# Patient Record
Sex: MALE | Race: WHITE | Employment: OTHER | URBAN - METROPOLITAN AREA
[De-identification: names, ages, dates, MRNs, and addresses within clinical notes are randomized per-mention and may not be internally consistent; named-entity substitution may affect disease eponyms.]

---

## 2017-01-24 DIAGNOSIS — R91.8 LUNG NODULES: ICD-10-CM

## 2017-01-24 DIAGNOSIS — J90 UNSPECIFIED PLEURAL EFFUSION: ICD-10-CM

## 2017-01-24 DIAGNOSIS — J44.9 OBSTRUCTIVE CHRONIC BRONCHITIS WITHOUT EXACERBATION (HCC): ICD-10-CM

## 2017-01-24 DIAGNOSIS — R05.9 COUGH: Primary | ICD-10-CM

## 2017-01-24 DIAGNOSIS — J90 PLEURAL EFFUSION, LEFT: Primary | ICD-10-CM

## 2017-01-27 ENCOUNTER — HOSPITAL ENCOUNTER (OUTPATIENT)
Dept: CT IMAGING | Age: 82
Discharge: HOME | End: 2017-01-27
Attending: INTERNAL MEDICINE
Payer: MEDICARE

## 2017-01-27 DIAGNOSIS — J90 PLEURAL EFFUSION, LEFT: ICD-10-CM

## 2017-01-27 DIAGNOSIS — R91.8 LUNG NODULES: ICD-10-CM

## 2017-01-27 PROCEDURE — 74011250636 HC RX REV CODE- 250/636: Performed by: INTERNAL MEDICINE

## 2017-01-27 PROCEDURE — 74011636320 HC RX REV CODE- 636/320: Performed by: INTERNAL MEDICINE

## 2017-01-27 PROCEDURE — 71260 CT THORAX DX C+: CPT

## 2017-01-27 RX ORDER — SODIUM CHLORIDE 0.9 % (FLUSH) 0.9 %
10 SYRINGE (ML) INJECTION
Status: COMPLETED | OUTPATIENT
Start: 2017-01-27 | End: 2017-01-27

## 2017-01-27 RX ORDER — SODIUM CHLORIDE 9 MG/ML
50 INJECTION, SOLUTION INTRAVENOUS
Status: COMPLETED | OUTPATIENT
Start: 2017-01-27 | End: 2017-01-27

## 2017-01-27 RX ADMIN — Medication 10 ML: at 09:09

## 2017-01-27 RX ADMIN — SODIUM CHLORIDE 50 ML/HR: 900 INJECTION, SOLUTION INTRAVENOUS at 09:09

## 2017-01-27 RX ADMIN — IOPAMIDOL 70 ML: 612 INJECTION, SOLUTION INTRAVENOUS at 09:09

## 2017-01-27 NOTE — PROGRESS NOTES
Discussed results with patient--only trace effusion. Feeling better. No cough or sob. , can f/u with PCP Dr Jeffery Kapoor in 4 months

## 2017-01-30 ENCOUNTER — TELEPHONE (OUTPATIENT)
Dept: INTERNAL MEDICINE CLINIC | Age: 82
End: 2017-01-30

## 2017-01-30 NOTE — TELEPHONE ENCOUNTER
I called and spoke with Sunni Ruiz. She was made aware that CT did not have any pneumonia or masses. She was made aware that it did show some lung nodules that have not changed. She was advised to come back into the office in a few months to get potential xray. She verbalized understanding.

## 2017-01-30 NOTE — TELEPHONE ENCOUNTER
Patient's daughter, Vilma Velasquez, states she needs a call back in reference to getting CT Scan results & discuss pan of care. Please call.  Thank you

## 2017-02-22 RX ORDER — ROSUVASTATIN CALCIUM 20 MG/1
TABLET, COATED ORAL
Qty: 90 TAB | Refills: 1 | Status: SHIPPED | OUTPATIENT
Start: 2017-02-22 | End: 2017-10-17 | Stop reason: SDUPTHER

## 2017-03-02 ENCOUNTER — OFFICE VISIT (OUTPATIENT)
Dept: NEUROLOGY | Age: 82
End: 2017-03-02

## 2017-03-02 VITALS
OXYGEN SATURATION: 95 % | HEART RATE: 61 BPM | DIASTOLIC BLOOD PRESSURE: 70 MMHG | WEIGHT: 122 LBS | HEIGHT: 64 IN | SYSTOLIC BLOOD PRESSURE: 122 MMHG | BODY MASS INDEX: 20.83 KG/M2

## 2017-03-02 DIAGNOSIS — F98.8 ADD (ATTENTION DEFICIT DISORDER): Primary | ICD-10-CM

## 2017-03-02 RX ORDER — ATOMOXETINE 80 MG/1
80 CAPSULE ORAL DAILY
Qty: 30 CAP | Refills: 5 | Status: SHIPPED | OUTPATIENT
Start: 2017-03-02 | End: 2017-11-01 | Stop reason: SDUPTHER

## 2017-03-02 NOTE — PATIENT INSTRUCTIONS

## 2017-03-02 NOTE — MR AVS SNAPSHOT
Visit Information Date & Time Provider Department Dept. Phone Encounter #  
 3/2/2017 12:00 PM Finn Mcgee MD [facility] [phone] [id number] Your Appointments 5/23/2017 11:30 AM  
ROUTINE CARE with Velia Richards III, DO [facility] Appt Note: 6 month follow up  
 [address] [id number]  
[address]  [address] [facility] Upcoming Health Maintenance Date Due  
 GLAUCOMA SCREENING Q2Y 12/30/2016 MEDICARE YEARLY EXAM 5/24/2017 DTaP/Tdap/Td series (2 - Td) 3/14/2022 Allergies as of 3/2/2017  Review Complete On: 3/2/2017 By: Elizabeth Flanagan Severity Noted Reaction Type Reactions Zetia [Ezetimibe]  04/12/2010    Other (comments) C/o back pain Current Immunizations  Reviewed on 11/23/2015 Name Date Influenza High Dose Vaccine PF 10/15/2015 Influenza Vaccine 10/15/2014, 10/7/2013 Influenza Vaccine Whole 11/1/2010 Pneumococcal Conjugate (PCV-13) 11/23/2015 Pneumococcal Vaccine (Unspecified Type) 11/14/2011, 10/1/1998 TDAP Vaccine 3/14/2012 Zoster 9/24/2011 Not reviewed this visit Vitals BP  
  
  
  
  
  
 122/70 BMI and BSA Data Body Mass Index Body Surface Area  
 20.94 kg/m 2 1.58 m 2 Preferred Pharmacy Pharmacy Name Phone [address] [phone] Your Updated Medication List  
  
   
This list is accurate as of: 3/2/17 12:55 PM.  Always use your most recent med list.  
  
  
  
  
 aspirin 81 mg tablet Take 81 mg by mouth daily. atomoxetine 80 mg capsule Commonly known as:  STRATTERA Take 1 Cap by mouth daily. finasteride 5 mg tablet Commonly known as:  PROSCAR Take 5 mg by mouth daily. rosuvastatin 20 mg tablet Commonly known as:  CRESTOR  
 TAKE ONE TABLET BY MOUTH ONE TIME DAILY SPIRIVA WITH HANDIHALER 18 mcg inhalation capsule Generic drug:  tiotropium INHALE ONE CAPSULE BY MOUTH ONE TIME DAILY  
  
 tamsulosin 0.4 mg capsule Commonly known as:  FLOMAX Take 0.4 mg by mouth daily. Prescriptions Sent to Pharmacy Refills  
 atomoxetine (STRATTERA) 80 mg capsule 5 Sig: Take 1 Cap by mouth daily. Class: Normal  
 Pharmacy: [address], [address] [place] [address] #: [phone] Route: Oral  
  
Patient Instructions A Healthy Lifestyle: Care Instructions Your Care Instructions A healthy lifestyle can help you feel good, stay at a healthy weight, and have plenty of energy for both work and play. A healthy lifestyle is something you can share with your whole family. A healthy lifestyle also can lower your risk for serious health problems, such as high blood pressure, heart disease, and diabetes. You can follow a few steps listed below to improve your health and the health of your family. Follow-up care is a key part of your treatment and safety. Be sure to make and go to all appointments, and call your doctor if you are having problems. Its also a good idea to know your test results and keep a list of the medicines you take. How can you care for yourself at home? · Do not eat too much sugar, fat, or fast foods. You can still have dessert and treats now and then. The goal is moderation. · Start small to improve your eating habits. Pay attention to portion sizes, drink less juice and soda pop, and eat more fruits and vegetables. ¨ Eat a healthy amount of food. A 3-ounce serving of meat, for example, is about the size of a deck of cards. Fill the rest of your plate with vegetables and whole grains. ¨ Limit the amount of soda and sports drinks you have every day. Drink more water when you are thirsty. ¨ Eat at least 5 servings of fruits and vegetables every day.  It may seem like a lot, but it is not hard to reach this goal. A serving or helping is 1 piece of fruit, 1 cup of vegetables, or 2 cups of leafy, raw vegetables. Have an apple or some carrot sticks as an afternoon snack instead of a candy bar. Try to have fruits and/or vegetables at every meal. 
· Make exercise part of your daily routine. You may want to start with simple activities, such as walking, bicycling, or slow swimming. Try to be active 30 to 60 minutes every day. You do not need to do all 30 to 60 minutes all at once. For example, you can exercise 3 times a day for 10 or 20 minutes. Moderate exercise is safe for most people, but it is always a good idea to talk to your doctor before starting an exercise program. 
· Keep moving. Naheed Boys the lawn, work in the garden, or [employer]. Take the stairs instead of the elevator at work. · If you smoke, quit. People who smoke have an increased risk for heart attack, stroke, cancer, and other lung illnesses. Quitting is hard, but there are ways to boost your chance of quitting tobacco for good. ¨ Use nicotine gum, patches, or lozenges. ¨ Ask your doctor about stop-smoking programs and medicines. ¨ Keep trying. In addition to reducing your risk of diseases in the future, you will notice some benefits soon after you stop using tobacco. If you have shortness of breath or asthma symptoms, they will likely get better within a few weeks after you quit. · Limit how much alcohol you drink. Moderate amounts of alcohol (up to 2 drinks a day for men, 1 drink a day for women) are okay. But drinking too much can lead to liver problems, high blood pressure, and other health problems. Family health If you have a family, there are many things you can do together to improve your health. · Eat meals together as a family as often as possible. · Eat healthy foods. This includes fruits, vegetables, lean meats and dairy, and whole grains. · Include your family in your fitness plan. Most people think of activities such as jogging or tennis as the way to fitness, but there are many ways you and your family can be more active. Anything that makes you breathe hard and gets your heart pumping is exercise. Here are some tips: 
¨ Walk to do errands or to take your child to school or the bus. ¨ Go for a family bike ride after dinner instead of watching TV. Where can you learn more? Go to http://samina-cyn.info/. Enter U288 in the search box to learn more about \"A Healthy Lifestyle: Care Instructions. \" Current as of: July 26, 2016 Content Version: 11.1 © 1571-2710 [employer]. Care instructions adapted under license by [employer] (which disclaims liability or warranty for this information). If you have questions about a medical condition or this instruction, always ask your healthcare professional. [address] any warranty or liability for your use of this information. Introducing [facility]! Harvinder Norris introduces [employer] patient portal. Now you can access parts of your medical record, email your doctor's office, and request medication refills online. 1. In your internet browser, go to [email or web address] 2. Click on the First Time User? Click Here link in the Sign In box. You will see the New Member Sign Up page. 3. Enter your [employer] Access Code exactly as it appears below. You will not need to use this code after youve completed the sign-up process. If you do not sign up before the expiration date, you must request a new code. · [employer] Access Code: [email or web address] Expires: 5/31/2017 11:53 AM 
 
4. Enter the last four digits of your Social Security Number (xxxx) and Date of Birth (mm/dd/yyyy) as indicated and click Submit. You will be taken to the next sign-up page. 5. Create a [employer] ID. This will be your [employer] login ID and cannot be changed, so think of one that is secure and easy to remember. 6. Create a [employer] password. You can change your password at any time. 7. Enter your Password Reset Question and Answer. This can be used at a later time if you forget your password. 8. Enter your e-mail address. You will receive e-mail notification when new information is available in [address] 9. Click Sign Up. You can now view and download portions of your medical record. 10. Click the Download Summary menu link to download a portable copy of your medical information. If you have questions, please visit the Frequently Asked Questions section of the [employer] website. Remember, [employer] is NOT to be used for urgent needs. For medical emergencies, dial 911. Now available from your iPhone and Android! Please provide this summary of care documentation to your next provider. Your primary care clinician is listed as Vaughn De La Cruz If you have any questions after today's visit, please call [phone].
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
## 2017-05-16 ENCOUNTER — OFFICE VISIT (OUTPATIENT)
Dept: INTERNAL MEDICINE CLINIC | Age: 82
End: 2017-05-16

## 2017-05-16 VITALS
OXYGEN SATURATION: 96 % | SYSTOLIC BLOOD PRESSURE: 117 MMHG | WEIGHT: 120 LBS | TEMPERATURE: 97.7 F | BODY MASS INDEX: 20.49 KG/M2 | DIASTOLIC BLOOD PRESSURE: 64 MMHG | HEART RATE: 79 BPM | RESPIRATION RATE: 18 BRPM | HEIGHT: 64 IN

## 2017-05-16 DIAGNOSIS — E78.2 MIXED HYPERLIPIDEMIA: Chronic | ICD-10-CM

## 2017-05-16 DIAGNOSIS — I10 ESSENTIAL HYPERTENSION: Chronic | ICD-10-CM

## 2017-05-16 DIAGNOSIS — J43.9 PULMONARY EMPHYSEMA, UNSPECIFIED EMPHYSEMA TYPE (HCC): Primary | Chronic | ICD-10-CM

## 2017-05-16 DIAGNOSIS — Z00.00 ROUTINE GENERAL MEDICAL EXAMINATION AT A HEALTH CARE FACILITY: ICD-10-CM

## 2017-05-16 DIAGNOSIS — Z13.39 SCREENING FOR ALCOHOLISM: ICD-10-CM

## 2017-05-16 DIAGNOSIS — R73.9 HYPERGLYCEMIA: ICD-10-CM

## 2017-05-16 DIAGNOSIS — Z13.31 SCREENING FOR DEPRESSION: ICD-10-CM

## 2017-05-16 DIAGNOSIS — R73.03 PREDIABETES: ICD-10-CM

## 2017-05-16 NOTE — PATIENT INSTRUCTIONS
Medicare Part B Preventive Services Limitations Recommendation/Date completed if known Scheduled/ Next Due   Bone Mass Measurement  (age 72 & older, biennial) Requires diagnosis related to osteoporosis or estrogen deficiency. Biennial benefit unless patient has history of long-term glucocorticoid tx or baseline is needed because initial test was by other method Completed:  n/a     Recommended every 2 years n/a   Cardiovascular Screening Blood Tests (every 5 years)  Total cholesterol, HDL, Triglycerides Order as a panel if possible Completed:  5/2016     Recommended annually DUE: Due   Colorectal Cancer Screening  -Fecal occult blood test (annual)  -Flexible sigmoidoscopy (5y)  -Screening colonoscopy (10y)  -Barium Enema   Completed:  8/5/15 Dr. Renee Dumas        Recommended every (10 ) years DUE: complete-no routine colonoscopy follow up due to age    Counseling to Prevent Tobacco Use (up to 8 sessions per year)  - Counseling greater than 3 and up to 10 minutes  - Counseling greater than 10 minutes Patients must be asymptomatic of tobacco-related conditions to receive as preventive service You currently smoke 1/2 PPD . You have tried quitting. call [phone]  When you are ready to quit   Diabetes Screening Tests (at least every 3 years, Medicare covers annually or at 6-month intervals for prediabetic patients)     Fasting blood sugar (FBS) or glucose tolerance test (GTT) Patient must be diagnosed with one of the following:  -Hypertension, Dyslipidemia, obesity, previous impaired FBS or GTT  Or any two of the following: overweight, FH of diabetes, age ? 72, history of gestational diabetes, birth of baby weighing more than 9 pounds Completed:  5/2016        Recommended annually DUE: Today   Diabetes Self-Management Training (DSMT) (no USPSTF recommendation) Requires referral by treating physician for patient with diabetes or renal disease.  10 hours of initial DSMT session of no less than 30 minutes each in a continuous 12-month period. 2 hours of follow-up DSMT in subsequent years. n/a n/a   Glaucoma Screening (no USPSTF recommendation) Diabetes mellitus, family history, , age 48 or over,  American, age 72 or over Completed:  [facility]  2015        Recommended annually DUE:    Human Immunodeficiency Virus (HIV) Screening (annually for increased risk patients)  HIV-1 and HIV-2 by EIA, NOHELIA, rapid antibody test, or oral mucosa transudate Patient must be at increased risk for HIV infection per USPSTF guidelines or pregnant. Tests covered annually for patients at increased risk. Pregnant patients may receive up to 3 test during pregnancy. n/a n/a   Medical Nutrition Therapy (MNT) (for diabetes or renal disease not recommended schedule) Requires referral by treating physician for patient with diabetes or renal disease. Can be provided in same year as diabetes self-management training (DSMT), and CMS recommends medical nutrition therapy take place after DSMT. Up to 3 hours for initial year and 2 hours in subsequent years. n/a n/a   Prostate Cancer Screening (annually up to age 76)  - Digital rectal exam (DAVID)  - Prostate specific antigen (PSA) Annually (age 48 or over), DAVID not paid separately when covered E/M service is provided on same date Completed:  Managed by Dr. Lexy Whaley  None recorded in chart     Recommended annually DUE: per Dr. Jenny Loyd Vaccination (annually)   Completed:  8/2016    Recommended annually     DUE every Fall    Pneumococcal Vaccination (once after 65)   Completed:  11/14/11 PSV 23  11/23/15 PCV 13 Complete   Hepatitis B Vaccinations (if medium/high risk) Medium/high risk factors: End-stage renal disease,  Hemophiliacs who received Factor VIII or IX concentrates, Clients of institutions for the mentally retarded, Persons who live in the same house as a HepB virus carrier, Homosexual men, Illicit injectable drug abusers.  n/a n/a   Screening Mammography (biennial age 54-69)? Annually (age 36 or over) Completed:   n/a     Recommended annually n/a   Screening Pap Tests and Pelvic Examination (up to age 79 and after 79 if unknown history or abnormal study last 10 years) Every 25 months except high risk Completed:     n/a     Recommended every 2 years n/a   Ultrasound Screening for Abdominal Aortic Aneurysm (AAA) (once) Patient must be referred through IPPE and not have had a screening for abdominal aortic aneurysm before under Medicare. Limited to patients who meet one of the following criteria:  - Men who are 73-68 years old and have smoked more than 100 cigarettes in their lifetime.  -Anyone with a FH of AAA  -Anyone recommended for screening by USPSTF Not covered by Medicare as preventive.     n/a n/a   Thanks for coming in today. It was nice to spend some time with you. If you have any questions about your visit today, please call [phone] and ask for Tone Meyers. Advance Directives: After Your Visit  Your Care Instructions  An advance directive is a legal way to state your wishes at the end of your life. It tells your family and your doctor what to do if you can no longer say what you want. There are two main types of advance directives. You can change them any time that your wishes change. · A living will tells your family and your doctor your wishes about life support and other treatment. · A medical power of  lets you name a person to make treatment decisions for you when you can't speak for yourself. This person is called a health care agent. If you do not have an advance directive, decisions about your medical care may be made by a doctor or a  who doesn't know you. It may help to think of an advance directive as a gift to the people who care for you. If you have one, they won't have to make tough decisions by themselves. Follow-up care is a key part of your treatment and safety.  Be sure to make and go to all appointments, and call your doctor if you are having problems. It's also a good idea to know your test results and keep a list of the medicines you take. How can you care for yourself at home? · Discuss your wishes with your loved ones and your doctor. This way, there are no surprises. · Many states have a unique form. Or you might use a universal form that has been approved by many states. This kind of form can sometimes be completed and stored online. Your electronic copy will then be available wherever you have a connection to the Internet. In most cases, doctors will respect your wishes even if you have a form from a different state. · You don't need a  to do an advance directive. But you may want to get legal advice. · Think about these questions when you prepare an advance directive:  ¨ Who do you want to make decisions about your medical care if you are not able to? Many people choose a family member, close friend, or doctor. ¨ Do you know enough about life support methods that might be used? If not, talk to your doctor so you understand. ¨ What are you most afraid of that might happen? You might be afraid of having pain, losing your independence, or being kept alive by machines. ¨ Where would you prefer to die? Choices include your home, a hospital, or a nursing home. ¨ Would you like to have information about hospice care to support you and your family? ¨ Do you want to donate organs when you die? ¨ Do you want certain Shinto practices performed before you die? If so, put your wishes in the advance directive. · Read your advance directive every year, and make changes as needed. When should you call for help? Be sure to contact your doctor if you have any questions. Where can you learn more? Go to [employer]  Enter R264 in the search box to learn more about \"Advance Directives: After Your Visit. \"   © 8484-7203 [employer].  Care instructions adapted under license by [employer] (which disclaims liability or warranty for this information). This care instruction is for use with your licensed healthcare professional. If you have questions about a medical condition or this instruction, always ask your healthcare professional. [address] any warranty or liability for your use of this information. Content Version: 86.3.318109; Current as of: February 20, 2015. Come back for fasting labs, lab opens 8 am, mon-fri. No appt needed.

## 2017-05-16 NOTE — PROGRESS NOTES
Ambrose Nagy is a 80 y.o. male who presents for evaluation of routine follow up. Last seen by me 2016. Saw dr Enrique Vu dec 29 for uri issues. Ended up having CT chest, had small pleural effusion, otherwise ok. He is doing fine now, no issues. ROS:  Constitutional: negative for fevers, chills, anorexia and weight loss  Eyes:   negative for visual disturbance and irritation  ENT:   negative for tinnitus,sore throat,nasal congestion,ear pain,hoarseness  Respiratory:  negative for cough, hemoptysis, dyspnea,wheezing  CV:   negative for chest pain, palpitations, lower extremity edema  GI:   negative for nausea, vomiting, diarrhea, abdominal pain,melena  Genitourinary: negative for frequency, dysuria and hematuria  Musculoskel: negative for myalgias, arthralgias, back pain, muscle weakness, joint pain  Neurological:  negative for headaches, dizziness, focal weakness, numbness  Psychiatric:     Negative for depression or anxiety      Past Medical History:   Diagnosis Date    Arthritis     BPH (benign prostatic hypertrophy) 2010    COPD (chronic obstructive pulmonary disease) with emphysema ([facility] [address] 2012    HLD (hyperlipidemia) 2010    HTN (hypertension) 2010    Memory disorder     Psychiatric disorder     anxiety and depression       Past Surgical History:   Procedure Laterality Date    COLONOSCOPY,NURA FERNÁNDEZ,SNARE  2015    2 sessile sigmoid polyps, 3 & 5 mm; Dr. Gilbert Patterson; no further screening recommended    ENDOSCOPY, COLON, DIAGNOSTIC      negative; 10 year repeat; Dr. Daniella Perez  years ago    left    HX HERNIA REPAIR      right inguinal    CO COLSC FLX W/RMVL OF TUMOR POLYP LESION SNARE TQ  2012            Family History   Problem Relation Age of Onset    Heart Disease Mother       at 80    Heart Disease Father       at 80    Liver Disease Brother     Stroke Brother     Heart Attack Brother     Other Sister 8     ?  polio    Cancer Sister      lung       Social History     Social History    Marital status:      Spouse name: N/A    Number of children: N/A    Years of education: N/A     Occupational History    Not on file. Social History Main Topics    Smoking status: Current Some Day Smoker     Packs/day: 0.50     Years: 60.00     Types: Cigarettes    Smokeless tobacco: Former User     Quit date: 10/5/2014      Comment: smokes about 10 cigs per day    Alcohol use 4.2 oz/week     7 Glasses of wine per week      Comment: 1 drinks per evening    Drug use: No    Sexual activity: Not on file     Other Topics Concern    Not on file     Social History Narrative            Visit Vitals    /64 (BP 1 Location: Left arm, BP Patient Position: Sitting)    Pulse 79    Temp 97.7 °F (36.5 °C) (Oral)    Resp 18    Ht 5' 4\" (1.626 m)    Wt 120 lb (54.4 kg)    SpO2 96%    BMI 20.6 kg/m2       Physical Examination:   General - Well appearing male  HEENT - PERRL, TM no erythema/opacification, normal nasal turbinates, no oropharyngeal erythema or exudate, MMM  Neck - supple, no bruits, no thyroidomegaly, no lymphadenopathy  Pulm - clear to auscultation bilaterally--good air flow, no adventitious sounds. Cardio - RRR, normal S1 S2, no murmur  Abd - soft, nontender, no masses, no HSM  Extrem - no edema, +2 distal pulses  Neuro-  No focal deficits, CN intact     Assessment/Plan:    1  Copd--controlled with spiriva  2.  hyperlipids--on crestor, check flp  3. Prediabetes--last a1c 6.0, check again  4.  adhd--controlled with strattera  5. Tobacco abuse--continues to smoke some  6. bph--on proscar, flomax  7.   Routine adult health maintenance--will make appt to be screened for glaucoma    rtc 6 months        Maggie Archibald III, DO

## 2017-05-16 NOTE — PROGRESS NOTES
Reviewed record in preparation for visit and have obtained necessary documentation. Identified pt with two pt identifiers(name and ). Chief Complaint   Patient presents with    Cholesterol Problem     follow up       Health Maintenance Due   Topic Date Due    GLAUCOMA SCREENING Q2Y  2016       Mr. aHrrington has a reminder for a \"due or due soon\" health maintenance. I have asked that he discuss health maintenance topic(s) due with His  primary care provider. Coordination of Care Questionnaire:  :     1) Have you been to an emergency room, urgent care clinic since your last visit? no   Hospitalized since your last visit? no             2) Have you seen or consulted any other health care providers outside of [address] [place] [address] since your last visit? no  (Include any pap smears or colon screenings in this section.)      Patient is accompanied by self I have received verbal consent from Dennis Severance to discuss any/all medical information while they are present in the room.

## 2017-05-16 NOTE — MR AVS SNAPSHOT
Visit Information Date & Time Provider Department Dept. Phone Encounter #  
 5/16/2017  2:00 PM Shyla Jeronimo, [address] [phone] [id number] Follow-up Instructions Return in about 6 months (around 11/16/2017). Your Appointments 9/5/2017 11:30 AM  
Follow Up with Radha Desir NP [facility] Appt Note: Follow up $0CP tdb 3/2/17  
 [address] [place] [address], 
[address] [place] [address], [phone]  
[address] , [address] [place] [address], [address] [phone] Upcoming Health Maintenance Date Due  
 GLAUCOMA SCREENING Q2Y 12/30/2016 INFLUENZA AGE 9 TO ADULT 8/1/2017 MEDICARE YEARLY EXAM 5/17/2018 DTaP/Tdap/Td series (2 - Td) 3/14/2022 Allergies as of 5/16/2017  Review Complete On: 5/16/2017 By: Marli Joiner III, DO Severity Noted Reaction Type Reactions Zetia [Ezetimibe]  04/12/2010    Other (comments) C/o back pain Current Immunizations  Reviewed on 11/23/2015 Name Date Influenza High Dose Vaccine PF 10/15/2015 Influenza Vaccine 10/15/2014, 10/7/2013 Influenza Vaccine Whole 11/1/2010 Pneumococcal Conjugate (PCV-13) 11/23/2015 Pneumococcal Vaccine (Unspecified Type) 11/14/2011, 10/1/1998 TDAP Vaccine 3/14/2012 Zoster 9/24/2011 Not reviewed this visit You Were Diagnosed With   
  
 Codes Comments Pulmonary emphysema, unspecified emph[facility] type ([facility]    -  Primary ICD-10-CM: J43.9 ICD-9-CM: 492.8 Routine general medical examination at a health care facility     ICD-10-CM: Z00.00 ICD-9-CM: V70.0 Screening for alcoholism     ICD-10-CM: Z13.89 ICD-9-CM: V79.1 Screening for depression     ICD-10-CM: Z13.89 ICD-9-CM: V79.0 Essential hypertension     ICD-10-CM: I10 
ICD-9-CM: 401.9 Mixed hyperlipidemia     ICD-10-CM: E78.2 ICD-9-CM: 272.2 Prediabetes     ICD-10-CM: R73.03 
ICD-9-CM: 790.29 Hyperglycemia     ICD-10-CM: R73.9 ICD-9-CM: 790.29 Vitals BP Pulse Temp Resp Height(growth percentile) Weight(growth percentile)  
 117/64 (BP 1 Location: Left arm, BP Patient Position: Sitting) 79 97.7 °F (36.5 °C) (Oral) 18 5' 4\" (1.626 m) 120 lb (54.4 kg) SpO2 BMI Smoking Status 96% 20.6 kg/m2 Current Some Day Smoker Vitals History BMI and BSA Data Body Mass Index Body Surface Area  
 20.6 kg/m 2 1.57 m 2 Preferred Pharmacy Pharmacy Name Phone [facility], [address] [phone] Your Updated Medication List  
  
   
This list is accurate as of: 5/16/17  2:36 PM.  Always use your most recent med list.  
  
  
  
  
 aspirin 81 mg tablet Take 81 mg by mouth daily. atomoxetine 80 mg capsule Commonly known as:  STRATTERA Take 1 Cap by mouth daily. finasteride 5 mg tablet Commonly known as:  PROSCAR Take 5 mg by mouth daily. rosuvastatin 20 mg tablet Commonly known as:  CRESTOR  
TAKE ONE TABLET BY MOUTH ONE TIME DAILY SPIRIVA WITH HANDIHALER 18 mcg inhalation capsule Generic drug:  tiotropium INHALE ONE CAPSULE BY MOUTH ONE TIME DAILY  
  
 tamsulosin 0.4 mg capsule Commonly known as:  FLOMAX Take 0.4 mg by mouth daily. We Performed the Following [facility] 68 [[id number] [facility]] HEMOGLOBIN A1C WITH EAG [47796 CPT(R)] LIPID PANEL [07982 CPT(R)] METABOLIC PANEL, COMPREHENSIVE [99742 CPT(R)] Follow-up Instructions Return in about 6 months (around 11/16/2017). Patient Instructions Medicare Part B Preventive Services Limitations Recommendation/Date completed if known Scheduled/ Next Due Bone Mass Measurement 
(age 72 & older, biennial) Requires diagnosis related to osteoporosis or estrogen deficiency.  Biennial benefit unless patient has history of long-term glucocorticoid tx or baseline is needed because initial test was by other method Completed: 
n/a 
  
Recommended every 2 years n/a Cardiovascular Screening Blood Tests (every 5 years) Total cholesterol, HDL, Triglycerides Order as a panel if possible Completed: 
5/2016 
  
Recommended annually DUE: Due  
Colorectal Cancer Screening 
-Fecal occult blood test (annual) -Flexible sigmoidoscopy (5y) 
-Screening colonoscopy (10y) -Barium Enema   Completed: 
8/5/15 Dr. Kathia Zavala 
  
  
Recommended every (10 ) years DUE: complete-no routine colonoscopy follow up due to age Counseling to Prevent Tobacco Use (up to 8 sessions per year) - Counseling greater than 3 and up to 10 minutes - Counseling greater than 10 minutes Patients must be asymptomatic of tobacco-related conditions to receive as preventive service You currently smoke 1/2 PPD . You have tried quitting. call [phone] When you are ready to quit Diabetes Screening Tests (at least every 3 years, Medicare covers annually or at 6-month intervals for prediabetic patients) 
  Fasting blood sugar (FBS) or glucose tolerance test (GTT) Patient must be diagnosed with one of the following: 
-Hypertension, Dyslipidemia, obesity, previous impaired FBS or GTT 
Or any two of the following: overweight, FH of diabetes, age ? 72, history of gestational diabetes, birth of baby weighing more than 9 pounds Completed: 
5/2016 
  
  
Recommended annually DUE: Today Diabetes Self-Management Training (DSMT) (no USPSTF recommendation) Requires referral by treating physician for patient with diabetes or renal disease. 10 hours of initial DSMT session of no less than 30 minutes each in a continuous 12-month period. 2 hours of follow-up DSMT in subsequent years. n/a n/a Glaucoma Screening (no USPSTF recommendation) Diabetes mellitus, family history, , age 48 or over,  American, age 72 or over Completed: 
[facility] 2015 
  
  
 Recommended annually DUE:   
Human Immunodeficiency Virus (HIV) Screening (annually for increased risk patients) HIV-1 and HIV-2 by EIA, NOHELIA, rapid antibody test, or oral mucosa transudate Patient must be at increased risk for HIV infection per USPSTF guidelines or pregnant. Tests covered annually for patients at increased risk. Pregnant patients may receive up to 3 test during pregnancy. n/a n/a Medical Nutrition Therapy (MNT) (for diabetes or renal disease not recommended schedule) Requires referral by treating physician for patient with diabetes or renal disease. Can be provided in same year as diabetes self-management training (DSMT), and CMS recommends medical nutrition therapy take place after DSMT. Up to 3 hours for initial year and 2 hours in subsequent years. n/a n/a Prostate Cancer Screening (annually up to age 76) - Digital rectal exam (DAVID) - Prostate specific antigen (PSA) Annually (age 48 or over), DAVID not paid separately when covered E/M service is provided on same date Completed: 
Managed by Dr. Lindsay Collier None recorded in chart 
  
Recommended annually DUE: per Dr. Lindsay Collier Seasonal Influenza Vaccination (annually)   Completed: 
8/2016 
  Recommended annually 
  
DUE every Fall Pneumococcal Vaccination (once after 65)   Completed: 
11/14/11 PSV 23 
11/23/15 PCV 13 Complete Hepatitis B Vaccinations (if medium/high risk) Medium/high risk factors: End-stage renal disease, Hemophiliacs who received Factor VIII or IX concentrates, Clients of institutions for the mentally retarded, Persons who live in the same house as a HepB virus carrier, Homosexual men, Illicit injectable drug abusers. n/a n/a Screening Mammography (biennial age 54-69)? Annually (age 36 or over) Completed:  
n/a 
  
Recommended annually n/a Screening Pap Tests and Pelvic Examination (up to age 79 and after 79 if unknown history or abnormal study last 10 years) Every 24 months except high risk Completed: 
  
n/a 
  
 Recommended every 2 years n/a Ultrasound Screening for Abdominal Aortic Aneurysm (AAA) (once) Patient must be referred through IPPE and not have had a screening for abdominal aortic aneurysm before under Medicare. Limited to patients who meet one of the following criteria: 
- Men who are 73-68 years old and have smoked more than 100 cigarettes in their lifetime. 
-Anyone with a FH of AAA 
-Anyone recommended for screening by USPSTF Not covered by Medicare as preventive. 
  
n/a n/a Thanks for coming in today. It was nice to spend some time with you. If you have any questions about your visit today, please call [phone] and ask for [facility]. Advance Directives: After Your Visit Your Care Instructions An advance directive is a legal way to state your wishes at the end of your life. It tells your family and your doctor what to do if you can no longer say what you want. There are two main types of advance directives. You can change them any time that your wishes change. · A living will tells your family and your doctor your wishes about life support and other treatment. · A medical power of  lets you name a person to make treatment decisions for you when you can't speak for yourself. This person is called a health care agent. If you do not have an advance directive, decisions about your medical care may be made by a doctor or a  who doesn't know you. It may help to think of an advance directive as a gift to the people who care for you. If you have one, they won't have to make tough decisions by themselves. Follow-up care is a key part of your treatment and safety. Be sure to make and go to all appointments, and call your doctor if you are having problems. It's also a good idea to know your test results and keep a list of the medicines you take. How can you care for yourself at home? · Discuss your wishes with your loved ones and your doctor. This way, there are no surprises. · Many states have a unique form. Or you might use a universal form that has been approved by many states. This kind of form can sometimes be completed and stored online. Your electronic copy will then be available wherever you have a connection to the Internet. In most cases, doctors will respect your wishes even if you have a form from a different state. · You don't need a  to do an advance directive. But you may want to get legal advice. · Think about these questions when you prepare an advance directive: ¨ Who do you want to make decisions about your medical care if you are not able to? Many people choose a family member, close friend, or doctor. ¨ Do you know enough about life support methods that might be used? If not, talk to your doctor so you understand. ¨ What are you most afraid of that might happen? You might be afraid of having pain, losing your independence, or being kept alive by machines. ¨ Where would you prefer to die? Choices include your home, a hospital, or a nursing home. ¨ Would you like to have information about hospice care to support you and your family? ¨ Do you want to donate organs when you die? ¨ Do you want certain Amish practices performed before you die? If so, put your wishes in the advance directive. · Read your advance directive every year, and make changes as needed. When should you call for help? Be sure to contact your doctor if you have any questions. Where can you learn more? Go to Mindjet.be Enter R264 in the search box to learn more about \"Advance Directives: After Your Visit. \"  
© 3841-8931 [employer]. Care instructions adapted under license by Judith Harrington (which disclaims liability or warranty for this information).  This care instruction is for use with your licensed healthcare professional. If you have questions about a medical condition or this instruction, always ask your healthcare professional. [place] [address] any warranty or liability for your use of this information. Content Version: 25.6.929053; Current as of: February 20, 2015. Come back for fasting labs, lab opens 8 am, mon-fri. No appt needed. Introducing [facility] SERVICES! Theresa Cleverly introduces Greenpie patient portal. Now you can access parts of your medical record, email your doctor's office, and request medication refills online. 1. In your internet browser, go to [email or web address] 2. Click on the First Time User? Click Here link in the Sign In box. You will see the New Member Sign Up page. 3. Enter your Greenpie Access Code exactly as it appears below. You will not need to use this code after youve completed the sign-up process. If you do not sign up before the expiration date, you must request a new code. · Greenpie Access Code: [email or web address] Expires: 5/31/2017 12:53 PM 
 
4. Enter the last four digits of your Social Security Number (xxxx) and Date of Birth (mm/dd/yyyy) as indicated and click Submit. You will be taken to the next sign-up page. 5. Create a Greenpie ID. This will be your Greenpie login ID and cannot be changed, so think of one that is secure and easy to remember. 6. Create a Greenpie password. You can change your password at any time. 7. Enter your Password Reset Question and Answer. This can be used at a later time if you forget your password. 8. Enter your e-mail address. You will receive e-mail notification when new information is available in [address] 9. Click Sign Up. You can now view and download portions of your medical record. 10. Click the Download Summary menu link to download a portable copy of your medical information. If you have questions, please visit the Frequently Asked Questions section of the Greenpie website.  Remember, Greenpie is NOT to be used for urgent needs. For medical emergencies, dial 911. Now available from your iPhone and Android! Please provide this summary of care documentation to your next provider. Your primary care clinician is listed as Rubén Krause If you have any questions after today's visit, please call [phone].

## 2017-05-16 NOTE — PROGRESS NOTES
This is a Subsequent Medicare Annual Wellness Visit providing Personalized Prevention Plan Services (PPPS) (Performed 12 months after initial AWV and PPPS )    I have reviewed the patient's medical history in detail and updated the computerized patient record. History     Past Medical History:   Diagnosis Date    Arthritis     BPH (benign prostatic hypertrophy) 2010    COPD (chronic obstructive pulmonary disease) with emphysema ([facility] 2012    HLD (hyperlipidemia) 2010    HTN (hypertension) 2010    Memory disorder     Psychiatric disorder     anxiety and depression      Past Surgical History:   Procedure Laterality Date    Juan Naranjo  2015    2 sessile sigmoid polyps, 3 & 5 mm; Dr. Christine Vale; no further screening recommended    ENDOSCOPY, COLON, DIAGNOSTIC      negative; 10 year repeat; Dr. Joycelyn Zhu  years ago    left    HX HERNIA REPAIR      right inguinal    [place] COLSC FLX W/RMVL OF TUMOR POLYP LESION SNARE TQ  2012          Current Outpatient Prescriptions   Medication Sig Dispense Refill    atomoxetine (STRATTERA) 80 mg capsule Take 1 Cap by mouth daily. 30 Cap 5    rosuvastatin (CRESTOR) 20 mg tablet TAKE ONE TABLET BY MOUTH ONE TIME DAILY 90 Tab 1    SPIRIVA WITH HANDIHALER 18 mcg inhalation capsule INHALE ONE CAPSULE BY MOUTH ONE TIME DAILY 30 Cap 8    tamsulosin (FLOMAX) 0.4 mg capsule Take 0.4 mg by mouth daily.  finasteride (PROSCAR) 5 mg tablet Take 5 mg by mouth daily.  aspirin 81 mg Tab Take 81 mg by mouth daily. Allergies   Allergen Reactions    Zetia [Ezetimibe] Other (comments)     C/o back pain     Family History   Problem Relation Age of Onset    Heart Disease Mother       at 80    Heart Disease Father       at 80    Liver Disease Brother     Stroke Brother     Heart Attack Brother     Other Sister 8     ?  polio    Cancer Sister      lung     Social History   Substance Use Topics    Smoking status: Current Some Day Smoker     Packs/day: 0.50     Years: 60.00     Types: Cigarettes    Smokeless tobacco: Former User     Quit date: 10/5/2014      Comment: smokes about 10 cigs per day    Alcohol use 4.2 oz/week     7 Glasses of wine per week      Comment: 1 drinks per evening     Patient Active Problem List   Diagnosis Code    HLD (hyperlipidemia) E78.5    BPH (benign prostatic hypertrophy) N40.0    DJD (degenerative joint disease) M19.90    Smoker F17.200    Post herpetic neuralgia B02.29    COPD (chronic obstructive pulmonary disease) with emphysema (HCC) J43.9    Essential tremor G25.0    Short-term memory loss R41.3    Anxiety state F41.1    Depression F32.9    ADHD (attention deficit hyperactivity disorder) F90.9    Abnormal CT lung screening R91.8    Prediabetes R73.03    Essential hypertension I10       Depression Risk Factor Screening:     PHQ over the last two weeks 5/23/2016   Little interest or pleasure in doing things Not at all   Feeling down, depressed or hopeless Not at all   Total Score PHQ 2 0     Alcohol Risk Factor Screening: On any occasion during the past 3 months, have you had more than 4 drinks containing alcohol? Yes    Do you average more than 14 drinks per week? No      Functional Ability and Level of Safety:     Hearing Loss   None noted. Activities of Daily Living   Self-care.    Requires assistance with: no ADLs  ADL Assessment 5/16/2017   Feeding yourself No Help Needed   Getting from bed to chair No Help Needed   Getting dressed No Help Needed   Bathing or showering No Help Needed   Walk across the room (includes cane/walker) No Help Needed   Using the telphone No Help Needed   Taking your medications No Help Needed   Preparing meals No Help Needed   Managing money (expenses/bills) No Help Needed   Moderately strenuous housework (laundry) No Help Needed   Shopping for personal items (toiletries/medicines) No Help Needed   Shopping for groceries No Help Needed   Driving No Help Needed   Climbing a flight of stairs No Help Needed   Getting to places beyond walking distances No Help Needed     Fall Risk     Fall Risk Assessment, last 12 mths 5/16/2017   Able to walk? Yes   Fall in past 12 months? No     Abuse Screen   Patient is not abused  Abuse Screening Questionnaire 5/16/2017   Do you ever feel afraid of your partner? N   Are you in a relationship with someone who physically or mentally threatens you? N   Is it safe for you to go home? Y     Review of Systems   Not required    Physical Examination     Evaluation of Cognitive Function:  Mood/affect:  neutral  Appearance: age appropriate and casually dressed  Family member/caregiver input: None noted during this visit. No exam performed today, Medicare Wellness Visit Completed. Patient Care Team:  Sally Rizo DO as PCP - General (Internal Medicine)  Tillie Skiff, RN as Ambulatory Care Navigator  Ila Sheppard MD (Gastroenterology)  Eric Sawant MD (Neurology)  Rafael Morin MD (Urology)  Pedro Ramirez DO (Dermatology)    Advice/Referrals/Counseling   Education and counseling provided:  Are appropriate based on today's review and evaluation  End-of-Life planning (with patient's consent)  Screening for glaucoma      Assessment/Plan   Sofia Lutz presents today for a 6 month follow-up and a Medicare Wellness Visit. 1. Glaucoma Screening: The patient stated that it has been about 2 years. Daughter to schedule appointment. 2. ACP: A copy of patient's completed Advanced Medical Directive is on file in the patient's medical record. NN reviewed Advanced Medical Directive document with patient & patient denies the need for changes/ updates. Medication reconciliation completed by MA/LPN and reviewed by PCP. Medical/surgical/social/family history reviewed and updated by PCP. Patient provided AVS and preventative screening table.   Patient verbalized understanding of all information discussed.     Attending note:  Agree with above  Thanks  Josi Glance, do

## 2017-05-17 ENCOUNTER — APPOINTMENT (OUTPATIENT)
Dept: INTERNAL MEDICINE CLINIC | Age: 82
End: 2017-05-17

## 2017-05-17 ENCOUNTER — HOSPITAL ENCOUNTER (OUTPATIENT)
Dept: LAB | Age: 82
Discharge: HOME | End: 2017-05-17
Payer: MEDICARE

## 2017-05-17 PROCEDURE — 80061 LIPID PANEL: CPT

## 2017-05-17 PROCEDURE — 80053 COMPREHEN METABOLIC PANEL: CPT

## 2017-05-17 PROCEDURE — 36415 COLL VENOUS BLD VENIPUNCTURE: CPT

## 2017-05-17 PROCEDURE — 83036 HEMOGLOBIN GLYCOSYLATED A1C: CPT

## 2017-05-18 LAB
ALBUMIN SERPL-MCNC: 3.7 G/DL (ref 3.5–4.7)
ALBUMIN/GLOB SERPL: 1.3 {RATIO} (ref 1.2–2.2)
ALP SERPL-CCNC: 76 IU/L (ref 39–117)
ALT SERPL-CCNC: 17 IU/L (ref 0–44)
AST SERPL-CCNC: 21 IU/L (ref 0–40)
BILIRUB SERPL-MCNC: 0.4 MG/DL (ref 0–1.2)
BUN SERPL-MCNC: 15 MG/DL (ref 8–27)
BUN/CREAT SERPL: 20 (ref 10–24)
CALCIUM SERPL-MCNC: 9 MG/DL (ref 8.6–10.2)
CHLORIDE SERPL-SCNC: 97 MMOL/L (ref 96–106)
CHOLEST SERPL-MCNC: 126 MG/DL (ref 100–199)
CO2 SERPL-SCNC: 25 MMOL/L (ref 18–29)
CREAT SERPL-MCNC: 0.75 MG/DL (ref 0.76–1.27)
EST. AVERAGE GLUCOSE BLD GHB EST-MCNC: 123 MG/DL
GLOBULIN SER CALC-MCNC: 2.9 G/DL (ref 1.5–4.5)
GLUCOSE SERPL-MCNC: 92 MG/DL (ref 65–99)
HBA1C MFR BLD: 5.9 % (ref 4.8–5.6)
HDLC SERPL-MCNC: 48 MG/DL
LDLC SERPL CALC-MCNC: 66 MG/DL (ref 0–99)
POTASSIUM SERPL-SCNC: 4.5 MMOL/L (ref 3.5–5.2)
PROT SERPL-MCNC: 6.6 G/DL (ref 6–8.5)
SODIUM SERPL-SCNC: 138 MMOL/L (ref 134–144)
TRIGL SERPL-MCNC: 61 MG/DL (ref 0–149)
VLDLC SERPL CALC-MCNC: 12 MG/DL (ref 5–40)

## 2017-05-18 NOTE — PROGRESS NOTES
Labs all stable. Remains prediabetic. Try to limit carbs/starches. Cholesterol, kidneys and liver all look good. No new recs.

## 2017-05-30 ENCOUNTER — OFFICE VISIT (OUTPATIENT)
Dept: INTERNAL MEDICINE CLINIC | Age: 82
End: 2017-05-30

## 2017-05-30 VITALS
BODY MASS INDEX: 20.14 KG/M2 | DIASTOLIC BLOOD PRESSURE: 68 MMHG | HEART RATE: 74 BPM | TEMPERATURE: 98 F | HEIGHT: 64 IN | WEIGHT: 118 LBS | RESPIRATION RATE: 14 BRPM | OXYGEN SATURATION: 96 % | SYSTOLIC BLOOD PRESSURE: 117 MMHG

## 2017-05-30 DIAGNOSIS — K59.00 CONSTIPATION, UNSPECIFIED CONSTIPATION TYPE: Primary | ICD-10-CM

## 2017-05-30 RX ORDER — BISMUTH SUBSALICYLATE 262 MG
1 TABLET,CHEWABLE ORAL DAILY
COMMUNITY
End: 2019-01-01

## 2017-05-30 NOTE — PROGRESS NOTES
Reviewed record in preparation for visit and have obtained necessary documentation. Identified pt with two pt identifiers(name and ). Chief Complaint   Patient presents with    Constipation     follow up       Health Maintenance Due   Topic Date Due    GLAUCOMA SCREENING Q2Y  2016       Mr. Harrington has a reminder for a \"due or due soon\" health maintenance. I have asked that he discuss health maintenance topic(s) due with His  primary care provider. Coordination of Care Questionnaire:  :     1) Have you been to an emergency room, urgent care clinic since your last visit? no   Hospitalized since your last visit? no             2) Have you seen or consulted any other health care providers outside of [address] [place] [address] since your last visit? no  (Include any pap smears or colon screenings in this section.)      Patient is accompanied by self I have received verbal consent from Brittnee Allison to discuss any/all medical information while they are present in the room.

## 2017-05-30 NOTE — PROGRESS NOTES
Christiano Villarreal is a 80 y.o. male who presents for evaluation of constipation. Last seen by me just 2 weeks ago, may 16. Apparently has been having troubles with constipation for years. Not on any scheduled meds for bowels. Had to manually disimpact self last week. Uses laxatives on occasion.       ROS:  Constitutional: negative for fevers, chills, anorexia and weight loss  Eyes:   negative for visual disturbance and irritation  ENT:   negative for tinnitus,sore throat,nasal congestion,ear pain,hoarseness  Respiratory:  negative for cough, hemoptysis, dyspnea,wheezing  CV:   negative for chest pain, palpitations, lower extremity edema  GI:   negative for nausea, vomiting, diarrhea, abdominal pain,melena  Genitourinary: negative for frequency, dysuria and hematuria  Musculoskel: negative for myalgias, arthralgias, back pain, muscle weakness, joint pain  Neurological:  negative for headaches, dizziness, focal weakness, numbness  Psychiatric:     Negative for depression or anxiety      Past Medical History:   Diagnosis Date    Arthritis     BPH (benign prostatic hypertrophy) 2010    COPD (chronic obstructive pulmonary disease) with emphysema ([facility] [address] 2012    HLD (hyperlipidemia) 2010    HTN (hypertension) 2010    Memory disorder     Psychiatric disorder     anxiety and depression       Past Surgical History:   Procedure Laterality Date    COLONOSCOPY,NURA FERNÁNDEZ,SNARE  2015    2 sessile sigmoid polyps, 3 & 5 mm; Dr. Karma West; no further screening recommended    ENDOSCOPY, COLON, DIAGNOSTIC      negative; 10 year repeat; Dr. Sera Guidry  years ago    left    HX HERNIA REPAIR      right inguinal    CA COLSC FLX W/RMVL OF TUMOR POLYP LESION SNARE TQ  2012            Family History   Problem Relation Age of Onset    Heart Disease Mother       at 80    Heart Disease Father       at 80    Liver Disease Brother     Stroke Brother     Heart Attack Brother     Other Sister 8     ? polio    Cancer Sister      lung       Social History     Social History    Marital status:      Spouse name: N/A    Number of children: N/A    Years of education: N/A     Occupational History    Not on file. Social History Main Topics    Smoking status: Current Some Day Smoker     Packs/day: 0.50     Years: 60.00     Types: Cigarettes    Smokeless tobacco: Former User     Quit date: 10/5/2014      Comment: smokes about 10 cigs per day    Alcohol use 4.2 oz/week     7 Glasses of wine per week      Comment: 1 drinks per evening    Drug use: No    Sexual activity: Not on file     Other Topics Concern    Not on file     Social History Narrative            Visit Vitals    /68 (BP 1 Location: Left arm, BP Patient Position: Sitting)    Pulse 74    Temp 98 °F (36.7 °C) (Oral)    Resp 14    Ht 5' 4\" (1.626 m)    Wt 118 lb (53.5 kg)    SpO2 96%    BMI 20.25 kg/m2       Physical Examination:   General - Well appearing male  HEENT - PERRL, TM no erythema/opacification, normal nasal turbinates, no oropharyngeal erythema or exudate, MMM  Neck - supple, no bruits, no thyroidomegaly, no lymphadenopathy  Pulm - clear to auscultation bilaterally  Cardio - RRR, normal S1 S2, no murmur  Abd - soft, nontender, no masses, no HSM  Extrem - no edema, +2 distal pulses  Neuro-  No focal deficits, CN intact     Assessment/Plan:    1. Constipation--add colace or senna, as well as miralax. Info given on constipation. If not improved with this, would try linzess. rtc prn. Has eye exam for 2 weeks.         Anson Beckford III, DO

## 2017-05-30 NOTE — PATIENT INSTRUCTIONS
Constipation: Care Instructions  Your Care Instructions  Constipation means that you have a hard time passing stools (bowel movements). People pass stools from 3 times a day to once every 3 days. What is normal for you may be different. Constipation may occur with pain in the rectum and cramping. The pain may get worse when you try to pass stools. Sometimes there are small amounts of bright red blood on toilet paper or the surface of stools. This is because of enlarged veins near the rectum (hemorrhoids). A few changes in your diet and lifestyle may help you avoid ongoing constipation. Your doctor may also prescribe medicine to help loosen your stool. Some medicines can cause constipation. These include pain medicines and antidepressants. Tell your doctor about all the medicines you take. Your doctor may want to make a medicine change to ease your symptoms. Follow-up care is a key part of your treatment and safety. Be sure to make and go to all appointments, and call your doctor if you are having problems. It's also a good idea to know your test results and keep a list of the medicines you take. How can you care for yourself at home? · Drink plenty of fluids, enough so that your urine is light yellow or clear like water. If you have kidney, heart, or liver disease and have to limit fluids, talk with your doctor before you increase the amount of fluids you drink. · Include high-fiber foods in your diet each day. These include fruits, vegetables, beans, and whole grains. · Get at least 30 minutes of exercise on most days of the week. Walking is a good choice. You also may want to do other activities, such as running, swimming, cycling, or playing tennis or team sports. · Take a fiber supplement, such as Citrucel or Metamucil, every day. Read and follow all instructions on the label. · Schedule time each day for a bowel movement. A daily routine may help.  Take your time having your bowel movement. · Support your feet with a small step stool when you sit on the toilet. This helps flex your hips and places your pelvis in a squatting position. · Your doctor may recommend an over-the-counter laxative to relieve your constipation. Examples are Milk of Magnesia and MiraLax. Read and follow all instructions on the label. Do not use laxatives on a long-term basis. When should you call for help? Call your doctor now or seek immediate medical care if:  · You have new or worse belly pain. · You have new or worse nausea or vomiting. · You have blood in your stools. Watch closely for changes in your health, and be sure to contact your doctor if:  · Your constipation is getting worse. · You do not get better as expected. Where can you learn more? Go to http://samina-cyn.info/. Enter [phone] in the search box to learn more about \"Constipation: Care Instructions. \"  Current as of: May 27, 2016  Content Version: 11.2  © 7116-6833 [employer]. Care instructions adapted under license by [employer] (which disclaims liability or warranty for this information). If you have questions about a medical condition or this instruction, always ask your healthcare professional. [address] any warranty or liability for your use of this information. Try colace or senna for stool softener. Would also add miralax daily.

## 2017-06-07 RX ORDER — TIOTROPIUM BROMIDE 18 UG/1
CAPSULE ORAL; RESPIRATORY (INHALATION)
Qty: 30 CAP | Refills: 8 | Status: SHIPPED | OUTPATIENT
Start: 2017-06-07 | End: 2019-01-01

## 2017-07-24 ENCOUNTER — TELEPHONE (OUTPATIENT)
Dept: INTERNAL MEDICINE CLINIC | Age: 82
End: 2017-07-24

## 2017-07-24 NOTE — TELEPHONE ENCOUNTER
Spoke with pts daughter after verifying a realese of info in chart and advised of last TDAP shot date.  She expressed understanding and had no questions at this time

## 2017-07-24 NOTE — TELEPHONE ENCOUNTER
Patient's daughter, Art Tidwell needs to know when last TDAP was done & how long this is effective for as patient is expecting a [employer] Please call to advise.  Thank you

## 2017-11-02 RX ORDER — ATOMOXETINE 80 MG/1
80 CAPSULE ORAL DAILY
Qty: 30 CAP | Refills: 5 | Status: SHIPPED | OUTPATIENT
Start: 2017-11-02 | End: 2018-05-10 | Stop reason: SDUPTHER

## 2017-11-28 ENCOUNTER — APPOINTMENT (OUTPATIENT)
Dept: GENERAL RADIOLOGY | Age: 82
DRG: 481 | End: 2017-11-28
Attending: EMERGENCY MEDICINE
Payer: MEDICARE

## 2017-11-28 ENCOUNTER — HOSPITAL ENCOUNTER (INPATIENT)
Age: 82
LOS: 4 days | Discharge: NURSING, REHAB OR LONG-TERM CARE | DRG: 481 | End: 2017-12-02
Attending: EMERGENCY MEDICINE | Admitting: HOSPITALIST
Payer: MEDICARE

## 2017-11-28 DIAGNOSIS — S72.001A CLOSED FRACTURE OF RIGHT HIP, INITIAL ENCOUNTER (HCC): Primary | ICD-10-CM

## 2017-11-28 PROBLEM — S72.009A HIP FRACTURE (HCC): Status: ACTIVE | Noted: 2017-11-28

## 2017-11-28 LAB
ALBUMIN SERPL-MCNC: 3 G/DL (ref 3.5–5)
ALBUMIN/GLOB SERPL: 0.8 {RATIO} (ref 1.1–2.2)
ALP SERPL-CCNC: 80 U/L (ref 45–117)
ALT SERPL-CCNC: 25 U/L (ref 12–78)
ANION GAP SERPL CALC-SCNC: 4 MMOL/L (ref 5–15)
APTT PPP: 25.3 SEC (ref 22.1–32.5)
AST SERPL-CCNC: 18 U/L (ref 15–37)
BASOPHILS # BLD: 0 K/UL (ref 0–0.1)
BASOPHILS NFR BLD: 0 % (ref 0–1)
BILIRUB SERPL-MCNC: 0.3 MG/DL (ref 0.2–1)
BUN SERPL-MCNC: 19 MG/DL (ref 6–20)
BUN/CREAT SERPL: 25 (ref 12–20)
CALCIUM SERPL-MCNC: 8.2 MG/DL (ref 8.5–10.1)
CHLORIDE SERPL-SCNC: 101 MMOL/L (ref 97–108)
CO2 SERPL-SCNC: 33 MMOL/L (ref 21–32)
CREAT SERPL-MCNC: 0.75 MG/DL (ref 0.7–1.3)
EOSINOPHIL # BLD: 0.2 K/UL (ref 0–0.4)
EOSINOPHIL NFR BLD: 1 % (ref 0–7)
ERYTHROCYTE [DISTWIDTH] IN BLOOD BY AUTOMATED COUNT: 13.3 % (ref 11.5–14.5)
GLOBULIN SER CALC-MCNC: 4 G/DL (ref 2–4)
GLUCOSE SERPL-MCNC: 105 MG/DL (ref 65–100)
HCT VFR BLD AUTO: 33.6 % (ref 36.6–50.3)
HGB BLD-MCNC: 11.4 G/DL (ref 12.1–17)
INR PPP: 1.1 (ref 0.9–1.1)
LYMPHOCYTES # BLD: 1.1 K/UL (ref 0.8–3.5)
LYMPHOCYTES NFR BLD: 10 % (ref 12–49)
MCH RBC QN AUTO: 32.1 PG (ref 26–34)
MCHC RBC AUTO-ENTMCNC: 33.9 G/DL (ref 30–36.5)
MCV RBC AUTO: 94.6 FL (ref 80–99)
MONOCYTES # BLD: 0.7 K/UL (ref 0–1)
MONOCYTES NFR BLD: 7 % (ref 5–13)
NEUTS SEG # BLD: 8.9 K/UL (ref 1.8–8)
NEUTS SEG NFR BLD: 82 % (ref 32–75)
PLATELET # BLD AUTO: 188 K/UL (ref 150–400)
POTASSIUM SERPL-SCNC: 4.4 MMOL/L (ref 3.5–5.1)
PROT SERPL-MCNC: 7 G/DL (ref 6.4–8.2)
PROTHROMBIN TIME: 10.7 SEC (ref 9–11.1)
RBC # BLD AUTO: 3.55 M/UL (ref 4.1–5.7)
SODIUM SERPL-SCNC: 138 MMOL/L (ref 136–145)
THERAPEUTIC RANGE,PTTT: NORMAL SECS (ref 58–77)
WBC # BLD AUTO: 10.8 K/UL (ref 4.1–11.1)

## 2017-11-28 PROCEDURE — 85025 COMPLETE CBC W/AUTO DIFF WBC: CPT | Performed by: EMERGENCY MEDICINE

## 2017-11-28 PROCEDURE — 80053 COMPREHEN METABOLIC PANEL: CPT | Performed by: EMERGENCY MEDICINE

## 2017-11-28 PROCEDURE — 36415 COLL VENOUS BLD VENIPUNCTURE: CPT | Performed by: EMERGENCY MEDICINE

## 2017-11-28 PROCEDURE — 73502 X-RAY EXAM HIP UNI 2-3 VIEWS: CPT

## 2017-11-28 PROCEDURE — 99285 EMERGENCY DEPT VISIT HI MDM: CPT

## 2017-11-28 PROCEDURE — 85730 THROMBOPLASTIN TIME PARTIAL: CPT | Performed by: EMERGENCY MEDICINE

## 2017-11-28 PROCEDURE — 30233N1 TRANSFUSION OF NONAUTOLOGOUS RED BLOOD CELLS INTO PERIPHERAL VEIN, PERCUTANEOUS APPROACH: ICD-10-PCS | Performed by: INTERNAL MEDICINE

## 2017-11-28 PROCEDURE — 85610 PROTHROMBIN TIME: CPT | Performed by: EMERGENCY MEDICINE

## 2017-11-28 PROCEDURE — 71010 XR CHEST PORT: CPT

## 2017-11-28 PROCEDURE — 93005 ELECTROCARDIOGRAM TRACING: CPT

## 2017-11-28 PROCEDURE — 65270000029 HC RM PRIVATE

## 2017-11-28 PROCEDURE — 86923 COMPATIBILITY TEST ELECTRIC: CPT | Performed by: PHYSICIAN ASSISTANT

## 2017-11-28 PROCEDURE — 94762 N-INVAS EAR/PLS OXIMTRY CONT: CPT

## 2017-11-28 PROCEDURE — 86900 BLOOD TYPING SEROLOGIC ABO: CPT | Performed by: PHYSICIAN ASSISTANT

## 2017-11-28 PROCEDURE — 74011250637 HC RX REV CODE- 250/637: Performed by: EMERGENCY MEDICINE

## 2017-11-28 RX ORDER — ACETAMINOPHEN 325 MG/1
650 TABLET ORAL EVERY 6 HOURS
Status: DISCONTINUED | OUTPATIENT
Start: 2017-11-28 | End: 2017-11-30

## 2017-11-28 RX ORDER — OXYCODONE HYDROCHLORIDE 5 MG/1
5 TABLET ORAL
Status: DISCONTINUED | OUTPATIENT
Start: 2017-11-28 | End: 2017-12-02 | Stop reason: HOSPADM

## 2017-11-28 RX ORDER — OXYCODONE HYDROCHLORIDE 5 MG/1
2.5 TABLET ORAL
Status: DISCONTINUED | OUTPATIENT
Start: 2017-11-28 | End: 2017-12-02 | Stop reason: HOSPADM

## 2017-11-28 RX ORDER — AMOXICILLIN 250 MG
2 CAPSULE ORAL 2 TIMES DAILY
Status: DISCONTINUED | OUTPATIENT
Start: 2017-11-28 | End: 2017-12-02 | Stop reason: HOSPADM

## 2017-11-28 RX ORDER — NALOXONE HYDROCHLORIDE 0.4 MG/ML
0.4 INJECTION, SOLUTION INTRAMUSCULAR; INTRAVENOUS; SUBCUTANEOUS AS NEEDED
Status: DISCONTINUED | OUTPATIENT
Start: 2017-11-28 | End: 2017-11-30

## 2017-11-28 RX ORDER — OXYCODONE AND ACETAMINOPHEN 5; 325 MG/1; MG/1
2 TABLET ORAL
Status: COMPLETED | OUTPATIENT
Start: 2017-11-28 | End: 2017-11-28

## 2017-11-28 RX ADMIN — OXYCODONE HYDROCHLORIDE AND ACETAMINOPHEN 2 TABLET: 5; 325 TABLET ORAL at 20:26

## 2017-11-28 NOTE — IP AVS SNAPSHOT
[address] [facility] 
[phone] Patient: Bonita De Jesus MRN: [id number] About your hospitalization You were admitted on:  2017 You last received care in the:  [facility] ORTHOPEDICS You were discharged on:  2017 Why you were hospitalized Your primary diagnosis was:  Closed Right Hip Fracture (Hcc) Your diagnoses also included:  Hip Fracture (Hcc) Things You Need To Do (next 8 weeks) Follow up with Nury Cardona DO in 2 week(s)  
routine hospital follow up Phone:  [phone] Where:  [address], [facility], [address] [address] Follow up with Clau Painting DO in 10 day(s)  
surgical follow up Phone:  [phone] Where:  [address] [facility], [address] [place] [address], [address] [place] [address] Discharge Orders None A check irma indicates which time of day the medication should be taken. My Medications TAKE these medications as instructed Instructions Each Dose to Equal  
 Morning Noon Evening Bedtime  
 aspirin 81 mg tablet Your last dose was: Your next dose is: Take 81 mg by mouth daily. 81 mg  
    
   
   
   
  
 atomoxetine 80 mg capsule Commonly known as:  STRATTERA Your last dose was: Your next dose is: Take 1 Cap by mouth daily. 80 mg  
    
   
   
   
  
 calcium-vitamin D 500 mg(1,250mg) -200 unit per tablet Commonly known as:  OYSTER SHELL Your last dose was: Your next dose is: Take 1 Tab by mouth two (2) times daily (with meals) for 30 days. 1 Tab  
    
   
   
   
  
 enoxaparin 60 mg/0.6 mL injection Commonly known as:  LOVENOX Start taking on:  12/3/2017 Your last dose was: Your next dose is:    
   
   
 25 mg by SubCUTAneous route daily for 14 days.   
 25 mg  
 famotidine 20 mg tablet Commonly known as:  PEPCID Your last dose was: Your next dose is: Take 1 Tab by mouth two (2) times a day for 30 days. 20 mg  
    
   
   
   
  
 finasteride 5 mg tablet Commonly known as:  PROSCAR Your last dose was: Your next dose is: Take 5 mg by mouth daily. 5 mg  
    
   
   
   
  
 multivitamin tablet Commonly known as:  ONE A DAY Your last dose was: Your next dose is: Take 1 Tab by mouth daily. 1 Tab * oxyCODONE IR 5 mg immediate release tablet Commonly known as:  Ermias Lovell Your last dose was: Your next dose is: Take 0.5 Tabs by mouth every four (4) hours as needed (For pain 3-6). Max Daily Amount: 15 mg.  
 2.5 mg  
    
   
   
   
  
 * oxyCODONE IR 5 mg immediate release tablet Commonly known as:  Ermias Lovell Your last dose was: Your next dose is: Take 1 Tab by mouth every four (4) hours as needed (For pain 7-10). Max Daily Amount: 30 mg.  
 5 mg  
    
   
   
   
  
 polyethylene glycol 17 gram packet Commonly known as:  Ford Byrne Start taking on:  12/3/2017 Your last dose was: Your next dose is: Take 1 Packet by mouth daily for 30 days. 17 g  
    
   
   
   
  
 rosuvastatin 20 mg tablet Commonly known as:  CRESTOR Your last dose was: Your next dose is: TAKE ONE TABLET BY MOUTH ONE TIME DAILY  
     
   
   
   
  
 senna-docusate 8.6-50 mg per tablet Commonly known as:  Bettina Goff Your last dose was: Your next dose is: Take 2 Tabs by mouth two (2) times a day for 30 days. 2 Tab SPIRIVA WITH HANDIHALER 18 mcg inhalation capsule Generic drug:  tiotropium Your last dose was: Your next dose is: INHALE ONE CAPSULE VIA DEVICE BY MOUTH ONE TIME DAILY  
     
   
   
   
  
 tamsulosin 0.4 mg capsule Commonly known as:  FLOMAX Your last dose was: Your next dose is: Take 0.4 mg by mouth daily. 0.4 mg  
    
   
   
   
  
 * Notice: This list has 2 medication(s) that are the same as other medications prescribed for you. Read the directions carefully, and ask your doctor or other care provider to review them with you. Where to Get Your Medications Information on where to get these meds will be given to you by the nurse or doctor. ! Ask your nurse or doctor about these medications  
  calcium-vitamin D 500 mg(1,250mg) -200 unit per tablet  
 enoxaparin 60 mg/0.6 mL injection  
 famotidine 20 mg tablet  
 oxyCODONE IR 5 mg immediate release tablet  
 oxyCODONE IR 5 mg immediate release tablet  
 polyethylene glycol 17 gram packet  
 senna-docusate 8.6-50 mg per tablet Discharge Instructions HOSPITALIST DISCHARGE INSTRUCTIONS 
 
NAME: Annie Whatley :  1934 MRN:  [id number] Date/Time:  2017 10:21 AM 
 
ADMIT DATE: 2017 DISCHARGE DATE: 2017 Attending Physician: Ling Kan MD 
 
DISCHARGE DIAGNOSIS: 
Acute post operative blood loss anemia in setting of right hip fracture s/p right hip cephalomedullary nailing  with Dr. Breana Villela Left pleural effusion, unclear etiology COPD without acute exacerbation BPH Hyperlipidemia Mild cognitive impairment Underweight Body mass index is 17.43 kg/(m^2) Medications: Per above medication reconciliation. Pain Management: per above medications Recommended diet: Regular Diet and ensure supplements TID with meals Recommended activity: See surgical instructions Wound care: See surgical/procedure care instructions Indwelling devices:  None Supplemental Oxygen: None Required Lab work: None Glucose management:  None Code status: Full Outside physician follow up: Follow-up Information Follow up With Details Comments Contact Info Mitchell Mcallister III, DO In 2 weeks routine hospital follow up Chauncey Collins [address] [facility] [address] [facility] 
[phone] Kishor Castilloroldancong, DO In 10 days surgical follow up [address] [facility] [address] [facility] 
[phone] Skilled nursing facility/ SNF MD responsible for above on discharge. [address] [place] [address] Orthopedics [facility] Discharge Instruction Sheet: Hip Fracture Repair DR. AMAYA Blood Clot Prevention Lovenox You will be discharged on Lovenox. It helps prevent blood clots. It is an injection that you receive in the side of your stomach. Nursing will teach you or a caregiver how to do this before you go home. Patients take Lovenox for two weeks after 
surgery. Pain control: 
Typically, we will prescribe a narcotic. Usually 1-2 tabs every four hours is sufficient for the pain. Most patients need this only for the first few weeks. You should discontinue this as the pain decreases. Some of these medications contain a large dose of Tylenol (acetaminophen). Therefore, you should consult your pharmacist before taking any additional Tylenol at the same time. Constipation Pain medicines and anesthesia can be constipating-this can be prevented by gentle physical activity and drinking plenty of fluid. It should be treated with over-the-counter medications such as Miralax or suppositories, and/or Fleets enema. You should have a bowel movement at least every other day following surgery. Incision care The outer dressing can be removed when the incision is no longer draining. The incision can be left uncovered and open to air. Keep this area clean and dry. A bandage may be placed for comfort, but is not necessary. You may shower when you are discharged.  After showering pat incision dry-DO NOT rub the incision. DO NOT take a tub bath or go swimming until cleared by your doctor. DO NOT apply lotions, oils, or creams to incision. Remove staples in 10 -14 days Physical Activity Restrictions Hip Fracture You can weight bear as tolerated on the leg with the fracture  You will need to use a walker to ambulate with for the first few weeks after your surgery and then you will switch to a cane. NO DRIVING until told to do so. Surgery to Repair a Hip Fracture: What to Expect at Home Your Recovery Surgery for a hip fracture repairs a broken hip bone. When you leave the hospital after surgery, you will probably be walking with crutches or a walker. You may be able to climb a few stairs and get in and out of bed and chairs. But you will need someone to help you at home for the next few weeks or until you have more energy and can move around better. If there is no one to help you at home, you may go to a rehabilitation center or long-term care center. You will go home with a bandage and stitches or staples. You can remove the bandage when your doctor tells you to. Your doctor will remove your stitches or staples 10-15days after your surgery. You may still have some mild pain, and the area may be swollen for 3 to 4 months after surgery. Your doctor will give you medicine for the pain. You will continue the rehabilitation program (rehab) you started in the hospital. The better you do with your rehab exercises, the quicker you will get your strength and movement back. Most people are able to return to work 4 weeks to 4 months after surgery. But it may take 6 months to 1 year for you to fully recover. Some people, especially older people, are never able to move quite as well as they used to. This care sheet gives you a general idea about how long it will take for you to recover. But each person recovers at a different pace. Follow the steps below to get better as quickly as possible. How can you care for yourself at home? Activity · Rest when you feel tired. You may take a nap, but do not stay in bed all day. · Work with your physical therapist to learn the best way to exercise. You may be able to take frequent, short walks using crutches or a walker. You will probably have to use crutches or a walker for at least 4 to 6 weeks. After that, you may need to use a cane to help you walk. · Do not sit for longer than 30 to 45 minutes at a time. When you sit, use chairs with arms, and do not sit in low chairs. · Sleep on your back with your legs slightly apart or on your side with a pillow between your knees for about 6 weeks or as your doctor tells you. Do not sleep on your stomach or affected hip. · You may need to take sponge baths until your stitches or staples have been removed. You will probably be able to shower 24 hours after they are removed. Ask your doctor when it is okay to bathe or shower. · Ask your doctor when you can drive again. · Most people are able to return to work 4 weeks to 4 months after surgery. · Ask your doctor when it is okay for you to have sex. · Do not lift anything that would make you strain. This may include heavy grocery bags and milk containers, a heavy briefcase or backpack, cat litter or dog food bags, a vacuum , or a child. Diet · By the time you leave the hospital, you will probably be eating your normal diet. If your stomach is upset, try bland, low-fat foods like plain rice, broiled chicken, toast, and yogurt. Your doctor may recommend that you take iron and vitamin supplements. · Continue to drink plenty of fluids. · If you take warfarin, make sure you get about the same amount of vitamin K each day. This will help blood thinners work evenly from day to day. An example of food that is high in vitamin K is leafy green vegetables. · Eat healthy foods, and watch your portion sizes.  Try to stay at your ideal weight. Too much weight puts more stress on your hip joint. · You may notice that your bowel movements are not regular right after your surgery. This is common. Try to avoid constipation and straining with bowel movements. You may want to take a fiber supplement every day. If you have not had a bowel movement after a couple of days, ask your doctor about taking a mild laxative. · Your doctor may want you to take calcium supplements and eat foods high in calcium, such as milk, cheese, ice cream, and salmon with bones. These help stop bone loss. Orange juice and soy milk with added calcium are also good choices. Medicines · Your doctor may give you medicine to prevent blood clots. This could be in pill form or as a shot (injection). If a shot is necessary, your doctor will tell you how to do this. · Take pain medicines exactly as directed. ¨ If the doctor gave you a prescription medicine for pain, take it as prescribed. ¨ If you are not taking a prescription pain medicine, ask your doctor if you can take an over-the-counter medicine. · If you think your pain medicine is making you sick to your stomach: 
¨ Take your medicine after meals (unless your doctor has told you not to). ¨ Ask your doctor for a different pain medicine. · If your doctor prescribed antibiotics, take them as directed. Do not stop taking them just because you feel better. You need to take the full course of antibiotics. · Your doctor may also prescribe medicines or calcium supplements to make your bones stronger. Incision care · You will have a bandage over the cut (incision) and staples or stitches. If there is no drainage, most doctors will let you take the bandage off in a few days. · Your doctor will remove the staples or stitches 10-14 days after the surgery and replace them with strips of tape. Leave the tape on for a week or until it falls off. Exercise · Your rehab program will include a number of exercises to do. Always do them as your therapist tells you. · Do not do any vigorous exercise for 12 weeks or until your doctor tells you it is okay. Ice and elevation · For pain, put ice or a cold pack on the area for 10 to 20 minutes at a time. Put a thin cloth between the ice and your skin. · Your ankle may swell for about 3 months. Prop up your ankle when you ice it or anytime you sit or lie down. Try to keep it above the level of your heart. This will help reduce swelling. Other instructions · Continue to wear your support stockings as your doctor says. These help to prevent blood clots. The length of time that you will have to wear them depends on your activity level and the amount of swelling you have. Most people wear these stockings for 4 to 6 weeks after surgery. Preventing falls is also very important. To prevent falls: · Arrange furniture so that you will not trip on it. · Get rid of throw rugs, and move electrical cords out of the way. · Walk only in areas with plenty of light. · Put grab bars in showers and bathtubs. · Avoid icy or snowy sidewalks. · Wear shoes with sturdy, flat soles. Follow-up care is a key part of your treatment and safety. Be sure to make and go to all appointments, and call your doctor if you are having problems. Its also a good idea to know your test results and keep a list of the medicines you take. When should you call for help? Call 911 anytime you think you may need emergency care. For example, call if: 
· You passed out (lost consciousness). · You have severe trouble breathing. · You have sudden chest pain and shortness of breath, or you cough up blood. Call your doctor now or seek immediate medical care if: 
· Your leg or foot is cool or pale or changes color. · You cannot feel or move your leg. · You have signs of a blood clot, such as: 
¨ Pain in your calf, back of the knee, thigh, or groin. ¨ Redness and swelling in your leg or groin. · Your incision comes open and begins to bleed, or the bleeding increases. · You feel like your heart is racing or beating irregularly. · You have signs of infection, such as: 
¨ Increased pain, swelling, warmth, or redness. ¨ Red streaks leading from the incision. ¨ Pus draining from the incision. ¨ A fever. Watch closely for any changes in your health, and be sure to contact your doctor if: 
· You do not have a bowel movement after taking a laxative. · You do not get better as expected. Information obtained by : 
I understand that if any problems occur once I am at home I am to contact my physician. I understand and acknowledge receipt of the instructions indicated above. Physician's or R.N.'s Signature                                                                  Date/Time Patient or Representative 
 
[employer]O Transitions of Care Introducing [employer] [address] offers a voluntary care coordination program to provide high quality service and care to [facility] fee-for-service beneficiaries. Rasta Brewer was designed to help you enhance your health and well-being through the following services: ? Transitions of Care  support for individuals who are transitioning from one care setting to another (example: Hospital to home). ? Chronic and Complex Care Coordination  support for individuals and caregivers of those with serious or chronic illnesses or with more than one chronic (ongoing) condition and those who take a number of different medications.   
 
 
If you meet specific medical criteria, a [employer] Coordinator may call you directly to coordinate your care with your primary care physician and your other care providers. For questions about the [facility] programs, please, contact your physicians office. For general questions or additional information about Accountable Care Organizations: 
Please visit www.medicare.gov/acos. html or call 1-800-MEDICARE (9-169.774.9947) TTY users should call 2-409.193.6980. [employer]ct Announcement We are excited to announce that we are making your provider's discharge notes available to you in [employer]/Protect. You will see these notes when they are completed and signed by the physician that discharged you from your recent hospital stay. If you have any questions or concerns about any information you see in [employer], please call the Health Information Department where you were seen or reach out to your Primary Care Provider for more information about your plan of care. Introducing [facility] SERVICES! Ruslan Pablo introduces Innovate/Protect patient portal. Now you can access parts of your medical record, email your doctor's office, and request medication refills online. 1. In your internet browser, go to [email or web address] 2. Click on the First Time User? Click Here link in the Sign In box. You will see the New Member Sign Up page. 3. Enter your [employer]ct Access Code exactly as it appears below. You will not need to use this code after youve completed the sign-up process. If you do not sign up before the expiration date, you must request a new code. · [employer] Access Code: [email or web address] Expires: 3/2/2018  1:33 PM 
 
4. Enter the last four digits of your Social Security Number (xxxx) and Date of Birth (mm/dd/yyyy) as indicated and click Submit. You will be taken to the next sign-up page. 5. Create a Innovate/Protect ID. This will be your [employer] login ID and cannot be changed, so think of one that is secure and easy to remember. 6. Create a deltamethod password. You can change your password at any time. 7. Enter your Password Reset Question and Answer. This can be used at a later time if you forget your password. 8. Enter your e-mail address. You will receive e-mail notification when new information is available in [address] 9. Click Sign Up. You can now view and download portions of your medical record. 10. Click the Download Summary menu link to download a portable copy of your medical information. If you have questions, please visit the Frequently Asked Questions section of the deltamethod website. Remember, deltamethod is NOT to be used for urgent needs. For medical emergencies, dial 911. Now available from your iPhone and Android! Unresulted Labs-Please follow up with your PCP about these lab tests Order Current Status XR FLUOROSCOPY OVER 60 MINUTES In process Providers Seen During Your Hospitalization Provider Specialty Primary office phone Lianne August DO Emergency Medicine [phone] Yessenia Wesley MD Internal Medicine [phone] Your Primary Care Physician (PCP) Primary Care Physician Office Phone Office Fax Maryann Hudson BAUER [phone] You are allergic to the following Allergen Reactions Zetia (Ezetimibe) Other (comments) C/o back pain Recent Documentation Height Weight BMI Smoking Status 1.676 m 49 kg 17.43 kg/m2 Current Some Day Smoker Emergency Contacts Name Discharge Info Relation Home Work Mobile Albin Cabrera CAREGIVER [3] Child [2] [phone] Patient Belongings The following personal items are in your possession at time of discharge: 
  Dental Appliances: Partials, At home, Uppers  Visual Aid: Glasses, With patient Please provide this summary of care documentation to your next provider. Signatures-by signing, you are acknowledging that this After Visit Summary has been reviewed with you and you have received a copy. Patient Signature:  ____________________________________________________________ Date:  ____________________________________________________________  
  
Josh Artist Provider Signature:  ____________________________________________________________ Date:  ____________________________________________________________

## 2017-11-29 ENCOUNTER — ANESTHESIA (OUTPATIENT)
Dept: SURGERY | Age: 82
DRG: 481 | End: 2017-11-29
Payer: MEDICARE

## 2017-11-29 ENCOUNTER — ANESTHESIA EVENT (OUTPATIENT)
Dept: SURGERY | Age: 82
DRG: 481 | End: 2017-11-29
Payer: MEDICARE

## 2017-11-29 ENCOUNTER — APPOINTMENT (OUTPATIENT)
Dept: GENERAL RADIOLOGY | Age: 82
DRG: 481 | End: 2017-11-29
Attending: ORTHOPAEDIC SURGERY
Payer: MEDICARE

## 2017-11-29 LAB
APPEARANCE UR: CLEAR
ATRIAL RATE: 75 BPM
BACTERIA URNS QL MICRO: NEGATIVE /HPF
BILIRUB UR QL: NEGATIVE
CALCULATED P AXIS, ECG09: 72 DEGREES
CALCULATED R AXIS, ECG10: -43 DEGREES
CALCULATED T AXIS, ECG11: 63 DEGREES
COLOR UR: NORMAL
DIAGNOSIS, 93000: NORMAL
EPITH CASTS URNS QL MICRO: NORMAL /LPF
GLUCOSE UR STRIP.AUTO-MCNC: NEGATIVE MG/DL
HGB UR QL STRIP: NEGATIVE
HYALINE CASTS URNS QL MICRO: NORMAL /LPF (ref 0–5)
KETONES UR QL STRIP.AUTO: NEGATIVE MG/DL
LEUKOCYTE ESTERASE UR QL STRIP.AUTO: NEGATIVE
NITRITE UR QL STRIP.AUTO: NEGATIVE
P-R INTERVAL, ECG05: 148 MS
PH UR STRIP: 6.5 [PH] (ref 5–8)
PROT UR STRIP-MCNC: NEGATIVE MG/DL
Q-T INTERVAL, ECG07: 392 MS
QRS DURATION, ECG06: 122 MS
QTC CALCULATION (BEZET), ECG08: 437 MS
RBC #/AREA URNS HPF: NORMAL /HPF (ref 0–5)
SP GR UR REFRACTOMETRY: 1.02 (ref 1–1.03)
UA: UC IF INDICATED,UAUC: NORMAL
UROBILINOGEN UR QL STRIP.AUTO: 1 EU/DL (ref 0.2–1)
VENTRICULAR RATE, ECG03: 75 BPM
WBC URNS QL MICRO: NORMAL /HPF (ref 0–4)

## 2017-11-29 PROCEDURE — 74011250636 HC RX REV CODE- 250/636

## 2017-11-29 PROCEDURE — 74011000258 HC RX REV CODE- 258: Performed by: ORTHOPAEDIC SURGERY

## 2017-11-29 PROCEDURE — 76010000149 HC OR TIME 1 TO 1.5 HR: Performed by: ORTHOPAEDIC SURGERY

## 2017-11-29 PROCEDURE — C1769 GUIDE WIRE: HCPCS | Performed by: ORTHOPAEDIC SURGERY

## 2017-11-29 PROCEDURE — 77030021678 HC GLIDESCP STAT DISP VERT -B: Performed by: NURSE ANESTHETIST, CERTIFIED REGISTERED

## 2017-11-29 PROCEDURE — P9045 ALBUMIN (HUMAN), 5%, 250 ML: HCPCS

## 2017-11-29 PROCEDURE — 74011000250 HC RX REV CODE- 250

## 2017-11-29 PROCEDURE — 77030018836 HC SOL IRR NACL ICUM -A: Performed by: ORTHOPAEDIC SURGERY

## 2017-11-29 PROCEDURE — 77030013079 HC BLNKT BAIR HGGR 3M -A: Performed by: NURSE ANESTHETIST, CERTIFIED REGISTERED

## 2017-11-29 PROCEDURE — 93306 TTE W/DOPPLER COMPLETE: CPT

## 2017-11-29 PROCEDURE — 74011250637 HC RX REV CODE- 250/637: Performed by: ORTHOPAEDIC SURGERY

## 2017-11-29 PROCEDURE — 76210000016 HC OR PH I REC 1 TO 1.5 HR: Performed by: ORTHOPAEDIC SURGERY

## 2017-11-29 PROCEDURE — 76001 XR FLUOROSCOPY OVER 60 MINUTES: CPT

## 2017-11-29 PROCEDURE — 77030020788: Performed by: ORTHOPAEDIC SURGERY

## 2017-11-29 PROCEDURE — 77030031139 HC SUT VCRL2 J&J -A: Performed by: ORTHOPAEDIC SURGERY

## 2017-11-29 PROCEDURE — 65270000029 HC RM PRIVATE

## 2017-11-29 PROCEDURE — 81001 URINALYSIS AUTO W/SCOPE: CPT | Performed by: PHYSICIAN ASSISTANT

## 2017-11-29 PROCEDURE — 77030008684 HC TU ET CUF COVD -B: Performed by: NURSE ANESTHETIST, CERTIFIED REGISTERED

## 2017-11-29 PROCEDURE — 74011250636 HC RX REV CODE- 250/636: Performed by: HOSPITALIST

## 2017-11-29 PROCEDURE — C1713 ANCHOR/SCREW BN/BN,TIS/BN: HCPCS | Performed by: ORTHOPAEDIC SURGERY

## 2017-11-29 PROCEDURE — 36415 COLL VENOUS BLD VENIPUNCTURE: CPT | Performed by: EMERGENCY MEDICINE

## 2017-11-29 PROCEDURE — 77030032490 HC SLV COMPR SCD KNE COVD -B: Performed by: ORTHOPAEDIC SURGERY

## 2017-11-29 PROCEDURE — 77030016474 HC BIT DRL QC3 SYNT -C: Performed by: ORTHOPAEDIC SURGERY

## 2017-11-29 PROCEDURE — 77030011640 HC PAD GRND REM COVD -A: Performed by: ORTHOPAEDIC SURGERY

## 2017-11-29 PROCEDURE — 73501 X-RAY EXAM HIP UNI 1 VIEW: CPT

## 2017-11-29 PROCEDURE — 74011250637 HC RX REV CODE- 250/637: Performed by: PHYSICIAN ASSISTANT

## 2017-11-29 PROCEDURE — 77030014405 HC GD ROD RMR SYNT -C: Performed by: ORTHOPAEDIC SURGERY

## 2017-11-29 PROCEDURE — 74011250637 HC RX REV CODE- 250/637: Performed by: HOSPITALIST

## 2017-11-29 PROCEDURE — 0QS636Z REPOSITION RIGHT UPPER FEMUR WITH INTRAMEDULLARY INTERNAL FIXATION DEVICE, PERCUTANEOUS APPROACH: ICD-10-PCS | Performed by: ORTHOPAEDIC SURGERY

## 2017-11-29 PROCEDURE — 77030008467 HC STPLR SKN COVD -B: Performed by: ORTHOPAEDIC SURGERY

## 2017-11-29 PROCEDURE — 76060000033 HC ANESTHESIA 1 TO 1.5 HR: Performed by: ORTHOPAEDIC SURGERY

## 2017-11-29 PROCEDURE — 77030019908 HC STETH ESOPH SIMS -A: Performed by: NURSE ANESTHETIST, CERTIFIED REGISTERED

## 2017-11-29 PROCEDURE — 74011250636 HC RX REV CODE- 250/636: Performed by: ORTHOPAEDIC SURGERY

## 2017-11-29 PROCEDURE — 77030035168: Performed by: ORTHOPAEDIC SURGERY

## 2017-11-29 DEVICE — IMPLANTABLE DEVICE: Type: IMPLANTABLE DEVICE | Site: HIP | Status: FUNCTIONAL

## 2017-11-29 DEVICE — SCREW BNE L40MM DIA5MM TIB LT GRN TI ST CANN LOK FULL THRD: Type: IMPLANTABLE DEVICE | Site: HIP | Status: FUNCTIONAL

## 2017-11-29 DEVICE — NAIL IM L235MM DIA12MM 130DEG SHT GRN R PROX FEM TI: Type: IMPLANTABLE DEVICE | Site: HIP | Status: FUNCTIONAL

## 2017-11-29 RX ORDER — SODIUM CHLORIDE 0.9 % (FLUSH) 0.9 %
5-10 SYRINGE (ML) INJECTION AS NEEDED
Status: DISCONTINUED | OUTPATIENT
Start: 2017-11-29 | End: 2017-12-02 | Stop reason: HOSPADM

## 2017-11-29 RX ORDER — FERROUS SULFATE, DRIED 160(50) MG
1 TABLET, EXTENDED RELEASE ORAL
Status: DISCONTINUED | OUTPATIENT
Start: 2017-11-30 | End: 2017-12-02 | Stop reason: HOSPADM

## 2017-11-29 RX ORDER — PROPOFOL 10 MG/ML
INJECTION, EMULSION INTRAVENOUS AS NEEDED
Status: DISCONTINUED | OUTPATIENT
Start: 2017-11-29 | End: 2017-11-29 | Stop reason: HOSPADM

## 2017-11-29 RX ORDER — SODIUM CHLORIDE 0.9 % (FLUSH) 0.9 %
5-10 SYRINGE (ML) INJECTION EVERY 8 HOURS
Status: DISCONTINUED | OUTPATIENT
Start: 2017-11-29 | End: 2017-11-30

## 2017-11-29 RX ORDER — TAMSULOSIN HYDROCHLORIDE 0.4 MG/1
0.4 CAPSULE ORAL DAILY
Status: DISCONTINUED | OUTPATIENT
Start: 2017-11-29 | End: 2017-12-02 | Stop reason: HOSPADM

## 2017-11-29 RX ORDER — ALBUMIN HUMAN 50 G/1000ML
SOLUTION INTRAVENOUS AS NEEDED
Status: DISCONTINUED | OUTPATIENT
Start: 2017-11-29 | End: 2017-11-29 | Stop reason: HOSPADM

## 2017-11-29 RX ORDER — SODIUM CHLORIDE 0.9 % (FLUSH) 0.9 %
5-10 SYRINGE (ML) INJECTION EVERY 8 HOURS
Status: DISCONTINUED | OUTPATIENT
Start: 2017-11-30 | End: 2017-12-02 | Stop reason: HOSPADM

## 2017-11-29 RX ORDER — DIPHENHYDRAMINE HYDROCHLORIDE 50 MG/ML
12.5 INJECTION, SOLUTION INTRAMUSCULAR; INTRAVENOUS AS NEEDED
Status: DISCONTINUED | OUTPATIENT
Start: 2017-11-29 | End: 2017-11-29 | Stop reason: HOSPADM

## 2017-11-29 RX ORDER — ONDANSETRON 2 MG/ML
INJECTION INTRAMUSCULAR; INTRAVENOUS AS NEEDED
Status: DISCONTINUED | OUTPATIENT
Start: 2017-11-29 | End: 2017-11-29 | Stop reason: HOSPADM

## 2017-11-29 RX ORDER — LIDOCAINE HYDROCHLORIDE 20 MG/ML
INJECTION, SOLUTION EPIDURAL; INFILTRATION; INTRACAUDAL; PERINEURAL AS NEEDED
Status: DISCONTINUED | OUTPATIENT
Start: 2017-11-29 | End: 2017-11-29 | Stop reason: HOSPADM

## 2017-11-29 RX ORDER — SODIUM CHLORIDE, SODIUM LACTATE, POTASSIUM CHLORIDE, CALCIUM CHLORIDE 600; 310; 30; 20 MG/100ML; MG/100ML; MG/100ML; MG/100ML
INJECTION, SOLUTION INTRAVENOUS
Status: DISCONTINUED | OUTPATIENT
Start: 2017-11-29 | End: 2017-11-29 | Stop reason: HOSPADM

## 2017-11-29 RX ORDER — HYDROMORPHONE HYDROCHLORIDE 2 MG/ML
INJECTION, SOLUTION INTRAMUSCULAR; INTRAVENOUS; SUBCUTANEOUS
Status: DISPENSED
Start: 2017-11-29 | End: 2017-11-30

## 2017-11-29 RX ORDER — FINASTERIDE 5 MG/1
5 TABLET, FILM COATED ORAL DAILY
Status: DISCONTINUED | OUTPATIENT
Start: 2017-11-29 | End: 2017-12-02 | Stop reason: HOSPADM

## 2017-11-29 RX ORDER — FENTANYL CITRATE 50 UG/ML
INJECTION, SOLUTION INTRAMUSCULAR; INTRAVENOUS AS NEEDED
Status: DISCONTINUED | OUTPATIENT
Start: 2017-11-29 | End: 2017-11-29 | Stop reason: HOSPADM

## 2017-11-29 RX ORDER — GUAIFENESIN 100 MG/5ML
81 LIQUID (ML) ORAL DAILY
Status: DISCONTINUED | OUTPATIENT
Start: 2017-11-29 | End: 2017-12-02 | Stop reason: HOSPADM

## 2017-11-29 RX ORDER — ONDANSETRON 2 MG/ML
4 INJECTION INTRAMUSCULAR; INTRAVENOUS
Status: ACTIVE | OUTPATIENT
Start: 2017-11-29 | End: 2017-11-30

## 2017-11-29 RX ORDER — SODIUM CHLORIDE 0.9 % (FLUSH) 0.9 %
5-10 SYRINGE (ML) INJECTION AS NEEDED
Status: DISCONTINUED | OUTPATIENT
Start: 2017-11-29 | End: 2017-11-29 | Stop reason: HOSPADM

## 2017-11-29 RX ORDER — OXYCODONE HYDROCHLORIDE 5 MG/1
5 TABLET ORAL
Status: DISCONTINUED | OUTPATIENT
Start: 2017-11-29 | End: 2017-11-30

## 2017-11-29 RX ORDER — ONDANSETRON 2 MG/ML
4 INJECTION INTRAMUSCULAR; INTRAVENOUS
Status: DISCONTINUED | OUTPATIENT
Start: 2017-11-29 | End: 2017-11-30

## 2017-11-29 RX ORDER — MORPHINE SULFATE 10 MG/ML
2 INJECTION, SOLUTION INTRAMUSCULAR; INTRAVENOUS
Status: DISCONTINUED | OUTPATIENT
Start: 2017-11-29 | End: 2017-11-29 | Stop reason: HOSPADM

## 2017-11-29 RX ORDER — AMOXICILLIN 250 MG
1 CAPSULE ORAL 2 TIMES DAILY
Status: DISCONTINUED | OUTPATIENT
Start: 2017-11-29 | End: 2017-11-30

## 2017-11-29 RX ORDER — THERA TABS 400 MCG
1 TAB ORAL DAILY
Status: DISCONTINUED | OUTPATIENT
Start: 2017-11-29 | End: 2017-12-02 | Stop reason: HOSPADM

## 2017-11-29 RX ORDER — SUCCINYLCHOLINE CHLORIDE 20 MG/ML
INJECTION INTRAMUSCULAR; INTRAVENOUS AS NEEDED
Status: DISCONTINUED | OUTPATIENT
Start: 2017-11-29 | End: 2017-11-29 | Stop reason: HOSPADM

## 2017-11-29 RX ORDER — SODIUM CHLORIDE 0.9 % (FLUSH) 0.9 %
5-10 SYRINGE (ML) INJECTION EVERY 8 HOURS
Status: DISCONTINUED | OUTPATIENT
Start: 2017-11-29 | End: 2017-11-29 | Stop reason: HOSPADM

## 2017-11-29 RX ORDER — ONDANSETRON 2 MG/ML
4 INJECTION INTRAMUSCULAR; INTRAVENOUS AS NEEDED
Status: DISCONTINUED | OUTPATIENT
Start: 2017-11-29 | End: 2017-11-29 | Stop reason: HOSPADM

## 2017-11-29 RX ORDER — SODIUM CHLORIDE, SODIUM LACTATE, POTASSIUM CHLORIDE, CALCIUM CHLORIDE 600; 310; 30; 20 MG/100ML; MG/100ML; MG/100ML; MG/100ML
25 INJECTION, SOLUTION INTRAVENOUS CONTINUOUS
Status: DISCONTINUED | OUTPATIENT
Start: 2017-11-29 | End: 2017-11-29 | Stop reason: HOSPADM

## 2017-11-29 RX ORDER — FACIAL-BODY WIPES
10 EACH TOPICAL DAILY PRN
Status: DISCONTINUED | OUTPATIENT
Start: 2017-12-01 | End: 2017-12-02 | Stop reason: HOSPADM

## 2017-11-29 RX ORDER — NALOXONE HYDROCHLORIDE 0.4 MG/ML
0.4 INJECTION, SOLUTION INTRAMUSCULAR; INTRAVENOUS; SUBCUTANEOUS AS NEEDED
Status: DISCONTINUED | OUTPATIENT
Start: 2017-11-29 | End: 2017-12-02 | Stop reason: HOSPADM

## 2017-11-29 RX ORDER — KETAMINE HYDROCHLORIDE 100 MG/ML
INJECTION, SOLUTION INTRAMUSCULAR; INTRAVENOUS AS NEEDED
Status: DISCONTINUED | OUTPATIENT
Start: 2017-11-29 | End: 2017-11-29 | Stop reason: HOSPADM

## 2017-11-29 RX ORDER — CEFAZOLIN SODIUM 1 G/3ML
INJECTION, POWDER, FOR SOLUTION INTRAMUSCULAR; INTRAVENOUS AS NEEDED
Status: DISCONTINUED | OUTPATIENT
Start: 2017-11-29 | End: 2017-11-29 | Stop reason: HOSPADM

## 2017-11-29 RX ORDER — ONDANSETRON 4 MG/1
4 TABLET, ORALLY DISINTEGRATING ORAL
Status: DISCONTINUED | OUTPATIENT
Start: 2017-11-29 | End: 2017-12-02 | Stop reason: HOSPADM

## 2017-11-29 RX ORDER — ATORVASTATIN CALCIUM 20 MG/1
20 TABLET, FILM COATED ORAL DAILY
Status: DISCONTINUED | OUTPATIENT
Start: 2017-11-29 | End: 2017-12-02 | Stop reason: HOSPADM

## 2017-11-29 RX ORDER — SODIUM CHLORIDE 9 MG/ML
125 INJECTION, SOLUTION INTRAVENOUS CONTINUOUS
Status: DISCONTINUED | OUTPATIENT
Start: 2017-11-29 | End: 2017-11-30

## 2017-11-29 RX ORDER — SODIUM CHLORIDE 9 MG/ML
50 INJECTION, SOLUTION INTRAVENOUS CONTINUOUS
Status: DISCONTINUED | OUTPATIENT
Start: 2017-11-29 | End: 2017-11-30

## 2017-11-29 RX ORDER — PHENYLEPHRINE HCL IN 0.9% NACL 0.4MG/10ML
SYRINGE (ML) INTRAVENOUS AS NEEDED
Status: DISCONTINUED | OUTPATIENT
Start: 2017-11-29 | End: 2017-11-29 | Stop reason: HOSPADM

## 2017-11-29 RX ORDER — SODIUM CHLORIDE 0.9 % (FLUSH) 0.9 %
5-10 SYRINGE (ML) INJECTION AS NEEDED
Status: DISCONTINUED | OUTPATIENT
Start: 2017-11-29 | End: 2017-11-30

## 2017-11-29 RX ORDER — POLYETHYLENE GLYCOL 3350 17 G/17G
17 POWDER, FOR SOLUTION ORAL DAILY
Status: DISCONTINUED | OUTPATIENT
Start: 2017-11-30 | End: 2017-12-02 | Stop reason: HOSPADM

## 2017-11-29 RX ORDER — ROCURONIUM BROMIDE 10 MG/ML
INJECTION, SOLUTION INTRAVENOUS AS NEEDED
Status: DISCONTINUED | OUTPATIENT
Start: 2017-11-29 | End: 2017-11-29 | Stop reason: HOSPADM

## 2017-11-29 RX ORDER — ACETAMINOPHEN 325 MG/1
650 TABLET ORAL EVERY 6 HOURS
Status: DISCONTINUED | OUTPATIENT
Start: 2017-11-29 | End: 2017-12-02 | Stop reason: HOSPADM

## 2017-11-29 RX ORDER — FENTANYL CITRATE 50 UG/ML
25 INJECTION, SOLUTION INTRAMUSCULAR; INTRAVENOUS
Status: DISCONTINUED | OUTPATIENT
Start: 2017-11-29 | End: 2017-11-29 | Stop reason: HOSPADM

## 2017-11-29 RX ORDER — DEXAMETHASONE SODIUM PHOSPHATE 4 MG/ML
INJECTION, SOLUTION INTRA-ARTICULAR; INTRALESIONAL; INTRAMUSCULAR; INTRAVENOUS; SOFT TISSUE AS NEEDED
Status: DISCONTINUED | OUTPATIENT
Start: 2017-11-29 | End: 2017-11-29 | Stop reason: HOSPADM

## 2017-11-29 RX ORDER — OXYCODONE HYDROCHLORIDE 5 MG/1
2.5 TABLET ORAL
Status: DISCONTINUED | OUTPATIENT
Start: 2017-11-29 | End: 2017-11-30

## 2017-11-29 RX ORDER — HYDROMORPHONE HYDROCHLORIDE 1 MG/ML
.2-.5 INJECTION, SOLUTION INTRAMUSCULAR; INTRAVENOUS; SUBCUTANEOUS
Status: DISCONTINUED | OUTPATIENT
Start: 2017-11-29 | End: 2017-11-29 | Stop reason: HOSPADM

## 2017-11-29 RX ORDER — ENOXAPARIN SODIUM 100 MG/ML
25 INJECTION SUBCUTANEOUS DAILY
Status: DISCONTINUED | OUTPATIENT
Start: 2017-11-30 | End: 2017-12-02 | Stop reason: HOSPADM

## 2017-11-29 RX ADMIN — ONDANSETRON 4 MG: 2 INJECTION INTRAMUSCULAR; INTRAVENOUS at 16:39

## 2017-11-29 RX ADMIN — SUCCINYLCHOLINE CHLORIDE 120 MG: 20 INJECTION INTRAMUSCULAR; INTRAVENOUS at 16:09

## 2017-11-29 RX ADMIN — SODIUM CHLORIDE 50 ML/HR: 900 INJECTION, SOLUTION INTRAVENOUS at 03:27

## 2017-11-29 RX ADMIN — Medication 10 ML: at 14:36

## 2017-11-29 RX ADMIN — ROCURONIUM BROMIDE 5 MG: 10 INJECTION, SOLUTION INTRAVENOUS at 16:09

## 2017-11-29 RX ADMIN — CEFAZOLIN SODIUM 1 G: 1 INJECTION, POWDER, FOR SOLUTION INTRAMUSCULAR; INTRAVENOUS at 23:13

## 2017-11-29 RX ADMIN — DOCUSATE SODIUM AND SENNOSIDES 2 TABLET: 8.6; 5 TABLET, FILM COATED ORAL at 10:28

## 2017-11-29 RX ADMIN — OXYCODONE HYDROCHLORIDE 5 MG: 5 TABLET ORAL at 02:24

## 2017-11-29 RX ADMIN — FENTANYL CITRATE 50 MCG: 50 INJECTION, SOLUTION INTRAMUSCULAR; INTRAVENOUS at 16:09

## 2017-11-29 RX ADMIN — DEXAMETHASONE SODIUM PHOSPHATE 8 MG: 4 INJECTION, SOLUTION INTRA-ARTICULAR; INTRALESIONAL; INTRAMUSCULAR; INTRAVENOUS; SOFT TISSUE at 16:39

## 2017-11-29 RX ADMIN — PROPOFOL 50 MG: 10 INJECTION, EMULSION INTRAVENOUS at 16:09

## 2017-11-29 RX ADMIN — TAMSULOSIN HYDROCHLORIDE 0.4 MG: 0.4 CAPSULE ORAL at 10:28

## 2017-11-29 RX ADMIN — ACETAMINOPHEN 650 MG: 325 TABLET ORAL at 10:28

## 2017-11-29 RX ADMIN — ATORVASTATIN CALCIUM 20 MG: 20 TABLET, FILM COATED ORAL at 10:28

## 2017-11-29 RX ADMIN — Medication 80 MCG: at 16:35

## 2017-11-29 RX ADMIN — HYDROMORPHONE HYDROCHLORIDE 0.5 MG: 1 INJECTION, SOLUTION INTRAMUSCULAR; INTRAVENOUS; SUBCUTANEOUS at 18:12

## 2017-11-29 RX ADMIN — ROCURONIUM BROMIDE 10 MG: 10 INJECTION, SOLUTION INTRAVENOUS at 17:01

## 2017-11-29 RX ADMIN — ACETAMINOPHEN 650 MG: 325 TABLET ORAL at 02:24

## 2017-11-29 RX ADMIN — FENTANYL CITRATE 50 MCG: 50 INJECTION, SOLUTION INTRAMUSCULAR; INTRAVENOUS at 16:00

## 2017-11-29 RX ADMIN — Medication 10 ML: at 23:07

## 2017-11-29 RX ADMIN — Medication 160 MCG: at 16:39

## 2017-11-29 RX ADMIN — ACETAMINOPHEN 650 MG: 325 TABLET ORAL at 13:20

## 2017-11-29 RX ADMIN — Medication 160 MCG: at 16:44

## 2017-11-29 RX ADMIN — ROCURONIUM BROMIDE 25 MG: 10 INJECTION, SOLUTION INTRAVENOUS at 16:19

## 2017-11-29 RX ADMIN — DOCUSATE SODIUM AND SENNOSIDES 1 TABLET: 8.6; 5 TABLET, FILM COATED ORAL at 23:12

## 2017-11-29 RX ADMIN — CEFAZOLIN SODIUM 1 G: 1 INJECTION, POWDER, FOR SOLUTION INTRAMUSCULAR; INTRAVENOUS at 16:18

## 2017-11-29 RX ADMIN — SODIUM CHLORIDE, SODIUM LACTATE, POTASSIUM CHLORIDE, CALCIUM CHLORIDE: 600; 310; 30; 20 INJECTION, SOLUTION INTRAVENOUS at 16:45

## 2017-11-29 RX ADMIN — ALBUMIN HUMAN 250 ML: 50 SOLUTION INTRAVENOUS at 16:41

## 2017-11-29 RX ADMIN — OXYCODONE HYDROCHLORIDE 5 MG: 5 TABLET ORAL at 23:07

## 2017-11-29 RX ADMIN — PROPOFOL 50 MG: 10 INJECTION, EMULSION INTRAVENOUS at 16:17

## 2017-11-29 RX ADMIN — LIDOCAINE HYDROCHLORIDE 80 MG: 20 INJECTION, SOLUTION EPIDURAL; INFILTRATION; INTRACAUDAL; PERINEURAL at 16:09

## 2017-11-29 RX ADMIN — THERA TABS 1 TABLET: TAB at 10:28

## 2017-11-29 RX ADMIN — KETAMINE HYDROCHLORIDE 50 MG: 100 INJECTION, SOLUTION INTRAMUSCULAR; INTRAVENOUS at 16:09

## 2017-11-29 RX ADMIN — UMECLIDINIUM 1 PUFF: 62.5 AEROSOL, POWDER ORAL at 13:18

## 2017-11-29 RX ADMIN — OXYCODONE HYDROCHLORIDE 5 MG: 5 TABLET ORAL at 06:35

## 2017-11-29 RX ADMIN — SODIUM CHLORIDE, SODIUM LACTATE, POTASSIUM CHLORIDE, CALCIUM CHLORIDE: 600; 310; 30; 20 INJECTION, SOLUTION INTRAVENOUS at 15:53

## 2017-11-29 RX ADMIN — ACETAMINOPHEN 650 MG: 325 TABLET ORAL at 23:07

## 2017-11-29 RX ADMIN — SODIUM CHLORIDE 125 ML/HR: 900 INJECTION, SOLUTION INTRAVENOUS at 19:20

## 2017-11-29 RX ADMIN — FINASTERIDE 5 MG: 5 TABLET, FILM COATED ORAL at 10:28

## 2017-11-29 RX ADMIN — SODIUM CHLORIDE 125 ML/HR: 900 INJECTION, SOLUTION INTRAVENOUS at 23:07

## 2017-11-29 NOTE — PERIOP NOTES
TRANSFER - OUT REPORT:    Verbal report given to Gaby DE LA PAZ(name) on Annie Whatley  being transferred to [facility](unit) for routine progression of care       Report consisted of patients Situation, Background, Assessment and   Recommendations(SBAR). Information from the following report(s) OR Summary, Procedure Summary, Intake/Output and MAR was reviewed with the receiving nurse. Opportunity for questions and clarification was provided.       Patient transported with:   O2 @ 2 liters  Tech

## 2017-11-29 NOTE — PROGRESS NOTES
Hospitalist Progress Note    NAME: Raudel Douglass   :  1934   MRN:  [id number]       Assessment / Plan:  Acute right hip fracture:  - xray with  - appreciate ortho consult, plan to OR this afternoon  - see H&P for preoperative assessment  - DVT prophylaxis and pain management per ortho protocol   Left pleural effusion, unclear etiology:  First noted , asymptomatic  - CXR on admission with small-to-moderate enlarging left pleural effusion  - CT chest  with trace left pleural effusion with left lower lobe atelectasis. No evident etiology. Stable 2 mm right-sided subpleural lymph nodes can be considered benign. Additional chronic changes including coronary artery disease. - echo done, results pending   COPD without acute exacerbation:  - con't incruse (on spiriva at home)  - nebs prn   BPH: con't flomax/proscar   Hyperlipidemia: con't lipitor (on crestor at home)  Mild cognitive impairment: con't strattera        Code Status: Full    Surrogate Decision Maker: children  DVT Prophylaxis: per ortho protocol   GI Prophylaxis: not indicated     Baseline: lives with his dtr and her family, ambulating independent      Subjective:     Chief Complaint / Reason for Physician Visit  \"I'm ready for surgery\". Pain currently controlled, mild confusion. Discussed with RN events overnight. Review of Systems:  Symptom Y/N Comments  Symptom Y/N Comments   Fever/Chills n   Chest Pain n    Poor Appetite n   Edema n    Cough n   Abdominal Pain n    Sputum n   Joint Pain     SOB/SAUCEDO n   Pruritis/Rash     Nausea/vomit    Tolerating PT/OT     Diarrhea    Tolerating Diet     Constipation    Other       Could NOT obtain due to:      Objective:     VITALS:   Last 24hrs VS reviewed since prior progress note.  Most recent are:  Patient Vitals for the past 24 hrs:   Temp Pulse Resp BP SpO2   17 1000 98.5 °F (36.9 °C) 78 18 142/68 96 %   17 0849 98.1 °F (36.7 °C) 73 18 144/65 100 %   17 0337 98 °F (36.7 °C) 74 16 137/70 96 %   11/29/17 0112 98.5 °F (36.9 °C) 74 16 133/74 97 %   11/29/17 0000 - - - 131/68 97 %   11/28/17 2345 - - - 130/68 99 %   11/28/17 2330 - - - 138/65 100 %   11/28/17 2315 - - - 137/53 100 %   11/28/17 2300 - - - (!) 129/37 96 %   11/28/17 2245 - - - 129/71 99 %   11/28/17 2230 - - - 112/90 97 %   11/28/17 2215 - - - 123/67 96 %   11/28/17 2200 - - - 127/62 97 %   11/28/17 2145 - - - 130/63 98 %   11/28/17 2129 - - - 140/42 93 %   11/28/17 2127 - - - - 100 %   11/28/17 2121 - - - - 100 %   11/28/17 2100 - - - - 100 %   11/28/17 2041 - - - - 99 %   11/28/17 1945 98.5 °F (36.9 °C) 66 16 139/76 99 %       Intake/Output Summary (Last 24 hours) at 11/29/17 1234  Last data filed at 11/29/17 0909   Gross per 24 hour   Intake                0 ml   Output              150 ml   Net             -150 ml        PHYSICAL EXAM:  General: WD, WN. Alert, cooperative, no acute distress    EENT:  EOMI. Anicteric sclerae. MMM  Resp:  CTA bilaterally, no wheezing or rales. No accessory muscle use  CV:  Regular  rhythm,  No edema  GI:  Soft, mildly distended, Non tender.  +Bowel sounds  Neurologic:  Alert and oriented X 3, normal speech,   Psych:   Some insight. Not anxious nor agitated  Skin:  Pale, No rashes. No jaundice    Reviewed most current lab test results and cultures  YES  Reviewed most current radiology test results   YES  Review and summation of old records today    NO  Reviewed patient's current orders and MAR    YES  PMH/SH reviewed - no change compared to H&P  ________________________________________________________________________  Care Plan discussed with:    Comments   Patient x    Family      RN     Care Manager     Consultant                        Multidiciplinary team rounds were held today with , nursing, pharmacist and clinical coordinator. Patient's plan of care was discussed; medications were reviewed and discharge planning was addressed. ________________________________________________________________________  Total NON critical care TIME:  30 Minutes    Total CRITICAL CARE TIME Spent:   Minutes non procedure based      Comments   >50% of visit spent in counseling and coordination of care x    ________________________________________________________________________  Suzanne Canavan, MD     Procedures: see electronic medical records for all procedures/Xrays and details which were not copied into this note but were reviewed prior to creation of Plan. LABS:  I reviewed today's most current labs and imaging studies.   Pertinent labs include:  Recent Labs      11/28/17 2056   WBC  10.8   HGB  11.4*   HCT  33.6*   PLT  188     Recent Labs      11/28/17 2056   NA  138   K  4.4   CL  101   CO2  33*   GLU  105*   BUN  19   CREA  0.75   CA  8.2*   ALB  3.0*   TBILI  0.3   SGOT  18   ALT  25   INR  1.1       Signed: Suzanne Canavan, MD

## 2017-11-29 NOTE — ANESTHESIA PREPROCEDURE EVALUATION
Anesthetic History   No history of anesthetic complications            Review of Systems / Medical History  Patient summary reviewed, nursing notes reviewed and pertinent labs reviewed    Pulmonary    COPD (emphysema): mild      Smoker (30 pk years)         Neuro/Psych         Psychiatric history (depression/anxiety)  Pertinent negatives: No seizures, TIA and CVA  Comments: Post-herpetic neuralgia  Short term memory loss  Essential tremor Cardiovascular    Hypertension: well controlled          Hyperlipidemia  Pertinent negatives: No dysrhythmias and angina  Exercise tolerance: >4 METS  Comments: 12-3-13 EKG:SR, RBBB, LAE, Septal infarct  12-9-13 ECHO:60% EF, mild AS, mild AR,mild TR, trivial MR  Works in yard, [place], active without chest pain   GI/Hepatic/Renal             Pertinent negatives: No liver disease and renal disease   Endo/Other    Diabetes (prediabetes, no meds)    Arthritis  Pertinent negatives: No hypothyroidism and hyperthyroidism   Other Findings   Comments: DJD  BPH         Physical Exam    Airway  Mallampati: IV  TM Distance: 4 - 6 cm  Neck ROM: normal range of motion   Mouth opening: Diminished (comment)     Cardiovascular    Rhythm: regular  Rate: normal         Dental  No notable dental hx       Pulmonary  Breath sounds clear to auscultation               Abdominal  GI exam deferred       Other Findings            Anesthetic Plan    ASA: 3  Anesthesia type: general          Induction: Intravenous  Anesthetic plan and risks discussed with: Patient      Have glidescope available

## 2017-11-29 NOTE — ED PROVIDER NOTES
EMERGENCY DEPARTMENT HISTORY AND PHYSICAL EXAM      Date: 11/28/2017  Patient Name: Yesenia Buckley    History of Presenting Illness     Chief Complaint   Patient presents with    Hip Pain     Patient had GLF about 1 hour ago after standing quickly from squatting on the floor. Pain to right hip. No obvious deformity. History Provided By: Patient    HPI: Yesenia Buckley, 80 y.o. male with PMHx significant for HTN, HLD, anxiety, depression, and arthritis, presents via EMS to the ED with cc of abrupt onset right hip pain s/p glf one hour pta. Pt reports he tripped on items on the floor while walking in the dark and landed directly onto his right hip. He states his family was able to assist him off the floor, however he has not ambulated since the fall. Pt indicates his pain is significantly exacerbated with any movement and alleviated with resting his leg on a pillow. He denies hitting his head or LOC. Pt specifically denies any numbness or tingling of RLE as well as right ankle or knee pain. PCP: Mis Mora III, DO    There are no other complaints, changes, or physical findings at this time. Current Facility-Administered Medications   Medication Dose Route Frequency Provider Last Rate Last Dose    acetaminophen (TYLENOL) tablet 650 mg  650 mg Oral Q6H Juanita Boyd        oxyCODONE IR (ROXICODONE) tablet 2.5 mg  2.5 mg Oral Q4H PRN CONOR Boyd        oxyCODONE IR (ROXICODONE) tablet 5 mg  5 mg Oral Q4H PRN CONOR Boyd        naloxone [facility]) injection 0.4 mg  0.4 mg IntraVENous PRN CONOR Boyd        senna-docusate (PERICOLACE) 8.6-50 mg per tablet 2 Tab  2 Tab Oral BID Juanita Boyd        ceFAZolin (ANCEF) 1 g in 0.9% sodium chloride (MBP/ADV) 50 mL  1 g IntraVENous ON CALL TO OR CONOR Boyd         Current Outpatient Prescriptions   Medication Sig Dispense Refill    atomoxetine (STRATTERA) 80 mg capsule Take 1 Cap by mouth daily.  30 Cap 5    rosuvastatin (CRESTOR) 20 mg tablet TAKE ONE TABLET BY MOUTH ONE TIME DAILY 90 Tab 3    SPIRIVA WITH HANDIHALER 18 mcg inhalation capsule INHALE ONE CAPSULE VIA DEVICE BY MOUTH ONE TIME DAILY 30 Cap 8    multivitamin (ONE A DAY) tablet Take 1 Tab by mouth daily.  tamsulosin (FLOMAX) 0.4 mg capsule Take 0.4 mg by mouth daily.  finasteride (PROSCAR) 5 mg tablet Take 5 mg by mouth daily.  aspirin 81 mg Tab Take 81 mg by mouth daily. Past History     Past Medical History:  Past Medical History:   Diagnosis Date    Arthritis     BPH (benign prostatic hypertrophy) 2010    COPD (chronic obstructive pulmonary disease) with emphysema ([facility] [address] 2012    HLD (hyperlipidemia) 2010    HTN (hypertension) 2010    Memory disorder     Psychiatric disorder     anxiety and depression       Past Surgical History:  Past Surgical History:   Procedure Laterality Date    Jed Pepenuzhat  2015    2 sessile sigmoid polyps, 3 & 5 mm; Dr. Pastor Herrera; no further screening recommended    ENDOSCOPY, COLON, DIAGNOSTIC      negative; 10 year repeat; Dr. Mallory Page  years ago    left    HX HERNIA REPAIR      right inguinal    TX COLSC FLX W/RMVL OF TUMOR POLYP LESION SNARE TQ  2012            Family History:  Family History   Problem Relation Age of Onset    Heart Disease Mother       at 80    Heart Disease Father       at 80    Liver Disease Brother     Stroke Brother     Heart Attack Brother     Other Sister 8     ? polio    Cancer Sister      lung       Social History:  Social History   Substance Use Topics    Smoking status: Current Some Day Smoker     Packs/day: 0.50     Years: 60.00     Types: Cigarettes    Smokeless tobacco: Former User     Quit date: 10/5/2014      Comment: smokes about 10 cigs per day    Alcohol use 4.2 oz/week     7 Glasses of wine per week      Comment: 1 drinks per evening       Allergies:   Allergies   Allergen Reactions    Zetia [Ezetimibe] Other (comments)     C/o back pain         Review of Systems   Review of Systems   Constitutional: Negative for chills, fatigue and fever. HENT: Negative for congestion and rhinorrhea. Eyes: Negative for visual disturbance. Respiratory: Negative for cough, shortness of breath and wheezing. Cardiovascular: Negative for chest pain and palpitations. Gastrointestinal: Negative for abdominal distention, abdominal pain, constipation, diarrhea, nausea and vomiting. Endocrine: Negative. Genitourinary: Negative for difficulty urinating and dysuria. Musculoskeletal: Positive for arthralgias (right hip). - right knee pain  - right ankle pain   Skin: Negative for rash. Neurological: Positive for numbness (RLE). Negative for dizziness, weakness and light-headedness. - tingling RLE   Psychiatric/Behavioral: Negative for suicidal ideas. Physical Exam   Physical Exam   Constitutional: He is oriented to person, place, and time. He appears well-developed and well-nourished. No distress. HENT:   Head: Normocephalic and atraumatic. Mouth/Throat: Oropharynx is clear and moist.   Eyes: Conjunctivae and EOM are normal.   Neck: Neck supple. No JVD present. No tracheal deviation present. Cardiovascular: Normal rate, regular rhythm and intact distal pulses. Exam reveals no gallop and no friction rub. No murmur heard. Normal peripheral pulses   Pulmonary/Chest: Effort normal and breath sounds normal. No stridor. No respiratory distress. He has no wheezes. Abdominal: Soft. Bowel sounds are normal. He exhibits no distension and no mass. There is no tenderness. There is no guarding. Musculoskeletal:   Unable to flex right hip secondary to pain. Tenderness to palpation over right lateral hip, no obvious deformity or swelling. No peripheral edema. Sensation intact BLE. Neurological: He is alert and oriented to person, place, and time. He has normal strength. No focal deficits   Skin: Skin is warm, dry and intact. No rash noted. Psychiatric: He has a normal mood and affect. His behavior is normal. Judgment and thought content normal.   Nursing note and vitals reviewed. Diagnostic Study Results     Labs -     Recent Results (from the past 12 hour(s))   METABOLIC PANEL, COMPREHENSIVE    Collection Time: 11/28/17  8:56 PM   Result Value Ref Range    Sodium 138 136 - 145 mmol/L    Potassium 4.4 3.5 - 5.1 mmol/L    Chloride 101 97 - 108 mmol/L    CO2 33 (H) 21 - 32 mmol/L    Anion gap 4 (L) 5 - 15 mmol/L    Glucose 105 (H) 65 - 100 mg/dL    BUN 19 6 - 20 MG/DL    Creatinine 0.75 0.70 - 1.30 MG/DL    BUN/Creatinine ratio 25 (H) 12 - 20      GFR est AA >60 >60 ml/min/1.73m2    GFR est non-AA >60 >60 ml/min/1.73m2    Calcium 8.2 (L) 8.5 - 10.1 MG/DL    Bilirubin, total 0.3 0.2 - 1.0 MG/DL    ALT (SGPT) 25 12 - 78 U/L    AST (SGOT) 18 15 - 37 U/L    Alk. phosphatase 80 45 - 117 U/L    Protein, total 7.0 6.4 - 8.2 g/dL    Albumin 3.0 (L) 3.5 - 5.0 g/dL    Globulin 4.0 2.0 - 4.0 g/dL    A-G Ratio 0.8 (L) 1.1 - 2.2     CBC WITH AUTOMATED DIFF    Collection Time: 11/28/17  8:56 PM   Result Value Ref Range    WBC 10.8 4.1 - 11.1 K/uL    RBC 3.55 (L) 4.10 - 5.70 M/uL    HGB 11.4 (L) 12.1 - 17.0 g/dL    HCT 33.6 (L) 36.6 - 50.3 %    MCV 94.6 80.0 - 99.0 FL    MCH 32.1 26.0 - 34.0 PG    MCHC 33.9 30.0 - 36.5 g/dL    RDW 13.3 11.5 - 14.5 %    PLATELET 517 010 - 515 K/uL    NEUTROPHILS 82 (H) 32 - 75 %    LYMPHOCYTES 10 (L) 12 - 49 %    MONOCYTES 7 5 - 13 %    EOSINOPHILS 1 0 - 7 %    BASOPHILS 0 0 - 1 %    ABS. NEUTROPHILS 8.9 (H) 1.8 - 8.0 K/UL    ABS. LYMPHOCYTES 1.1 0.8 - 3.5 K/UL    ABS. MONOCYTES 0.7 0.0 - 1.0 K/UL    ABS. EOSINOPHILS 0.2 0.0 - 0.4 K/UL    ABS.  BASOPHILS 0.0 0.0 - 0.1 K/UL   PTT    Collection Time: 11/28/17  8:56 PM   Result Value Ref Range    aPTT 25.3 22.1 - 32.5 sec    aPTT, therapeutic range     58.0 - 77.0 SECS   PROTHROMBIN TIME + INR    Collection Time: 11/28/17  8:56 PM   Result Value Ref Range    INR 1.1 0.9 - 1.1      Prothrombin time 10.7 9.0 - 11.1 sec       Radiologic Studies -   XR HIP RT W OR WO PELV 2-3 VWS   Final Result   EXAM:  XR HIP RT W OR WO PELV 2-3 VWS     INDICATION:   fall. Right hip pain     COMPARISON: None.     FINDINGS: An AP view of the pelvis and a frogleg lateral view of the right hip  demonstrate a comminuted intertrochanteric right hip fracture with varus  angulation. Bones are osteopenic. .     IMPRESSION  IMPRESSION:       Acute right hip fracture. CXR Results  (Last 48 hours)               11/28/17 2036  XR CHEST PORT Final result    Impression:  IMPRESSION:   1. Small-to-moderate enlarging left pleural effusion           Narrative:  INDICATION:  pre-op        EXAM: Portable chest 2023 hours. Comparison January 24, 2017       FINDINGS: Cardiac silhouette is not enlarged. There is a small to moderate left   pleural effusion which has increased in size in the interval. Lung volumes are   increased. There is scarring in the lung apices. Partial collapse of the left   base. Calcified granuloma left mid chest laterally unchanged. Pulmonary   vasculature is normal. No pneumothorax                   Medical Decision Making   I am the first provider for this patient. I reviewed the vital signs, available nursing notes, past medical history, past surgical history, family history and social history. Vital Signs-Reviewed the patient's vital signs.   Patient Vitals for the past 12 hrs:   Temp Pulse Resp BP SpO2   11/28/17 2145 - - - 130/63 98 %   11/28/17 2129 - - - 140/42 93 %   11/28/17 2127 - - - - 100 %   11/28/17 2121 - - - - 100 %   11/28/17 2100 - - - - 100 %   11/28/17 2041 - - - - 99 %   11/28/17 1945 98.5 °F (36.9 °C) 66 16 139/76 99 %       Pulse Oximetry Analysis - 99% on RA    Cardiac Monitor:   Rate: 66 bpm  Rhythm: Normal Sinus Rhythm     Records Reviewed: Nursing Notes, Old Medical Records, Previous electrocardiograms and Ambulance Run Sheet    Provider Notes (Medical Decision Making):   DDx: fracture, dislocation, sprain, strain, contusion    ED Course:   7:56 PM  Initial assessment performed. The patients presenting problems have been discussed, and they are in agreement with the care plan formulated and outlined with them. I have encouraged them to ask questions as they arise throughout their visit. CONSULT NOTE:   8:35 PM  Sharad Sharif DO spoke with Loreto Bustamante PA-C,   Specialty: ortho  Discussed pt's hx, disposition, and available diagnostic and imaging results. Reviewed care plans. Consultant agrees with plans as outlined. ALFONSO Coleub will see pt. Written by Kathryn Torres ED Scribe, as dictated by Sharad Sharif DO.    CONSULT NOTE:   8:44 PM  Sharad Sharif DO spoke with Jeanna Campbell MD   Specialty: Hospitalist  Discussed pt's hx, disposition, and available diagnostic and imaging results. Reviewed care plans. Consultant will evaluate pt for admission. Written by Kathryn Torres ED Scribe, as dictated by Sharad Sharif DO. Disposition:    ADMIT NOTE:  8:44 PM  The patient is being admitted to the hospital by Jeanna Campbell MD.  The results of their tests and reasons for their admission have been discussed with the patient and/or available family. They convey agreement and understanding for the need to be admitted and for their admission diagnosis. PLAN:  1. Admit to hospitalist      Diagnosis     Clinical Impression:   1. Closed fracture of right hip, initial encounter [facility])        Attestations: This note is prepared by Kathryn Torres, acting as Scribe for Sharad Sharif DO. Sharad Sharif DO: The scribe's documentation has been prepared under my direction and personally reviewed by me in its entirety. I confirm that the note above accurately reflects all work, treatment, procedures, and medical decision making performed by me.

## 2017-11-29 NOTE — PERIOP NOTES
Handoff Report from Operating Room to PACU    Report received from Kirit Lancaster CRNA and Elda Villarreal RN regarding Eduard Hastings. Surgeon(s):  Giovanni Colvin DO  And Procedure(s) (LRB):  FEMUR INSERTION INTRA MEDULLARY NAIL Right (Synthes Long Nail) (Right)  confirmed   with allergies and dressings discussed. Anesthesia type, drugs, patient history, complications, estimated blood loss, vital signs, intake and output, and blood products received, last pain medication, lines, reversal medications and temperature were reviewed.

## 2017-11-29 NOTE — ANESTHESIA POSTPROCEDURE EVALUATION
Post-Anesthesia Evaluation and Assessment    Patient: Shirley Kovacs MRN: [id number]  SSN: [id number]    YOB: 1934  Age: 80 y.o. Sex: male       Cardiovascular Function/Vital Signs  Visit Vitals    BP (P) 138/66 (BP 1 Location: Right arm, BP Patient Position: At rest)    Pulse 82    Temp (P) 37 °C (98.6 °F)    Resp 13    Ht 5' 6\" (1.676 m)    Wt 49 kg (108 lb)    SpO2 100%    BMI 17.43 kg/m2       Patient is status post general anesthesia for Procedure(s): FEMUR INSERTION INTRA MEDULLARY NAIL Right (Synthes Long Nail). Nausea/Vomiting: None    Postoperative hydration reviewed and adequate. Pain:  Pain Scale 1: (P) Numeric (0 - 10) (11/29/17 1830)  Pain Intensity 1: (P) 0 (11/29/17 1830)   Managed    Neurological Status:   Neuro (WDL): (P) Exceptions to WDL (11/29/17 1830)  Neuro  Neurologic State: (P) Drowsy; Eyes open to stimulus; Eyes open spontaneously; Eyes open to voice;Sleeping (11/29/17 1830)  Orientation Level: (P) Disoriented to place;Oriented to person;Oriented to situation (Orientation to Place comes and go's) (11/29/17 1830)  Cognition: (P) Follows commands (11/29/17 1830)  Speech: (P) Clear (11/29/17 1830)  LUE Motor Response: (P) Purposeful (11/29/17 1830)  LLE Motor Response: (P) Purposeful (11/29/17 1830)  RUE Motor Response: (P) Purposeful (11/29/17 1830)  RLE Motor Response: (P) Purposeful (11/29/17 1830)   At baseline    Mental Status and Level of Consciousness: Arousable    Pulmonary Status:   O2 Device: Nasal cannula (11/29/17 1830)   Adequate oxygenation and airway patent    Complications related to anesthesia: None    Post-anesthesia assessment completed.  No concerns    Signed By: Mynor Velasquez MD     November 29, 2017

## 2017-11-29 NOTE — H&P
Hospitalist Admission Note    NAME: Annie hWatley   :  1934   MRN:  [id number]     Date/Time:  2017 11:09 PM    Patient PCP: Filiberto Parra III, DO  ________________________________________________________________________    My assessment of this patient's clinical condition and my plan of care is as follows. Assessment / Plan:  Acute right hip fracture  DVT prophylaxis and pain management per ortho protocol   Follow [facility]  Pre-op cardiac risk assessment:  Pt evaluated using revised cardiac risk index and is felt to be low to moderate cardiovascular risk for intermediate risk surgery with a 0.4- 2.4% risk for major complications based on these criteria. This risk has been discussed with the patient and dtr. Pt wishes to proceed. Plan for surgery without further cardiac testing if this risk is acceptable per surgery and anesthesia. Further risk reduction will involve medical management of other comorbid conditions in the perioperative period. Left pleural effusion   Unknown etiology. Stable since at least 2017. Asymptomatic  CT 2017: No evident etiology. No further work up prior to surgery   Will check ECHO ( no need to be done prior to surgery)   Primary cardiology: used to be Dr Michelle Magana     Mild cognitive impairment  MS at baseline per dtr. Confusion may deteriorate due to surgery/anesthesia/hospitalization. Avoid oversedation   Re orient frequently      COPD , wo exacerbation, cont jet nebs prn, cont Spiriva   BPH, cont Flomax/proscar   Hyperlipidemia , cont Crestor           Code Status: Full code d/w pt and dtr bedside   Surrogate Decision Maker: children, no MPOA     DVT Prophylaxis: per ortho protocol   GI Prophylaxis: not indicated    Baseline: lives with his dtr and her family, ambulating independent         Subjective:   CHIEF COMPLAINT: R hip pain     HISTORY OF PRESENT ILLNESS:     Horace Jones is a 80 y.o.    male who presents with above complaint. Pt sustained GLF today when he was returning from the bathroom back to his bedroom in the dark. He tripped and landed on his right side. Son in low helped pt to get back to bed. Pt was not able to ambulate since his fall. He is normally ambulating independent. Dtr/pt denies any other recent falls. No hitting head or LOC. After his fall pt c/o R hip pain. Pain is significantly exacerbated with any movement. Pain is alleviated with staying still. Cxray in ED revealed left pleural effusion. Pt and dtr aware of pleural effusion since 2017. Pt denies any SAUCEDO,chest pain, or orthopnea. No LE edema.      Vs: 98.5 °F (36.9 °C) - 66 - 139/76 - 97 - 16 - 99% RA     We were asked to admit for work up and evaluation of the above problems.      Past Medical History:   Diagnosis Date    Arthritis     BPH (benign prostatic hypertrophy) 2010    COPD (chronic obstructive pulmonary disease) with emphysema ([facility] [address] 2012    HLD (hyperlipidemia) 2010    HTN (hypertension) 2010    Memory disorder     Psychiatric disorder     anxiety and depression        Past Surgical History:   Procedure Laterality Date    Carlosor Giorgio  2015    2 sessile sigmoid polyps, 3 & 5 mm; Dr. Colleen Mccarthy; no further screening recommended    ENDOSCOPY, COLON, DIAGNOSTIC      negative; 10 year repeat; Dr. Tereza Gage  years ago    left    HX HERNIA REPAIR      right inguinal    CT COLSC FLX W/RMVL OF TUMOR POLYP LESION SNARE TQ  2012            Social History   Substance Use Topics    Smoking status: Current Some Day Smoker     Packs/day: 0.50     Years: 60.00     Types: Cigarettes    Smokeless tobacco: Former User     Quit date: 10/5/2014      Comment: smokes about 10 cigs per day    Alcohol use 4.2 oz/week     7 Glasses of wine per week      Comment: 1 drinks per evening        Family History   Problem Relation Age of Onset    Heart Disease Mother       at 80    Heart Disease Father       at 80    Liver Disease Brother     Stroke Brother     Heart Attack Brother     Other Sister 8     ? polio    Cancer Sister      lung     Allergies   Allergen Reactions    Zetia [Ezetimibe] Other (comments)     C/o back pain        Prior to Admission medications    Medication Sig Start Date End Date Taking? Authorizing Provider   atomoxetine (STRATTERA) 80 mg capsule Take 1 Cap by mouth daily. 17   Kaylie Daniels MD   rosuvastatin (CRESTOR) 20 mg tablet TAKE ONE TABLET BY MOUTH ONE TIME DAILY 10/17/17   Gould [place] III, DO   SPIRIVA WITH HANDIHALER 18 mcg inhalation capsule INHALE ONE CAPSULE VIA DEVICE BY MOUTH ONE TIME DAILY 17   Gould [place] III, DO   multivitamin (ONE A DAY) tablet Take 1 Tab by mouth daily. Historical Provider   tamsulosin (FLOMAX) 0.4 mg capsule Take 0.4 mg by mouth daily. 5/19/15   Historical Provider   finasteride (PROSCAR) 5 mg tablet Take 5 mg by mouth daily. Historical Provider   aspirin 81 mg Tab Take 81 mg by mouth daily. 4/12/10   Historical Provider       REVIEW OF SYSTEMS:     I am not able to complete the review of systems because:    The patient is intubated and sedated    The patient has altered mental status due to his acute medical problems    The patient has baseline aphasia from prior stroke(s)    The patient has baseline dementia and is not reliable historian    The patient is in acute medical distress and unable to provide information           Total of 12 systems reviewed as follows:       POSITIVE= underlined text  Negative = text not underlined  General:  fever, chills, sweats, generalized weakness, weight loss/gain,      loss of appetite   Eyes:    blurred vision, eye pain, loss of vision, double vision  ENT:    rhinorrhea, pharyngitis   Respiratory:   cough, sputum production, SOB, SAUECDO, wheezing, pleuritic pain   Cardiology:   chest pain, palpitations, orthopnea, PND, edema, syncope   Gastrointestinal: abdominal pain , N/V, diarrhea, dysphagia, constipation, bleeding   Genitourinary:  frequency, urgency, dysuria, hematuria, incontinence   Muskuloskeletal :  arthralgia, myalgia, back pain  Hematology:  easy bruising, nose or gum bleeding, lymphadenopathy   Dermatological: rash, ulceration, pruritis, color change / jaundice  Endocrine:   hot flashes or polydipsia   Neurological:  headache, dizziness, confusion, focal weakness, paresthesia,     Speech difficulties, memory loss, gait difficulty  Psychological: Feelings of anxiety, depression, agitation    Objective:   VITALS:    Visit Vitals    /63    Pulse 66    Temp 98.5 °F (36.9 °C)    Resp 16    Wt 49 kg (108 lb)    SpO2 98%    BMI 18.54 kg/m2       PHYSICAL EXAM:    General:    Alert, cooperative, no distress, appears stated age. HEENT: Atraumatic, anicteric sclerae, pink conjunctivae     No oral ulcers, mucosa moist, throat clear, dentition fair  Neck:  Supple, symmetrical,  thyroid: non tender  Lungs:   Clear to auscultation bilaterally. No Wheezing or Rhonchi. No rales. Chest wall:  No tenderness  No Accessory muscle use. Heart:   Regular  rhythm,  No  murmur   No edema  Abdomen:   Soft, non-tender. Not distended. Bowel sounds normal  Extremities: No cyanosis. No clubbing,      Skin turgor normal, Capillary refill normal, Radial dial pulse 2+                          + R LE  Externally rotated   Skin:     Not pale. Not Jaundiced  No rashes   Psych:  Good insight. Not depressed. Not anxious or agitated. Neurologic: EOMs intact. No facial asymmetry. No aphasia or slurred speech. Symmetrical strength, Sensation grossly intact.  Alert and oriented X 4.     _______________________________________________________________________  Care Plan discussed with:    Comments   Patient y    Family  y dtr bedside    RN y    Care Manager                    Consultant:  y ED provider _______________________________________________________________________  Expected  Disposition:   Home with Family    HH/PT/OT/RN    SNF/LTC y   [de-identified]    ________________________________________________________________________  TOTAL TIME:  72 Minutes    Critical Care Provided     Minutes non procedure based      Comments    y Reviewed previous records    y Discussion with patient and/or family and questions answered       ________________________________________________________________________  Signed: Lien Beckett MD    Procedures: see electronic medical records for all procedures/Xrays and details which were not copied into this note but were reviewed prior to creation of Plan. LAB DATA REVIEWED:    Recent Results (from the past 24 hour(s))   METABOLIC PANEL, COMPREHENSIVE    Collection Time: 11/28/17  8:56 PM   Result Value Ref Range    Sodium 138 136 - 145 mmol/L    Potassium 4.4 3.5 - 5.1 mmol/L    Chloride 101 97 - 108 mmol/L    CO2 33 (H) 21 - 32 mmol/L    Anion gap 4 (L) 5 - 15 mmol/L    Glucose 105 (H) 65 - 100 mg/dL    BUN 19 6 - 20 MG/DL    Creatinine 0.75 0.70 - 1.30 MG/DL    BUN/Creatinine ratio 25 (H) 12 - 20      GFR est AA >60 >60 ml/min/1.73m2    GFR est non-AA >60 >60 ml/min/1.73m2    Calcium 8.2 (L) 8.5 - 10.1 MG/DL    Bilirubin, total 0.3 0.2 - 1.0 MG/DL    ALT (SGPT) 25 12 - 78 U/L    AST (SGOT) 18 15 - 37 U/L    Alk.  phosphatase 80 45 - 117 U/L    Protein, total 7.0 6.4 - 8.2 g/dL    Albumin 3.0 (L) 3.5 - 5.0 g/dL    Globulin 4.0 2.0 - 4.0 g/dL    A-G Ratio 0.8 (L) 1.1 - 2.2     CBC WITH AUTOMATED DIFF    Collection Time: 11/28/17  8:56 PM   Result Value Ref Range    WBC 10.8 4.1 - 11.1 K/uL    RBC 3.55 (L) 4.10 - 5.70 M/uL    HGB 11.4 (L) 12.1 - 17.0 g/dL    HCT 33.6 (L) 36.6 - 50.3 %    MCV 94.6 80.0 - 99.0 FL    MCH 32.1 26.0 - 34.0 PG    MCHC 33.9 30.0 - 36.5 g/dL    RDW 13.3 11.5 - 14.5 %    PLATELET 915 751 - 333 K/uL    NEUTROPHILS 82 (H) 32 - 75 %    LYMPHOCYTES 10 (L) 12 - 49 %    MONOCYTES 7 5 - 13 %    EOSINOPHILS 1 0 - 7 %    BASOPHILS 0 0 - 1 %    ABS. NEUTROPHILS 8.9 (H) 1.8 - 8.0 K/UL    ABS. LYMPHOCYTES 1.1 0.8 - 3.5 K/UL    ABS. MONOCYTES 0.7 0.0 - 1.0 K/UL    ABS. EOSINOPHILS 0.2 0.0 - 0.4 K/UL    ABS.  BASOPHILS 0.0 0.0 - 0.1 K/UL   PTT    Collection Time: 11/28/17  8:56 PM   Result Value Ref Range    aPTT 25.3 22.1 - 32.5 sec    aPTT, therapeutic range     58.0 - 77.0 SECS   PROTHROMBIN TIME + INR    Collection Time: 11/28/17  8:56 PM   Result Value Ref Range    INR 1.1 0.9 - 1.1      Prothrombin time 10.7 9.0 - 11.1 sec   TYPE & SCREEN    Collection Time: 11/28/17  8:56 PM   Result Value Ref Range    Crossmatch Expiration 12/01/2017     ABO/Rh(D) A POSITIVE     Antibody screen NEG    EKG, 12 LEAD, INITIAL    Collection Time: 11/28/17 10:00 PM   Result Value Ref Range    Ventricular Rate 75 BPM    Atrial Rate 75 BPM    P-R Interval 148 ms    QRS Duration 122 ms    Q-T Interval 392 ms    QTC Calculation (Bezet) 437 ms    Calculated P Axis 72 degrees    Calculated R Axis -43 degrees    Calculated T Axis 63 degrees    Diagnosis       Normal sinus rhythm  Left axis deviation  Right bundle branch block  When compared with ECG of 07-DEC-2013 15:26,  Criteria for Septal infarct are no longer present

## 2017-11-29 NOTE — CONSULTS
[address]   [address] [place] [address] Road       Name:  Susie Chris   MR#:  [id number]   :  1934   Account #:  [de-identified]    Date of Consultation:  2017   Date of Adm:  2017       REASON FOR CONSULTATION: Right hip fracture. CHIEF COMPLAINT: Right hip pain. HISTORY OF CHIEF COMPLAINT: The patient is an 80-year-old male   who presented to [facility] after a ground-  level fall that occurred in the bathroom at his home. He was brought to   the hospital by his family and evaluation in the ER revealed evidence   of a right intertrochanteric hip fracture that was comminuted and   displaced. He denied any other injuries with the fall. He did have a   bruise to his right elbow. Orthopedics was consulted for management of his hip fracture. He   denied any loss of consciousness. REVIEW OF SYSTEMS   The patient denies any recent fever, chills, nausea, vomiting, chest   pain or shortness of breath. PAST MEDICAL HISTORY: BPH, COPD, hyperlipidemia,   hypertension, anxiety and depression. PAST SURGICAL HISTORY: Colonoscopy, hernia repair, bilateral.    SOCIAL HISTORY: The patient admits to smoking approximately half   pack per day for 60 years. He admits to 1 drink per night of wine,   alcohol use. He lives at home with his family. FAMILY HISTORY: Noncontributory. ALLERGIES: Jarome Dys. MEDICATIONS    2. Crestor. 3. Spiriva. 4. Multivitamin. 5. Flomax. 6. Proscar. 7. Aspirin. PHYSICAL EXAMINATION   VITAL SIGNS: The patient is currently afebrile. Vital signs are stable. GENERAL: The patient is awake, alert, and oriented x3. He is well-  appearing. He is in no acute distress. CHEST: Normal respirations. ABDOMEN: Soft, nontender to palpation. EXTREMITIES: The patient has tenderness to palpation of the right   hip. He has obvious swelling and bruising in this area.  There is also   swelling at the right elbow with bruising. He has normal range of   motion of the elbow. He has pain with any attempted range of motion   of the hip. Positive log roll exam. No tenderness to palpation about the   knee or ankle on the right. He is neurovascularly and sensory intact. IMAGING: X-rays of the right hip and femur show evidence of a   comminuted and displaced right intertrochanteric hip fracture. LABORATORY DATA: White blood cell count 10.8, hemoglobin 11.4,   hematocrit 33.6, platelets 465. INR 1.1.    ASSESSMENT: An 49-year-old male with a comminuted and displaced   right intertrochanteric hip fracture. PLAN: After discussion with the patient and his family, we decided to   pursue surgical treatment. The risks and benefits of right hip   intramedullary nailing were explained. Risks of infection, blood loss,   neurovascular, anesthesia risk, and risks secondary to the patient's   comorbidities were described. Informed consent was obtained. He was made bed rest and medically optimized and cleared prior to   this procedure. He was n.p.o. after midnight and ready for surgery on   11/29/2017.         Bernett Nissen, DO DD / BRENNA   D:  11/29/2017   17:53   T:  11/29/2017   18:47   Job #:  [id number]

## 2017-11-29 NOTE — BRIEF OP NOTE
BRIEF OPERATIVE NOTE    Date of Procedure: 11/29/2017   Preoperative Diagnosis: Right Hip Fracture  Postoperative Diagnosis: RIGHT HIP FRACTURE    Procedure(s): FEMUR INSERTION INTRA MEDULLARY NAIL Right (Synthes Long Nail)  Surgeon(s) and Role:     * Drew Higgins DO - Primary         Assistant Staff:       Surgical Staff:  Circ-1: Michelle Reddy RN  Scrub Tech-1: Gely Jacobs  Scrub Tech-Relief: Rupa Andrews  Surg Asst-1: Aline Otoole  Event Time In   Incision Start 1640   Incision Close 1715     Anesthesia: General   Estimated Blood Loss: 200cc  Specimens: * No specimens in log *   Findings: comminuted IT fx   Complications: none  Implants:   Implant Name Type Inv.  Item Serial No.  Lot No. LRB No. Used Action   NAIL GODFREY 130D [id number] RT -- TFNA STRL - SN/A  NAIL GODFREY 130D [id number] RT -- TFNA STRL N/A [employer] [place] [id number] Right 1 Implanted   BLADE HELCL ADV TFNA 100MM -- STRL - SN/A  BLADE HELCL ADV TFNA 100MM -- STRL N/A [employer] [place] [id number] Right 1 Implanted   SCR BNE LCK T25 F/IM NL [id number] --  - SN/A   SCR BNE LCK T25 F/IM NL 5X40MM --  N/A [employer] [place] N/A Right 1 Implanted

## 2017-11-30 LAB
ANION GAP SERPL CALC-SCNC: 8 MMOL/L (ref 5–15)
BUN SERPL-MCNC: 11 MG/DL (ref 6–20)
BUN/CREAT SERPL: 15 (ref 12–20)
CALCIUM SERPL-MCNC: 7.7 MG/DL (ref 8.5–10.1)
CHLORIDE SERPL-SCNC: 104 MMOL/L (ref 97–108)
CO2 SERPL-SCNC: 26 MMOL/L (ref 21–32)
CREAT SERPL-MCNC: 0.74 MG/DL (ref 0.7–1.3)
ERYTHROCYTE [DISTWIDTH] IN BLOOD BY AUTOMATED COUNT: 13.8 % (ref 11.5–14.5)
GLUCOSE SERPL-MCNC: 132 MG/DL (ref 65–100)
HCT VFR BLD AUTO: 22.2 % (ref 36.6–50.3)
HGB BLD-MCNC: 7.5 G/DL (ref 12.1–17)
MCH RBC QN AUTO: 31.8 PG (ref 26–34)
MCHC RBC AUTO-ENTMCNC: 33.8 G/DL (ref 30–36.5)
MCV RBC AUTO: 94.1 FL (ref 80–99)
PLATELET # BLD AUTO: 139 K/UL (ref 150–400)
POTASSIUM SERPL-SCNC: 4.4 MMOL/L (ref 3.5–5.1)
RBC # BLD AUTO: 2.36 M/UL (ref 4.1–5.7)
SODIUM SERPL-SCNC: 138 MMOL/L (ref 136–145)
WBC # BLD AUTO: 7.1 K/UL (ref 4.1–11.1)

## 2017-11-30 PROCEDURE — 74011250637 HC RX REV CODE- 250/637: Performed by: PHYSICIAN ASSISTANT

## 2017-11-30 PROCEDURE — 74011000258 HC RX REV CODE- 258: Performed by: ORTHOPAEDIC SURGERY

## 2017-11-30 PROCEDURE — 97162 PT EVAL MOD COMPLEX 30 MIN: CPT

## 2017-11-30 PROCEDURE — G8987 SELF CARE CURRENT STATUS: HCPCS | Performed by: OCCUPATIONAL THERAPIST

## 2017-11-30 PROCEDURE — 36430 TRANSFUSION BLD/BLD COMPNT: CPT

## 2017-11-30 PROCEDURE — 85027 COMPLETE CBC AUTOMATED: CPT | Performed by: INTERNAL MEDICINE

## 2017-11-30 PROCEDURE — G8978 MOBILITY CURRENT STATUS: HCPCS

## 2017-11-30 PROCEDURE — 97530 THERAPEUTIC ACTIVITIES: CPT

## 2017-11-30 PROCEDURE — 36415 COLL VENOUS BLD VENIPUNCTURE: CPT | Performed by: INTERNAL MEDICINE

## 2017-11-30 PROCEDURE — 65270000029 HC RM PRIVATE

## 2017-11-30 PROCEDURE — G8988 SELF CARE GOAL STATUS: HCPCS | Performed by: OCCUPATIONAL THERAPIST

## 2017-11-30 PROCEDURE — 74011250636 HC RX REV CODE- 250/636: Performed by: ORTHOPAEDIC SURGERY

## 2017-11-30 PROCEDURE — G8979 MOBILITY GOAL STATUS: HCPCS

## 2017-11-30 PROCEDURE — 80048 BASIC METABOLIC PNL TOTAL CA: CPT | Performed by: INTERNAL MEDICINE

## 2017-11-30 PROCEDURE — 97530 THERAPEUTIC ACTIVITIES: CPT | Performed by: OCCUPATIONAL THERAPIST

## 2017-11-30 PROCEDURE — P9016 RBC LEUKOCYTES REDUCED: HCPCS | Performed by: PHYSICIAN ASSISTANT

## 2017-11-30 PROCEDURE — 74011250637 HC RX REV CODE- 250/637: Performed by: NURSE PRACTITIONER

## 2017-11-30 PROCEDURE — 97165 OT EVAL LOW COMPLEX 30 MIN: CPT | Performed by: OCCUPATIONAL THERAPIST

## 2017-11-30 PROCEDURE — 74011250637 HC RX REV CODE- 250/637: Performed by: HOSPITALIST

## 2017-11-30 PROCEDURE — 74011250637 HC RX REV CODE- 250/637: Performed by: ORTHOPAEDIC SURGERY

## 2017-11-30 RX ORDER — SODIUM CHLORIDE 9 MG/ML
250 INJECTION, SOLUTION INTRAVENOUS AS NEEDED
Status: DISCONTINUED | OUTPATIENT
Start: 2017-11-30 | End: 2017-12-02 | Stop reason: HOSPADM

## 2017-11-30 RX ORDER — FAMOTIDINE 20 MG/1
20 TABLET, FILM COATED ORAL 2 TIMES DAILY
Status: DISCONTINUED | OUTPATIENT
Start: 2017-11-30 | End: 2017-12-02 | Stop reason: HOSPADM

## 2017-11-30 RX ADMIN — ACETAMINOPHEN 650 MG: 325 TABLET ORAL at 12:00

## 2017-11-30 RX ADMIN — FINASTERIDE 5 MG: 5 TABLET, FILM COATED ORAL at 09:20

## 2017-11-30 RX ADMIN — Medication 10 ML: at 05:43

## 2017-11-30 RX ADMIN — Medication 10 ML: at 14:41

## 2017-11-30 RX ADMIN — ASPIRIN 81 MG 81 MG: 81 TABLET ORAL at 09:20

## 2017-11-30 RX ADMIN — UMECLIDINIUM 1 PUFF: 62.5 AEROSOL, POWDER ORAL at 09:32

## 2017-11-30 RX ADMIN — CALCIUM CARBONATE 500 MG (1,250 MG)-VITAMIN D3 200 UNIT TABLET 1 TABLET: at 09:20

## 2017-11-30 RX ADMIN — DOCUSATE SODIUM AND SENNOSIDES 2 TABLET: 8.6; 5 TABLET, FILM COATED ORAL at 17:16

## 2017-11-30 RX ADMIN — ENOXAPARIN SODIUM 25 MG: 60 INJECTION SUBCUTANEOUS at 09:22

## 2017-11-30 RX ADMIN — THERA TABS 1 TABLET: TAB at 09:20

## 2017-11-30 RX ADMIN — CALCIUM CARBONATE 500 MG (1,250 MG)-VITAMIN D3 200 UNIT TABLET 1 TABLET: at 17:16

## 2017-11-30 RX ADMIN — FAMOTIDINE 20 MG: 20 TABLET, FILM COATED ORAL at 12:00

## 2017-11-30 RX ADMIN — ACETAMINOPHEN 650 MG: 325 TABLET ORAL at 05:43

## 2017-11-30 RX ADMIN — CALCIUM CARBONATE 500 MG (1,250 MG)-VITAMIN D3 200 UNIT TABLET 1 TABLET: at 12:00

## 2017-11-30 RX ADMIN — ATORVASTATIN CALCIUM 20 MG: 20 TABLET, FILM COATED ORAL at 09:20

## 2017-11-30 RX ADMIN — POLYETHYLENE GLYCOL 3350 17 G: 17 POWDER, FOR SOLUTION ORAL at 09:20

## 2017-11-30 RX ADMIN — OXYCODONE HYDROCHLORIDE 5 MG: 5 TABLET ORAL at 09:39

## 2017-11-30 RX ADMIN — OXYCODONE HYDROCHLORIDE 5 MG: 5 TABLET ORAL at 05:43

## 2017-11-30 RX ADMIN — ACETAMINOPHEN 650 MG: 325 TABLET ORAL at 17:16

## 2017-11-30 RX ADMIN — TAMSULOSIN HYDROCHLORIDE 0.4 MG: 0.4 CAPSULE ORAL at 09:20

## 2017-11-30 RX ADMIN — OXYCODONE HYDROCHLORIDE 5 MG: 5 TABLET ORAL at 15:52

## 2017-11-30 RX ADMIN — DOCUSATE SODIUM AND SENNOSIDES 1 TABLET: 8.6; 5 TABLET, FILM COATED ORAL at 09:20

## 2017-11-30 RX ADMIN — FAMOTIDINE 20 MG: 20 TABLET, FILM COATED ORAL at 17:16

## 2017-11-30 RX ADMIN — CEFAZOLIN SODIUM 1 G: 1 INJECTION, POWDER, FOR SOLUTION INTRAMUSCULAR; INTRAVENOUS at 09:24

## 2017-11-30 NOTE — PROGRESS NOTES
Lovenox 40 mg sc daily ordered for DVT ppx  Will change order to lovenox 25 mg sc daily for body weight of 49 kg    Edward Vásquez

## 2017-11-30 NOTE — ROUTINE PROCESS
Bedside and Verbal shift change report given to [address] (oncoming nurse) by Lilo Ahn (offgoing nurse). Report included the following information SBAR, Kardex, Intake/Output, MAR and Recent Results.

## 2017-11-30 NOTE — PROGRESS NOTES
Problem: Self Care Deficits Care Plan (Adult)  Goal: *Acute Goals and Plan of Care (Insert Text)  Occupational Therapy Goals  Initiated 11/30/2017  1. Patient will perform grooming in standing with minimal assistance/contact guard assist within 7 day(s). 2.  Patient will perform sponge bathing with moderate assistance  within 7 day(s). 3.  Patient will perform upper body dressing and lower body dressing with minimal assistance, using adaptive aids, prn, within 7 day(s). 4.  Patient will perform toilet transfers with minimal assistance  within 7 day(s). 5.  Patient will perform all aspects of toileting with minimal assistance/contact guard assist within 7 day(s). 6.  Patient will participate in upper extremity therapeutic exercise/activities with supervision/set-up for 10 minutes within 7 day(s). 7.  Patient will perform standing adls for at least 8 minutes with minimal assistance within 7 days. Occupational Therapy EVALUATION  Patient: Abelardo Rose (14 y.o. male)  Date: 11/30/2017  Primary Diagnosis: Right Hip Fracture  Hip fracture (HCC)  Procedure(s) (LRB):  FEMUR INSERTION INTRA MEDULLARY NAIL Right (Synthes Long Nail) (Right) 1 Day Post-Op   Precautions:   Fall, WBAT    ASSESSMENT :  Based on the objective data described below, the patient presents with pain and decreased ROM and strength RLE s/p surgery for IM nail POD 1 post pt's fall at home. Pt is generally weak, demonstrated orthostatic hypotension (NP aware) during mobility attempts this date. Pt is functioning below his baseline for adls as he is now dependent for self care functioning at set up to total assistance levels. Pt was motivated to participate despite his 5/10 pain. He is independent including driving at baseline and would benefit from intensive rehab program at discharge. Recommend inpatient vs. [address] rehab depending on his progress.       Patient will benefit from skilled intervention to address the above impairments. Patients rehabilitation potential is considered to be Good  Factors which may influence rehabilitation potential include:   []             None noted  []             Mental ability/status  [x]             Medical condition  []             Home/family situation and support systems  []             Safety awareness  [x]             Pain tolerance/management  []             Other:      PLAN :  Recommendations and Planned Interventions:  [x]               Self Care Training                  [x]        Therapeutic Activities  [x]               Functional Mobility Training    []        Cognitive Retraining  [x]               Therapeutic Exercises           [x]        Endurance Activities  [x]               Balance Training                   []        Neuromuscular Re-Education  []               Visual/Perceptual Training     [x]   Home Safety Training  [x]               Patient Education                 [x]        Family Training/Education  []               Other (comment):    Frequency/Duration: Patient will be followed by occupational therapy 5 times a week to address goals.   Discharge Recommendations: Inpatient Rehab vs. snf rehab  Further Equipment Recommendations for Discharge: tbd     SUBJECTIVE:   Patient stated it's about a 5.    OBJECTIVE DATA SUMMARY:   HISTORY:   Past Medical History:   Diagnosis Date    Arthritis     BPH (benign prostatic hypertrophy) 4/12/2010    COPD (chronic obstructive pulmonary disease) with emphysema ([facility] [address] 7/25/2012    HLD (hyperlipidemia) 4/12/2010    HTN (hypertension) 4/12/2010    Memory disorder     Psychiatric disorder     anxiety and depression     Past Surgical History:   Procedure Laterality Date    COLONOSCOPY,NURA FERNÁNDEZ,SNARE  8/5/2015    2 sessile sigmoid polyps, 3 & 5 mm; Dr. Colleen Mccarthy; no further screening recommended    ENDOSCOPY, COLON, DIAGNOSTIC  2002    negative; 10 year repeat; Dr. Starks Lev  years ago    left    HX HERNIA REPAIR 2001    right inguinal    MD COLSC FLX W/RMVL OF TUMOR POLYP LESION SNARE TQ  7/27/2012            Prior Level of Function/Environment/Context: pt lives with his daughter and son in law. Pt is independent in adls and IADLs including driving. Pt is alone during the day while his family is at work. Pt fell while moving about in his  dark bedroom  Occupations in which the patient is/was successful, what are the barriers preventing that success: Pain and ORIF R LE. Expanded or extensive additional review of patient history: pt had one fall about 4 years ago    [address]: Private residence  # Steps to Enter: 4  Rails to Enter: Yes  Hand Rails : Bilateral  Wheelchair Ramp: No  One/Two Story Residence: One story  Living Alone: No  Support Systems: Child(justa)  Patient Expects to be Discharged to[de-identified] Private residence  Current DME Used/Available at Home: [address], straight  Tub or Shower Type: Tub/Shower combination (pt sits in tub or sponge bathes)  [x]  Right hand dominant   []  Left hand dominant    EXAMINATION OF PERFORMANCE DEFICITS:  Cognitive/Behavioral Status:  Neurologic State: Alert  Orientation Level: Oriented X4  Cognition: Follows commands  Perception: Appears intact  Perseveration: No perseveration noted  Safety/Judgement: Awareness of environment    Skin: generally intact, surgical site intact    Edema: none observed    Hearing: Auditory  Auditory Impairment: Hard of hearing, bilateral    Vision/Perceptual:    Tracking:  (able to scan room functionally)                                Range of Motion:  BUEs:  AROM: Generally decreased, functional  PROM: Generally decreased, functional                      Strength:  BUEs:  Strength: Generally decreased, functional                Coordination:  Coordination: Within functional limits  Fine Motor Skills-Upper: Left Intact; Right Intact    Gross Motor Skills-Upper: Left Intact; Right Intact    Tone & Sensation:  Sensation: intact  Tone: Normal                         Balance:  Sitting: Impaired  Sitting - Static: Good (unsupported); Fair (occasional)  Sitting - Dynamic: Fair (occasional)  Standing: Impaired; With support  Standing - Static: Fair;Good;Constant support  Standing - Dynamic : Fair    Functional Mobility and Transfers for ADLs:  Bed Mobility:  Rolling: Moderate assistance  Supine to Sit: Minimum assistance;Assist x2; Additional time  Sit to Supine: Moderate assistance;Assist x2  Scooting: Moderate assistance    Transfers:  Sit to Stand: Minimum assistance;Assist x2; Additional time; Adaptive equipment  Stand to Sit: Minimum assistance  Bed to Chair:  (unable due to orthostatic hypotension)    ADL Assessment:  Feeding: Independent    Oral Facial Hygiene/Grooming: Supervision;Setup    Bathing: Maximum assistance    Upper Body Dressing: Minimum assistance    Lower Body Dressing: Total assistance    Toileting: Moderate assistance (using urinal at this time. will need bed pan due to orthost)                ADL Intervention and task modifications:   Pt performed bed mobility ( min/mod A) and standing (Rustam X2)  despite complaints of 5/10 pain. Pt became orthostatic and was returned to supine with maximal assistance X2. Total assistance for scooting pt to [facility]                                   Cognitive Retraining  Safety/Judgement: Awareness of environment    Therapeutic Exercise:  Encouraged pt to pump ankles and attempt to move RLE as much as possible at bed level.      Functional Measure:  Barthel Index:    Bathin  Bladder: 10  Bowels: 10  Groomin  Dressin  Feeding: 10  Mobility: 0  Stairs: 0  Toilet Use: 0  Transfer (Bed to Chair and Back): 5  Total: 45       Barthel and G-code impairment scale:  Percentage of impairment CH  0% CI  1-19% CJ  20-39% CK  40-59% CL  60-79% CM  80-99% CN  100%   Barthel Score 0-100 100 99-80 79-60 59-40 20-39 1-19   0   Barthel Score 0-20 20 17-19 13-16 9-12 5-8 1-4 0      The Barthel ADL Index: Guidelines  1. The index should be used as a record of what a patient does, not as a record of what a patient could do. 2. The main aim is to establish degree of independence from any help, physical or verbal, however minor and for whatever reason. 3. The need for supervision renders the patient not independent. 4. A patient's performance should be established using the best available evidence. Asking the patient, friends/relatives and nurses are the usual sources, but direct observation and common sense are also important. However direct testing is not needed. 5. Usually the patient's performance over the preceding 24-48 hours is important, but occasionally longer periods will be relevant. 6. Middle categories imply that the patient supplies over 50 per cent of the effort. 7. Use of aids to be independent is allowed. Kiana Stoner., Barthel, DTieraW. ([id number]). Functional evaluation: the Barthel Index. [address] [facility] (14)2. Faith Elise galo FADUMO Goodwin, Demetra Wilcox., Sasha Choudhary., [place], [address] [place] [address] (1999). Measuring the change indisability after inpatient rehabilitation; comparison of the responsiveness of the Barthel Index and Functional Jefferson Measure. Journal of Neurology, Neurosurgery, and Psychiatry, 66(4), 022-805. Mayra Lara, N.J.A, ALFIE Bernabe, & Amilcar Romero M.A. (2004.) Assessment of post-stroke quality of life in cost-effectiveness studies: The usefulness of the Barthel Index and the EuroQoL-5D. Quality of Life Research, 13, 848-17       G codes: In compliance with CMSs Claims Based Outcome Reporting, the following G-code set was chosen for this patient based on their primary functional limitation being treated: The outcome measure chosen to determine the severity of the functional limitation was the BArthel index with a score of 45/100 which was correlated with the impairment scale. ?  Self Care:     - CURRENT STATUS: CK - 40%-59% impaired, limited or restricted    - GOAL STATUS: CJ - 20%-39% impaired, limited or restricted    - D/C STATUS:  ---------------To be determined---------------     Occupational Therapy Evaluation Charge Determination   History Examination Decision-Making   LOW Complexity : Brief history review  MEDIUM Complexity : 3-5 performance deficits relating to physical, cognitive , or psychosocial skils that result in activity limitations and / or participation restrictions MEDIUM Complexity : Patient may present with comorbidities that affect occupational performnce. Miniml to moderate modification of tasks or assistance (eg, physical or verbal ) with assesment(s) is necessary to enable patient to complete evaluation       Based on the above components, the patient evaluation is determined to be of the following complexity level: LOW   Pain:  Pain Scale 1: Numeric (0 - 10)  Pain Intensity 1: 3        Pain Description 1: Aching  Pain Intervention(s) 1: Medication (see MAR)  Activity Tolerance:   Poor due to orthostatic hypotension in standing - strong symptoms of dizziness. Please refer to the flowsheet for vital signs taken during this treatment. After treatment:   [] Patient left in no apparent distress sitting up in chair  [x] Patient left in no apparent distress in bed  [x] Call bell left within reach  [x] Nursing notified and present. NP aware  [x] Caregiver present  [] Bed alarm activated    COMMUNICATION/EDUCATION:   The patients plan of care was discussed with: Physical Therapist, Registered Nurse and NP.  [] Home safety education was provided and the patient/caregiver indicated understanding. [x] Patient/family have participated as able in goal setting and plan of care. [] Patient/family agree to work toward stated goals and plan of care. [] Patient understands intent and goals of therapy, but is neutral about his/her participation. [] Patient is unable to participate in goal setting and plan of care.   This patients plan of care is appropriate for delegation to LORI.     Thank you for this referral.  Riaz Nguyen, OTR/L  Time Calculation: 36 mins

## 2017-11-30 NOTE — PROGRESS NOTES
Ortho / Neurosurgery NP Note    POD# 1  s/p FEMUR INSERTION INTRA MEDULLARY NAIL Right (Synthes Long Nail)   Pt seen with PT and OT    Pt resting in bed now just finishing attempt to mobilize with therapy. Pt was able to stand but got dixzzy and orthostatic with HR up to 102 and BP down to 80/40. After supine pt less dizzy and BP returned to 144/62  No complaints. VSS Afebrile. Voiding status: + void  Tolerating PO well    Labs  Lab Results   Component Value Date/Time    HGB 7.5 11/30/2017 05:45 AM      Lab Results   Component Value Date/Time    INR 1.1 11/28/2017 08:56 PM        Body mass index is 17.43 kg/(m^2). : A BMI > 30 is classified as obesity and > 40 is classified as morbid obesity. Opsite Dressings c.d.i  Cryotherapy in place over incision  Calves soft and supple; No pain with passive stretch  Sensation and motor intact  SCDs for mechanical DVT proph while in bed     PLAN:  1) PT BID  2) Aspirin 81 mg POdaily and Lovenox for DVT Prophylaxis   3) GI Prophylaxis - Pepcid ordered  4) Expected Acute blood loss post-op anemia - baseline Hgb 13, and orthostatic with anemia limiting function and rehab potential. Discussed with Dr. Danny Yin and he isin agreement that due to pt symptoms will transfuse 1 unit PRBC today  5) Plan d/c home with family vs SNF depending on PT progress.     Renuka Nesbitt NP

## 2017-11-30 NOTE — OP NOTES
[address], [facility]   OP NOTE       Name:  Valentine Scheuermann   MR#:  [id number]   :  1934   Account #:  [de-identified]    Surgery Date:  2017   Date of Adm:  2017       PREOPERATIVE DIAGNOSIS: Right intertrochanteric hip fracture. POSTOPERATIVE DIAGNOSES: Right intertrochanteric hip fracture. PROCEDURES PERFORMED: Right hip cephalomedullary nailing. SURGEON: Bryon Morillo MD.    ASSISTANT: [facility] staff. ANESTHESIA: General.    ESTIMATED BLOOD LOSS: 200 mL. COMPLICATIONS: None. SPECIMENS REMOVED: None. DISPOSITION: Stable to PACU. INDICATIONS FOR PROCEDURE: The patient is an 77-year-old male   who presented to [facility] after a ground-  level fall at home. He was found to have a comminuted and displaced   intertrochanteric hip fracture. Therefore, orthopedic was consulted. He   denied any other injuries with his fall and denies any loss of   consciousness. He was admitted to the hospital by the medical team   for medical optimization and clearance. He was medically optimized   and cleared for surgery the day after his admission. He was n.p.o. after   midnight and bed rest for pain control. We discussed risks and benefits   of this procedure. Risks of infection, blood loss, neurovascular injury,   anesthesia risk, and risks secondary to the patient's comorbidities were   described in relationship to right hip cephalomedullary nailing. They   elected to proceed with this procedure and consent was obtained. DESCRIPTION OF PROCEDURE: The patient was taken to the OR on   2017. History and physical forms and consent forms were   confirmed in the chart. His operative extremity had been marked by   Orthopedics. We gave him sedation and he was intubated by   Anesthesia. He was then transferred to the [facility] table.  Reduction of   the fracture was visualized on x-ray with use of the [place] table. Sterile   prepping and draping was performed. Timeout was then called. The   patient was identified by name, date of birth, operative site, and   operative procedure. After all were in agreement the right hip was the   operative extremity, an incision was made for approach to the lateral   hip at the tip of the greater trochanter. Careful dissection down to avoid   neurovascular structures was performed. We identified the tip of the   greater trochanter and inserted a guide pin. We confirmed appropriate   positioning on AP and lateral x-rays and then inserted an opening   reamer. After this opening reamer was used, we inserted a ball-tipped   guidewire down the shaft of the femur. We then placed an intermediate   Synthes TFNA nail. He had wide canals and need for reaming was   necessary. We identified appropriate positioning on x-ray and then   made a small incision for our helical blade. The aiming arm was   attached and we placed a guidewire for appropriate helical blade   placement. Once we identified a tipped apex distance of less than 25   mm, we predrilled for our helical blade. The helical blade was placed   without difficulty. We then moved to the distal locking screw. This   was placed without difficulty and a small incision was made. After final   x-rays were taken confirming appropriate positioning of our nail, we   irrigated the wounds. Closure with a combination of 0 Vicryl, 2-0 Vicryl,   and staples was performed. Sterile dressings were applied, and the   patient was awakened by Anesthesia. He was transferred to the PACU   in stable condition. He will be weightbearing as tolerated and follow in   the hospital. He was started on DVT prophylaxis.         Belgica Nava DO DD / Anna Davis   D:  11/29/2017   18:55   T:  11/30/2017   08:25   Job #:  [id number]

## 2017-11-30 NOTE — PROGRESS NOTES
IP consult to address inpt rehab complete. Referral sent to [facility] for their review. SW plans to meet with pt to complete evaluation.     Dalila Latham, AGUSTIN  Ext [phone]

## 2017-11-30 NOTE — PROGRESS NOTES
POD 1 Day Post-Op    Procedure:  Procedure(s): FEMUR INSERTION INTRA MEDULLARY NAIL Right (Synthes Long Nail)    Subjective:     Patient doing well, complaining of appropriate post-op pain    Objective:     Blood pressure 149/82, pulse 76, temperature 98.7 °F (37.1 °C), resp. rate 16, height 5' 6\" (1.676 m), weight 49 kg (108 lb), SpO2 99 %. Temp (24hrs), Av.5 °F (36.9 °C), Min:98.1 °F (36.7 °C), Max:98.9 °F (37.2 °C)      Physical Exam:  Examination of the right hip reveals that the incision is clean, dry and intact. Sensation is intact to light touch.  mild swelling. No calf pain. NVSI    Labs:   Lab Results   Component Value Date/Time    HGB 7.5 2017 05:45 AM         Assessment:     Principal Problem:    Closed right hip fracture ([facility] [address] (2017)    Active Problems:    Hip fracture ([address] (2017)        Procedure(s):   FEMUR INSERTION INTRA MEDULLARY NAIL Right (Synthes Long Nail)    Plan/Recommendations/Medical Decision Making:     - pain control   - ice   - pt/ot - wbat RLE  - dvt prophylaxis - lovenox   - d/c planning - likely rehab vs snf        Astrid Ivey DO

## 2017-11-30 NOTE — INTERDISCIPLINARY ROUNDS
Bedside interdisciplinary rounds were held today to discuss patient plan of care and outcomes. The following members were present: Nurse Practitioner, Nurse, Clinical Care Leader, Pharmacy, Physical Therapy, and Case Management. Plan:  Pepcid & Lovenox for prophylaxis. Patient orthostatic with PT - 1 unit of PRBC's ordered for today. Discharge plan pending progress home vs. [facility].

## 2017-11-30 NOTE — PROGRESS NOTES
Pharmacy: [facility] (for [facility])    Per discussion w/ patient: he does not wish to take Strattera while in the hospital and will resume   when he is discharged. I removed Strattera from the patient's MAR. Thanks.

## 2017-11-30 NOTE — ROUTINE PROCESS
Bedside and Verbal shift change report given to Karlee Suero 44 (oncoming nurse) by Debo Jenkins RN (offgoing nurse). Report included the following information SBAR, Kardex, ED Summary, OR Summary, Procedure Summary, Intake/Output, MAR, Accordion, Recent Results and Med Rec Status.

## 2017-11-30 NOTE — PROGRESS NOTES
Problem: Mobility Impaired (Adult and Pediatric)  Goal: *Acute Goals and Plan of Care (Insert Text)  Physical Therapy Goals  Initiated 11/30/2017  1. Patient will move from supine to sit and sit to supine , scoot up and down and roll side to side in bed with modified independence within 7 day(s). 2.  Patient will transfer from bed to chair and chair to bed with contact guard assist using the least restrictive device within 7 day(s). 3.  Patient will perform sit to stand with contact guard assist within 7 day(s). 4.  Patient will ambulate with minimal assistance/contact guard assist for 75 feet with the least restrictive device within 7 day(s). 5.  Patient will ascend/descend 4 stairs with 2 handrail(s) with moderate assistance  within 7 day(s). physical Therapy EVALUATION  Patient: Yesenia Buckley (39 y.o. male)  Date: 11/30/2017  Primary Diagnosis: Right Hip Fracture  Hip fracture (HCC)  Procedure(s) (LRB):  FEMUR INSERTION INTRA MEDULLARY NAIL Right (Synthes Long Nail) (Right) 1 Day Post-Op   Precautions:   Fall, WBAT    ASSESSMENT :  Based on the objective data described below, the patient presents with impaired functional mobility secondary to impaired balance, generalized weakness, decreased R hip ROM and strength, increased R hip pain, and poor activity tolerance following admission for R hip fx with subsequent R femur ORIF. Pt received supine in bed with daughter present and agreeable to PT evaluation. Pt cleared by nursing for mobility. Educated pt on WB status and safe use of RW prior to mobility. Bed mobility performed with min-mod A x 2 to come to EOB and sit<>stand transfer performed with min A x 2 using RW. Pt with c/o dizziness in standing and was returned to supine due to significant decline in BP with standing activity (see doc flowsheet for 6114-7881). Pt reported improved symptoms in supine with VSS stable. He reported 5/10 R hip pain with activity.  Pt was left supine in bed with all needs met, daughter and NP present and RN aware. Recommend pt discharge to inpatient rehab vs SNF rehab pending progress in acute PT. PT will continue to follow pt BID while in the hospital to address functional impairments. Patient will benefit from skilled intervention to address the above impairments. Patients rehabilitation potential is considered to be Good  Factors which may influence rehabilitation potential include:   []         None noted  []         Mental ability/status  [x]         Medical condition  []         Home/family situation and support systems  []         Safety awareness  [x]         Pain tolerance/management  []         Other:      PLAN :  Recommendations and Planned Interventions:  [x]           Bed Mobility Training             []    Neuromuscular Re-Education  [x]           Transfer Training                   []    Orthotic/Prosthetic Training  [x]           Gait Training                         []    Modalities  [x]           Therapeutic Exercises           []    Edema Management/Control  [x]           Therapeutic Activities            [x]    Patient and Family Training/Education  []           Other (comment):    Frequency/Duration: Patient will be followed by physical therapy  twice daily to address goals. Discharge Recommendations: Inpatient Rehab vs [facility] and To Be Determined  Further Equipment Recommendations for Discharge: TBD by rehab     SUBJECTIVE:   Patient stated I feel dizzy.     OBJECTIVE DATA SUMMARY:   HISTORY:    Past Medical History:   Diagnosis Date    Arthritis     BPH (benign prostatic hypertrophy) 4/12/2010    COPD (chronic obstructive pulmonary disease) with emphysema ([facility] [address] 7/25/2012    HLD (hyperlipidemia) 4/12/2010    HTN (hypertension) 4/12/2010    Memory disorder     Psychiatric disorder     anxiety and depression     Past Surgical History:   Procedure Laterality Date    Rhonda Rodney  8/5/2015    2 sessile sigmoid polyps, 3 & 5 mm; Dr. Russo Pal; no further screening recommended    ENDOSCOPY, COLON, DIAGNOSTIC  2002    negative; 10 year repeat; Dr. Stacey Rivera  years ago    left    HX HERNIA REPAIR  2001    right inguinal    SD COLSC FLX W/RMVL OF TUMOR POLYP LESION SNARE TQ  7/27/2012          Prior Level of Function/Home Situation: Pt is independent for mobility and ADLs at baseline. Lives with daughter and JASMINA, however is alone during the day. Reports remote fall history, besides current situation. Drives. Personal factors and/or comorbidities impacting plan of care: memory disorder    Home Situation  Home Environment: Private residence  # Steps to Enter: 4  Rails to Enter: Yes  Hand Rails : Bilateral  Wheelchair Ramp: No  One/Two Story Residence: One story  Living Alone: No  Support Systems: Child(justa)  Patient Expects to be Discharged to[de-identified] Private residence  Current DME Used/Available at Home: [address], straight  Tub or Shower Type: Tub/Shower combination (pt sits in tub or sponge bathes)    EXAMINATION/PRESENTATION/DECISION MAKING:   Critical Behavior:  Neurologic State: Alert  Orientation Level: Oriented X4  Cognition: Follows commands  Safety/Judgement: Awareness of environment  Hearing: Auditory  Auditory Impairment: Hard of hearing, bilateral  Skin:  R hip incision  Edema: R hip  Range Of Motion:  AROM: Generally decreased, functional           PROM: Generally decreased, functional           Strength:    Strength: Generally decreased, functional                    Tone & Sensation:   Tone: Normal                              Coordination:  Coordination: Within functional limits  Vision:   Tracking:  (able to scan room functionally)  Functional Mobility:  Bed Mobility:  Rolling: Moderate assistance  Supine to Sit: Minimum assistance;Assist x2; Additional time  Sit to Supine: Moderate assistance;Assist x2  Scooting:  Moderate assistance  Transfers:  Sit to Stand: Minimum assistance;Assist x2;Additional time; Adaptive equipment  Stand to Sit: Minimum assistance        Bed to Chair:  (unable due to orthostatic hypotension)              Balance:   Sitting: Impaired  Sitting - Static: Good (unsupported); Fair (occasional)  Sitting - Dynamic: Fair (occasional)  Standing: Impaired; With support  Standing - Static: Fair;Good;Constant support  Standing - Dynamic : Fair  Ambulation/Gait Training:   Unsafe to attempt. Therapeutic Exercises:   Educated pt on performing ankle pumps    Functional Measure:  Barthel Index:    Bathin  Bladder: 10  Bowels: 10  Groomin  Dressin  Feeding: 10  Mobility: 0  Stairs: 0  Toilet Use: 0  Transfer (Bed to Chair and Back): 5  Total: 45       Barthel and G-code impairment scale:  Percentage of impairment CH  0% CI  1-19% CJ  20-39% CK  40-59% CL  60-79% CM  80-99% CN  100%   Barthel Score 0-100 100 99-80 79-60 59-40 20-39 1-19   0   Barthel Score 0-20 20 17-19 13-16 9-12 5-8 1-4 0      The Barthel ADL Index: Guidelines  1. The index should be used as a record of what a patient does, not as a record of what a patient could do. 2. The main aim is to establish degree of independence from any help, physical or verbal, however minor and for whatever reason. 3. The need for supervision renders the patient not independent. 4. A patient's performance should be established using the best available evidence. Asking the patient, friends/relatives and nurses are the usual sources, but direct observation and common sense are also important. However direct testing is not needed. 5. Usually the patient's performance over the preceding 24-48 hours is important, but occasionally longer periods will be relevant. 6. Middle categories imply that the patient supplies over 50 per cent of the effort. 7. Use of aids to be independent is allowed. Coit Dennis., Barthel, D.W. (3739). Functional evaluation: the Barthel Index. [address] [facility] (14)2.   FADUMO Collado, Monserrat Coelho.Yehuda., Wolf Loza (1999). Measuring the change indisability after inpatient rehabilitation; comparison of the responsiveness of the Barthel Index and Functional [place] Measure. Journal of Neurology, Neurosurgery, and Psychiatry, 66(4), 811-456. BUZZ Ann, ALFIE Bernabe, & Kristal Vu M.A. (2004.) Assessment of post-stroke quality of life in cost-effectiveness studies: The usefulness of the Barthel Index and the EuroQoL-5D. Quality of Life Research, 13, 953-66       G codes: In compliance with CMSs Claims Based Outcome Reporting, the following G-code set was chosen for this patient based on their primary functional limitation being treated: The outcome measure chosen to determine the severity of the functional limitation was the Barthel index with a score of 45/100 which was correlated with the impairment scale.     ? Mobility - Walking and Moving Around:     - CURRENT STATUS: CK - 40%-59% impaired, limited or restricted    - GOAL STATUS: CI - 1%-19% impaired, limited or restricted    - D/C STATUS:  ---------------To be determined---------------      Physical Therapy Evaluation Charge Determination   History Examination Presentation Decision-Making   MEDIUM  Complexity : 1-2 comorbidities / personal factors will impact the outcome/ POC  HIGH Complexity : 4+ Standardized tests and measures addressing body structure, function, activity limitation and / or participation in recreation  MEDIUM Complexity : Evolving with changing characteristics  Other outcome measures barthel index  MEDIUM      Based on the above components, the patient evaluation is determined to be of the following complexity level: MEDIUM    Pain:  Pain Scale 1: Numeric (0 - 10)  Pain Intensity 1: 0           Pain Intervention(s) 1: Medication (see MAR)  Activity Tolerance:   Poor - pain 5/10; symptomatic orthostatic hypotension in standing  Please refer to the flowsheet for vital signs taken during this treatment. After treatment:   []         Patient left in no apparent distress sitting up in chair  [x]         Patient left in no apparent distress in bed  [x]         Call bell left within reach  [x]         Nursing notified  [x]         Caregiver present  []         Bed alarm activated    COMMUNICATION/EDUCATION:   The patients plan of care was discussed with: Physical Therapist, Occupational Therapist, Registered Nurse and NP. [x]         Fall prevention education was provided and the patient/caregiver indicated understanding. [x]         Patient/family have participated as able in goal setting and plan of care. [x]         Patient/family agree to work toward stated goals and plan of care. []         Patient understands intent and goals of therapy, but is neutral about his/her participation. []         Patient is unable to participate in goal setting and plan of care.     Thank you for this referral.  Azalea Richardson, PT, DPT   Time Calculation: 34 mins

## 2017-11-30 NOTE — PROGRESS NOTES
Problem: Mobility Impaired (Adult and Pediatric)  Goal: *Acute Goals and Plan of Care (Insert Text)  Physical Therapy Goals  Initiated 11/30/2017  1. Patient will move from supine to sit and sit to supine , scoot up and down and roll side to side in bed with modified independence within 7 day(s). 2.  Patient will transfer from bed to chair and chair to bed with contact guard assist using the least restrictive device within 7 day(s). 3.  Patient will perform sit to stand with contact guard assist within 7 day(s). 4.  Patient will ambulate with minimal assistance/contact guard assist for 75 feet with the least restrictive device within 7 day(s). 5.  Patient will ascend/descend 4 stairs with 2 handrail(s) with moderate assistance  within 7 day(s). physical Therapy TREATMENT  Patient: Yamilex Bowie (39 y.o. male)  Date: 11/30/2017  Diagnosis: Right Hip Fracture  Hip fracture (HCC) Closed right hip fracture (HCC)  Procedure(s) (LRB):  FEMUR INSERTION INTRA MEDULLARY NAIL Right (Synthes Long Nail) (Right) 1 Day Post-Op  Precautions: Fall, WBAT    ASSESSMENT:  Pt received supine in bed with family present and agreeable to PT intervention. Pt cleared by nursing for mobility. Pt received 1 unit PRBCs after AM session. VSS throughout all positional changes, including post-bed>chair transfer this afternoon. He continues to require min A and increased time for bed mobility, mostly to assist RLE. Demonstrates good-fair sitting balance while sitting on EOB. Sit<>stand and bed>chair transfer performed with mod-max A x 2 using RW. Needs cueing for safe use of RW and for proper gait sequencing. He demonstrates decreased stance on R with R lateral lean in standing. Pt only able to tolerate short steps from bed>chair as pt with c/o nausea and reporting 10/10 pain. Nausea likely secondary to high pain levels and reinforced scheduled pain medication. VSS post-mobility and nausea improved once pt settled in bedside chair. Pt was left sitting up in chair with all needs met, RN aware, family present, and ice on R hip. Continue to recommend pt discharge to rehab to improve functional mobility and activity tolerance prior to returning home. Progression toward goals:  []    Improving appropriately and progressing toward goals  [x]    Improving slowly and progressing toward goals  []    Not making progress toward goals and plan of care will be adjusted     PLAN:  Patient continues to benefit from skilled intervention to address the above impairments. Continue treatment per established plan of care. Discharge Recommendations:  Rehab  Further Equipment Recommendations for Discharge:  TBD by rehab     SUBJECTIVE:   Patient stated I feel a little nauseous.     OBJECTIVE DATA SUMMARY:   Critical Behavior:  Neurologic State: Alert  Orientation Level: Oriented X4  Cognition: Follows commands  Safety/Judgement: Awareness of environment  Functional Mobility Training:  Bed Mobility:  Rolling: Minimum assistance  Supine to Sit: Minimum assistance  Scooting: Contact guard assistance;Minimum assistance        Transfers:  Sit to Stand: Moderate assistance;Maximum assistance;Assist x2  Stand to Sit: Moderate assistance;Assist x2;Maximum assistance        Bed to Chair: Moderate assistance;Maximum assistance;Assist x2                    Balance:  Sitting: Impaired  Sitting - Static: Good (unsupported)  Sitting - Dynamic: Fair (occasional)  Standing: Impaired; With support  Standing - Static: Fair;Constant support  Standing - Dynamic : Poor  Ambulation/Gait Training:  Distance (ft): 3 Feet (ft) (bed>chair)  Assistive Device: Gait belt;Walker, rolling  Ambulation - Level of Assistance: Moderate assistance;Maximum assistance;Assist x2; Additional time; Adaptive equipment        Gait Abnormalities: Antalgic;Decreased step clearance; Step to gait; Shuffling gait        Base of Support: Narrowed  Stance: Right decreased  Speed/Marie: Pace decreased (<100 feet/min); Shuffled  Step Length: Left shortened;Right shortened    Pain:  Pain Scale 1: Numeric (0 - 10)  Pain Intensity 1: 2           Pain Intervention(s) 1: Medication (see MAR)  Activity Tolerance:   Poor - reported 10/10 pain in R hip with gait and bed>chair transfer; VSS throughout on RA; no c/o dizziness, however did c/o nausea with activity (no emesis)  Please refer to the flowsheet for vital signs taken during this treatment.   After treatment:   [x]    Patient left in no apparent distress sitting up in chair  []    Patient left in no apparent distress in bed  [x]    Call bell left within reach  [x]    Nursing notified  [x]    Caregiver present  []    Bed alarm activated    COMMUNICATION/COLLABORATION:   The patients plan of care was discussed with: Physical Therapist and Registered Nurse    Azalea Richardson PT, DPT   Time Calculation: 19 mins

## 2017-11-30 NOTE — PROGRESS NOTES
Hospitalist Progress Note    NAME: Yesenia Buckley   :  1934   MRN:  [id number]       Assessment / Plan:  Acute post operative blood loss anemia in setting of right hip fracture: s/p right hip cephalomedullary nailing   - xray on admisssion with comminuted intertrochanteric right hip fracture with varus angulation. Bones are osteopenic.  - appreciate ortho consult  - Hg 7.5 this morning with orthostasis, 1u pRBCs planned  - DVT prophylaxis and pain management per ortho protocol   Left pleural effusion, unclear etiology:  First noted , asymptomatic  - CXR on admission with small-to-moderate enlarging left pleural effusion  - CT chest  with trace left pleural effusion with left lower lobe atelectasis. No evident etiology. Stable 2 mm right-sided subpleural lymph nodes can be considered benign. Additional chronic changes including coronary artery disease. - echo with EF 65%. No obvious wall motion abnormalities identified in the views obtained. Results reviewed with Pt's daughter today. - recommend continued outpatient f/u with PCP - discussed with Pt's daughter today  COPD without acute exacerbation:  - con't incruse (on spiriva at home)  - nebs prn   BPH: con't flomax/proscar   Hyperlipidemia: con't lipitor (on crestor at home)  Mild cognitive impairment: con't strattera       Code Status: Full    Surrogate Decision Maker: children  DVT Prophylaxis: per ortho protocol       Baseline: lives with his dtr and her family, ambulating independent      Subjective:     Chief Complaint / Reason for Physician Visit  \"I feel pretty good\". Dizziness when trying to get up after surgery. Discussed with RN events overnight.      Review of Systems:  Symptom Y/N Comments  Symptom Y/N Comments   Fever/Chills n   Chest Pain n    Poor Appetite n   Edema n    Cough n   Abdominal Pain n    Sputum n   Joint Pain     SOB/SAUCEDO n   Pruritis/Rash     Nausea/vomit    Tolerating PT/OT     Diarrhea    Tolerating Diet Constipation    Other       Could NOT obtain due to:      Objective:     VITALS:   Last 24hrs VS reviewed since prior progress note. Most recent are:  Patient Vitals for the past 24 hrs:   Temp Pulse Resp BP SpO2   11/30/17 0542 98.7 °F (37.1 °C) 76 - 149/82 99 %   11/30/17 0255 98.6 °F (37 °C) 81 16 113/63 96 %   11/29/17 2313 - 76 - 139/71 98 %   11/29/17 2200 98.1 °F (36.7 °C) 83 16 145/74 97 %   11/29/17 2113 - 92 - 147/80 94 %   11/29/17 1914 98.4 °F (36.9 °C) 86 18 118/59 99 %   11/29/17 1900 - 92 20 - 99 %   11/29/17 1830 98.6 °F (37 °C) 82 13 138/66 100 %   11/29/17 1815 - 83 15 139/64 100 %   11/29/17 1740 - 84 24 133/87 99 %   11/29/17 1735 - 85 21 125/65 98 %   11/29/17 1730 - 84 21 133/71 99 %   11/29/17 1530 98.2 °F (36.8 °C) 72 18 130/47 97 %   11/29/17 1435 98.9 °F (37.2 °C) 73 18 121/60 97 %   11/29/17 1000 98.5 °F (36.9 °C) 78 18 142/68 96 %       Intake/Output Summary (Last 24 hours) at 11/30/17 0851  Last data filed at 11/30/17 0800   Gross per 24 hour   Intake             1400 ml   Output             1150 ml   Net              250 ml        PHYSICAL EXAM:  General: WD, WN. Alert, cooperative, no acute distress    EENT:  EOMI. Anicteric sclerae. MMM  Resp:  CTA bilaterally, no wheezing or rales. No accessory muscle use  CV:  Regular rhythm,  pedal edema  GI:  Soft, mildly distended, Non tender.  +Bowel sounds  Neurologic:  Alert and oriented X 3, normal speech,   Psych:   Some insight. Not anxious nor agitated  Skin:  Pale, No rashes.   No jaundice    Reviewed most current lab test results and cultures  YES  Reviewed most current radiology test results   YES  Review and summation of old records today    NO  Reviewed patient's current orders and MAR    YES  PMH/ reviewed - no change compared to H&P  ________________________________________________________________________  Care Plan discussed with:    Comments   Patient x    Family  x dtr   RN     Care Manager     Consultant Multidiciplinary team rounds were held today with , nursing, pharmacist and clinical coordinator. Patient's plan of care was discussed; medications were reviewed and discharge planning was addressed. ________________________________________________________________________  Total NON critical care TIME:  30 Minutes    Total CRITICAL CARE TIME Spent:   Minutes non procedure based      Comments   >50% of visit spent in counseling and coordination of care x    ________________________________________________________________________  Macrina Marr MD     Procedures: see electronic medical records for all procedures/Xrays and details which were not copied into this note but were reviewed prior to creation of Plan. LABS:  I reviewed today's most current labs and imaging studies.   Pertinent labs include:  Recent Labs      11/30/17 0545  11/28/17 2056   WBC  7.1  10.8   HGB  7.5*  11.4*   HCT  22.2*  33.6*   PLT  139*  188     Recent Labs      11/30/17 0545  11/28/17 2056   NA  138  138   K  4.4  4.4   CL  104  101   CO2  26  33*   GLU  132*  105*   BUN  11  19   CREA  0.74  0.75   CA  7.7*  8.2*   ALB   --   3.0*   TBILI   --   0.3   SGOT   --   18   ALT   --   25   INR   --   1.1       Signed: Macrina Marr MD

## 2017-11-30 NOTE — PROGRESS NOTES
Initial Nutrition Assessment:    INTERVENTIONS/RECOMMENDATIONS:   · Continue regular diet  · Ensure TID (chocolate)     ASSESSMENT:   Chart reviewed, medically noted for s/p FEMUR INSERTION INTRA MEDULLARY NAIL Right and PMH shown below. Pt was eating breakfast during visit and seemed to have a good appetite. We discussed the role that nutrition plays in healing after a fracture/surgery, specifically focusing on protein sources at each meal as well as nutrition supplements to help meet pt protein needs. He consume ensure at home and agreed to receive during his stay. Due to underweight BMI, he was encouraged to consume 2-3 per day. Past Medical History:   Diagnosis Date    Arthritis     BPH (benign prostatic hypertrophy) 4/12/2010    COPD (chronic obstructive pulmonary disease) with emphysema ([facility] [address] 7/25/2012    HLD (hyperlipidemia) 4/12/2010    HTN (hypertension) 4/12/2010    Memory disorder     Psychiatric disorder     anxiety and depression       Diet Order: Regular  % Eaten:  No data found.     Pertinent Medications: [x]Reviewed: os-fam, miralax, senna-docusate,   Pertinent Labs: [x]Reviewed:   Food Allergies: [x]NKFA  []Other   Last BM: 11/28  Edema:        []RUE   []LUE   []RLE   []LLE      Pressure Injury:      [] Stage I   [] Stage II   [] Stage III   [] Stage IV      Wt Readings from Last 30 Encounters:   11/28/17 49 kg (108 lb)   05/30/17 53.5 kg (118 lb)   05/16/17 54.4 kg (120 lb)   03/02/17 55.3 kg (122 lb)   12/29/16 53.6 kg (118 lb 3.2 oz)   11/28/16 54.4 kg (120 lb)   11/22/16 54.3 kg (119 lb 9.6 oz)   05/23/16 61.8 kg (136 lb 3.2 oz)   11/23/15 56.2 kg (124 lb)   08/05/15 53.8 kg (118 lb 9 oz)   05/20/15 56.2 kg (124 lb)   11/19/14 55.8 kg (123 lb)   08/27/14 55.3 kg (122 lb)   04/16/14 54.7 kg (120 lb 9.6 oz)   03/20/14 55.3 kg (122 lb)   12/20/13 55.3 kg (122 lb)   12/16/13 54.9 kg (121 lb)   12/07/13 55.9 kg (123 lb 3.8 oz)   10/21/13 54.4 kg (120 lb)   09/26/13 55.5 kg (122 lb 6.4 oz) 07/25/13 53.5 kg (118 lb)   06/17/13 53.5 kg (118 lb)   03/18/13 56.8 kg (125 lb 3.2 oz)   11/21/12 54.7 kg (120 lb 9.6 oz)   07/27/12 52.2 kg (115 lb)   07/25/12 55.5 kg (122 lb 6.4 oz)   03/14/12 55.8 kg (123 lb)   11/14/11 55.3 kg (122 lb)   08/09/11 52.6 kg (116 lb)   08/04/11 53.5 kg (118 lb)       Anthropometrics:   Height: 5' 6\" (167.6 cm) Weight: 49 kg (108 lb)   IBW (%IBW):   ( ) UBW (%UBW):   (  %)   Last Weight Metrics:  Weight Loss Metrics 11/28/2017 5/30/2017 5/16/2017 3/2/2017 12/29/2016 11/28/2016 11/22/2016   Today's Wt 108 lb 118 lb 120 lb 122 lb 118 lb 3.2 oz 120 lb 119 lb 9.6 oz   BMI 17.43 kg/m2 20.25 kg/m2 20.6 kg/m2 20.94 kg/m2 20.29 kg/m2 20.6 kg/m2 20.53 kg/m2       BMI: Body mass index is 17.43 kg/(m^2). This BMI is indicative of:   [x]Underweight    []Normal    []Overweight    [] Obesity   [] Extreme Obesity (BMI>40)     Estimated Nutrition Needs (Based on):   1455 Kcals/day (BMR: 1120 x 1.3) ,   Protein  Carbohydrate: At Least 130 g/day  Fluids: 1455 mL/day (1ml/kcal) or per primary team    NUTRITION DIAGNOSES:   Problem:  Increased nutrient needs (protein)      Etiology: related to fracture and surgical site healing      Signs/Symptoms: as evidenced by s/p FEMUR INSERTION INTRA MEDULLARY NAIL Right       NUTRITION INTERVENTIONS:  Meals/Snacks: General/healthful diet   Supplements: Commercial supplement              GOAL:   consume >75% of meals and ONS in 3-5 days    LEARNING NEEDS (Diet, Food/Nutrient-Drug Interaction):    [] None Identified   [x] Identified and Education Provided/Documented   [] Identified and Pt declined/was not appropriate     Cultureal, Pentecostalism, OR Ethnic Dietary Needs:    [x] None Identified   [] Identified and Addressed     [x] Interdisciplinary Care Plan Reviewed/Documented    [x] Discharge Planning:  General healthy diet with adequate kcal and protein for fracture healing     MONITORING /EVALUATION:      Food/Nutrient Intake Outcomes:  Total energy intake  Physical Signs/Symptoms Outcomes: Weight/weight change, Electrolyte and renal profile, GI    NUTRITION RISK:    [] High              [x] Moderate           []  Low  []  Minimal/Uncompromised    PT SEEN FOR:    [x]  MD Consult: []Calorie Count      []Diabetic Diet Education        []Diet Education     []Electrolyte Management     [x]General Nutrition Management and Supplements     []Management of Tube Feeding     []TPN Recommendations    []  RN Referral:  []MST score >=2     []Enteral/Parenteral Nutrition PTA     []Pregnant: Gestational DM or Multigestation     []Pressure Ulcer/Wound Care needs        []  Low BMI  []  LOS Referral       Balaji Paige RDN  Pager [phone]  Weekend Pager [phone]

## 2017-12-01 LAB
ANION GAP SERPL CALC-SCNC: 6 MMOL/L (ref 5–15)
BUN SERPL-MCNC: 19 MG/DL (ref 6–20)
BUN/CREAT SERPL: 32 (ref 12–20)
CALCIUM SERPL-MCNC: 7.9 MG/DL (ref 8.5–10.1)
CHLORIDE SERPL-SCNC: 103 MMOL/L (ref 97–108)
CO2 SERPL-SCNC: 28 MMOL/L (ref 21–32)
CREAT SERPL-MCNC: 0.6 MG/DL (ref 0.7–1.3)
ERYTHROCYTE [DISTWIDTH] IN BLOOD BY AUTOMATED COUNT: 16.2 % (ref 11.5–14.5)
GLUCOSE SERPL-MCNC: 115 MG/DL (ref 65–100)
HCT VFR BLD AUTO: 21.7 % (ref 36.6–50.3)
HGB BLD-MCNC: 7.4 G/DL (ref 12.1–17)
MCH RBC QN AUTO: 31 PG (ref 26–34)
MCHC RBC AUTO-ENTMCNC: 34.1 G/DL (ref 30–36.5)
MCV RBC AUTO: 90.8 FL (ref 80–99)
PLATELET # BLD AUTO: 109 K/UL (ref 150–400)
POTASSIUM SERPL-SCNC: 4.2 MMOL/L (ref 3.5–5.1)
RBC # BLD AUTO: 2.39 M/UL (ref 4.1–5.7)
SODIUM SERPL-SCNC: 137 MMOL/L (ref 136–145)
WBC # BLD AUTO: 7.5 K/UL (ref 4.1–11.1)

## 2017-12-01 PROCEDURE — 97530 THERAPEUTIC ACTIVITIES: CPT

## 2017-12-01 PROCEDURE — 74011250637 HC RX REV CODE- 250/637: Performed by: NURSE PRACTITIONER

## 2017-12-01 PROCEDURE — 74011250637 HC RX REV CODE- 250/637: Performed by: ORTHOPAEDIC SURGERY

## 2017-12-01 PROCEDURE — 36415 COLL VENOUS BLD VENIPUNCTURE: CPT | Performed by: INTERNAL MEDICINE

## 2017-12-01 PROCEDURE — 74011250637 HC RX REV CODE- 250/637: Performed by: HOSPITALIST

## 2017-12-01 PROCEDURE — 65270000029 HC RM PRIVATE

## 2017-12-01 PROCEDURE — 74011250636 HC RX REV CODE- 250/636: Performed by: ORTHOPAEDIC SURGERY

## 2017-12-01 PROCEDURE — 85027 COMPLETE CBC AUTOMATED: CPT | Performed by: INTERNAL MEDICINE

## 2017-12-01 PROCEDURE — 74011250636 HC RX REV CODE- 250/636: Performed by: NURSE PRACTITIONER

## 2017-12-01 PROCEDURE — 36430 TRANSFUSION BLD/BLD COMPNT: CPT

## 2017-12-01 PROCEDURE — 80048 BASIC METABOLIC PNL TOTAL CA: CPT | Performed by: INTERNAL MEDICINE

## 2017-12-01 PROCEDURE — 74011250637 HC RX REV CODE- 250/637: Performed by: PHYSICIAN ASSISTANT

## 2017-12-01 PROCEDURE — 97116 GAIT TRAINING THERAPY: CPT

## 2017-12-01 PROCEDURE — P9016 RBC LEUKOCYTES REDUCED: HCPCS | Performed by: PHYSICIAN ASSISTANT

## 2017-12-01 RX ORDER — SODIUM CHLORIDE 9 MG/ML
250 INJECTION, SOLUTION INTRAVENOUS AS NEEDED
Status: DISCONTINUED | OUTPATIENT
Start: 2017-12-01 | End: 2017-12-02 | Stop reason: HOSPADM

## 2017-12-01 RX ADMIN — DOCUSATE SODIUM AND SENNOSIDES 2 TABLET: 8.6; 5 TABLET, FILM COATED ORAL at 08:54

## 2017-12-01 RX ADMIN — ATORVASTATIN CALCIUM 20 MG: 20 TABLET, FILM COATED ORAL at 08:55

## 2017-12-01 RX ADMIN — FAMOTIDINE 20 MG: 20 TABLET, FILM COATED ORAL at 17:22

## 2017-12-01 RX ADMIN — SODIUM CHLORIDE 250 ML: 900 INJECTION, SOLUTION INTRAVENOUS at 13:19

## 2017-12-01 RX ADMIN — POLYETHYLENE GLYCOL 3350 17 G: 17 POWDER, FOR SOLUTION ORAL at 08:53

## 2017-12-01 RX ADMIN — ACETAMINOPHEN 650 MG: 325 TABLET ORAL at 06:49

## 2017-12-01 RX ADMIN — ASPIRIN 81 MG 81 MG: 81 TABLET ORAL at 08:55

## 2017-12-01 RX ADMIN — TAMSULOSIN HYDROCHLORIDE 0.4 MG: 0.4 CAPSULE ORAL at 08:54

## 2017-12-01 RX ADMIN — OXYCODONE HYDROCHLORIDE 5 MG: 5 TABLET ORAL at 12:23

## 2017-12-01 RX ADMIN — Medication 10 ML: at 22:30

## 2017-12-01 RX ADMIN — ENOXAPARIN SODIUM 25 MG: 60 INJECTION SUBCUTANEOUS at 08:56

## 2017-12-01 RX ADMIN — ACETAMINOPHEN 650 MG: 325 TABLET ORAL at 17:22

## 2017-12-01 RX ADMIN — FINASTERIDE 5 MG: 5 TABLET, FILM COATED ORAL at 08:55

## 2017-12-01 RX ADMIN — Medication 10 ML: at 13:16

## 2017-12-01 RX ADMIN — DOCUSATE SODIUM AND SENNOSIDES 2 TABLET: 8.6; 5 TABLET, FILM COATED ORAL at 17:22

## 2017-12-01 RX ADMIN — CALCIUM CARBONATE 500 MG (1,250 MG)-VITAMIN D3 200 UNIT TABLET 1 TABLET: at 17:22

## 2017-12-01 RX ADMIN — THERA TABS 1 TABLET: TAB at 08:55

## 2017-12-01 RX ADMIN — UMECLIDINIUM 1 PUFF: 62.5 AEROSOL, POWDER ORAL at 08:57

## 2017-12-01 RX ADMIN — CALCIUM CARBONATE 500 MG (1,250 MG)-VITAMIN D3 200 UNIT TABLET 1 TABLET: at 08:55

## 2017-12-01 RX ADMIN — FAMOTIDINE 20 MG: 20 TABLET, FILM COATED ORAL at 08:55

## 2017-12-01 RX ADMIN — OXYCODONE HYDROCHLORIDE 5 MG: 5 TABLET ORAL at 07:32

## 2017-12-01 RX ADMIN — ACETAMINOPHEN 650 MG: 325 TABLET ORAL at 12:23

## 2017-12-01 RX ADMIN — CALCIUM CARBONATE 500 MG (1,250 MG)-VITAMIN D3 200 UNIT TABLET 1 TABLET: at 12:23

## 2017-12-01 NOTE — PROGRESS NOTES
Per attending MD pt not medically stable today for discharge and pt will go to [facility] tomorrow once medically stable. Call report to [facility] is [phone] or [phone]. Care Management Interventions  PCP Verified by CM: Yes  Mode of Transport at Discharge:  Other (see comment) (Pt to be transported to [facility] at discharge)  Physical Therapy Consult: Yes  Occupational Therapy Consult: Yes  Current Support Network: Own Home  Confirm Follow Up Transport: Family  Plan discussed with Pt/Family/Caregiver: Yes  Discharge Location  Discharge Placement: Rehab hospital/unit acute ([facility])    AGUSTIN Dutton  Ext [phone]

## 2017-12-01 NOTE — PROGRESS NOTES
POD 2 Days Post-Op    Procedure:  Procedure(s): FEMUR INSERTION INTRA MEDULLARY NAIL Right (Synthes Long Nail)    Subjective:     Patient is seen today with the following complaints: Pain well controlled. OOB with PT. Some R hip pain with walking. No other complaints. Objective:     Vitals:  Blood pressure 125/59, pulse 74, temperature 98.6 °F (37 °C), resp. rate 18, height 5' 6\" (1.676 m), weight 49 kg (108 lb), SpO2 98 %. Tmax:  Temp (24hrs), Av.4 °F (36.9 °C), Min:97.8 °F (36.6 °C), Max:98.7 °F (37.1 °C)      Physical Exam:  Examination of the right hip reveals dsg c/d/i. Pain with ER/IR of hip. Sensation is intact to light touch. + Brisk capillary refill.      Labs:   Recent Results (from the past 24 hour(s))   METABOLIC PANEL, BASIC    Collection Time: 17  2:32 AM   Result Value Ref Range    Sodium 137 136 - 145 mmol/L    Potassium 4.2 3.5 - 5.1 mmol/L    Chloride 103 97 - 108 mmol/L    CO2 28 21 - 32 mmol/L    Anion gap 6 5 - 15 mmol/L    Glucose 115 (H) 65 - 100 mg/dL    BUN 19 6 - 20 MG/DL    Creatinine 0.60 (L) 0.70 - 1.30 MG/DL    BUN/Creatinine ratio 32 (H) 12 - 20      GFR est AA >60 >60 ml/min/1.73m2    GFR est non-AA >60 >60 ml/min/1.73m2    Calcium 7.9 (L) 8.5 - 10.1 MG/DL   CBC W/O DIFF    Collection Time: 17  2:32 AM   Result Value Ref Range    WBC 7.5 4.1 - 11.1 K/uL    RBC 2.39 (L) 4.10 - 5.70 M/uL    HGB 7.4 (L) 12.1 - 17.0 g/dL    HCT 21.7 (L) 36.6 - 50.3 %    MCV 90.8 80.0 - 99.0 FL    MCH 31.0 26.0 - 34.0 PG    MCHC 34.1 30.0 - 36.5 g/dL    RDW 16.2 (H) 11.5 - 14.5 %    PLATELET 866 (L) 941 - 400 K/uL             Assessment:     Principal Problem:    Closed right hip fracture (HCC) (2017)    Active Problems:    Hip fracture (HCC) (2017)        Plan/Recommendations/Medical Decision Making:     Cont PT - WBAT RLE  Pain control  Lovenox for DVT ppx  CM for placement      Bran Torres MD

## 2017-12-01 NOTE — ROUTINE PROCESS
Bedside and Verbal shift change report given to Hamzah Knutson Dr (oncoming nurse) by Pedro Montague (offgoing nurse). Report included the following information SBAR, Kardex, Intake/Output, MAR and Recent Results.

## 2017-12-01 NOTE — PROGRESS NOTES
Hospitalist Progress Note    NAME: Audra Hunt   :  1934   MRN:  [id number]       Assessment / Plan:  Acute post operative blood loss anemia in setting of right hip fracture: s/p right hip cephalomedullary nailing . Hg still low this morning despite transfusion yesterday. - xray on admisssion with comminuted intertrochanteric right hip fracture with varus angulation. Bones are osteopenic.  - appreciate ortho consult  - Hg 7.5 still this morning with orthostasis, plan to transfuse 1 additional unit pRBCs today. PT eval after Hg stable  - DVT prophylaxis and pain management per ortho protocol   - Pt has been accepted at [address] pending medical stability  Left pleural effusion, unclear etiology:  First noted , asymptomatic  - CXR on admission with small-to-moderate enlarging left pleural effusion  - CT chest  with trace left pleural effusion with left lower lobe atelectasis. No evident etiology. Stable 2 mm right-sided subpleural lymph nodes can be considered benign. Additional chronic changes including coronary artery disease. - echo with EF 65%. No obvious wall motion abnormalities identified in the views obtained. Results reviewed with Pt's daughter today. - recommend continued outpatient f/u with PCP - discussed with Pt's daughter yesterday  COPD without acute exacerbation:  - con't incruse (on spiriva at home)  - nebs prn   BPH: con't flomax/proscar   Hyperlipidemia: con't lipitor (on crestor at home)  Mild cognitive impairment: con't strattera    Underweight Body mass index is 17.43 kg/(m^2)     Code Status: Full    Surrogate Decision Maker: children  DVT Prophylaxis: per ortho protocol - may need to hold with blood loss anemia      Baseline: lives with his dtr and her family, ambulating independent      Subjective:     Chief Complaint / Reason for Physician Visit  \"I feel okay\". Discussed with RN events overnight.      Review of Systems:  Symptom Y/N Comments  Symptom Y/N Comments Fever/Chills n   Chest Pain n    Poor Appetite n   Edema n    Cough n   Abdominal Pain n    Sputum n   Joint Pain     SOB/SAUCEDO n   Pruritis/Rash     Nausea/vomit    Tolerating PT/OT     Diarrhea    Tolerating Diet     Constipation    Other       Could NOT obtain due to:      Objective:     VITALS:   Last 24hrs VS reviewed since prior progress note. Most recent are:  Patient Vitals for the past 24 hrs:   Temp Pulse Resp BP SpO2   12/01/17 1000 98.6 °F (37 °C) 74 18 125/59 98 %   12/01/17 0604 98.6 °F (37 °C) 86 16 118/60 97 %   12/01/17 0228 97.8 °F (36.6 °C) 87 - 113/62 97 %   11/30/17 2215 98.3 °F (36.8 °C) 88 16 115/60 98 %   11/30/17 1801 98.3 °F (36.8 °C) 86 18 138/73 97 %   11/30/17 1549 98.4 °F (36.9 °C) 94 18 131/84 95 %   11/30/17 1438 98.6 °F (37 °C) 84 18 143/69 99 %   11/30/17 1328 98.7 °F (37.1 °C) 91 18 130/69 96 %       Intake/Output Summary (Last 24 hours) at 12/01/17 1316  Last data filed at 12/01/17 0604   Gross per 24 hour   Intake            306.7 ml   Output             1500 ml   Net          -1193.3 ml        PHYSICAL EXAM:  General: Thin. Alert, cooperative, no acute distress    EENT:  EOMI. Anicteric sclerae. MMM  Resp:  CTA bilaterally, no wheezing or rales. No accessory muscle use  CV:  Regular rhythm, R thigh edema with dressings x2 with old blood  GI:  Soft, mildly distended, Non tender.  +Bowel sounds  Neurologic:  Alert and oriented X 3, normal speech,   Psych:   Some insight. Not anxious nor agitated  Skin:  Alert. No rashes.   No jaundice    Reviewed most current lab test results and cultures  YES  Reviewed most current radiology test results   YES  Review and summation of old records today    NO  Reviewed patient's current orders and MAR    YES  PMH/SH reviewed - no change compared to H&P  ________________________________________________________________________  Care Plan discussed with:    Comments   Patient x    Family  x Dtr, son   RN x    Care Manager x    Consultant x Multidiciplinary team rounds were held today with , nursing, pharmacist and clinical coordinator. Patient's plan of care was discussed; medications were reviewed and discharge planning was addressed. ________________________________________________________________________  Total NON critical care TIME:  25 Minutes    Total CRITICAL CARE TIME Spent:   Minutes non procedure based      Comments   >50% of visit spent in counseling and coordination of care x    ________________________________________________________________________  Trice Calix MD     Procedures: see electronic medical records for all procedures/Xrays and details which were not copied into this note but were reviewed prior to creation of Plan. LABS:  I reviewed today's most current labs and imaging studies.   Pertinent labs include:  Recent Labs      12/01/17   0232  11/30/17   0545  11/28/17 2056   WBC  7.5  7.1  10.8   HGB  7.4*  7.5*  11.4*   HCT  21.7*  22.2*  33.6*   PLT  109*  139*  188     Recent Labs      12/01/17   0232  11/30/17   0545  11/28/17 2056   NA  137  138  138   K  4.2  4.4  4.4   CL  103  104  101   CO2  28  26  33*   GLU  115*  132*  105*   BUN  19  11  19   CREA  0.60*  0.74  0.75   CA  7.9*  7.7*  8.2*   ALB   --    --   3.0*   TBILI   --    --   0.3   SGOT   --    --   18   ALT   --    --   25   INR   --    --   1.1       Signed: Trice Calix MD

## 2017-12-01 NOTE — PROGRESS NOTES
Problem: Mobility Impaired (Adult and Pediatric)  Goal: *Acute Goals and Plan of Care (Insert Text)  Physical Therapy Goals  Initiated 11/30/2017  1. Patient will move from supine to sit and sit to supine , scoot up and down and roll side to side in bed with modified independence within 7 day(s). 2.  Patient will transfer from bed to chair and chair to bed with contact guard assist using the least restrictive device within 7 day(s). 3.  Patient will perform sit to stand with contact guard assist within 7 day(s). 4.  Patient will ambulate with minimal assistance/contact guard assist for 75 feet with the least restrictive device within 7 day(s). 5.  Patient will ascend/descend 4 stairs with 2 handrail(s) with moderate assistance  within 7 day(s). physical Therapy TREATMENT  Patient: Fabby Cao (89 y.o. male)  Date: 12/1/2017  Diagnosis: Right Hip Fracture  Hip fracture (HCC) Closed right hip fracture (HCC)  Procedure(s) (LRB):  FEMUR INSERTION INTRA MEDULLARY NAIL Right (Synthes Long Nail) (Right) 2 Days Post-Op  Precautions: Fall, WBAT  Chart, physical therapy assessment, plan of care and goals were reviewed. ASSESSMENT:  Pt cleared by nurse to mobilize. Pt received in bed supine. Pt reported its painful putting weight on the R LE. Pt performed supine to sit at min A and requiring cueing for hand placement. Pt performed sit to stand transfer at min A x2. Pt requiring cueing for hand placement. Pt educated verbally and visually on sequencing with RW to improve ambulation with decreased pain. Pt with understanding . Pt ambulated 10ft with RW at [id number] x2. Pt reported having pain and after a while feeling nauseous. Nausea subsided once seated. Pt left up in chair with ice in place wit all needs met. Pt will benefit from rehab to improve strength and mobility.    Progression toward goals:  []      Improving appropriately and progressing toward goals  [x]      Improving slowly and progressing toward goals  []      Not making progress toward goals and plan of care will be adjusted     PLAN:  Patient continues to benefit from skilled intervention to address the above impairments. Continue treatment per established plan of care. Discharge Recommendations:  Rehab  Further Equipment Recommendations for Discharge:  Defer to rehab      SUBJECTIVE:   Patient stated I'm not in pain as long as I'm still.     OBJECTIVE DATA SUMMARY:   Critical Behavior:  Neurologic State: Alert, Appropriate for age  Orientation Level: Oriented X4  Cognition: Follows commands  Safety/Judgement: Awareness of environment  Functional Mobility Training:  Bed Mobility:     Supine to Sit: Minimum assistance     Scooting: Minimum assistance         Transfers:  Sit to Stand: Minimum assistance;Assist x2  Stand to Sit: Contact guard assistance                             Balance:  Sitting: Intact  Sitting - Static: Good (unsupported)  Sitting - Dynamic: Fair (occasional)  Standing: Intact; With support  Standing - Static: Fair;Constant support  Standing - Dynamic : Fair  Ambulation/Gait Training:  Distance (ft): 10 Feet (ft)  Assistive Device: Gait belt;Walker, rolling  Ambulation - Level of Assistance: Contact guard assistance        Gait Abnormalities: Antalgic;Decreased step clearance        Base of Support: Narrowed  Stance: Right decreased  Speed/Marie: Slow  Step Length: Right shortened;Left shortened                  Pain:  Pain Scale 1: Numeric (0 - 10)  Pain Intensity 1: 3  Pain Location 1: Leg  Pain Orientation 1: Right  Pain Description 1: Aching  Pain Intervention(s) 1: Medication (see MAR); Ice;Rest;Repositioned  Activity Tolerance:   Pt with imporved mobility although limited by pain    After treatment:   [x] Patient left in no apparent distress sitting up in chair  [] Patient left in no apparent distress in bed  [x] Call bell left within reach  [x] Nursing notified  [x] Caregiver present  [] Bed alarm activated    COMMUNICATION/COLLABORATION:   The patients plan of care was discussed with: Registered Nurse    Raj Masker   Time Calculation: 17 mins

## 2017-12-01 NOTE — INTERDISCIPLINARY ROUNDS
Bedside interdisciplinary rounds were held today to discuss patient plan of care and outcomes. The following members were present: Nurse Practitioner, Nurse, Clinical Care Leader, Pharmacy, Physical Therapy, and Case Management. Plan:  ASA & Lovenox for prophylaxis. Transfuse another unit of PRBC's today. Accepted to [facility] when ready.

## 2017-12-01 NOTE — PROGRESS NOTES
Ortho / Neurosurgery NP Note    POD# 2  s/p FEMUR INSERTION INTRA MEDULLARY NAIL Right (Synthes Long Nail)     Pt resting in bed. PT better yesterday after transfusion. No complaints. VSS Afebrile. Voiding status: + void  Tolerating PO well    Labs  Lab Results   Component Value Date/Time    HGB 7.4 12/01/2017 02:32 AM      Lab Results   Component Value Date/Time    INR 1.1 11/28/2017 08:56 PM        Body mass index is 17.43 kg/(m^2). : A BMI > 30 is classified as obesity and > 40 is classified as morbid obesity. Opsite Dressings c.d.i  Cryotherapy in place over incision  Calves soft and supple; No pain with passive stretch  Sensation and motor intact  SCDs for mechanical DVT proph while in bed     PLAN:  1) PT BID  2) Aspirin 81 mg POdaily and Lovenox for DVT Prophylaxis   3) GI Prophylaxis - Pepcid ordered  4) Expected Acute blood loss post-op anemia - 1 units yesterday, Hgb actually lower today despite this. Will discuss with Dr. Martinez Hernandez and Dr. Rod Metzger. 5) Plan d/c home [facility] depending on PT progress. Orthopedically stable but will transfuse again today. Up to medicine if they are comfortable with patient going to [facility] today post-transfusion or if they want to wait another day.      Wilner Albert NP

## 2017-12-01 NOTE — PROGRESS NOTES
Problem: Mobility Impaired (Adult and Pediatric)  Goal: *Acute Goals and Plan of Care (Insert Text)  Physical Therapy Goals  Initiated 11/30/2017  1. Patient will move from supine to sit and sit to supine , scoot up and down and roll side to side in bed with modified independence within 7 day(s). 2.  Patient will transfer from bed to chair and chair to bed with contact guard assist using the least restrictive device within 7 day(s). 3.  Patient will perform sit to stand with contact guard assist within 7 day(s). 4.  Patient will ambulate with minimal assistance/contact guard assist for 75 feet with the least restrictive device within 7 day(s). 5.  Patient will ascend/descend 4 stairs with 2 handrail(s) with moderate assistance  within 7 day(s). physical Therapy TREATMENT  Patient: Enma Nogueira (84 y.o. male)  Date: 12/1/2017  Diagnosis: Right Hip Fracture  Hip fracture (HCC) Closed right hip fracture (HCC)  Procedure(s) (LRB):  FEMUR INSERTION INTRA MEDULLARY NAIL Right (Synthes Long Nail) (Right) 2 Days Post-Op  Precautions: Fall, WBAT  Chart, physical therapy assessment, plan of care and goals were reviewed. ASSESSMENT:  Pt cleared by nurse to mobilize. Pt received in bed supine receiving a blood transfusion. Pt agreeable to trying mobility. Pt performed supine to sit at min A with assistance with RLE and for trunk. Pt perofremd sit to stand at min A x1. Pt requiring cueing for hand placement. Pt ambulated 15ft with RW at [facility]. Pt requiring  cueing for sequencing and to slow down for safety. Pt with improved mobility and activity tolerance. Pt performed sit to supine at [facility] for RLE. Pt will benefit from rehab to improve strength and mobility.    Progression toward goals:  []      Improving appropriately and progressing toward goals  [x]      Improving slowly and progressing toward goals  []      Not making progress toward goals and plan of care will be adjusted     PLAN:  Patient continues to benefit from skilled intervention to address the above impairments. Continue treatment per established plan of care. Discharge Recommendations:  Rehab  Further Equipment Recommendations for Discharge:  Defer to rehab     SUBJECTIVE:   Patient stated I'll try to see what we can do.     OBJECTIVE DATA SUMMARY:   Critical Behavior:  Neurologic State: Alert, Appropriate for age  Orientation Level: Oriented X4  Cognition: Follows commands  Safety/Judgement: Awareness of environment  Functional Mobility Training:  Bed Mobility:     Supine to Sit: Minimum assistance; Additional time  Sit to Supine: Contact guard assistance  Scooting: Minimum assistance         Transfers:  Sit to Stand: Minimum assistance;Assist x1  Stand to Sit: Contact guard assistance                             Balance:  Sitting: Intact  Sitting - Static: Good (unsupported)  Sitting - Dynamic: Good (unsupported)  Standing: Intact; With support  Standing - Static: Good  Standing - Dynamic : Fair  Ambulation/Gait Training:  Distance (ft): 15 Feet (ft)  Assistive Device: Gait belt;Walker, rolling  Ambulation - Level of Assistance: Contact guard assistance        Gait Abnormalities: Antalgic;Decreased step clearance  Right Side Weight Bearing: As tolerated  Left Side Weight Bearing: Full  Base of Support: Narrowed  Stance: Right decreased  Speed/Marie: Fluctuations  Step Length: Right shortened;Left shortened               Pain:  Pain Scale 1: Numeric (0 - 10)  Pain Intensity 1: 3  Pain Location 1: Leg  Pain Orientation 1: Right  Pain Description 1: Aching  Pain Intervention(s) 1: Medication (see MAR); Ice;Rest;Repositioned  Activity Tolerance:   Pt with improved mobility this afternoon.      After treatment:   [] Patient left in no apparent distress sitting up in chair  [x] Patient left in no apparent distress in bed  [x] Call bell left within reach  [x] Nursing notified  [x] Caregiver present  [] Bed alarm activated    COMMUNICATION/COLLABORATION:   The patients plan of care was discussed with: Registered Nurse    Carmina Oconnor   Time Calculation: 20 mins

## 2017-12-01 NOTE — ROUTINE PROCESS
Bedside and Verbal shift change report given to Cody JUAN (oncoming nurse) by Trent Michele RN (offgoing nurse). Report included the following information SBAR, Kardex, ED Summary, OR Summary, Procedure Summary, Intake/Output, MAR, Accordion, Recent Results and Med Rec Status.

## 2017-12-02 ENCOUNTER — HOSPITAL ENCOUNTER (OUTPATIENT)
Age: 82
Discharge: HOME HEALTH | End: 2017-12-14
Attending: PHYSICAL MEDICINE & REHABILITATION | Admitting: PHYSICAL MEDICINE & REHABILITATION

## 2017-12-02 VITALS
SYSTOLIC BLOOD PRESSURE: 139 MMHG | RESPIRATION RATE: 18 BRPM | WEIGHT: 108 LBS | HEIGHT: 66 IN | OXYGEN SATURATION: 100 % | TEMPERATURE: 98.4 F | BODY MASS INDEX: 17.36 KG/M2 | DIASTOLIC BLOOD PRESSURE: 66 MMHG | HEART RATE: 83 BPM

## 2017-12-02 VITALS — HEIGHT: 63 IN | WEIGHT: 114 LBS | BODY MASS INDEX: 20.2 KG/M2

## 2017-12-02 LAB
ABO + RH BLD: NORMAL
APPEARANCE UR: CLEAR
BACTERIA URNS QL MICRO: NEGATIVE /HPF
BILIRUB UR QL: NEGATIVE
BLD PROD TYP BPU: NORMAL
BLD PROD TYP BPU: NORMAL
BLOOD GROUP ANTIBODIES SERPL: NORMAL
BPU ID: NORMAL
BPU ID: NORMAL
COLOR UR: NORMAL
CROSSMATCH RESULT,%XM: NORMAL
CROSSMATCH RESULT,%XM: NORMAL
EPITH CASTS URNS QL MICRO: NORMAL /LPF
GLUCOSE UR STRIP.AUTO-MCNC: NEGATIVE MG/DL
HCT VFR BLD AUTO: 29.1 % (ref 36.6–50.3)
HGB BLD-MCNC: 9.5 G/DL (ref 12.1–17)
HGB UR QL STRIP: NEGATIVE
KETONES UR QL STRIP.AUTO: NEGATIVE MG/DL
LEUKOCYTE ESTERASE UR QL STRIP.AUTO: NEGATIVE
NITRITE UR QL STRIP.AUTO: NEGATIVE
PH UR STRIP: 7 [PH] (ref 5–8)
PROT UR STRIP-MCNC: NEGATIVE MG/DL
RBC #/AREA URNS HPF: NORMAL /HPF (ref 0–5)
SP GR UR REFRACTOMETRY: 1.02 (ref 1–1.03)
SPECIMEN EXP DATE BLD: NORMAL
STATUS OF UNIT,%ST: NORMAL
STATUS OF UNIT,%ST: NORMAL
UNIT DIVISION, %UDIV: 0
UNIT DIVISION, %UDIV: 0
UROBILINOGEN UR QL STRIP.AUTO: 1 EU/DL (ref 0.2–1)
WBC URNS QL MICRO: NORMAL /HPF (ref 0–4)

## 2017-12-02 PROCEDURE — 74011250637 HC RX REV CODE- 250/637: Performed by: PHYSICIAN ASSISTANT

## 2017-12-02 PROCEDURE — 87086 URINE CULTURE/COLONY COUNT: CPT | Performed by: PHYSICAL MEDICINE & REHABILITATION

## 2017-12-02 PROCEDURE — 74011250637 HC RX REV CODE- 250/637: Performed by: PHYSICAL MEDICINE & REHABILITATION

## 2017-12-02 PROCEDURE — 74011250636 HC RX REV CODE- 250/636: Performed by: ORTHOPAEDIC SURGERY

## 2017-12-02 PROCEDURE — 97116 GAIT TRAINING THERAPY: CPT | Performed by: PHYSICAL THERAPIST

## 2017-12-02 PROCEDURE — 85018 HEMOGLOBIN: CPT | Performed by: INTERNAL MEDICINE

## 2017-12-02 PROCEDURE — 81001 URINALYSIS AUTO W/SCOPE: CPT | Performed by: PHYSICAL MEDICINE & REHABILITATION

## 2017-12-02 PROCEDURE — 36415 COLL VENOUS BLD VENIPUNCTURE: CPT | Performed by: INTERNAL MEDICINE

## 2017-12-02 PROCEDURE — 74011250637 HC RX REV CODE- 250/637: Performed by: ORTHOPAEDIC SURGERY

## 2017-12-02 PROCEDURE — 74011250637 HC RX REV CODE- 250/637: Performed by: HOSPITALIST

## 2017-12-02 PROCEDURE — 74011250637 HC RX REV CODE- 250/637: Performed by: NURSE PRACTITIONER

## 2017-12-02 PROCEDURE — 97110 THERAPEUTIC EXERCISES: CPT | Performed by: PHYSICAL THERAPIST

## 2017-12-02 RX ORDER — ATORVASTATIN CALCIUM 40 MG/1
40 TABLET, FILM COATED ORAL DAILY
Status: DISCONTINUED | OUTPATIENT
Start: 2017-12-03 | End: 2017-12-14 | Stop reason: HOSPADM

## 2017-12-02 RX ORDER — OXYCODONE HYDROCHLORIDE 5 MG/1
5 TABLET ORAL
Qty: 20 TAB | Refills: 0 | Status: SHIPPED
Start: 2017-12-02 | End: 2017-12-26 | Stop reason: ALTCHOICE

## 2017-12-02 RX ORDER — ACETAMINOPHEN 325 MG/1
650 TABLET ORAL
Status: DISCONTINUED | OUTPATIENT
Start: 2017-12-02 | End: 2017-12-14 | Stop reason: HOSPADM

## 2017-12-02 RX ORDER — ADHESIVE BANDAGE
30 BANDAGE TOPICAL DAILY PRN
Status: DISCONTINUED | OUTPATIENT
Start: 2017-12-02 | End: 2017-12-14 | Stop reason: HOSPADM

## 2017-12-02 RX ORDER — FAMOTIDINE 20 MG/1
20 TABLET, FILM COATED ORAL 2 TIMES DAILY
Qty: 60 TAB | Refills: 0 | Status: SHIPPED
Start: 2017-12-02 | End: 2017-12-26

## 2017-12-02 RX ORDER — ENOXAPARIN SODIUM 100 MG/ML
25 INJECTION SUBCUTANEOUS DAILY
Qty: 1 SYRINGE | Refills: 0 | Status: SHIPPED
Start: 2017-12-03 | End: 2017-12-17

## 2017-12-02 RX ORDER — AMOXICILLIN 250 MG
2 CAPSULE ORAL 2 TIMES DAILY
Qty: 120 TAB | Refills: 0 | Status: SHIPPED
Start: 2017-12-02 | End: 2017-12-26

## 2017-12-02 RX ORDER — TRAMADOL HYDROCHLORIDE 50 MG/1
25 TABLET ORAL
Status: DISCONTINUED | OUTPATIENT
Start: 2017-12-02 | End: 2017-12-05 | Stop reason: ALTCHOICE

## 2017-12-02 RX ORDER — OXYCODONE HYDROCHLORIDE 5 MG/1
2.5 TABLET ORAL
Qty: 20 TAB | Refills: 0 | Status: SHIPPED
Start: 2017-12-02 | End: 2017-12-26

## 2017-12-02 RX ORDER — FINASTERIDE 5 MG/1
5 TABLET, FILM COATED ORAL DAILY
Status: DISCONTINUED | OUTPATIENT
Start: 2017-12-03 | End: 2017-12-14 | Stop reason: HOSPADM

## 2017-12-02 RX ORDER — ONDANSETRON 4 MG/1
4 TABLET, ORALLY DISINTEGRATING ORAL
Status: DISCONTINUED | OUTPATIENT
Start: 2017-12-02 | End: 2017-12-14 | Stop reason: HOSPADM

## 2017-12-02 RX ORDER — TAMSULOSIN HYDROCHLORIDE 0.4 MG/1
0.4 CAPSULE ORAL DAILY
Status: DISCONTINUED | OUTPATIENT
Start: 2017-12-03 | End: 2017-12-04

## 2017-12-02 RX ORDER — AMOXICILLIN 250 MG
1 CAPSULE ORAL DAILY
Status: DISCONTINUED | OUTPATIENT
Start: 2017-12-03 | End: 2017-12-14 | Stop reason: HOSPADM

## 2017-12-02 RX ORDER — POLYETHYLENE GLYCOL 3350 17 G/17G
17 POWDER, FOR SOLUTION ORAL DAILY
Qty: 30 PACKET | Refills: 0 | Status: SHIPPED
Start: 2017-12-03 | End: 2017-12-26

## 2017-12-02 RX ORDER — THERA TABS 400 MCG
1 TAB ORAL DAILY
Status: DISCONTINUED | OUTPATIENT
Start: 2017-12-03 | End: 2017-12-14 | Stop reason: HOSPADM

## 2017-12-02 RX ORDER — GUAIFENESIN 100 MG/5ML
81 LIQUID (ML) ORAL DAILY
Status: DISCONTINUED | OUTPATIENT
Start: 2017-12-03 | End: 2017-12-14 | Stop reason: HOSPADM

## 2017-12-02 RX ORDER — FERROUS SULFATE, DRIED 160(50) MG
1 TABLET, EXTENDED RELEASE ORAL 2 TIMES DAILY WITH MEALS
Qty: 60 TAB | Refills: 0 | Status: SHIPPED
Start: 2017-12-02 | End: 2017-12-26

## 2017-12-02 RX ADMIN — ACETAMINOPHEN 650 MG: 325 TABLET ORAL at 17:20

## 2017-12-02 RX ADMIN — TAMSULOSIN HYDROCHLORIDE 0.4 MG: 0.4 CAPSULE ORAL at 09:24

## 2017-12-02 RX ADMIN — POLYETHYLENE GLYCOL 3350 17 G: 17 POWDER, FOR SOLUTION ORAL at 09:23

## 2017-12-02 RX ADMIN — ACETAMINOPHEN 650 MG: 325 TABLET ORAL at 12:20

## 2017-12-02 RX ADMIN — CALCIUM CARBONATE 500 MG (1,250 MG)-VITAMIN D3 200 UNIT TABLET 1 TABLET: at 08:08

## 2017-12-02 RX ADMIN — FAMOTIDINE 20 MG: 20 TABLET, FILM COATED ORAL at 09:00

## 2017-12-02 RX ADMIN — ACETAMINOPHEN 650 MG: 325 TABLET ORAL at 01:03

## 2017-12-02 RX ADMIN — UMECLIDINIUM 1 PUFF: 62.5 AEROSOL, POWDER ORAL at 09:27

## 2017-12-02 RX ADMIN — CALCIUM CARBONATE 500 MG (1,250 MG)-VITAMIN D3 200 UNIT TABLET 1 TABLET: at 12:20

## 2017-12-02 RX ADMIN — ASPIRIN 81 MG 81 MG: 81 TABLET ORAL at 09:24

## 2017-12-02 RX ADMIN — ENOXAPARIN SODIUM 25 MG: 60 INJECTION SUBCUTANEOUS at 09:00

## 2017-12-02 RX ADMIN — Medication 10 ML: at 07:04

## 2017-12-02 RX ADMIN — MAGNESIUM HYDROXIDE 30 ML: 400 SUSPENSION ORAL at 17:20

## 2017-12-02 RX ADMIN — FINASTERIDE 5 MG: 5 TABLET, FILM COATED ORAL at 09:24

## 2017-12-02 RX ADMIN — ONDANSETRON 4 MG: 4 TABLET, ORALLY DISINTEGRATING ORAL at 19:06

## 2017-12-02 RX ADMIN — THERA TABS 1 TABLET: TAB at 09:24

## 2017-12-02 RX ADMIN — DOCUSATE SODIUM AND SENNOSIDES 2 TABLET: 8.6; 5 TABLET, FILM COATED ORAL at 09:24

## 2017-12-02 RX ADMIN — ATORVASTATIN CALCIUM 20 MG: 20 TABLET, FILM COATED ORAL at 09:24

## 2017-12-02 RX ADMIN — ACETAMINOPHEN 650 MG: 325 TABLET ORAL at 07:04

## 2017-12-02 NOTE — DISCHARGE INSTRUCTIONS
HOSPITALIST DISCHARGE INSTRUCTIONS    NAME: Dale Dickerson   :  1934   MRN:  [id number]     Date/Time:  2017 10:21 AM    ADMIT DATE: 2017   DISCHARGE DATE: 2017     Attending Physician: Glenna Muir MD    DISCHARGE DIAGNOSIS:  Acute post operative blood loss anemia in setting of right hip fracture s/p right hip cephalomedullary nailing  with Dr. Adell Holstein  Left pleural effusion, unclear etiology  COPD without acute exacerbation  BPH  Hyperlipidemia  Mild cognitive impairment  Underweight Body mass index is 17.43 kg/(m^2)      Medications: Per above medication reconciliation. Pain Management: per above medications    Recommended diet: Regular Diet and ensure supplements TID with meals    Recommended activity: See surgical instructions    Wound care: See surgical/procedure care instructions    Indwelling devices:  None    Supplemental Oxygen: None    Required Lab work: None    Glucose management:  None    Code status: Full      Outside physician follow up: Follow-up Information     Follow up With Details Comments [address] III, DO In 2 weeks routine hospital follow up Hodan Cevallos 57, DO In 10 days surgical follow up [address] [facility],  [phone]                 Skilled nursing facility/ SNF MD responsible for above on discharge. [address] [facility]    Discharge Instruction Sheet: Hip Fracture Repair    DR. AMAYA    Blood Clot Prevention  Lovenox  You will be discharged on Lovenox. It helps prevent blood clots. It is an injection that you receive in the side of your stomach. Nursing will teach you or a caregiver how to do this before you go home. Patients take Lovenox for two weeks after  surgery. Pain control:  Typically, we will prescribe a narcotic.   Usually 1-2 tabs every four hours is sufficient for the pain. Most patients need this only for the first few weeks. You should discontinue this as the pain decreases. Some of these medications contain a large dose of Tylenol (acetaminophen). Therefore, you should consult your pharmacist before taking any additional Tylenol at the same time. Constipation  Pain medicines and anesthesia can be constipating-this can be prevented by gentle physical activity and drinking plenty of fluid. It should be treated with over-the-counter medications such as Miralax or suppositories, and/or Fleets enema. You should have a bowel movement at least every other day following surgery. Incision care  The outer dressing can be removed when the incision is no longer draining. The incision can be left uncovered and open to air. Keep this area clean and dry. A bandage may be placed for comfort, but is not necessary. You may shower when you are discharged. After showering pat incision dry-DO NOT rub the incision. DO NOT take a tub bath or go swimming until cleared by your doctor. DO NOT apply lotions, oils, or creams to incision. Remove staples in 10 -14 days    Physical Activity Restrictions Hip Fracture  You can weight bear as tolerated on the leg with the fracture  You will need to use a walker to ambulate with for the first few weeks after your surgery and then you will switch to a cane. NO DRIVING until told to do so. Surgery to Repair a Hip Fracture: What to Expect at Home  Your Recovery  Surgery for a hip fracture repairs a broken hip bone. When you leave the hospital after surgery, you will probably be walking with crutches or a walker. You may be able to climb a few stairs and get in and out of bed and chairs. But you will need someone to help you at home for the next few weeks or until you have more energy and can move around better.  If there is no one to help you at home, you may go to a rehabilitation center or long-term care center. You will go home with a bandage and stitches or staples. You can remove the bandage when your doctor tells you to. Your doctor will remove your stitches or staples 10-15days after your surgery. You may still have some mild pain, and the area may be swollen for 3 to 4 months after surgery. Your doctor will give you medicine for the pain. You will continue the rehabilitation program (rehab) you started in the hospital. The better you do with your rehab exercises, the quicker you will get your strength and movement back. Most people are able to return to work 4 weeks to 4 months after surgery. But it may take 6 months to 1 year for you to fully recover. Some people, especially older people, are never able to move quite as well as they used to. This care sheet gives you a general idea about how long it will take for you to recover. But each person recovers at a different pace. Follow the steps below to get better as quickly as possible. How can you care for yourself at home? Activity  · Rest when you feel tired. You may take a nap, but do not stay in bed all day. · Work with your physical therapist to learn the best way to exercise. You may be able to take frequent, short walks using crutches or a walker. You will probably have to use crutches or a walker for at least 4 to 6 weeks. After that, you may need to use a cane to help you walk. · Do not sit for longer than 30 to 45 minutes at a time. When you sit, use chairs with arms, and do not sit in low chairs. · Sleep on your back with your legs slightly apart or on your side with a pillow between your knees for about 6 weeks or as your doctor tells you. Do not sleep on your stomach or affected hip. · You may need to take sponge baths until your stitches or staples have been removed. You will probably be able to shower 24 hours after they are removed. Ask your doctor when it is okay to bathe or shower. · Ask your doctor when you can drive again.   · Most people are able to return to work 4 weeks to 4 months after surgery. · Ask your doctor when it is okay for you to have sex. · Do not lift anything that would make you strain. This may include heavy grocery bags and milk containers, a heavy briefcase or backpack, cat litter or dog food bags, a vacuum , or a child. Diet  · By the time you leave the hospital, you will probably be eating your normal diet. If your stomach is upset, try bland, low-fat foods like plain rice, broiled chicken, toast, and yogurt. Your doctor may recommend that you take iron and vitamin supplements. · Continue to drink plenty of fluids. · If you take warfarin, make sure you get about the same amount of vitamin K each day. This will help blood thinners work evenly from day to day. An example of food that is high in vitamin K is leafy green vegetables. · Eat healthy foods, and watch your portion sizes. Try to stay at your ideal weight. Too much weight puts more stress on your hip joint. · You may notice that your bowel movements are not regular right after your surgery. This is common. Try to avoid constipation and straining with bowel movements. You may want to take a fiber supplement every day. If you have not had a bowel movement after a couple of days, ask your doctor about taking a mild laxative. · Your doctor may want you to take calcium supplements and eat foods high in calcium, such as milk, cheese, ice cream, and salmon with bones. These help stop bone loss. Orange juice and soy milk with added calcium are also good choices. Medicines  · Your doctor may give you medicine to prevent blood clots. This could be in pill form or as a shot (injection). If a shot is necessary, your doctor will tell you how to do this. · Take pain medicines exactly as directed. ¨ If the doctor gave you a prescription medicine for pain, take it as prescribed.   ¨ If you are not taking a prescription pain medicine, ask your doctor if you can take an over-the-counter medicine. · If you think your pain medicine is making you sick to your stomach:  ¨ Take your medicine after meals (unless your doctor has told you not to). ¨ Ask your doctor for a different pain medicine. · If your doctor prescribed antibiotics, take them as directed. Do not stop taking them just because you feel better. You need to take the full course of antibiotics. · Your doctor may also prescribe medicines or calcium supplements to make your bones stronger. Incision care  · You will have a bandage over the cut (incision) and staples or stitches. If there is no drainage, most doctors will let you take the bandage off in a few days. · Your doctor will remove the staples or stitches 10-14 days after the surgery and replace them with strips of tape. Leave the tape on for a week or until it falls off. Exercise  · Your rehab program will include a number of exercises to do. Always do them as your therapist tells you. · Do not do any vigorous exercise for 12 weeks or until your doctor tells you it is okay. Ice and elevation  · For pain, put ice or a cold pack on the area for 10 to 20 minutes at a time. Put a thin cloth between the ice and your skin. · Your ankle may swell for about 3 months. Prop up your ankle when you ice it or anytime you sit or lie down. Try to keep it above the level of your heart. This will help reduce swelling. Other instructions  · Continue to wear your support stockings as your doctor says. These help to prevent blood clots. The length of time that you will have to wear them depends on your activity level and the amount of swelling you have. Most people wear these stockings for 4 to 6 weeks after surgery. Preventing falls is also very important. To prevent falls:  · Arrange furniture so that you will not trip on it. · Get rid of throw rugs, and move electrical cords out of the way. · Walk only in areas with plenty of light.   · Put grab bars in showers and bathtubs. · Avoid icy or snowy sidewalks. · Wear shoes with sturdy, flat soles. Follow-up care is a key part of your treatment and safety. Be sure to make and go to all appointments, and call your doctor if you are having problems. Its also a good idea to know your test results and keep a list of the medicines you take. When should you call for help? Call 911 anytime you think you may need emergency care. For example, call if:  · You passed out (lost consciousness). · You have severe trouble breathing. · You have sudden chest pain and shortness of breath, or you cough up blood. Call your doctor now or seek immediate medical care if:  · Your leg or foot is cool or pale or changes color. · You cannot feel or move your leg. · You have signs of a blood clot, such as:  ¨ Pain in your calf, back of the knee, thigh, or groin. ¨ Redness and swelling in your leg or groin. · Your incision comes open and begins to bleed, or the bleeding increases. · You feel like your heart is racing or beating irregularly. · You have signs of infection, such as:  ¨ Increased pain, swelling, warmth, or redness. ¨ Red streaks leading from the incision. ¨ Pus draining from the incision. ¨ A fever. Watch closely for any changes in your health, and be sure to contact your doctor if:  · You do not have a bowel movement after taking a laxative. · You do not get better as expected. Information obtained by :  I understand that if any problems occur once I am at home I am to contact my physician. I understand and acknowledge receipt of the instructions indicated above.                                                                                                                                            Physician's or R.N.'s Signature                                                                  Date/Time Patient or Representative

## 2017-12-02 NOTE — DISCHARGE SUMMARY
Hospitalist Discharge Summary     Patient ID:  Yamilex Aas  [id number]  80 y.o.  1934    PCP on record: Schuyler Potter III,     Admit date: 11/28/2017  Discharge date and time: 12/2/2017      DISCHARGE DIAGNOSIS:  Right hip fracture s/p right hip cephalomedullary nailing 11/29 with Dr. Denver Ricci  Acute post operative blood loss anemia   Chronic left pleural effusion, unclear etiology  COPD without acute exacerbation  BPH   Hyperlipidemia  Mild cognitive impairment  Underweight Body mass index is 17.43 kg/(m^2)      CONSULTATIONS:  IP CONSULT TO ORTHOPEDIC SURGERY    Excerpted HPI from H&P of Waldo Dominguez MD:  Ashleigh Chawla is a 80 y.o.  male who presents with above complaint. Pt sustained GLF today when he was returning from the bathroom back to his bedroom in the dark. He tripped and landed on his right side. Son in low helped pt to get back to bed. Pt was not able to ambulate since his fall. He is normally ambulating independent. Dtr/pt denies any other recent falls. No hitting head or LOC. After his fall pt c/o R hip pain. Pain is significantly exacerbated with any movement. Pain is alleviated with staying still. Cxray in ED revealed left pleural effusion. Pt and dtr aware of pleural effusion since 01/2017. Pt denies any SAUCEDO,chest pain, or orthopnea. No LE edema.     ______________________________________________________________________  DISCHARGE SUMMARY/HOSPITAL COURSE:  for full details see H&P, daily progress notes, labs, consult notes. Hospital course:  Acute post operative blood loss anemia in setting of right hip fracture: s/p right hip cephalomedullary nailing 11/29. Pt was transfused 2u pRBCs while here. Hg 9.5 on the day of discharge. He was working well with PT. Pt had xray on admisssion with comminuted intertrochanteric right hip fracture with varus angulation. Bones are osteopenic. Pt on lovenox for DVT prophylaxis, oxycodone for pain.   He was also started on calcium supplementation. Left pleural effusion, unclear etiology:  First noted 1/17, asymptomatic. CXR on admission with small-to-moderate enlarging left pleural effusion. CT chest 1/17 with trace left pleural effusion with left lower lobe atelectasis. No evident etiology. Stable 2 mm right-sided subpleural lymph nodes can be considered benign. Additional chronic changes including coronary artery disease. Pt had echo while here with EF 65%. No obvious wall motion abnormalities identified in the views obtained. Pt recommended to continued outpatient f/u with PCP - discussed with Pt's daughter and son while here. COPD without acute exacerbation: con't incruse (on spiriva at home)  BPH: con't flomax/proscar   Hyperlipidemia: con't lipitor (on crestor at home)  Mild cognitive impairment: con't strattera    Underweight Body mass index is 17.43 kg/(m^2)    _______________________________________________________________________  Patient seen and examined by me on discharge day. Pertinent Findings:  Gen: awake, appropriate, NAD  HEENT: cl daron, no lesions  Chest: CTA bilaterally, no crackles or wheezes  Cv: RRR, no murmur, moderate R thigh erythema, dressings c/d/i  Abd: soft, NT, ND, BS+, no mass  Neuro: CN intact  _______________________________________________________________________  DISCHARGE MEDICATIONS:   Current Discharge Medication List      START taking these medications    Details   calcium-vitamin D (OYSTER SHELL) 500 mg(1,250mg) -200 unit per tablet Take 1 Tab by mouth two (2) times daily (with meals) for 30 days. Qty: 60 Tab, Refills: 0      famotidine (PEPCID) 20 mg tablet Take 1 Tab by mouth two (2) times a day for 30 days. Qty: 60 Tab, Refills: 0      !! oxyCODONE IR (ROXICODONE) 5 mg immediate release tablet Take 0.5 Tabs by mouth every four (4) hours as needed (For pain 3-6).  Max Daily Amount: 15 mg.  Qty: 20 Tab, Refills: 0      polyethylene glycol (MIRALAX) 17 gram packet Take 1 Packet by mouth daily for 30 days. Qty: 30 Packet, Refills: 0      senna-docusate (PERICOLACE) 8.6-50 mg per tablet Take 2 Tabs by mouth two (2) times a day for 30 days. Qty: 120 Tab, Refills: 0      enoxaparin (LOVENOX) 60 mg/0.6 mL injection 25 mg by SubCUTAneous route daily for 14 days. Qty: 1 Syringe, Refills: 0      !! oxyCODONE IR (ROXICODONE) 5 mg immediate release tablet Take 1 Tab by mouth every four (4) hours as needed (For pain 7-10). Max Daily Amount: 30 mg.  Qty: 20 Tab, Refills: 0       !! - Potential duplicate medications found. Please discuss with provider. CONTINUE these medications which have NOT CHANGED    Details   atomoxetine (STRATTERA) 80 mg capsule Take 1 Cap by mouth daily. Qty: 30 Cap, Refills: 5      rosuvastatin (CRESTOR) 20 mg tablet TAKE ONE TABLET BY MOUTH ONE TIME DAILY  Qty: 90 Tab, Refills: 3      SPIRIVA WITH HANDIHALER 18 mcg inhalation capsule INHALE ONE CAPSULE VIA DEVICE BY MOUTH ONE TIME DAILY  Qty: 30 Cap, Refills: 8      multivitamin (ONE A DAY) tablet Take 1 Tab by mouth daily. tamsulosin (FLOMAX) 0.4 mg capsule Take 0.4 mg by mouth daily. Associated Diagnoses: BPH (benign prostatic hypertrophy)      finasteride (PROSCAR) 5 mg tablet Take 5 mg by mouth daily. Associated Diagnoses: BPH (benign prostatic hypertrophy)      aspirin 81 mg Tab Take 81 mg by mouth daily. Associated Diagnoses: HLD (hyperlipidemia)             My Recommended Diet, Activity, Wound Care, and follow-up labs are listed in the patient's Discharge Insturctions which I have personally completed and reviewed.     _______________________________________________________________________  DISPOSITION:    Home with Family:    Home with HH/PT/OT/RN:    SNF/LTC:    TERRY: x   OTHER:        Condition at Discharge:  Stable  _______________________________________________________________________  Follow up with:   PCP : Anahi Isidro III, DO  Follow-up Information     Follow up With Details Comments Contact Info    Kaden Lombardi III, DO In 2 weeks routine hospital follow up [address] Randol Boast, DO In 10 days surgical follow up [address] [facility] [address]  [phone]                Total time in minutes spent coordinating this discharge (includes going over instructions, follow-up, prescriptions, and preparing report for sign off to her PCP) :  35 minutes    Signed:  Lissette Garcia MD

## 2017-12-02 NOTE — PROGRESS NOTES
Hospital to [facility] [address]                                                                        80 y.o.   male    [address]   Room: 3207/01    MRM 3 ORTHOPEDICS  Unit Phone# :  [phone]      [facility]  MR[facility] [address]  Dept: [phone]  Loc: [phone]                    SITUATION     Admitted:  11/28/2017         Attending Provider:  Zenaida Mayo MD       Consultations:  IP CONSULT TO ORTHOPEDIC SURGERY    PCP:  Ba Ramírez III, DO   [phone]    Treatment Team: Attending Provider: Zenaida Mayo MD; Consulting Provider: CONOR Galvez; Consulting Provider: Kenia Slaughter DO; Utilization Review: Rod Montejo RN; Care Manager: AGUSTIN Sands    Admitting Dx:  Right Hip Fracture  Hip fracture [facility])       Principal Problem: Closed right hip fracture ([address]    3 Days Post-Op of   Procedure(s): FEMUR INSERTION INTRA MEDULLARY NAIL Right (Synthes Long Nail)   BY: Kenia Slaughter DO             ON: 11/29/2017                  Code Status: Full Code                Advance Directives:   Advance Care Planning 11/29/2017   Patient's Healthcare Decision Maker is: Named in scanned ACP document   Primary Decision Maker Name Dorys Holguin   Primary Decision Maker Phone Number [phone]   Primary Decision Maker Relationship to Patient Adult child   Confirm Advance Directive Yes, on file    (Send w/patient)   Yes Not W Pt       Isolation:  There are currently no Active Isolations       MDRO: No current active infections    Pain Medications given:  5mg Oxycodone    Last dose: Yesterday     Special Equipment needed: yes  Type of equipment:      (Not currently on dialysis)  (Not currently on dialysis)  (Not currently on dialysis)     BACKGROUND     Allergies:   Allergies   Allergen Reactions    Zetia [Ezetimibe] Other (comments)     C/o back pain       Past Medical History:   Diagnosis Date    Arthritis     BPH (benign prostatic hypertrophy) 4/12/2010    COPD (chronic obstructive pulmonary disease) with emphysema ([facility] [address] 7/25/2012    HLD (hyperlipidemia) 4/12/2010    HTN (hypertension) 4/12/2010    Memory disorder     Psychiatric disorder     anxiety and depression       Past Surgical History:   Procedure Laterality Date    Joie Axon  8/5/2015    2 sessile sigmoid polyps, 3 & 5 mm; Dr. Bronson Arrow; no further screening recommended    ENDOSCOPY, COLON, DIAGNOSTIC  2002    negative; 10 year repeat; Dr. Armond Steen  years ago    left    HX HERNIA REPAIR  2001    right inguinal    SD COLSC FLX W/RMVL OF TUMOR POLYP LESION SNARE TQ  7/27/2012            Prescriptions Prior to Admission   Medication Sig    atomoxetine (STRATTERA) 80 mg capsule Take 1 Cap by mouth daily.  rosuvastatin (CRESTOR) 20 mg tablet TAKE ONE TABLET BY MOUTH ONE TIME DAILY    SPIRIVA WITH HANDIHALER 18 mcg inhalation capsule INHALE ONE CAPSULE VIA DEVICE BY MOUTH ONE TIME DAILY    multivitamin (ONE A DAY) tablet Take 1 Tab by mouth daily.  tamsulosin (FLOMAX) 0.4 mg capsule Take 0.4 mg by mouth daily.  finasteride (PROSCAR) 5 mg tablet Take 5 mg by mouth daily.  aspirin 81 mg Tab Take 81 mg by mouth daily. Hard scripts included in transfer packet yes    Vaccinations:    Immunization History   Administered Date(s) Administered    Influenza High Dose Vaccine PF 10/15/2015    Influenza Vaccine 10/07/2013, 10/15/2014    Influenza Vaccine Whole 11/01/2010    Pneumococcal Conjugate (PCV-13) 11/23/2015    TDAP Vaccine 03/14/2012    ZZZ-RETIRED (DO NOT USE) Pneumococcal Vaccine (Unspecified Type) 10/01/1998, 11/14/2011    Zoster 09/24/2011       Readmission Risks:    Known Risks: previous fall, periodic confusion        The Charlson CoMorbitiy Index tool is an evidenced based tool that has more automatic generated information.  The tool looks at many different items such as the age of the patient, how many times they were admitted in the last calendar year, current length of stay in the hospital and their diagnosis. All of these items are pulled automatically from information documented in the chart from various places and will generate a score that predicts whether a patient is at low (less than 13), medium (13-20) or high (21 or greater) risk of being readmitted.         ASSESSMENT                Temp: 98.4 °F (36.9 °C) (12/02/17 1039) Pulse (Heart Rate): 83 (12/02/17 1039)     Resp Rate: 18 (12/02/17 1039)           BP: 139/66 (12/02/17 1039)     O2 Sat (%): 100 % (12/02/17 1039)     Weight: 49 kg (108 lb)    Height: 5' 6\" (167.6 cm) (11/29/17 1530)       If above not within 1 hour of discharge:    BP:_____  P:____  R:____ T:_____ O2 Sat: ___%  O2: ______    Active Orders   Diet    DIET REGULAR         Orientation: oriented to time, place, person and situation     Active Behaviors: None                                   Active Lines/Drains:  (Peg Tube / Razo / CL or S/L?): no    Urinary Status: Voiding     Last BM: Last Bowel Movement Date: 11/28/17     Skin Integrity: Incision (comment) (right leg)   Wound Leg Right-DRESSING STATUS: Clean, dry, and intact    Wound Leg Right-DRESSING TYPE: Transparent film    Mobility: Slightly limited   Weight Bearing Status: WBAT (Weight Bearing as Tolerated)      Gait Training  Assistive Device: Gait belt, Walker, rolling  Ambulation - Level of Assistance: Contact guard assistance, Minimal assistance  Distance (ft): 80 Feet (ft)         Lab Results   Component Value Date/Time    Glucose 115 12/01/2017 02:32 AM    Hemoglobin A1c 5.9 05/17/2017 08:49 AM    INR 1.1 11/28/2017 08:56 PM    INR (POC) <0.9 12/07/2013 04:08 PM    HGB 9.5 12/02/2017 08:10 AM    HGB 7.4 12/01/2017 02:32 AM        RECOMMENDATION     See After Visit Summary (AVS) for:  · Discharge instructions  · After [address]   · Special equipment needed (entered pre-discharge by Care Management)  · Medication Reconciliation    · Follow up Appointment(s)         Report given/sent by:  Donny Daniel RN                    Verbal report given to: Jose  FAXED to:  paper copy sent with patient         Estimated discharge time:  12/2/2017 at 1400

## 2017-12-02 NOTE — PROGRESS NOTES
Problem: Mobility Impaired (Adult and Pediatric)  Goal: *Acute Goals and Plan of Care (Insert Text)  Physical Therapy Goals  Initiated 11/30/2017  1. Patient will move from supine to sit and sit to supine , scoot up and down and roll side to side in bed with modified independence within 7 day(s). 2.  Patient will transfer from bed to chair and chair to bed with contact guard assist using the least restrictive device within 7 day(s). 3.  Patient will perform sit to stand with contact guard assist within 7 day(s). 4.  Patient will ambulate with minimal assistance/contact guard assist for 75 feet with the least restrictive device within 7 day(s). 5.  Patient will ascend/descend 4 stairs with 2 handrail(s) with moderate assistance  within 7 day(s). physical Therapy TREATMENT  Seen 1015 to 1042      Patient: Ashlyn Raymundo (73 y.o. male)  Date: 12/2/2017  Diagnosis: Right Hip Fracture  Hip fracture (HCC) Closed right hip fracture (HCC)  Procedure(s) (LRB):  FEMUR INSERTION INTRA MEDULLARY NAIL Right (Synthes Long Nail) (Right) 3 Days Post-Op  Precautions: Fall, WBAT RLE    ASSESSMENT:  Patient seen for exercises,transfers and gait training. He was agreeable to working with PT. Did 10 reps of LE exercises with assist prior to getting up OOB. Tends to lean to the left to get weight off the right hip during sitting. Encouraged more equal sitting. Stood with assist x 2 to the RW. Ambulated [de-identified]' with RW and CGA x 2. Needing lots of verbal cues for safety on turns, at thresholds and avoiding objects. Cues for more upright posture and to look ahead. Tends to look down at his feet. Pain 2/10 at beginning of session and 5/10 at end of session. Ice packs to right hip. Patient up in the chair. Son present for entire session. Nurse aware of all the above. Needs IPR.   Progression toward goals:  [x]    Improving appropriately and progressing toward goals  []    Improving slowly and progressing toward goals  []    Not making progress toward goals and plan of care will be adjusted     PLAN:  Patient continues to benefit from skilled intervention to address the above impairments. Continue treatment per established plan of care. Discharge Recommendations:  Inpatient Rehab  Further Equipment Recommendations for Discharge:  Defer to IPR     SUBJECTIVE:   Patient stated My pain is a 5 now.     OBJECTIVE DATA SUMMARY:   Critical Behavior:  Neurologic State: Alert (Periods of confusion)  Orientation Level: Oriented X4 (Periods of confusion)  Cognition: Memory loss (Periods of confusion)  Safety/Judgement: Awareness of environment  Functional Mobility Training:  Bed Mobility:  Rolling: Minimum assistance  Supine to Sit: Minimum assistance  Scooting: Moderate assistance    Transfers:  Sit to Stand: Minimum assistance;Assist x2  Stand to Sit: Contact guard assistance (cues for safety)          Balance:  Sitting: Impaired; Without support  Sitting - Static: Fair (occasional)  Sitting - Dynamic: Fair (occasional)  Standing: Impaired; Without support  Standing - Static: Constant support;Good  Standing - Dynamic : Fair  Ambulation/Gait Training:  Distance (ft): 80 Feet (ft)  Assistive Device: Gait belt;Walker, rolling  Ambulation - Level of Assistance: Contact guard assistance;Minimal assistance  Gait Abnormalities: Antalgic;Decreased step clearance  Right Side Weight Bearing: As tolerated  Left Side Weight Bearing: Full  Base of Support: Narrowed  Stance: Right decreased  Speed/Marie: Fluctuations  Step Length: Left shortened;Right shortened        Therapeutic Exercises: Toe wiggles, ankle pumps, heel slides, hip IR/ER/Abd/Add with assist x 10 reps with RLE    Pain:  Pain Scale 1: Numeric (0 - 10)  Pain Intensity 1: 2 at beginning of session, 5/10 at end of session  Pain Location 1: Hip  Pain Orientation 1: Right        Activity Tolerance: Tolerated PT treatment session well.   Please refer to the flowsheet for vital signs taken during this treatment.   After treatment:   [x]    Patient left in no apparent distress sitting up in chair  []    Patient left in no apparent distress in bed  [x]    Call bell left within reach  [x]    Nursing notified  [x]    Caregiver present  []    Bed alarm activated    COMMUNICATION/COLLABORATION:   The patients plan of care was discussed with: Registered Nurse    Martha Teixeira, PT  Time Calculation: 27 mins

## 2017-12-03 LAB
ALBUMIN SERPL-MCNC: 2.5 G/DL (ref 3.5–5)
ALBUMIN/GLOB SERPL: 0.7 {RATIO} (ref 1.1–2.2)
ALP SERPL-CCNC: 65 U/L (ref 45–117)
ALT SERPL-CCNC: 33 U/L (ref 12–78)
ANION GAP SERPL CALC-SCNC: 4 MMOL/L (ref 5–15)
AST SERPL-CCNC: 44 U/L (ref 15–37)
BILIRUB SERPL-MCNC: 0.7 MG/DL (ref 0.2–1)
BUN SERPL-MCNC: 13 MG/DL (ref 6–20)
BUN/CREAT SERPL: 22 (ref 12–20)
CALCIUM SERPL-MCNC: 8.2 MG/DL (ref 8.5–10.1)
CHLORIDE SERPL-SCNC: 101 MMOL/L (ref 97–108)
CO2 SERPL-SCNC: 32 MMOL/L (ref 21–32)
CREAT SERPL-MCNC: 0.59 MG/DL (ref 0.7–1.3)
ERYTHROCYTE [DISTWIDTH] IN BLOOD BY AUTOMATED COUNT: 15.2 % (ref 11.5–14.5)
GLOBULIN SER CALC-MCNC: 3.8 G/DL (ref 2–4)
GLUCOSE SERPL-MCNC: 96 MG/DL (ref 65–100)
HCT VFR BLD AUTO: 25.9 % (ref 36.6–50.3)
HGB BLD-MCNC: 8.6 G/DL (ref 12.1–17)
MCH RBC QN AUTO: 30 PG (ref 26–34)
MCHC RBC AUTO-ENTMCNC: 33.2 G/DL (ref 30–36.5)
MCV RBC AUTO: 90.2 FL (ref 80–99)
PLATELET # BLD AUTO: 154 K/UL (ref 150–400)
POTASSIUM SERPL-SCNC: 4 MMOL/L (ref 3.5–5.1)
PROT SERPL-MCNC: 6.3 G/DL (ref 6.4–8.2)
RBC # BLD AUTO: 2.87 M/UL (ref 4.1–5.7)
SODIUM SERPL-SCNC: 137 MMOL/L (ref 136–145)
WBC # BLD AUTO: 5.5 K/UL (ref 4.1–11.1)

## 2017-12-03 PROCEDURE — 80053 COMPREHEN METABOLIC PANEL: CPT | Performed by: PHYSICAL MEDICINE & REHABILITATION

## 2017-12-03 PROCEDURE — 85027 COMPLETE CBC AUTOMATED: CPT | Performed by: PHYSICAL MEDICINE & REHABILITATION

## 2017-12-03 PROCEDURE — 36415 COLL VENOUS BLD VENIPUNCTURE: CPT | Performed by: PHYSICAL MEDICINE & REHABILITATION

## 2017-12-03 PROCEDURE — 74011250637 HC RX REV CODE- 250/637: Performed by: PHYSICAL MEDICINE & REHABILITATION

## 2017-12-03 RX ORDER — POLYETHYLENE GLYCOL 3350 17 G/17G
17 POWDER, FOR SOLUTION ORAL
Status: COMPLETED | OUTPATIENT
Start: 2017-12-03 | End: 2017-12-03

## 2017-12-03 RX ADMIN — ASPIRIN 81 MG 81 MG: 81 TABLET ORAL at 08:50

## 2017-12-03 RX ADMIN — POLYETHYLENE GLYCOL 3350 17 G: 17 POWDER, FOR SOLUTION ORAL at 12:51

## 2017-12-03 RX ADMIN — TAMSULOSIN HYDROCHLORIDE 0.4 MG: 0.4 CAPSULE ORAL at 08:50

## 2017-12-03 RX ADMIN — ATORVASTATIN CALCIUM 40 MG: 40 TABLET, FILM COATED ORAL at 08:50

## 2017-12-03 RX ADMIN — THERA TABS 1 TABLET: TAB at 08:50

## 2017-12-03 RX ADMIN — DOCUSATE SODIUM AND SENNOSIDES 1 TABLET: 8.6; 5 TABLET, FILM COATED ORAL at 08:50

## 2017-12-03 RX ADMIN — UMECLIDINIUM 1 PUFF: 62.5 AEROSOL, POWDER ORAL at 08:57

## 2017-12-03 RX ADMIN — ACETAMINOPHEN 650 MG: 325 TABLET ORAL at 08:56

## 2017-12-03 RX ADMIN — FINASTERIDE 5 MG: 5 TABLET, FILM COATED ORAL at 08:50

## 2017-12-04 LAB
ANION GAP SERPL CALC-SCNC: 5 MMOL/L (ref 5–15)
BACTERIA SPEC CULT: NORMAL
BUN SERPL-MCNC: 15 MG/DL (ref 6–20)
BUN/CREAT SERPL: 26 (ref 12–20)
CALCIUM SERPL-MCNC: 8.3 MG/DL (ref 8.5–10.1)
CC UR VC: NORMAL
CHLORIDE SERPL-SCNC: 100 MMOL/L (ref 97–108)
CO2 SERPL-SCNC: 29 MMOL/L (ref 21–32)
CREAT SERPL-MCNC: 0.58 MG/DL (ref 0.7–1.3)
ERYTHROCYTE [DISTWIDTH] IN BLOOD BY AUTOMATED COUNT: 15.2 % (ref 11.5–14.5)
GLUCOSE SERPL-MCNC: 89 MG/DL (ref 65–100)
HCT VFR BLD AUTO: 24.5 % (ref 36.6–50.3)
HGB BLD-MCNC: 8.2 G/DL (ref 12.1–17)
MCH RBC QN AUTO: 30.9 PG (ref 26–34)
MCHC RBC AUTO-ENTMCNC: 33.5 G/DL (ref 30–36.5)
MCV RBC AUTO: 92.5 FL (ref 80–99)
PLATELET # BLD AUTO: 168 K/UL (ref 150–400)
POTASSIUM SERPL-SCNC: 4 MMOL/L (ref 3.5–5.1)
RBC # BLD AUTO: 2.65 M/UL (ref 4.1–5.7)
SERVICE CMNT-IMP: NORMAL
SODIUM SERPL-SCNC: 134 MMOL/L (ref 136–145)
WBC # BLD AUTO: 5.6 K/UL (ref 4.1–11.1)

## 2017-12-04 PROCEDURE — 85027 COMPLETE CBC AUTOMATED: CPT | Performed by: PHYSICAL MEDICINE & REHABILITATION

## 2017-12-04 PROCEDURE — 36415 COLL VENOUS BLD VENIPUNCTURE: CPT | Performed by: PHYSICAL MEDICINE & REHABILITATION

## 2017-12-04 PROCEDURE — 80048 BASIC METABOLIC PNL TOTAL CA: CPT | Performed by: PHYSICAL MEDICINE & REHABILITATION

## 2017-12-04 PROCEDURE — 74011250637 HC RX REV CODE- 250/637: Performed by: PHYSICAL MEDICINE & REHABILITATION

## 2017-12-04 RX ORDER — ATOMOXETINE 80 MG/1
80 CAPSULE ORAL DAILY
Status: DISCONTINUED | OUTPATIENT
Start: 2017-12-05 | End: 2017-12-14 | Stop reason: HOSPADM

## 2017-12-04 RX ORDER — TAMSULOSIN HYDROCHLORIDE 0.4 MG/1
0.4 CAPSULE ORAL
Status: DISCONTINUED | OUTPATIENT
Start: 2017-12-05 | End: 2017-12-05 | Stop reason: ALTCHOICE

## 2017-12-04 RX ORDER — ATOMOXETINE 80 MG/1
80 CAPSULE ORAL DAILY
Status: DISCONTINUED | OUTPATIENT
Start: 2017-12-05 | End: 2017-12-04

## 2017-12-04 RX ADMIN — THERA TABS 1 TABLET: TAB at 08:57

## 2017-12-04 RX ADMIN — DOCUSATE SODIUM AND SENNOSIDES 1 TABLET: 8.6; 5 TABLET, FILM COATED ORAL at 08:57

## 2017-12-04 RX ADMIN — ASPIRIN 81 MG 81 MG: 81 TABLET ORAL at 08:57

## 2017-12-04 RX ADMIN — UMECLIDINIUM 1 PUFF: 62.5 AEROSOL, POWDER ORAL at 08:59

## 2017-12-04 RX ADMIN — FINASTERIDE 5 MG: 5 TABLET, FILM COATED ORAL at 08:57

## 2017-12-04 RX ADMIN — TRAMADOL HYDROCHLORIDE 25 MG: 50 TABLET, FILM COATED ORAL at 09:04

## 2017-12-04 RX ADMIN — ATORVASTATIN CALCIUM 40 MG: 40 TABLET, FILM COATED ORAL at 08:57

## 2017-12-05 ENCOUNTER — PATIENT OUTREACH (OUTPATIENT)
Dept: INTERNAL MEDICINE CLINIC | Age: 82
End: 2017-12-05

## 2017-12-05 PROCEDURE — 74011250637 HC RX REV CODE- 250/637: Performed by: PHYSICAL MEDICINE & REHABILITATION

## 2017-12-05 RX ADMIN — FINASTERIDE 5 MG: 5 TABLET, FILM COATED ORAL at 08:47

## 2017-12-05 RX ADMIN — ATORVASTATIN CALCIUM 40 MG: 40 TABLET, FILM COATED ORAL at 08:47

## 2017-12-05 RX ADMIN — ATOMOXETINE 80 MG: 80 CAPSULE ORAL at 08:47

## 2017-12-05 RX ADMIN — DOCUSATE SODIUM AND SENNOSIDES 1 TABLET: 8.6; 5 TABLET, FILM COATED ORAL at 08:47

## 2017-12-05 RX ADMIN — ACETAMINOPHEN 650 MG: 325 TABLET ORAL at 20:38

## 2017-12-05 RX ADMIN — ASPIRIN 81 MG 81 MG: 81 TABLET ORAL at 08:47

## 2017-12-05 RX ADMIN — UMECLIDINIUM 1 PUFF: 62.5 AEROSOL, POWDER ORAL at 08:48

## 2017-12-05 RX ADMIN — ACETAMINOPHEN 650 MG: 325 TABLET ORAL at 08:47

## 2017-12-05 RX ADMIN — TRAMADOL HYDROCHLORIDE 25 MG: 50 TABLET, FILM COATED ORAL at 02:26

## 2017-12-05 RX ADMIN — MAGNESIUM HYDROXIDE 30 ML: 400 SUSPENSION ORAL at 20:38

## 2017-12-05 RX ADMIN — THERA TABS 1 TABLET: TAB at 08:47

## 2017-12-05 NOTE — PROGRESS NOTES
[address] - AFPAYAL - 12/5/2017  This note will not be viewable in [address] Hospital Discharge to [address], listed on [facility]) Discharge Report. Patient hospitalized @ [address] [place] [address] 11/28/17 - 12/2/17. RRAT score: 13 Moderate    Hospital course:  Acute post operative blood loss anemia in setting of right hip fracture: s/p right hip cephalomedullary nailing 11/29.  Pt was transfused 2u pRBCs while here. Hg 9.5 on the day of discharge. He was working well with PT. Pt had xray on admisssion with comminuted intertrochanteric right hip fracture with varus angulation. Bones are osteopenic. Pt on lovenox for DVT prophylaxis, oxycodone for pain. He was also started on calcium supplementation. Left pleural effusion, unclear etiology:  First noted 1/17, asymptomatic. CXR on admission with small-to-moderate enlarging left pleural effusion. CT chest 1/17 with trace left pleural effusion with left lower lobe atelectasis. No evident etiology. Stable 2 mm right-sided subpleural lymph nodes can be considered benign. Additional chronic changes including coronary artery disease. Pt had echo while here with EF 65%. No obvious wall motion abnormalities identified in the views obtained. Pt recommended to continued outpatient f/u with PCP - discussed with Pt's daughter and son while here. COPD without acute exacerbation: con't incruse (on spiriva at home)  BPH: con't flomax/proscar   Hyperlipidemia: con't lipitor (on crestor at home)  Mild cognitive impairment: con't strattera    Underweight Body mass index is 17.43 kg/(m^2)    Nursing staff were not available to complete medication reconciliation during this phone contact.       Support System:  Facility staff, family    ACP - On file, dated 2014      Barriers- none identified @ this time    PCP f/u - During patient's stay in [address] [place] [address], his medical care will be managed by the facility staff.     MACKENZIE contact # given to call as needed.

## 2017-12-06 LAB
ANION GAP SERPL CALC-SCNC: 7 MMOL/L (ref 5–15)
BUN SERPL-MCNC: 15 MG/DL (ref 6–20)
BUN/CREAT SERPL: 27 (ref 12–20)
CALCIUM SERPL-MCNC: 8.3 MG/DL (ref 8.5–10.1)
CHLORIDE SERPL-SCNC: 101 MMOL/L (ref 97–108)
CO2 SERPL-SCNC: 27 MMOL/L (ref 21–32)
CREAT SERPL-MCNC: 0.56 MG/DL (ref 0.7–1.3)
ERYTHROCYTE [DISTWIDTH] IN BLOOD BY AUTOMATED COUNT: 15.8 % (ref 11.5–14.5)
GLUCOSE SERPL-MCNC: 82 MG/DL (ref 65–100)
HCT VFR BLD AUTO: 28.9 % (ref 36.6–50.3)
HGB BLD-MCNC: 9.5 G/DL (ref 12.1–17)
MCH RBC QN AUTO: 30.8 PG (ref 26–34)
MCHC RBC AUTO-ENTMCNC: 32.9 G/DL (ref 30–36.5)
MCV RBC AUTO: 93.8 FL (ref 80–99)
PLATELET # BLD AUTO: 271 K/UL (ref 150–400)
POTASSIUM SERPL-SCNC: 3.9 MMOL/L (ref 3.5–5.1)
RBC # BLD AUTO: 3.08 M/UL (ref 4.1–5.7)
SODIUM SERPL-SCNC: 135 MMOL/L (ref 136–145)
WBC # BLD AUTO: 5.2 K/UL (ref 4.1–11.1)

## 2017-12-06 PROCEDURE — 74011250637 HC RX REV CODE- 250/637: Performed by: PHYSICAL MEDICINE & REHABILITATION

## 2017-12-06 PROCEDURE — 85027 COMPLETE CBC AUTOMATED: CPT | Performed by: PHYSICAL MEDICINE & REHABILITATION

## 2017-12-06 PROCEDURE — 36415 COLL VENOUS BLD VENIPUNCTURE: CPT | Performed by: PHYSICAL MEDICINE & REHABILITATION

## 2017-12-06 PROCEDURE — 80048 BASIC METABOLIC PNL TOTAL CA: CPT | Performed by: PHYSICAL MEDICINE & REHABILITATION

## 2017-12-06 RX ORDER — TAMSULOSIN HYDROCHLORIDE 0.4 MG/1
0.4 CAPSULE ORAL EVERY EVENING
Status: DISCONTINUED | OUTPATIENT
Start: 2017-12-06 | End: 2017-12-14 | Stop reason: HOSPADM

## 2017-12-06 RX ADMIN — ASPIRIN 81 MG 81 MG: 81 TABLET ORAL at 08:43

## 2017-12-06 RX ADMIN — ACETAMINOPHEN 650 MG: 325 TABLET ORAL at 17:32

## 2017-12-06 RX ADMIN — ATOMOXETINE 80 MG: 80 CAPSULE ORAL at 08:43

## 2017-12-06 RX ADMIN — UMECLIDINIUM 1 PUFF: 62.5 AEROSOL, POWDER ORAL at 08:43

## 2017-12-06 RX ADMIN — DOCUSATE SODIUM AND SENNOSIDES 1 TABLET: 8.6; 5 TABLET, FILM COATED ORAL at 08:43

## 2017-12-06 RX ADMIN — ACETAMINOPHEN 650 MG: 325 TABLET ORAL at 12:18

## 2017-12-06 RX ADMIN — TAMSULOSIN HYDROCHLORIDE 0.4 MG: 0.4 CAPSULE ORAL at 17:32

## 2017-12-06 RX ADMIN — ATORVASTATIN CALCIUM 40 MG: 40 TABLET, FILM COATED ORAL at 08:43

## 2017-12-06 RX ADMIN — FINASTERIDE 5 MG: 5 TABLET, FILM COATED ORAL at 08:43

## 2017-12-06 RX ADMIN — ACETAMINOPHEN 650 MG: 325 TABLET ORAL at 08:43

## 2017-12-06 RX ADMIN — THERA TABS 1 TABLET: TAB at 08:43

## 2017-12-07 PROCEDURE — 74011250637 HC RX REV CODE- 250/637: Performed by: PHYSICAL MEDICINE & REHABILITATION

## 2017-12-07 RX ADMIN — ACETAMINOPHEN 650 MG: 325 TABLET ORAL at 10:47

## 2017-12-07 RX ADMIN — ASPIRIN 81 MG 81 MG: 81 TABLET ORAL at 09:35

## 2017-12-07 RX ADMIN — ATORVASTATIN CALCIUM 40 MG: 40 TABLET, FILM COATED ORAL at 09:35

## 2017-12-07 RX ADMIN — ACETAMINOPHEN 650 MG: 325 TABLET ORAL at 17:03

## 2017-12-07 RX ADMIN — FINASTERIDE 5 MG: 5 TABLET, FILM COATED ORAL at 09:35

## 2017-12-07 RX ADMIN — UMECLIDINIUM 1 PUFF: 62.5 AEROSOL, POWDER ORAL at 09:35

## 2017-12-07 RX ADMIN — THERA TABS 1 TABLET: TAB at 09:35

## 2017-12-07 RX ADMIN — ATOMOXETINE 80 MG: 80 CAPSULE ORAL at 09:36

## 2017-12-07 RX ADMIN — DOCUSATE SODIUM AND SENNOSIDES 1 TABLET: 8.6; 5 TABLET, FILM COATED ORAL at 09:35

## 2017-12-07 RX ADMIN — TAMSULOSIN HYDROCHLORIDE 0.4 MG: 0.4 CAPSULE ORAL at 17:00

## 2017-12-08 PROCEDURE — 74011250637 HC RX REV CODE- 250/637: Performed by: PHYSICAL MEDICINE & REHABILITATION

## 2017-12-08 RX ORDER — ACETAMINOPHEN 325 MG/1
650 TABLET ORAL 3 TIMES DAILY
Status: DISCONTINUED | OUTPATIENT
Start: 2017-12-08 | End: 2017-12-14 | Stop reason: HOSPADM

## 2017-12-08 RX ADMIN — ACETAMINOPHEN 650 MG: 325 TABLET ORAL at 22:26

## 2017-12-08 RX ADMIN — DOCUSATE SODIUM AND SENNOSIDES 1 TABLET: 8.6; 5 TABLET, FILM COATED ORAL at 08:27

## 2017-12-08 RX ADMIN — ASPIRIN 81 MG 81 MG: 81 TABLET ORAL at 08:27

## 2017-12-08 RX ADMIN — THERA TABS 1 TABLET: TAB at 08:27

## 2017-12-08 RX ADMIN — ATOMOXETINE 80 MG: 80 CAPSULE ORAL at 08:26

## 2017-12-08 RX ADMIN — TAMSULOSIN HYDROCHLORIDE 0.4 MG: 0.4 CAPSULE ORAL at 17:13

## 2017-12-08 RX ADMIN — ACETAMINOPHEN 650 MG: 325 TABLET ORAL at 13:13

## 2017-12-08 RX ADMIN — ACETAMINOPHEN 650 MG: 325 TABLET ORAL at 08:32

## 2017-12-08 RX ADMIN — UMECLIDINIUM 1 PUFF: 62.5 AEROSOL, POWDER ORAL at 08:27

## 2017-12-08 RX ADMIN — ATORVASTATIN CALCIUM 40 MG: 40 TABLET, FILM COATED ORAL at 08:27

## 2017-12-08 RX ADMIN — FINASTERIDE 5 MG: 5 TABLET, FILM COATED ORAL at 08:27

## 2017-12-09 PROCEDURE — 74011250637 HC RX REV CODE- 250/637: Performed by: PHYSICAL MEDICINE & REHABILITATION

## 2017-12-09 RX ADMIN — ATOMOXETINE 80 MG: 80 CAPSULE ORAL at 08:46

## 2017-12-09 RX ADMIN — ACETAMINOPHEN 650 MG: 325 TABLET ORAL at 06:14

## 2017-12-09 RX ADMIN — FINASTERIDE 5 MG: 5 TABLET, FILM COATED ORAL at 08:46

## 2017-12-09 RX ADMIN — TAMSULOSIN HYDROCHLORIDE 0.4 MG: 0.4 CAPSULE ORAL at 17:42

## 2017-12-09 RX ADMIN — DOCUSATE SODIUM AND SENNOSIDES 1 TABLET: 8.6; 5 TABLET, FILM COATED ORAL at 08:46

## 2017-12-09 RX ADMIN — ATORVASTATIN CALCIUM 40 MG: 40 TABLET, FILM COATED ORAL at 08:46

## 2017-12-09 RX ADMIN — ASPIRIN 81 MG 81 MG: 81 TABLET ORAL at 08:46

## 2017-12-09 RX ADMIN — THERA TABS 1 TABLET: TAB at 08:46

## 2017-12-09 RX ADMIN — ACETAMINOPHEN 650 MG: 325 TABLET ORAL at 13:52

## 2017-12-09 RX ADMIN — ACETAMINOPHEN 650 MG: 325 TABLET ORAL at 20:28

## 2017-12-09 RX ADMIN — MAGNESIUM HYDROXIDE 30 ML: 400 SUSPENSION ORAL at 17:42

## 2017-12-09 RX ADMIN — UMECLIDINIUM 1 PUFF: 62.5 AEROSOL, POWDER ORAL at 08:45

## 2017-12-10 PROCEDURE — 74011250637 HC RX REV CODE- 250/637: Performed by: PHYSICAL MEDICINE & REHABILITATION

## 2017-12-10 RX ADMIN — UMECLIDINIUM 1 PUFF: 62.5 AEROSOL, POWDER ORAL at 08:43

## 2017-12-10 RX ADMIN — ATORVASTATIN CALCIUM 40 MG: 40 TABLET, FILM COATED ORAL at 08:43

## 2017-12-10 RX ADMIN — ASPIRIN 81 MG 81 MG: 81 TABLET ORAL at 08:43

## 2017-12-10 RX ADMIN — ACETAMINOPHEN 650 MG: 325 TABLET ORAL at 06:35

## 2017-12-10 RX ADMIN — TAMSULOSIN HYDROCHLORIDE 0.4 MG: 0.4 CAPSULE ORAL at 17:13

## 2017-12-10 RX ADMIN — FINASTERIDE 5 MG: 5 TABLET, FILM COATED ORAL at 08:43

## 2017-12-10 RX ADMIN — DOCUSATE SODIUM AND SENNOSIDES 1 TABLET: 8.6; 5 TABLET, FILM COATED ORAL at 08:43

## 2017-12-10 RX ADMIN — ACETAMINOPHEN 650 MG: 325 TABLET ORAL at 21:19

## 2017-12-10 RX ADMIN — ACETAMINOPHEN 650 MG: 325 TABLET ORAL at 13:40

## 2017-12-10 RX ADMIN — THERA TABS 1 TABLET: TAB at 08:43

## 2017-12-10 RX ADMIN — ATOMOXETINE 80 MG: 80 CAPSULE ORAL at 08:42

## 2017-12-11 ENCOUNTER — HOME HEALTH ADMISSION (OUTPATIENT)
Dept: HOME HEALTH SERVICES | Facility: HOME HEALTH | Age: 82
End: 2017-12-11
Payer: MEDICARE

## 2017-12-11 PROCEDURE — 74011250637 HC RX REV CODE- 250/637: Performed by: PHYSICAL MEDICINE & REHABILITATION

## 2017-12-11 RX ADMIN — TAMSULOSIN HYDROCHLORIDE 0.4 MG: 0.4 CAPSULE ORAL at 17:17

## 2017-12-11 RX ADMIN — ACETAMINOPHEN 650 MG: 325 TABLET ORAL at 06:23

## 2017-12-11 RX ADMIN — ATOMOXETINE 80 MG: 80 CAPSULE ORAL at 09:32

## 2017-12-11 RX ADMIN — ACETAMINOPHEN 650 MG: 325 TABLET ORAL at 13:52

## 2017-12-11 RX ADMIN — MAGNESIUM HYDROXIDE 30 ML: 400 SUSPENSION ORAL at 09:37

## 2017-12-11 RX ADMIN — ACETAMINOPHEN 650 MG: 325 TABLET ORAL at 21:20

## 2017-12-11 RX ADMIN — ATORVASTATIN CALCIUM 40 MG: 40 TABLET, FILM COATED ORAL at 09:32

## 2017-12-11 RX ADMIN — ASPIRIN 81 MG 81 MG: 81 TABLET ORAL at 09:32

## 2017-12-11 RX ADMIN — FINASTERIDE 5 MG: 5 TABLET, FILM COATED ORAL at 09:32

## 2017-12-11 RX ADMIN — THERA TABS 1 TABLET: TAB at 09:32

## 2017-12-11 RX ADMIN — ACETAMINOPHEN 650 MG: 325 TABLET ORAL at 09:37

## 2017-12-11 RX ADMIN — UMECLIDINIUM 1 PUFF: 62.5 AEROSOL, POWDER ORAL at 09:32

## 2017-12-11 RX ADMIN — DOCUSATE SODIUM AND SENNOSIDES 1 TABLET: 8.6; 5 TABLET, FILM COATED ORAL at 09:32

## 2017-12-12 PROCEDURE — 74011250637 HC RX REV CODE- 250/637: Performed by: PHYSICAL MEDICINE & REHABILITATION

## 2017-12-12 RX ADMIN — ACETAMINOPHEN 650 MG: 325 TABLET ORAL at 05:27

## 2017-12-12 RX ADMIN — DOCUSATE SODIUM AND SENNOSIDES 1 TABLET: 8.6; 5 TABLET, FILM COATED ORAL at 09:04

## 2017-12-12 RX ADMIN — TAMSULOSIN HYDROCHLORIDE 0.4 MG: 0.4 CAPSULE ORAL at 16:56

## 2017-12-12 RX ADMIN — ACETAMINOPHEN 650 MG: 325 TABLET ORAL at 22:04

## 2017-12-12 RX ADMIN — ACETAMINOPHEN 650 MG: 325 TABLET ORAL at 13:26

## 2017-12-12 RX ADMIN — UMECLIDINIUM 1 PUFF: 62.5 AEROSOL, POWDER ORAL at 09:05

## 2017-12-12 RX ADMIN — ATORVASTATIN CALCIUM 40 MG: 40 TABLET, FILM COATED ORAL at 09:04

## 2017-12-12 RX ADMIN — FINASTERIDE 5 MG: 5 TABLET, FILM COATED ORAL at 09:04

## 2017-12-12 RX ADMIN — THERA TABS 1 TABLET: TAB at 09:04

## 2017-12-12 RX ADMIN — ATOMOXETINE 80 MG: 80 CAPSULE ORAL at 09:04

## 2017-12-12 RX ADMIN — ASPIRIN 81 MG 81 MG: 81 TABLET ORAL at 09:04

## 2017-12-13 PROCEDURE — 74011250637 HC RX REV CODE- 250/637: Performed by: PHYSICAL MEDICINE & REHABILITATION

## 2017-12-13 RX ADMIN — ATOMOXETINE 80 MG: 80 CAPSULE ORAL at 08:19

## 2017-12-13 RX ADMIN — THERA TABS 1 TABLET: TAB at 08:19

## 2017-12-13 RX ADMIN — ACETAMINOPHEN 650 MG: 325 TABLET ORAL at 21:33

## 2017-12-13 RX ADMIN — DOCUSATE SODIUM AND SENNOSIDES 1 TABLET: 8.6; 5 TABLET, FILM COATED ORAL at 08:19

## 2017-12-13 RX ADMIN — UMECLIDINIUM 1 PUFF: 62.5 AEROSOL, POWDER ORAL at 08:20

## 2017-12-13 RX ADMIN — ACETAMINOPHEN 650 MG: 325 TABLET ORAL at 06:42

## 2017-12-13 RX ADMIN — FINASTERIDE 5 MG: 5 TABLET, FILM COATED ORAL at 08:19

## 2017-12-13 RX ADMIN — TAMSULOSIN HYDROCHLORIDE 0.4 MG: 0.4 CAPSULE ORAL at 17:05

## 2017-12-13 RX ADMIN — ASPIRIN 81 MG 81 MG: 81 TABLET ORAL at 08:19

## 2017-12-13 RX ADMIN — ACETAMINOPHEN 650 MG: 325 TABLET ORAL at 13:00

## 2017-12-13 RX ADMIN — ATORVASTATIN CALCIUM 40 MG: 40 TABLET, FILM COATED ORAL at 08:19

## 2017-12-13 NOTE — ED NOTES
Assumed care, pt resting in bed, call bell within reach. Daughter at bedside.
Family at bedside patient remains comfortable when laying in still position.
Hospitalist MD at bedside at this time
Patient laying in position of comfort in bed. Patient has no requests at this time.
TRANSFER - OUT REPORT:    Verbal report given to [facility] RN (name) on Fabby Seen  being transferred to Ortho(unit) for routine progression of care       Report consisted of patients Situation, Background, Assessment and   Recommendations(SBAR). Information from the following report(s) SBAR, Kardex, ED Summary and MAR was reviewed with the receiving nurse. Lines:   Peripheral IV 08/05/15 Right Hand (Active)       Peripheral IV 11/28/17 Left Forearm (Active)   Site Assessment Clean, dry, & intact 11/28/2017 10:06 PM   Phlebitis Assessment 0 11/28/2017 10:06 PM   Infiltration Assessment 0 11/28/2017 10:06 PM   Dressing Status Clean, dry, & intact 11/28/2017 10:06 PM        Opportunity for questions and clarification was provided.       Patient transported with:   Nokter
no

## 2017-12-14 PROCEDURE — 74011250637 HC RX REV CODE- 250/637: Performed by: PHYSICAL MEDICINE & REHABILITATION

## 2017-12-14 RX ADMIN — THERA TABS 1 TABLET: TAB at 09:40

## 2017-12-14 RX ADMIN — DOCUSATE SODIUM AND SENNOSIDES 1 TABLET: 8.6; 5 TABLET, FILM COATED ORAL at 09:40

## 2017-12-14 RX ADMIN — ACETAMINOPHEN 650 MG: 325 TABLET ORAL at 05:45

## 2017-12-14 RX ADMIN — ASPIRIN 81 MG 81 MG: 81 TABLET ORAL at 09:40

## 2017-12-14 RX ADMIN — ATOMOXETINE 80 MG: 80 CAPSULE ORAL at 09:37

## 2017-12-14 RX ADMIN — ACETAMINOPHEN 650 MG: 325 TABLET ORAL at 09:39

## 2017-12-14 RX ADMIN — FINASTERIDE 5 MG: 5 TABLET, FILM COATED ORAL at 09:40

## 2017-12-14 RX ADMIN — ATORVASTATIN CALCIUM 40 MG: 40 TABLET, FILM COATED ORAL at 09:40

## 2017-12-14 RX ADMIN — UMECLIDINIUM 1 PUFF: 62.5 AEROSOL, POWDER ORAL at 09:38

## 2017-12-16 ENCOUNTER — HOME CARE VISIT (OUTPATIENT)
Dept: SCHEDULING | Facility: HOME HEALTH | Age: 82
End: 2017-12-16
Payer: MEDICARE

## 2017-12-16 VITALS
DIASTOLIC BLOOD PRESSURE: 66 MMHG | SYSTOLIC BLOOD PRESSURE: 134 MMHG | HEART RATE: 77 BPM | BODY MASS INDEX: 20.2 KG/M2 | HEIGHT: 63 IN | RESPIRATION RATE: 20 BRPM | OXYGEN SATURATION: 99 % | WEIGHT: 114 LBS

## 2017-12-16 VITALS
OXYGEN SATURATION: 99 % | SYSTOLIC BLOOD PRESSURE: 132 MMHG | HEART RATE: 71 BPM | DIASTOLIC BLOOD PRESSURE: 68 MMHG | TEMPERATURE: 97.6 F

## 2017-12-16 PROCEDURE — G0299 HHS/HOSPICE OF RN EA 15 MIN: HCPCS

## 2017-12-16 PROCEDURE — 3331090002 HH PPS REVENUE DEBIT

## 2017-12-16 PROCEDURE — 400013 HH SOC

## 2017-12-16 PROCEDURE — 3331090001 HH PPS REVENUE CREDIT

## 2017-12-16 PROCEDURE — G0151 HHCP-SERV OF PT,EA 15 MIN: HCPCS

## 2017-12-17 PROCEDURE — 3331090002 HH PPS REVENUE DEBIT

## 2017-12-17 PROCEDURE — 3331090001 HH PPS REVENUE CREDIT

## 2017-12-18 ENCOUNTER — HOME CARE VISIT (OUTPATIENT)
Dept: SCHEDULING | Facility: HOME HEALTH | Age: 82
End: 2017-12-18
Payer: MEDICARE

## 2017-12-18 VITALS
OXYGEN SATURATION: 100 % | RESPIRATION RATE: 16 BRPM | HEART RATE: 74 BPM | TEMPERATURE: 97.6 F | DIASTOLIC BLOOD PRESSURE: 80 MMHG | SYSTOLIC BLOOD PRESSURE: 120 MMHG

## 2017-12-18 PROCEDURE — 3331090001 HH PPS REVENUE CREDIT

## 2017-12-18 PROCEDURE — 3331090002 HH PPS REVENUE DEBIT

## 2017-12-18 PROCEDURE — G0300 HHS/HOSPICE OF LPN EA 15 MIN: HCPCS

## 2017-12-18 PROCEDURE — G0152 HHCP-SERV OF OT,EA 15 MIN: HCPCS

## 2017-12-18 PROCEDURE — G0157 HHC PT ASSISTANT EA 15: HCPCS

## 2017-12-19 VITALS
RESPIRATION RATE: 14 BRPM | DIASTOLIC BLOOD PRESSURE: 80 MMHG | SYSTOLIC BLOOD PRESSURE: 120 MMHG | TEMPERATURE: 97.6 F | OXYGEN SATURATION: 97 % | HEART RATE: 72 BPM

## 2017-12-19 PROCEDURE — 3331090002 HH PPS REVENUE DEBIT

## 2017-12-19 PROCEDURE — 3331090001 HH PPS REVENUE CREDIT

## 2017-12-20 ENCOUNTER — HOME CARE VISIT (OUTPATIENT)
Dept: SCHEDULING | Facility: HOME HEALTH | Age: 82
End: 2017-12-20
Payer: MEDICARE

## 2017-12-20 VITALS
RESPIRATION RATE: 20 BRPM | SYSTOLIC BLOOD PRESSURE: 130 MMHG | TEMPERATURE: 97.8 F | OXYGEN SATURATION: 98 % | HEART RATE: 83 BPM | DIASTOLIC BLOOD PRESSURE: 76 MMHG

## 2017-12-20 PROCEDURE — G0157 HHC PT ASSISTANT EA 15: HCPCS

## 2017-12-20 PROCEDURE — 3331090001 HH PPS REVENUE CREDIT

## 2017-12-20 PROCEDURE — 3331090002 HH PPS REVENUE DEBIT

## 2017-12-21 ENCOUNTER — HOME CARE VISIT (OUTPATIENT)
Dept: SCHEDULING | Facility: HOME HEALTH | Age: 82
End: 2017-12-21
Payer: MEDICARE

## 2017-12-21 ENCOUNTER — HOME CARE VISIT (OUTPATIENT)
Dept: HOME HEALTH SERVICES | Facility: HOME HEALTH | Age: 82
End: 2017-12-21
Payer: MEDICARE

## 2017-12-21 VITALS
HEART RATE: 67 BPM | DIASTOLIC BLOOD PRESSURE: 70 MMHG | SYSTOLIC BLOOD PRESSURE: 118 MMHG | RESPIRATION RATE: 14 BRPM | OXYGEN SATURATION: 98 % | TEMPERATURE: 97.1 F

## 2017-12-21 PROCEDURE — G0152 HHCP-SERV OF OT,EA 15 MIN: HCPCS

## 2017-12-21 PROCEDURE — G0157 HHC PT ASSISTANT EA 15: HCPCS

## 2017-12-21 PROCEDURE — 3331090002 HH PPS REVENUE DEBIT

## 2017-12-21 PROCEDURE — 3331090001 HH PPS REVENUE CREDIT

## 2017-12-22 PROCEDURE — 3331090001 HH PPS REVENUE CREDIT

## 2017-12-22 PROCEDURE — 3331090002 HH PPS REVENUE DEBIT

## 2017-12-23 VITALS
HEART RATE: 80 BPM | DIASTOLIC BLOOD PRESSURE: 70 MMHG | SYSTOLIC BLOOD PRESSURE: 120 MMHG | TEMPERATURE: 96.9 F | OXYGEN SATURATION: 95 %

## 2017-12-23 PROCEDURE — 3331090002 HH PPS REVENUE DEBIT

## 2017-12-23 PROCEDURE — 3331090001 HH PPS REVENUE CREDIT

## 2017-12-24 VITALS
DIASTOLIC BLOOD PRESSURE: 64 MMHG | TEMPERATURE: 97 F | HEART RATE: 77 BPM | OXYGEN SATURATION: 97 % | SYSTOLIC BLOOD PRESSURE: 110 MMHG

## 2017-12-24 PROCEDURE — 3331090001 HH PPS REVENUE CREDIT

## 2017-12-24 PROCEDURE — 3331090002 HH PPS REVENUE DEBIT

## 2017-12-25 ENCOUNTER — HOME CARE VISIT (OUTPATIENT)
Dept: SCHEDULING | Facility: HOME HEALTH | Age: 82
End: 2017-12-25
Payer: MEDICARE

## 2017-12-25 PROCEDURE — 3331090002 HH PPS REVENUE DEBIT

## 2017-12-25 PROCEDURE — 3331090001 HH PPS REVENUE CREDIT

## 2017-12-26 ENCOUNTER — HOSPITAL ENCOUNTER (OUTPATIENT)
Dept: LAB | Age: 82
Discharge: HOME | End: 2017-12-26
Payer: MEDICARE

## 2017-12-26 ENCOUNTER — OFFICE VISIT (OUTPATIENT)
Dept: INTERNAL MEDICINE CLINIC | Age: 82
End: 2017-12-26

## 2017-12-26 VITALS
HEART RATE: 89 BPM | RESPIRATION RATE: 16 BRPM | SYSTOLIC BLOOD PRESSURE: 103 MMHG | DIASTOLIC BLOOD PRESSURE: 66 MMHG | WEIGHT: 106 LBS | OXYGEN SATURATION: 97 % | BODY MASS INDEX: 18.78 KG/M2 | TEMPERATURE: 97.3 F | HEIGHT: 63 IN

## 2017-12-26 DIAGNOSIS — E78.2 MIXED HYPERLIPIDEMIA: Chronic | ICD-10-CM

## 2017-12-26 DIAGNOSIS — R39.12 BENIGN PROSTATIC HYPERPLASIA WITH WEAK URINARY STREAM: ICD-10-CM

## 2017-12-26 DIAGNOSIS — I10 ESSENTIAL HYPERTENSION: Chronic | ICD-10-CM

## 2017-12-26 DIAGNOSIS — N40.1 BENIGN PROSTATIC HYPERPLASIA WITH WEAK URINARY STREAM: ICD-10-CM

## 2017-12-26 DIAGNOSIS — D64.9 ANEMIA, UNSPECIFIED TYPE: Primary | ICD-10-CM

## 2017-12-26 DIAGNOSIS — J43.9 PULMONARY EMPHYSEMA, UNSPECIFIED EMPHYSEMA TYPE (HCC): Chronic | ICD-10-CM

## 2017-12-26 DIAGNOSIS — F17.200 SMOKER: ICD-10-CM

## 2017-12-26 PROBLEM — S72.009A HIP FRACTURE (HCC): Status: RESOLVED | Noted: 2017-11-28 | Resolved: 2017-12-26

## 2017-12-26 PROCEDURE — 85025 COMPLETE CBC W/AUTO DIFF WBC: CPT

## 2017-12-26 PROCEDURE — 83550 IRON BINDING TEST: CPT

## 2017-12-26 PROCEDURE — 84443 ASSAY THYROID STIM HORMONE: CPT

## 2017-12-26 PROCEDURE — 3331090002 HH PPS REVENUE DEBIT

## 2017-12-26 PROCEDURE — 3331090001 HH PPS REVENUE CREDIT

## 2017-12-26 PROCEDURE — 82728 ASSAY OF FERRITIN: CPT

## 2017-12-26 NOTE — MR AVS SNAPSHOT
Visit Information Date & Time Provider Department Dept. Phone Encounter #  
 12/26/2017  8:45 AM Anita Barboza, [address] [id number] Follow-up Instructions Return in about 3 months (around 3/26/2018). Your Appointments 1/2/2018  2:00 PM  
Follow Up with Vera Garcia MD  
Neurology Clinic at [facility]) Appt Note: follow up memory lost...tlw 12/12/17  
 [address] [address]  
[address] [facility], [address] [place] [address], [address] [address] Upcoming Health Maintenance Date Due  
 GLAUCOMA SCREENING Q2Y 12/30/2016 MEDICARE YEARLY EXAM 5/17/2018 DTaP/Tdap/Td series (2 - Td) 3/14/2022 Allergies as of 12/26/2017  Review Complete On: 12/26/2017 By: Josseline Christian III, DO Severity Noted Reaction Type Reactions Kiwi High 12/26/2017    Anaphylaxis Zetia [Ezetimibe]  04/12/2010    Other (comments) C/o back pain Current Immunizations  Reviewed on 11/23/2015 Name Date Influenza High Dose Vaccine PF 10/15/2015 Influenza Vaccine 10/15/2014, 10/7/2013 Influenza Vaccine Whole 11/1/2010 Pneumococcal Conjugate (PCV-13) 11/23/2015 TDAP Vaccine 3/14/2012 ZZZ-RETIRED (DO NOT USE) Pneumococcal Vaccine (Unspecified Type) 11/14/2011, 10/1/1998 Zoster 9/24/2011 Not reviewed this visit You Were Diagnosed With   
  
 Codes Comments Anemia, unspecified type    -  Primary ICD-10-CM: D64.9 ICD-9-CM: 285.9 Pulmonary emphysema, unspecified emphysema type ([facility]     ICD-10-CM: J43.9 ICD-9-CM: 492.8 Mixed hyperlipidemia     ICD-10-CM: E78.2 ICD-9-CM: 272.2 Smoker     ICD-10-CM: K03.131 ICD-9-CM: 305.1 Benign prostatic hyperplasia with weak urinary stream     ICD-10-CM: N40.1, R39.12 
ICD-9-CM: 600.01, 788.62  Essential hypertension     ICD-10-CM: I10 
 ICD-9-CM: 401.9 Vitals BP Pulse Temp Resp Height(growth percentile) Weight(growth percentile) 103/66 (BP 1 Location: Left arm, BP Patient Position: Sitting) 89 97.3 °F (36.3 °C) (Oral) 16 5' 3\" (1.6 m) 106 lb (48.1 kg) SpO2 BMI Smoking Status 97% 18.78 kg/m2 Current Every Day Smoker Vitals History BMI and BSA Data Body Mass Index Body Surface Area 18.78 kg/m 2 1.46 m 2 Preferred Pharmacy Pharmacy Name Phone [facility] [address] [facility], [address] [phone] Your Updated Medication List  
  
   
This list is accurate as of: 12/26/17  9:14 AM.  Always use your most recent med list.  
  
  
  
  
 acetaminophen 500 mg tablet Commonly known as:  TYLENOL Take 500 mg by mouth every six (6) hours as needed for Pain or Fever. aspirin 81 mg tablet Take 81 mg by mouth daily. atomoxetine 80 mg capsule Commonly known as:  STRATTERA Take 1 Cap by mouth daily. docusate sodium 100 mg capsule Commonly known as:  Mayte Antis Take 100 mg by mouth as needed for Constipation. finasteride 5 mg tablet Commonly known as:  PROSCAR Take 5 mg by mouth daily. multivitamin tablet Commonly known as:  ONE A DAY Take 1 Tab by mouth daily. rosuvastatin 20 mg tablet Commonly known as:  CRESTOR  
TAKE ONE TABLET BY MOUTH ONE TIME DAILY SPIRIVA WITH HANDIHALER 18 mcg inhalation capsule Generic drug:  tiotropium INHALE ONE CAPSULE VIA DEVICE BY MOUTH ONE TIME DAILY  
  
 tamsulosin 0.4 mg capsule Commonly known as:  FLOMAX Take 0.4 mg by mouth daily. We Performed the Following CBC WITH AUTOMATED DIFF [27911 CPT(R)] FERRITIN [44071 CPT(R)] IRON PROFILE [id number] CPT(R)] TSH 3RD GENERATION [92830 CPT(R)] Follow-up Instructions Return in about 3 months (around 3/26/2018). To-Do List   
 12/26/2017 To Be Determined Appointment with Deonte Crawley LPN at [place] [address]  
  
 12/27/2017 10:00 AM  
  Appointment with Torrey Atkinson OT at [place] [address]  
  
 12/27/2017 3:15 PM  
  Appointment with Bulmaro Romero PTA at [place] [address]  
  
 12/28/2017 To Be Determined Appointment with Deonte Crawley LPN at [place] [address]  
  
 12/29/2017 2:00 PM  
  Appointment with Bulmaro Romero PTA at [place] [address]  
  
 12/30/2017 To Be Determined Appointment with Bulmaro Romero PTA at [place] [address]  
  
 01/01/2018 To Be Determined Appointment with Deonte Crawley LPN at [place] [address]  
  
 01/02/2018 To Be Determined Appointment with Bulmaro Romero PTA at [place] [address]  
  
 01/04/2018 To Be Determined Appointment with Bulmaro Romero PTA at [place] [address]  
  
 01/04/2018 To Be Determined Appointment with Igor Esquivel at [place] [address]  
  
 01/08/2018 To Be Determined Appointment with Bulmaro Romero PTA at [place] [address]  
  
 01/10/2018 To Be Determined Appointment with Jarvis Davidson at [place] [address] Patient Instructions High-Calorie and High-Protein Diet: Care Instructions Your Care Instructions A high-calorie, high-protein diet gives you more energy and extra nutrition to help your body heal. Your doctor and dietitian can help you design a diet based on your health and what you prefer to eat. Always talk with your doctor or dietitian before you make changes in your diet. Follow-up care is a key part of your treatment and safety.  Be sure to make and go to all appointments, and call your doctor if you are having problems. It's also a good idea to know your test results and keep a list of the medicines you take. How can you care for yourself at home? · Eat three meals a day, plus snacks in between and at bedtime. · Include favorite foods in your meals. This will help make meals more pleasant. · Drink your beverage at the end of the meal, because drinking before or during the meal can fill you up. · Eat high-protein foods, such as: ¨ Meat, fish, and poultry. ¨ Milk and milk products. Add powdered milk to other foods (such as pudding or soups) to boost the protein. ¨ Eggs. ¨ Cooked dried beans and peas. ¨ Peanut butter, nuts, and seeds. ¨ Tofu. ¨ Cheeses. ¨ Protein bars. · Eat high-calorie foods, such as: 
¨ Butter, honey, and brown sugar, added to foods to make them taste better. ¨ Oils, sauces, and gravies. ¨ Peanut butter. ¨ Whole milk, yogurt, mayonnaise, and sour cream. 
¨ Granola cereal with fruit and granola bars. ¨ Muffins, pancakes, waffles, and other breads. ¨ Milkshakes, puddings, and custard. · Try high-energy drinks, such as Ensure, Boost, or instant breakfast drinks, if you have trouble eating solid foods. They will give you both calories and protein. Soups and smoothies also are good sources of nutrition. · Keep snacks around that are easy to eat, such as pudding, energy bars, ice cream, and flavored ice pops. · If you can, take a walk before you eat, to make you hungrier. · Do not waste energy eating foods that do not give you much nutrition, such as potato chips, candy bars, and soft drinks. · Drink plenty of fluids. If you have kidney, heart, or liver disease and have to limit fluids, talk with your doctor before you increase the amount of fluids you drink. Where can you learn more? Go to http://samina-cyn.info/. Enter V784 in the search box to learn more about \"High-Calorie and High-Protein Diet: Care Instructions. \" Current as of: May 12, 2017 Content Version: 11.4 © 8251-9505 Healthwise, [employer]. Care instructions adapted under license by [employer] (which disclaims liability or warranty for this information). If you have questions about a medical condition or this instruction, always ask your healthcare professional. [address] any warranty or liability for your use of this information. Introducing [facility]! [facility] introduces SCYFIX patient portal. Now you can access parts of your medical record, email your doctor's office, and request medication refills online. 1. In your internet browser, go to [email or web address] 2. Click on the First Time User? Click Here link in the Sign In box. You will see the New Member Sign Up page. 3. Enter your SCYFIX Access Code exactly as it appears below. You will not need to use this code after youve completed the sign-up process. If you do not sign up before the expiration date, you must request a new code. · SCYFIX Access Code: [email or web address] Expires: 3/2/2018  1:33 PM 
 
4. Enter the last four digits of your Social Security Number (xxxx) and Date of Birth (mm/dd/yyyy) as indicated and click Submit. You will be taken to the next sign-up page. 5. Create a [employer] ID. This will be your SCYFIX login ID and cannot be changed, so think of one that is secure and easy to remember. 6. Create a SCYFIX password. You can change your password at any time. 7. Enter your Password Reset Question and Answer. This can be used at a later time if you forget your password. 8. Enter your e-mail address. You will receive e-mail notification when new information is available in [address] 9. Click Sign Up. You can now view and download portions of your medical record. 10. Click the Download Summary menu link to download a portable copy of your medical information. If you have questions, please visit the Frequently Asked Questions section of the [email or web address] website. Remember, [employer] is NOT to be used for urgent needs. For medical emergencies, dial 911. Now available from your iPhone and Android! Please provide this summary of care documentation to your next provider. Your primary care clinician is listed as Aaron Nieto If you have any questions after today's visit, please call [phone].

## 2017-12-26 NOTE — PROGRESS NOTES
Reviewed record in preparation for visit and have obtained necessary documentation. Identified pt with two pt identifiers(name and ). Chief Complaint   Patient presents with    Follow-up     discharged for [facility] on 17       Health Maintenance Due   Topic Date Due    GLAUCOMA SCREENING Q2Y  2016    Influenza Age 5 to Adult  2017       Mr. Harrington has a reminder for a \"due or due soon\" health maintenance. I have asked that he discuss health maintenance topic(s) due with His  primary care provider. Coordination of Care Questionnaire:  :     1) Have you been to an emergency room, urgent care clinic since your last visit? no   Hospitalized since your last visit? yes             2) Have you seen or consulted any other health care providers outside of [address] [place] [address] since your last visit? no  (Include any pap smears or colon screenings in this section.)    3) Do you have an Advance Directive on file? yes    4) Are you interested in receiving information on Advance Directives? NO    Patient is accompanied by self I have received verbal consent from Enma Nogueira to discuss any/all medical information while they are present in the room.

## 2017-12-26 NOTE — PATIENT INSTRUCTIONS
High-Calorie and High-Protein Diet: Care Instructions  Your Care Instructions    A high-calorie, high-protein diet gives you more energy and extra nutrition to help your body heal. Your doctor and dietitian can help you design a diet based on your health and what you prefer to eat. Always talk with your doctor or dietitian before you make changes in your diet. Follow-up care is a key part of your treatment and safety. Be sure to make and go to all appointments, and call your doctor if you are having problems. It's also a good idea to know your test results and keep a list of the medicines you take. How can you care for yourself at home? · Eat three meals a day, plus snacks in between and at bedtime. · Include favorite foods in your meals. This will help make meals more pleasant. · Drink your beverage at the end of the meal, because drinking before or during the meal can fill you up. · Eat high-protein foods, such as:  ¨ Meat, fish, and poultry. ¨ Milk and milk products. Add powdered milk to other foods (such as pudding or soups) to boost the protein. ¨ Eggs. ¨ Cooked dried beans and peas. ¨ Peanut butter, nuts, and seeds. ¨ Tofu. ¨ Cheeses. ¨ Protein bars. · Eat high-calorie foods, such as:  ¨ Butter, honey, and brown sugar, added to foods to make them taste better. ¨ Oils, sauces, and gravies. ¨ Peanut butter. ¨ Whole milk, yogurt, mayonnaise, and sour cream.  ¨ Granola cereal with fruit and granola bars. ¨ Muffins, pancakes, waffles, and other breads. ¨ Milkshakes, puddings, and custard. · Try high-energy drinks, such as Ensure, Boost, or instant breakfast drinks, if you have trouble eating solid foods. They will give you both calories and protein. Soups and smoothies also are good sources of nutrition. · Keep snacks around that are easy to eat, such as pudding, energy bars, ice cream, and flavored ice pops. · If you can, take a walk before you eat, to make you hungrier.   · Do not waste energy eating foods that do not give you much nutrition, such as potato chips, candy bars, and soft drinks. · Drink plenty of fluids. If you have kidney, heart, or liver disease and have to limit fluids, talk with your doctor before you increase the amount of fluids you drink. Where can you learn more? Go to http://samina-cyn.info/. Enter B561 in the search box to learn more about \"High-Calorie and High-Protein Diet: Care Instructions. \"  Current as of: May 12, 2017  Content Version: 11.4  © 1195-4411 [employer]. Care instructions adapted under license by [employer] (which disclaims liability or warranty for this information). If you have questions about a medical condition or this instruction, always ask your healthcare professional. [address] any warranty or liability for your use of this information.

## 2017-12-26 NOTE — PROGRESS NOTES
Leonie Garcia is a 80 y.o. male who presents for evaluation of hospital follow up. Last seen by me may 30, 2017. Had a fall at home, [facility] with right hip fx. Also received 2 units prbc. Went to [facility] for inpt rehab, has been home since dec 14. Continues to work with therapy, uses walker now. Overall feels well, though bit concerned about weight loss.       ROS:  Constitutional: negative for fevers, chills, anorexia and weight loss  Eyes:   negative for visual disturbance and irritation  ENT:   negative for tinnitus,sore throat,nasal congestion,ear pain,hoarseness  Respiratory:  negative for cough, hemoptysis, dyspnea,wheezing  CV:   negative for chest pain, palpitations, lower extremity edema  GI:   negative for nausea, vomiting, diarrhea, abdominal pain,melena  Genitourinary: negative for frequency, dysuria and hematuria  Musculoskel: negative for myalgias, arthralgias, back pain, muscle weakness, joint pain  Neurological:  negative for headaches, dizziness, focal weakness, numbness  Psychiatric:     Negative for depression or anxiety      Past Medical History:   Diagnosis Date    Arthritis     BPH (benign prostatic hypertrophy) 2010    COPD (chronic obstructive pulmonary disease) with emphysema ([facility] [address] 2012    HLD (hyperlipidemia) 2010    HTN (hypertension) 2010    Memory disorder     Psychiatric disorder     anxiety and depression       Past Surgical History:   Procedure Laterality Date    COLONOSCOPY,NURA FERNÁNDEZ,SNARE  2015    2 sessile sigmoid polyps, 3 & 5 mm; Dr. Yash Prince; no further screening recommended    ENDOSCOPY, COLON, DIAGNOSTIC      negative; 10 year repeat; Dr. Carolann Estrada  years ago    left    HX HERNIA REPAIR      right inguinal    SC COLSC FLX W/RMVL OF TUMOR POLYP LESION SNARE TQ  2012            Family History   Problem Relation Age of Onset    Heart Disease Mother       at 80    Heart Disease Father  at 80    Liver Disease Brother     Stroke Brother     Heart Attack Brother     Other Sister 8     ? polio    Cancer Sister      lung       Social History     Social History    Marital status:      Spouse name: N/A    Number of children: N/A    Years of education: N/A     Occupational History    Not on file. Social History Main Topics    Smoking status: Current Every Day Smoker     Packs/day: 1.00     Years: 60.00     Types: Cigarettes    Smokeless tobacco: Former User     Quit date: 10/5/2014    Alcohol use 4.2 oz/week     7 Glasses of wine per week      Comment: 1 drinks per evening    Drug use: No    Sexual activity: Not on file     Other Topics Concern    Not on file     Social History Narrative            Visit Vitals    /66 (BP 1 Location: Left arm, BP Patient Position: Sitting)    Pulse 89    Temp 97.3 °F (36.3 °C) (Oral)    Resp 16    Ht 5' 3\" (1.6 m)    Wt 106 lb (48.1 kg)    SpO2 97%    BMI 18.78 kg/m2       Physical Examination:   General - Well appearing male  HEENT - PERRL, TM no erythema/opacification, normal nasal turbinates, no oropharyngeal erythema or exudate, MMM  Neck - supple, no bruits, no thyroidomegaly, no lymphadenopathy  Pulm - clear to auscultation bilaterally  Cardio - RRR, normal S1 S2, no murmur  Abd - soft, nontender, no masses, no HSM  Extrem - no edema, +2 distal pulses  Neuro-  No focal deficits, CN intact     Assessment/Plan:    1. Anemia--sp 2 units prbc during recent hospital stay. Check cbc, iron, ferritin  2. bph--continue flomax, proscar  3. Left sided pleural effusion--does not look large enough to be tapped, and bp too low for lasix. Monitor for now, check cxr next visit  4.  adhd--continue straterra  5. Copd--reminded to use spiriva daily  6.  hyperlipids--on crestor  7.   Mild cognitive impairment--stable    Had eye exam done recently, [facility] 3 months        Yoni Plaza III, DO

## 2017-12-27 ENCOUNTER — HOME CARE VISIT (OUTPATIENT)
Dept: SCHEDULING | Facility: HOME HEALTH | Age: 82
End: 2017-12-27
Payer: MEDICARE

## 2017-12-27 VITALS
HEART RATE: 77 BPM | TEMPERATURE: 97.2 F | OXYGEN SATURATION: 98 % | BODY MASS INDEX: 18.78 KG/M2 | SYSTOLIC BLOOD PRESSURE: 114 MMHG | WEIGHT: 106 LBS | RESPIRATION RATE: 16 BRPM | DIASTOLIC BLOOD PRESSURE: 62 MMHG

## 2017-12-27 LAB
BASOPHILS # BLD AUTO: 0.1 X10E3/UL (ref 0–0.2)
BASOPHILS NFR BLD AUTO: 1 %
EOSINOPHIL # BLD AUTO: 0.2 X10E3/UL (ref 0–0.4)
EOSINOPHIL NFR BLD AUTO: 3 %
ERYTHROCYTE [DISTWIDTH] IN BLOOD BY AUTOMATED COUNT: 16.1 % (ref 12.3–15.4)
FERRITIN SERPL-MCNC: 419 NG/ML (ref 30–400)
HCT VFR BLD AUTO: 38.8 % (ref 37.5–51)
HGB BLD-MCNC: 12.6 G/DL (ref 13–17.7)
IMM GRANULOCYTES # BLD: 0 X10E3/UL (ref 0–0.1)
IMM GRANULOCYTES NFR BLD: 0 %
IRON SATN MFR SERPL: 23 % (ref 15–55)
IRON SERPL-MCNC: 59 UG/DL (ref 38–169)
LYMPHOCYTES # BLD AUTO: 1.1 X10E3/UL (ref 0.7–3.1)
LYMPHOCYTES NFR BLD AUTO: 18 %
MCH RBC QN AUTO: 32.1 PG (ref 26.6–33)
MCHC RBC AUTO-ENTMCNC: 32.5 G/DL (ref 31.5–35.7)
MCV RBC AUTO: 99 FL (ref 79–97)
MONOCYTES # BLD AUTO: 0.5 X10E3/UL (ref 0.1–0.9)
MONOCYTES NFR BLD AUTO: 9 %
NEUTROPHILS # BLD AUTO: 4.1 X10E3/UL (ref 1.4–7)
NEUTROPHILS NFR BLD AUTO: 69 %
PLATELET # BLD AUTO: 242 X10E3/UL (ref 150–379)
RBC # BLD AUTO: 3.93 X10E6/UL (ref 4.14–5.8)
TIBC SERPL-MCNC: 254 UG/DL (ref 250–450)
TSH SERPL DL<=0.005 MIU/L-ACNC: 1.38 UIU/ML (ref 0.45–4.5)
UIBC SERPL-MCNC: 195 UG/DL (ref 111–343)
WBC # BLD AUTO: 6 X10E3/UL (ref 3.4–10.8)

## 2017-12-27 PROCEDURE — 3331090002 HH PPS REVENUE DEBIT

## 2017-12-27 PROCEDURE — 3331090001 HH PPS REVENUE CREDIT

## 2017-12-27 PROCEDURE — G0152 HHCP-SERV OF OT,EA 15 MIN: HCPCS

## 2017-12-27 PROCEDURE — G0157 HHC PT ASSISTANT EA 15: HCPCS

## 2017-12-28 ENCOUNTER — HOME CARE VISIT (OUTPATIENT)
Dept: SCHEDULING | Facility: HOME HEALTH | Age: 82
End: 2017-12-28
Payer: MEDICARE

## 2017-12-28 PROCEDURE — G0300 HHS/HOSPICE OF LPN EA 15 MIN: HCPCS

## 2017-12-28 PROCEDURE — 3331090002 HH PPS REVENUE DEBIT

## 2017-12-28 PROCEDURE — 3331090001 HH PPS REVENUE CREDIT

## 2017-12-29 ENCOUNTER — HOME CARE VISIT (OUTPATIENT)
Dept: SCHEDULING | Facility: HOME HEALTH | Age: 82
End: 2017-12-29
Payer: MEDICARE

## 2017-12-29 VITALS
DIASTOLIC BLOOD PRESSURE: 72 MMHG | HEART RATE: 79 BPM | OXYGEN SATURATION: 96 % | RESPIRATION RATE: 16 BRPM | TEMPERATURE: 97.4 F | SYSTOLIC BLOOD PRESSURE: 128 MMHG

## 2017-12-29 VITALS
HEART RATE: 80 BPM | DIASTOLIC BLOOD PRESSURE: 70 MMHG | TEMPERATURE: 96.8 F | SYSTOLIC BLOOD PRESSURE: 120 MMHG | OXYGEN SATURATION: 93 %

## 2017-12-29 VITALS
RESPIRATION RATE: 14 BRPM | OXYGEN SATURATION: 97 % | DIASTOLIC BLOOD PRESSURE: 62 MMHG | TEMPERATURE: 98.4 F | SYSTOLIC BLOOD PRESSURE: 116 MMHG | HEART RATE: 88 BPM

## 2017-12-29 PROCEDURE — 3331090002 HH PPS REVENUE DEBIT

## 2017-12-29 PROCEDURE — G0157 HHC PT ASSISTANT EA 15: HCPCS

## 2017-12-29 PROCEDURE — 3331090001 HH PPS REVENUE CREDIT

## 2017-12-30 PROCEDURE — 3331090001 HH PPS REVENUE CREDIT

## 2017-12-30 PROCEDURE — 3331090002 HH PPS REVENUE DEBIT

## 2017-12-31 PROCEDURE — 3331090001 HH PPS REVENUE CREDIT

## 2017-12-31 PROCEDURE — 3331090002 HH PPS REVENUE DEBIT

## 2018-01-01 ENCOUNTER — OFFICE VISIT (OUTPATIENT)
Dept: NEUROLOGY | Age: 83
End: 2018-01-01

## 2018-01-01 ENCOUNTER — TELEPHONE (OUTPATIENT)
Dept: INTERNAL MEDICINE CLINIC | Age: 83
End: 2018-01-01

## 2018-01-01 ENCOUNTER — HOME CARE VISIT (OUTPATIENT)
Dept: SCHEDULING | Facility: HOME HEALTH | Age: 83
End: 2018-01-01
Payer: MEDICARE

## 2018-01-01 ENCOUNTER — OFFICE VISIT (OUTPATIENT)
Dept: INTERNAL MEDICINE CLINIC | Age: 83
End: 2018-01-01

## 2018-01-01 VITALS
TEMPERATURE: 98.1 F | SYSTOLIC BLOOD PRESSURE: 124 MMHG | DIASTOLIC BLOOD PRESSURE: 76 MMHG | OXYGEN SATURATION: 98 % | HEART RATE: 76 BPM | RESPIRATION RATE: 18 BRPM

## 2018-01-01 VITALS
HEIGHT: 63 IN | RESPIRATION RATE: 16 BRPM | TEMPERATURE: 98.1 F | WEIGHT: 114.4 LBS | OXYGEN SATURATION: 95 % | BODY MASS INDEX: 20.27 KG/M2 | HEART RATE: 63 BPM | DIASTOLIC BLOOD PRESSURE: 62 MMHG | SYSTOLIC BLOOD PRESSURE: 119 MMHG

## 2018-01-01 DIAGNOSIS — F48.2 PBA (PSEUDOBULBAR AFFECT): ICD-10-CM

## 2018-01-01 DIAGNOSIS — J43.9 PULMONARY EMPHYSEMA, UNSPECIFIED EMPHYSEMA TYPE (HCC): Chronic | ICD-10-CM

## 2018-01-01 DIAGNOSIS — F17.200 SMOKER: ICD-10-CM

## 2018-01-01 DIAGNOSIS — F48.2 PBA (PSEUDOBULBAR AFFECT): Chronic | ICD-10-CM

## 2018-01-01 DIAGNOSIS — G30.1 LATE ONSET ALZHEIMER'S DISEASE WITHOUT BEHAVIORAL DISTURBANCE (HCC): Primary | Chronic | ICD-10-CM

## 2018-01-01 DIAGNOSIS — E78.2 MIXED HYPERLIPIDEMIA: Chronic | ICD-10-CM

## 2018-01-01 DIAGNOSIS — I10 ESSENTIAL HYPERTENSION: Chronic | ICD-10-CM

## 2018-01-01 DIAGNOSIS — F02.80 LATE ONSET ALZHEIMER'S DISEASE WITHOUT BEHAVIORAL DISTURBANCE (HCC): Primary | ICD-10-CM

## 2018-01-01 DIAGNOSIS — G30.1 LATE ONSET ALZHEIMER'S DISEASE WITHOUT BEHAVIORAL DISTURBANCE (HCC): Primary | ICD-10-CM

## 2018-01-01 DIAGNOSIS — F02.80 LATE ONSET ALZHEIMER'S DISEASE WITHOUT BEHAVIORAL DISTURBANCE (HCC): Primary | Chronic | ICD-10-CM

## 2018-01-01 PROCEDURE — G0299 HHS/HOSPICE OF RN EA 15 MIN: HCPCS

## 2018-01-01 PROCEDURE — 3331090002 HH PPS REVENUE DEBIT

## 2018-01-01 PROCEDURE — 3331090001 HH PPS REVENUE CREDIT

## 2018-01-01 RX ORDER — MEMANTINE HYDROCHLORIDE 5 MG/1
TABLET ORAL
Qty: 120 TAB | Refills: 2 | Status: SHIPPED | OUTPATIENT
Start: 2018-01-01 | End: 2019-01-01 | Stop reason: SDUPTHER

## 2018-01-02 ENCOUNTER — OFFICE VISIT (OUTPATIENT)
Dept: NEUROLOGY | Age: 83
End: 2018-01-02

## 2018-01-02 ENCOUNTER — TELEPHONE (OUTPATIENT)
Dept: INTERNAL MEDICINE CLINIC | Age: 83
End: 2018-01-02

## 2018-01-02 VITALS
OXYGEN SATURATION: 97 % | BODY MASS INDEX: 18.78 KG/M2 | HEIGHT: 63 IN | WEIGHT: 106 LBS | HEART RATE: 83 BPM | SYSTOLIC BLOOD PRESSURE: 118 MMHG | DIASTOLIC BLOOD PRESSURE: 70 MMHG

## 2018-01-02 DIAGNOSIS — G30.1 LATE ONSET ALZHEIMER'S DISEASE WITHOUT BEHAVIORAL DISTURBANCE (HCC): Primary | ICD-10-CM

## 2018-01-02 DIAGNOSIS — F02.80 LATE ONSET ALZHEIMER'S DISEASE WITHOUT BEHAVIORAL DISTURBANCE (HCC): Primary | ICD-10-CM

## 2018-01-02 PROCEDURE — 3331090002 HH PPS REVENUE DEBIT

## 2018-01-02 PROCEDURE — 3331090001 HH PPS REVENUE CREDIT

## 2018-01-02 RX ORDER — DONEPEZIL HYDROCHLORIDE 10 MG/1
TABLET, FILM COATED ORAL
Qty: 30 TAB | Refills: 11 | Status: SHIPPED | OUTPATIENT
Start: 2018-01-02 | End: 2019-01-01 | Stop reason: DRUGHIGH

## 2018-01-02 NOTE — TELEPHONE ENCOUNTER
Patient's daughter called, the patient's daughter who is in here with the office today and asked why I did not order a functional MRI scan. I explained to her that that was a research protocol and might be available at some academic institutions as part of a research study but it was not available for the clinician to order nor would Medicare pay for it.

## 2018-01-02 NOTE — MR AVS SNAPSHOT
Visit Information Date & Time Provider Department Dept. Phone Encounter #  
 1/2/2018  2:00 PM Jocelyn Da Silva MD Neurology Clinic at [facility] [phone] [id number] Follow-up Instructions Return in about 6 months (around 7/2/2018). Your Appointments 3/27/2018  8:45 AM  
ROUTINE CARE with Obi Hurtado DO [facility] [address]) Appt Note: 3 month follow up  
 [facility] [address] [address]  
[address] [facility] [address] Upcoming Health Maintenance Date Due  
 GLAUCOMA SCREENING Q2Y 12/30/2016 MEDICARE YEARLY EXAM 5/17/2018 DTaP/Tdap/Td series (2 - Td) 3/14/2022 Allergies as of 1/2/2018  Review Complete On: 1/2/2018 By: Cecilio Ferrer LPN Severity Noted Reaction Type Reactions Kiwi High 12/26/2017    Anaphylaxis Zetia [Ezetimibe]  04/12/2010    Other (comments) C/o back pain Current Immunizations  Reviewed on 11/23/2015 Name Date Influenza High Dose Vaccine PF 10/15/2015 Influenza Vaccine 10/15/2014, 10/7/2013 Influenza Vaccine Whole 11/1/2010 Pneumococcal Conjugate (PCV-13) 11/23/2015 TDAP Vaccine 3/14/2012 ZZZ-RETIRED (DO NOT USE) Pneumococcal Vaccine (Unspecified Type) 11/14/2011, 10/1/1998 Zoster 9/24/2011 Not reviewed this visit Vitals BP Pulse Height(growth percentile) Weight(growth percentile) SpO2 BMI  
 118/70 83 5' 3\" (1.6 m) 106 lb (48.1 kg) 97% 18.78 kg/m2 Smoking Status Current Every Day Smoker Vitals History BMI and BSA Data Body Mass Index Body Surface Area 18.78 kg/m 2 1.46 m 2 Preferred Pharmacy Pharmacy Name Phone [facility] Sendy Johnson [address] [phone] Your Updated Medication List  
  
   
 This list is accurate as of: 1/2/18  2:18 PM.  Always use your most recent med list.  
  
  
  
  
 acetaminophen 500 mg tablet Commonly known as:  TYLENOL Take 500 mg by mouth every six (6) hours as needed for Pain or Fever. aspirin 81 mg tablet Take 81 mg by mouth daily. atomoxetine 80 mg capsule Commonly known as:  STRATTERA Take 1 Cap by mouth daily. docusate sodium 100 mg capsule Commonly known as:  Olene Cam Take 100 mg by mouth as needed for Constipation. finasteride 5 mg tablet Commonly known as:  PROSCAR Take 5 mg by mouth daily. multivitamin tablet Commonly known as:  ONE A DAY Take 1 Tab by mouth daily. rosuvastatin 20 mg tablet Commonly known as:  CRESTOR  
TAKE ONE TABLET BY MOUTH ONE TIME DAILY SPIRIVA WITH HANDIHALER 18 mcg inhalation capsule Generic drug:  tiotropium INHALE ONE CAPSULE VIA DEVICE BY MOUTH ONE TIME DAILY  
  
 tamsulosin 0.4 mg capsule Commonly known as:  FLOMAX Take 0.4 mg by mouth daily. Follow-up Instructions Return in about 6 months (around 7/2/2018). To-Do List   
 01/03/2018 1:45 PM  
  Appointment with Shirley Chan PTA at [place] [address]  
  
 01/05/2018 1:45 PM  
  Appointment with Shirley Chan PTA at [place] [address]  
  
 01/08/2018 To Be Determined Appointment with Shirley Chan PTA at [place] [address]  
  
 01/10/2018 To Be Determined Appointment with Ariel Lin at [place] [address] Patient Instructions PRESCRIPTION REFILL POLICY [facility] Statement to Patients April 1, 2014 In an effort to ensure the large volume of patient prescription refills is processed in the most efficient and expeditious manner, we are asking our patients to assist us by calling your Pharmacy for all prescription refills, this will include also your  Mail Order Pharmacy. The pharmacy will contact our office electronically to continue the refill process. Please do not wait until the last minute to call your pharmacy. We need at least 48 hours (2days) to fill prescriptions. We also encourage you to call your pharmacy before going to  your prescription to make sure it is ready. With regard to controlled substance prescription refill requests (narcotic refills) that need to be picked up at our office, we ask your cooperation by providing us with at least 72 hours (3days) notice that you will need a refill. We will not refill narcotic prescription refill requests after 4:00pm on any weekday, Monday through Thursday, or after 2:00pm on Fridays, or on the weekends. We encourage everyone to explore another way of getting your prescription refill request processed using [employer], our patient web portal through our electronic medical record system. [employer] is an efficient and effective way to communicate your medication request directly to the office and  downloadable as an edelmira on your smart phone . [employer] also features a review functionality that allows you to view your medication list as well as leave messages for your physician. Are you ready to get connected? If so please review the attatched instructions or speak to any of our staff to get you set up right away! Thank you so much for your cooperation. Should you have any questions please contact our Practice Administrator. The Physicians and Staff,  [facility] Introducing [facility] SERVICES! Kalani Da Silva introduces [employer] patient portal. Now you can access parts of your medical record, email your doctor's office, and request medication refills online. 1. In your internet browser, go to [email or web address] 2. Click on the First Time User? Click Here link in the Sign In box.  You will see the New Member Sign Up page. 3. Enter your [employer] Access Code exactly as it appears below. You will not need to use this code after youve completed the sign-up process. If you do not sign up before the expiration date, you must request a new code. · [employer] Access Code: [email or web address] Expires: 3/2/2018  1:33 PM 
 
4. Enter the last four digits of your Social Security Number (xxxx) and Date of Birth (mm/dd/yyyy) as indicated and click Submit. You will be taken to the next sign-up page. 5. Create a Beem ID. This will be your Beem login ID and cannot be changed, so think of one that is secure and easy to remember. 6. Create a [employer] password. You can change your password at any time. 7. Enter your Password Reset Question and Answer. This can be used at a later time if you forget your password. 8. Enter your e-mail address. You will receive e-mail notification when new information is available in [address] 9. Click Sign Up. You can now view and download portions of your medical record. 10. Click the Download Summary menu link to download a portable copy of your medical information. If you have questions, please visit the Frequently Asked Questions section of the [employer] website. Remember, [employer] is NOT to be used for urgent needs. For medical emergencies, dial 911. Now available from your iPhone and Android! Please provide this summary of care documentation to your next provider. Your primary care clinician is listed as Professor Butt [address] If you have any questions after today's visit, please call [phone].

## 2018-01-02 NOTE — PATIENT INSTRUCTIONS
[address] [facility]   Statement to Patients  April 1, 2014      In an effort to ensure the large volume of patient prescription refills is processed in the most efficient and expeditious manner, we are asking our patients to assist us by calling your Pharmacy for all prescription refills, this will include also your  Mail Order Pharmacy. The pharmacy will contact our office electronically to continue the refill process. Please do not wait until the last minute to call your pharmacy. We need at least 48 hours (2days) to fill prescriptions. We also encourage you to call your pharmacy before going to  your prescription to make sure it is ready. With regard to controlled substance prescription refill requests (narcotic refills) that need to be picked up at our office, we ask your cooperation by providing us with at least 72 hours (3days) notice that you will need a refill. We will not refill narcotic prescription refill requests after 4:00pm on any weekday, Monday through Thursday, or after 2:00pm on Fridays, or on the weekends. We encourage everyone to explore another way of getting your prescription refill request processed using [employer], our patient web portal through our electronic medical record system. [employer] is an efficient and effective way to communicate your medication request directly to the office and  downloadable as an edelmira on your smart phone . [employer] also features a review functionality that allows you to view your medication list as well as leave messages for your physician. Are you ready to get connected? If so please review the attatched instructions or speak to any of our staff to get you set up right away! Thank you so much for your cooperation. Should you have any questions please contact our Practice Administrator.     The Physicians and Staff,  [facility]

## 2018-01-02 NOTE — TELEPHONE ENCOUNTER
Patient's daughter, Jadiel Harrington states she needs a call back to discuss Aricept being prescribed for patient for Dementia per Dr. Beata Peterson. Jadiel Harrington states she needs a call back to discuss Specialist visit as this is the first she has heard of the patient having dementia symptoms. Please call to discuss & advise.  Thank you

## 2018-01-02 NOTE — PROGRESS NOTES
HISTORY OF PRESENT ILLNESS  Lisa Lu is a 80 y.o. male. HPI Comments: Mariza Reed returns for follow-up with his daughter. He last saw Dr. Elizabeth Nj. He was diagnosed with ADD from neuropsych testing. He was put on Strattera and was last increased 6 months ago to 80 mg a day. See him today he looks demented to me. His Mini-Mental status exam test gives him at 18. He has been sleeping well. His daughter does not believe he has any side effects to the Strattera. He has not had any episodes where he has had trouble moving arm or leg. Memory Loss    The history is provided by the patient and relative. This is a chronic problem. Pertinent negatives include no seizures and no tingling. Mental status baseline is mild dementia. Review of Systems   Constitutional: Negative for chills, diaphoresis, fever and malaise/fatigue. Review of systems reveals no new complaints, he does not have any trouble with urinary incontinence or constipation. HENT: Negative for nosebleeds and tinnitus. Eyes: Negative for double vision and photophobia. Skin: Negative. Neurological: Negative for dizziness, tingling, tremors, sensory change, speech change, focal weakness, seizures, loss of consciousness and headaches. Psychiatric/Behavioral: Positive for memory loss. Negative for depression and suicidal ideas. Current Outpatient Prescriptions on File Prior to Visit   Medication Sig Dispense Refill    acetaminophen (TYLENOL) 500 mg tablet Take 500 mg by mouth every six (6) hours as needed for Pain or Fever.  docusate sodium (COLACE) 100 mg capsule Take 100 mg by mouth as needed for Constipation.  atomoxetine (STRATTERA) 80 mg capsule Take 1 Cap by mouth daily.  30 Cap 5    rosuvastatin (CRESTOR) 20 mg tablet TAKE ONE TABLET BY MOUTH ONE TIME DAILY 90 Tab 3    SPIRIVA WITH HANDIHALER 18 mcg inhalation capsule INHALE ONE CAPSULE VIA DEVICE BY MOUTH ONE TIME DAILY 30 Cap 8    multivitamin (ONE A DAY) tablet Take 1 Tab by mouth daily.  tamsulosin (FLOMAX) 0.4 mg capsule Take 0.4 mg by mouth daily.  finasteride (PROSCAR) 5 mg tablet Take 5 mg by mouth daily.  aspirin 81 mg Tab Take 81 mg by mouth daily. No current facility-administered medications on file prior to visit. Past Medical History:   Diagnosis Date    Arthritis     BPH (benign prostatic hypertrophy) 4/12/2010    COPD (chronic obstructive pulmonary disease) with emphysema ([facility] [address] 7/25/2012    HLD (hyperlipidemia) 4/12/2010    HTN (hypertension) 4/12/2010    Memory disorder     Psychiatric disorder     anxiety and depression     /70  Pulse 83  Ht 5' 3\" (1.6 m)  Wt 106 lb (48.1 kg)  SpO2 97%  BMI 18.78 kg/m2      Physical Exam   Constitutional: He appears well-developed and well-nourished. No distress. HENT:   Head: Normocephalic and atraumatic. Mouth/Throat: Oropharynx is clear and moist. No oropharyngeal exudate. Eyes: Conjunctivae and EOM are normal. Pupils are equal, round, and reactive to light. No scleral icterus. Neck: Normal range of motion. Neck supple. No thyromegaly present. Lymphadenopathy:     He has no cervical adenopathy. Neurological: He is alert. He has normal reflexes. He displays normal reflexes. No cranial nerve deficit. He exhibits normal muscle tone. Skin: Skin is warm and dry. No rash noted. He is not diaphoretic. No erythema. Psychiatric: He has a normal mood and affect. His behavior is normal.       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 I do not believe that neuropsych testing is going to be worthwhile. I will start him on Aricept 5 mg a day, he will increase it to 10 mg a day after 30 days. I will plan to see him back in 6 months. I will begin to taper him off the Strattera at that time.   HYPERLIPIDEMIA  Stroke risk, stable, managed by primary care  HYPERTENSION  Stroke risk, stable, managed by primary care  This note will not be viewable in [address]

## 2018-01-02 NOTE — LETTER
1/2/2018 2:26 PM 
 
Patient:  Delia Campo YOB: 1934 Date of Visit: 1/2/2018 Dear DO Chauncey Aragon [address] [address] VIA In Basket 
 : Thank you for referring Mr. Renee Chow to me for evaluation/treatment. Below are the relevant portions of my assessment and plan of care. HISTORY OF PRESENT ILLNESS Delia Campo is a 80 y.o. male. HPI Comments: Renee Chow returns for follow-up with his daughter. He last saw Dr. Janice Waldron. He was diagnosed with ADD from neuropsych testing. He was put on Strattera and was last increased 6 months ago to 80 mg a day. See him today he looks demented to me. His Mini-Mental status exam test gives him at 18. He has been sleeping well. His daughter does not believe he has any side effects to the Strattera. He has not had any episodes where he has had trouble moving arm or leg. Memory Loss The history is provided by the patient and relative. This is a chronic problem. Pertinent negatives include no seizures and no tingling. Mental status baseline is mild dementia. Review of Systems Constitutional: Negative for chills, diaphoresis, fever and malaise/fatigue. Review of systems reveals no new complaints, he does not have any trouble with urinary incontinence or constipation. HENT: Negative for nosebleeds and tinnitus. Eyes: Negative for double vision and photophobia. Skin: Negative. Neurological: Negative for dizziness, tingling, tremors, sensory change, speech change, focal weakness, seizures, loss of consciousness and headaches. Psychiatric/Behavioral: Positive for memory loss. Negative for depression and suicidal ideas. Current Outpatient Prescriptions on File Prior to Visit Medication Sig Dispense Refill  acetaminophen (TYLENOL) 500 mg tablet Take 500 mg by mouth every six (6) hours as needed for Pain or Fever.  docusate sodium (COLACE) 100 mg capsule Take 100 mg by mouth as needed for Constipation.  atomoxetine (STRATTERA) 80 mg capsule Take 1 Cap by mouth daily. 30 Cap 5  
 rosuvastatin (CRESTOR) 20 mg tablet TAKE ONE TABLET BY MOUTH ONE TIME DAILY 90 Tab 3  
 SPIRIVA WITH HANDIHALER 18 mcg inhalation capsule INHALE ONE CAPSULE VIA DEVICE BY MOUTH ONE TIME DAILY 30 Cap 8  
 multivitamin (ONE A DAY) tablet Take 1 Tab by mouth daily.  tamsulosin (FLOMAX) 0.4 mg capsule Take 0.4 mg by mouth daily.  finasteride (PROSCAR) 5 mg tablet Take 5 mg by mouth daily.  aspirin 81 mg Tab Take 81 mg by mouth daily. No current facility-administered medications on file prior to visit. Past Medical History:  
Diagnosis Date  Arthritis  BPH (benign prostatic hypertrophy) 4/12/2010  COPD (chronic obstructive pulmonary disease) with emphysema ([facility] [address] 7/25/2012  HLD (hyperlipidemia) 4/12/2010  
 HTN (hypertension) 4/12/2010  Memory disorder  Psychiatric disorder   
 anxiety and depression /70  Pulse 83  Ht 5' 3\" (1.6 m)  Wt 106 lb (48.1 kg)  SpO2 97%  BMI 18.78 kg/m2 Physical Exam  
Constitutional: He appears well-developed and well-nourished. No distress. HENT:  
Head: Normocephalic and atraumatic. Mouth/Throat: Oropharynx is clear and moist. No oropharyngeal exudate. Eyes: Conjunctivae and EOM are normal. Pupils are equal, round, and reactive to light. No scleral icterus. Neck: Normal range of motion. Neck supple. No thyromegaly present. Lymphadenopathy:  
  He has no cervical adenopathy. Neurological: He is alert. He has normal reflexes. He displays normal reflexes. No cranial nerve deficit. He exhibits normal muscle tone. Skin: Skin is warm and dry. No rash noted. He is not diaphoretic. No erythema. Psychiatric: He has a normal mood and affect. His behavior is normal.  
 
 
ASSESSMENT and PLAN 
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 I do not believe that neuropsych testing is going to be worthwhile. I will start him on Aricept 5 mg a day, he will increase it to 10 mg a day after 30 days. I will plan to see him back in 6 months. I will begin to taper him off the Strattera at that time. HYPERLIPIDEMIA Stroke risk, stable, managed by primary care HYPERTENSION Stroke risk, stable, managed by primary care This note will not be viewable in [address] If you have questions, please do not hesitate to call me. I look forward to following Mr. Harrington along with you. Sincerely, Ja Huizar MD

## 2018-01-03 ENCOUNTER — TELEPHONE (OUTPATIENT)
Dept: INTERNAL MEDICINE CLINIC | Age: 83
End: 2018-01-03

## 2018-01-03 ENCOUNTER — HOME CARE VISIT (OUTPATIENT)
Dept: SCHEDULING | Facility: HOME HEALTH | Age: 83
End: 2018-01-03
Payer: MEDICARE

## 2018-01-03 DIAGNOSIS — M15.9 OSTEOARTHRITIS OF MULTIPLE JOINTS, UNSPECIFIED OSTEOARTHRITIS TYPE: Primary | ICD-10-CM

## 2018-01-03 PROCEDURE — 3331090002 HH PPS REVENUE DEBIT

## 2018-01-03 PROCEDURE — 3331090001 HH PPS REVENUE CREDIT

## 2018-01-03 PROCEDURE — G0157 HHC PT ASSISTANT EA 15: HCPCS

## 2018-01-03 NOTE — TELEPHONE ENCOUNTER
#[phone] Dedra Remy would like a referral from Dr. Seng Ford for pt to see Dr. Linsey Knowles,  neurophysiologist  #[phone] phone  Fax #[phone]

## 2018-01-04 VITALS
WEIGHT: 106 LBS | SYSTOLIC BLOOD PRESSURE: 130 MMHG | RESPIRATION RATE: 16 BRPM | DIASTOLIC BLOOD PRESSURE: 74 MMHG | HEART RATE: 72 BPM | TEMPERATURE: 97.1 F | OXYGEN SATURATION: 96 % | BODY MASS INDEX: 18.78 KG/M2

## 2018-01-04 PROCEDURE — 3331090002 HH PPS REVENUE DEBIT

## 2018-01-04 PROCEDURE — 3331090001 HH PPS REVENUE CREDIT

## 2018-01-04 NOTE — TELEPHONE ENCOUNTER
----- Message from Olvin Decker Page sent at 1/4/2018  2:15 PM EST -----  Regarding: Dr. Jordana Denise, daughter, is requesting a return call regarding rx for \"Aricept\". She is concern about taking rx with having COPD.  Best contact number is [phone]    Message copied/pasted from [facility]

## 2018-01-05 ENCOUNTER — HOME CARE VISIT (OUTPATIENT)
Dept: SCHEDULING | Facility: HOME HEALTH | Age: 83
End: 2018-01-05
Payer: MEDICARE

## 2018-01-05 VITALS
TEMPERATURE: 97.1 F | HEART RATE: 61 BPM | DIASTOLIC BLOOD PRESSURE: 62 MMHG | RESPIRATION RATE: 16 BRPM | SYSTOLIC BLOOD PRESSURE: 124 MMHG | OXYGEN SATURATION: 96 %

## 2018-01-05 PROCEDURE — 3331090001 HH PPS REVENUE CREDIT

## 2018-01-05 PROCEDURE — G0157 HHC PT ASSISTANT EA 15: HCPCS

## 2018-01-05 PROCEDURE — 3331090002 HH PPS REVENUE DEBIT

## 2018-01-06 PROCEDURE — 3331090002 HH PPS REVENUE DEBIT

## 2018-01-06 PROCEDURE — 3331090001 HH PPS REVENUE CREDIT

## 2018-01-07 PROCEDURE — 3331090002 HH PPS REVENUE DEBIT

## 2018-01-07 PROCEDURE — 3331090001 HH PPS REVENUE CREDIT

## 2018-01-08 ENCOUNTER — HOME CARE VISIT (OUTPATIENT)
Dept: SCHEDULING | Facility: HOME HEALTH | Age: 83
End: 2018-01-08
Payer: MEDICARE

## 2018-01-08 ENCOUNTER — PATIENT OUTREACH (OUTPATIENT)
Dept: INTERNAL MEDICINE CLINIC | Age: 83
End: 2018-01-08

## 2018-01-08 ENCOUNTER — TELEPHONE (OUTPATIENT)
Dept: INTERNAL MEDICINE CLINIC | Age: 83
End: 2018-01-08

## 2018-01-08 PROCEDURE — 3331090001 HH PPS REVENUE CREDIT

## 2018-01-08 PROCEDURE — G0157 HHC PT ASSISTANT EA 15: HCPCS

## 2018-01-08 PROCEDURE — 3331090002 HH PPS REVENUE DEBIT

## 2018-01-08 NOTE — TELEPHONE ENCOUNTER
Spoke with patients daughter after 2 patient identifiers being note and advised per Dr. Jaime Caicedo should be fine for him to take. He can see dr torrez if she wants him to. Patients daughter expressed understanding and has no further questions at this time.

## 2018-01-08 NOTE — PROGRESS NOTES
[address]ero- 1/8/2018  This note will not be viewable in [address] NNTOCIP Post Hospitalization       - Patient attended [facility] appointment with Dr. Rosi Romero on 12/26/17. To the best of this NN's knowledge, this patient had no additional ED visits or Hospital Admissions during 30 day ANTONIETA period following admission to [address] [place] [address]. ANTONIETA period has ended. Post-Hospitalization Episode Resolved. Patient has NN contact information if any questions/concerns arise in the future.

## 2018-01-08 NOTE — TELEPHONE ENCOUNTER
----- Message from [place] sent at 1/5/2018  4:39 PM EST -----  Regarding: Dr. Schwartz Pro, daughter request to have nurse call her so pt can get approval to take a medicine prescribed from neurologist, best contact number [phone].         Message copied/pasted from [facility]

## 2018-01-08 NOTE — TELEPHONE ENCOUNTER
Patient's daughter, Lissett Sow, states she needs a call back today in reference to patient being prescribed Aricept by Dr. Enrike Mane Urology & Dr. Sandra Galloway opinion on patient being prescribed this medication & patient's history of COPD & difficulty urinating. Please call to advise.  Thank you

## 2018-01-09 ENCOUNTER — HOME CARE VISIT (OUTPATIENT)
Dept: SCHEDULING | Facility: HOME HEALTH | Age: 83
End: 2018-01-09
Payer: MEDICARE

## 2018-01-09 VITALS
SYSTOLIC BLOOD PRESSURE: 112 MMHG | RESPIRATION RATE: 16 BRPM | HEART RATE: 81 BPM | DIASTOLIC BLOOD PRESSURE: 72 MMHG | OXYGEN SATURATION: 99 % | TEMPERATURE: 97.3 F

## 2018-01-09 PROCEDURE — 3331090001 HH PPS REVENUE CREDIT

## 2018-01-09 PROCEDURE — 3331090002 HH PPS REVENUE DEBIT

## 2018-01-10 PROCEDURE — 3331090001 HH PPS REVENUE CREDIT

## 2018-01-10 PROCEDURE — 3331090002 HH PPS REVENUE DEBIT

## 2018-01-11 ENCOUNTER — TELEPHONE (OUTPATIENT)
Dept: INTERNAL MEDICINE CLINIC | Age: 83
End: 2018-01-11

## 2018-01-11 ENCOUNTER — HOME CARE VISIT (OUTPATIENT)
Dept: SCHEDULING | Facility: HOME HEALTH | Age: 83
End: 2018-01-11
Payer: MEDICARE

## 2018-01-11 VITALS
TEMPERATURE: 98.2 F | SYSTOLIC BLOOD PRESSURE: 126 MMHG | DIASTOLIC BLOOD PRESSURE: 70 MMHG | RESPIRATION RATE: 16 BRPM | OXYGEN SATURATION: 98 % | HEART RATE: 78 BPM

## 2018-01-11 PROCEDURE — 3331090001 HH PPS REVENUE CREDIT

## 2018-01-11 PROCEDURE — 3331090002 HH PPS REVENUE DEBIT

## 2018-01-11 PROCEDURE — G0151 HHCP-SERV OF PT,EA 15 MIN: HCPCS

## 2018-01-11 NOTE — TELEPHONE ENCOUNTER
Spoke with patients daughter after 2 patient identifiers being note and advised that UTi lab had not been done but his CBC showed normal WBC counts per Dr. Jarvis . Patient expressed understanding and has no further questions at this time.

## 2018-01-11 NOTE — TELEPHONE ENCOUNTER
Patient's daughter, Marco Pearson needs a call back to discuss if patient has recently been tested for possible UTI as patient is seeming to be more confused than normal even though patient does have recent diagnosis of dementia. Please call to discuss & advise.  Thank you

## 2018-01-12 PROCEDURE — 3331090002 HH PPS REVENUE DEBIT

## 2018-01-12 PROCEDURE — 3331090001 HH PPS REVENUE CREDIT

## 2018-01-13 PROCEDURE — 3331090002 HH PPS REVENUE DEBIT

## 2018-01-13 PROCEDURE — 3331090001 HH PPS REVENUE CREDIT

## 2018-01-14 PROCEDURE — 3331090002 HH PPS REVENUE DEBIT

## 2018-01-14 PROCEDURE — 3331090001 HH PPS REVENUE CREDIT

## 2018-01-15 PROCEDURE — 3331090001 HH PPS REVENUE CREDIT

## 2018-01-15 PROCEDURE — 3331090002 HH PPS REVENUE DEBIT

## 2018-01-16 ENCOUNTER — HOME CARE VISIT (OUTPATIENT)
Dept: SCHEDULING | Facility: HOME HEALTH | Age: 83
End: 2018-01-16
Payer: MEDICARE

## 2018-01-16 VITALS
WEIGHT: 106 LBS | RESPIRATION RATE: 16 BRPM | TEMPERATURE: 97.8 F | OXYGEN SATURATION: 98 % | SYSTOLIC BLOOD PRESSURE: 124 MMHG | DIASTOLIC BLOOD PRESSURE: 64 MMHG | BODY MASS INDEX: 18.78 KG/M2 | HEART RATE: 87 BPM

## 2018-01-16 PROCEDURE — 3331090001 HH PPS REVENUE CREDIT

## 2018-01-16 PROCEDURE — G0157 HHC PT ASSISTANT EA 15: HCPCS

## 2018-01-16 PROCEDURE — 3331090002 HH PPS REVENUE DEBIT

## 2018-01-17 PROCEDURE — 3331090001 HH PPS REVENUE CREDIT

## 2018-01-17 PROCEDURE — 3331090002 HH PPS REVENUE DEBIT

## 2018-01-18 ENCOUNTER — HOME CARE VISIT (OUTPATIENT)
Dept: SCHEDULING | Facility: HOME HEALTH | Age: 83
End: 2018-01-18
Payer: MEDICARE

## 2018-01-18 VITALS
DIASTOLIC BLOOD PRESSURE: 64 MMHG | HEART RATE: 67 BPM | TEMPERATURE: 97.5 F | RESPIRATION RATE: 16 BRPM | SYSTOLIC BLOOD PRESSURE: 116 MMHG | OXYGEN SATURATION: 98 %

## 2018-01-18 PROCEDURE — 3331090001 HH PPS REVENUE CREDIT

## 2018-01-18 PROCEDURE — 3331090002 HH PPS REVENUE DEBIT

## 2018-01-18 PROCEDURE — G0157 HHC PT ASSISTANT EA 15: HCPCS

## 2018-01-19 PROCEDURE — 3331090001 HH PPS REVENUE CREDIT

## 2018-01-19 PROCEDURE — 3331090002 HH PPS REVENUE DEBIT

## 2018-01-20 PROCEDURE — 3331090001 HH PPS REVENUE CREDIT

## 2018-01-20 PROCEDURE — 3331090002 HH PPS REVENUE DEBIT

## 2018-01-21 PROCEDURE — 3331090001 HH PPS REVENUE CREDIT

## 2018-01-21 PROCEDURE — 3331090002 HH PPS REVENUE DEBIT

## 2018-01-22 PROCEDURE — 3331090001 HH PPS REVENUE CREDIT

## 2018-01-22 PROCEDURE — 3331090002 HH PPS REVENUE DEBIT

## 2018-01-23 ENCOUNTER — HOME CARE VISIT (OUTPATIENT)
Dept: SCHEDULING | Facility: HOME HEALTH | Age: 83
End: 2018-01-23
Payer: MEDICARE

## 2018-01-23 VITALS
OXYGEN SATURATION: 96 % | SYSTOLIC BLOOD PRESSURE: 112 MMHG | RESPIRATION RATE: 16 BRPM | DIASTOLIC BLOOD PRESSURE: 66 MMHG | HEART RATE: 77 BPM | TEMPERATURE: 98 F

## 2018-01-23 PROCEDURE — 3331090001 HH PPS REVENUE CREDIT

## 2018-01-23 PROCEDURE — G0157 HHC PT ASSISTANT EA 15: HCPCS

## 2018-01-23 PROCEDURE — 3331090002 HH PPS REVENUE DEBIT

## 2018-01-24 PROCEDURE — 3331090002 HH PPS REVENUE DEBIT

## 2018-01-24 PROCEDURE — 3331090001 HH PPS REVENUE CREDIT

## 2018-01-25 ENCOUNTER — HOME CARE VISIT (OUTPATIENT)
Dept: SCHEDULING | Facility: HOME HEALTH | Age: 83
End: 2018-01-25
Payer: MEDICARE

## 2018-01-25 VITALS
HEART RATE: 70 BPM | RESPIRATION RATE: 16 BRPM | TEMPERATURE: 98.8 F | OXYGEN SATURATION: 96 % | DIASTOLIC BLOOD PRESSURE: 74 MMHG | SYSTOLIC BLOOD PRESSURE: 128 MMHG

## 2018-01-25 PROCEDURE — 3331090001 HH PPS REVENUE CREDIT

## 2018-01-25 PROCEDURE — 3331090002 HH PPS REVENUE DEBIT

## 2018-01-25 PROCEDURE — G0157 HHC PT ASSISTANT EA 15: HCPCS

## 2018-01-26 PROCEDURE — 3331090001 HH PPS REVENUE CREDIT

## 2018-01-26 PROCEDURE — 3331090002 HH PPS REVENUE DEBIT

## 2018-01-27 PROCEDURE — 3331090002 HH PPS REVENUE DEBIT

## 2018-01-27 PROCEDURE — 3331090001 HH PPS REVENUE CREDIT

## 2018-01-28 PROCEDURE — 3331090002 HH PPS REVENUE DEBIT

## 2018-01-28 PROCEDURE — 3331090001 HH PPS REVENUE CREDIT

## 2018-01-29 PROCEDURE — 3331090001 HH PPS REVENUE CREDIT

## 2018-01-29 PROCEDURE — 3331090002 HH PPS REVENUE DEBIT

## 2018-01-30 ENCOUNTER — HOME CARE VISIT (OUTPATIENT)
Dept: SCHEDULING | Facility: HOME HEALTH | Age: 83
End: 2018-01-30
Payer: MEDICARE

## 2018-01-30 VITALS
DIASTOLIC BLOOD PRESSURE: 68 MMHG | SYSTOLIC BLOOD PRESSURE: 126 MMHG | TEMPERATURE: 96.4 F | OXYGEN SATURATION: 98 % | RESPIRATION RATE: 18 BRPM | HEART RATE: 82 BPM

## 2018-01-30 PROCEDURE — 3331090001 HH PPS REVENUE CREDIT

## 2018-01-30 PROCEDURE — 3331090002 HH PPS REVENUE DEBIT

## 2018-01-30 PROCEDURE — G0157 HHC PT ASSISTANT EA 15: HCPCS

## 2018-01-31 PROCEDURE — 3331090001 HH PPS REVENUE CREDIT

## 2018-01-31 PROCEDURE — 3331090002 HH PPS REVENUE DEBIT

## 2018-02-01 ENCOUNTER — HOME CARE VISIT (OUTPATIENT)
Dept: SCHEDULING | Facility: HOME HEALTH | Age: 83
End: 2018-02-01
Payer: MEDICARE

## 2018-02-01 VITALS
HEART RATE: 72 BPM | SYSTOLIC BLOOD PRESSURE: 122 MMHG | DIASTOLIC BLOOD PRESSURE: 66 MMHG | OXYGEN SATURATION: 98 % | TEMPERATURE: 97 F | RESPIRATION RATE: 16 BRPM

## 2018-02-01 PROCEDURE — 3331090001 HH PPS REVENUE CREDIT

## 2018-02-01 PROCEDURE — G0157 HHC PT ASSISTANT EA 15: HCPCS

## 2018-02-01 PROCEDURE — 3331090002 HH PPS REVENUE DEBIT

## 2018-02-02 PROCEDURE — 3331090001 HH PPS REVENUE CREDIT

## 2018-02-02 PROCEDURE — 3331090002 HH PPS REVENUE DEBIT

## 2018-02-03 PROCEDURE — 3331090001 HH PPS REVENUE CREDIT

## 2018-02-03 PROCEDURE — 3331090002 HH PPS REVENUE DEBIT

## 2018-02-04 PROCEDURE — 3331090002 HH PPS REVENUE DEBIT

## 2018-02-04 PROCEDURE — 3331090001 HH PPS REVENUE CREDIT

## 2018-02-05 PROCEDURE — 3331090001 HH PPS REVENUE CREDIT

## 2018-02-05 PROCEDURE — 3331090002 HH PPS REVENUE DEBIT

## 2018-02-06 ENCOUNTER — HOME CARE VISIT (OUTPATIENT)
Dept: SCHEDULING | Facility: HOME HEALTH | Age: 83
End: 2018-02-06
Payer: MEDICARE

## 2018-02-06 VITALS
OXYGEN SATURATION: 97 % | DIASTOLIC BLOOD PRESSURE: 72 MMHG | SYSTOLIC BLOOD PRESSURE: 128 MMHG | HEART RATE: 76 BPM | TEMPERATURE: 96.9 F | RESPIRATION RATE: 16 BRPM

## 2018-02-06 PROCEDURE — 3331090002 HH PPS REVENUE DEBIT

## 2018-02-06 PROCEDURE — 3331090001 HH PPS REVENUE CREDIT

## 2018-02-06 PROCEDURE — G0157 HHC PT ASSISTANT EA 15: HCPCS

## 2018-02-07 PROCEDURE — 3331090002 HH PPS REVENUE DEBIT

## 2018-02-07 PROCEDURE — 3331090001 HH PPS REVENUE CREDIT

## 2018-02-08 ENCOUNTER — HOME CARE VISIT (OUTPATIENT)
Dept: SCHEDULING | Facility: HOME HEALTH | Age: 83
End: 2018-02-08
Payer: MEDICARE

## 2018-02-08 PROCEDURE — 3331090001 HH PPS REVENUE CREDIT

## 2018-02-08 PROCEDURE — 3331090002 HH PPS REVENUE DEBIT

## 2018-02-08 PROCEDURE — 3331090003 HH PPS REVENUE ADJ

## 2018-02-08 PROCEDURE — G0151 HHCP-SERV OF PT,EA 15 MIN: HCPCS

## 2018-02-09 VITALS
RESPIRATION RATE: 16 BRPM | DIASTOLIC BLOOD PRESSURE: 70 MMHG | HEART RATE: 74 BPM | SYSTOLIC BLOOD PRESSURE: 128 MMHG | OXYGEN SATURATION: 97 %

## 2018-02-09 PROCEDURE — 3331090002 HH PPS REVENUE DEBIT

## 2018-02-09 PROCEDURE — 3331090001 HH PPS REVENUE CREDIT

## 2018-02-10 PROCEDURE — 3331090002 HH PPS REVENUE DEBIT

## 2018-02-10 PROCEDURE — 3331090001 HH PPS REVENUE CREDIT

## 2018-02-11 PROCEDURE — 3331090002 HH PPS REVENUE DEBIT

## 2018-02-11 PROCEDURE — 3331090001 HH PPS REVENUE CREDIT

## 2018-03-27 ENCOUNTER — OFFICE VISIT (OUTPATIENT)
Dept: INTERNAL MEDICINE CLINIC | Age: 83
End: 2018-03-27

## 2018-03-27 VITALS
DIASTOLIC BLOOD PRESSURE: 73 MMHG | HEART RATE: 76 BPM | BODY MASS INDEX: 20.2 KG/M2 | SYSTOLIC BLOOD PRESSURE: 119 MMHG | HEIGHT: 63 IN | TEMPERATURE: 97.7 F | RESPIRATION RATE: 16 BRPM | WEIGHT: 114 LBS | OXYGEN SATURATION: 97 %

## 2018-03-27 DIAGNOSIS — G30.1 LATE ONSET ALZHEIMER'S DISEASE WITHOUT BEHAVIORAL DISTURBANCE (HCC): Chronic | ICD-10-CM

## 2018-03-27 DIAGNOSIS — J43.9 PULMONARY EMPHYSEMA, UNSPECIFIED EMPHYSEMA TYPE (HCC): Primary | Chronic | ICD-10-CM

## 2018-03-27 DIAGNOSIS — J91.8 PLEURAL EFFUSION ASSOCIATED WITH PANCREATITIS: Primary | ICD-10-CM

## 2018-03-27 DIAGNOSIS — R73.03 PREDIABETES: ICD-10-CM

## 2018-03-27 DIAGNOSIS — J90 PLEURAL EFFUSION ON LEFT: ICD-10-CM

## 2018-03-27 DIAGNOSIS — E78.2 MIXED HYPERLIPIDEMIA: Chronic | ICD-10-CM

## 2018-03-27 DIAGNOSIS — K85.90 PLEURAL EFFUSION ASSOCIATED WITH PANCREATITIS: Primary | ICD-10-CM

## 2018-03-27 DIAGNOSIS — I10 ESSENTIAL HYPERTENSION: Chronic | ICD-10-CM

## 2018-03-27 DIAGNOSIS — J90 PLEURAL EFFUSION, LEFT: Primary | ICD-10-CM

## 2018-03-27 DIAGNOSIS — F17.200 SMOKER: ICD-10-CM

## 2018-03-27 DIAGNOSIS — F02.80 LATE ONSET ALZHEIMER'S DISEASE WITHOUT BEHAVIORAL DISTURBANCE (HCC): Chronic | ICD-10-CM

## 2018-03-27 NOTE — MR AVS SNAPSHOT
[address] [facility] 
[phone] Patient: Anisa Mcguire MRN: [id number] Visit Information Date & Time Provider Department Dept. Phone Encounter #  
 3/27/2018  8:45 AM Marciana Phoenix, [address] [id number] Follow-up Instructions Return in about 3 months (around 6/27/2018). Your Appointments 7/3/2018  1:40 PM  
Follow Up with Ja Huizar MD  
[facility] at [facility]) Appt Note: follow up memory loss $ 0 CP jll 1/2/18  
 [facility], 
[address] [place] [address], [address] [phone]  
[address] [facility], [address] [place] [address], [phone] Upcoming Health Maintenance Date Due  
 MEDICARE YEARLY EXAM 5/17/2018 GLAUCOMA SCREENING Q2Y 6/27/2019 DTaP/Tdap/Td series (2 - Td) 3/14/2022 Allergies as of 3/27/2018  Review Complete On: 3/27/2018 By: Richelle Mohamud III, DO Severity Noted Reaction Type Reactions Kiwi High 12/26/2017    Anaphylaxis Zetia [Ezetimibe]  04/12/2010    Other (comments) C/o back pain Current Immunizations  Reviewed on 11/23/2015 Name Date Influenza High Dose Vaccine PF 10/15/2015 Influenza Vaccine 10/15/2014, 10/7/2013 Influenza Vaccine Whole 11/1/2010 Pneumococcal Conjugate (PCV-13) 11/23/2015 TDAP Vaccine 3/14/2012 ZZZ-RETIRED (DO NOT USE) Pneumococcal Vaccine (Unspecified Type) 11/14/2011, 10/1/1998 Zoster 9/24/2011 Not reviewed this visit You Were Diagnosed With   
  
 Codes Comments Pulmonary emphysema, unspecified emphysema type ([facility]    -  Primary ICD-10-CM: J43.9 ICD-9-CM: 492.8 Essential hypertension     ICD-10-CM: I10 
ICD-9-CM: 401.9 Mixed hyperlipidemia     ICD-10-CM: E78.2 ICD-9-CM: 272.2  Prediabetes     ICD-10-CM: R73.03 
 ICD-9-CM: 790.29 Smoker     ICD-10-CM: O75.198 ICD-9-CM: 305.1 Late onset Alzheimer's disease without behavioral disturbance     ICD-10-CM: G30.1, F02.80 ICD-9-CM: 331.0, 294.10 Pleural effusion on left     ICD-10-CM: J90 ICD-9-CM: 511.9 Vitals BP Pulse Temp Resp Height(growth percentile) Weight(growth percentile) 119/73 (BP 1 Location: Left arm, BP Patient Position: Sitting) 76 97.7 °F (36.5 °C) (Oral) 16 5' 3\" (1.6 m) 114 lb (51.7 kg) SpO2 BMI Smoking Status 97% 20.19 kg/m2 Current Every Day Smoker Vitals History BMI and BSA Data Body Mass Index Body Surface Area  
 20.19 kg/m 2 1.52 m 2 Preferred Pharmacy Pharmacy Name Phone [facility]  [address] [phone] Your Updated Medication List  
  
   
This list is accurate as of 3/27/18  9:21 AM.  Always use your most recent med list.  
  
  
  
  
 acetaminophen 500 mg tablet Commonly known as:  TYLENOL Take 500 mg by mouth every six (6) hours as needed for Pain or Fever. aspirin 81 mg tablet Take 81 mg by mouth daily. atomoxetine 80 mg capsule Commonly known as:  STRATTERA Take 1 Cap by mouth daily. docusate sodium 100 mg capsule Commonly known as:  Praveen Burger Take 100 mg by mouth as needed for Constipation. * donepezil 10 mg tablet Commonly known as:  ARICEPT Take one half tablet a day in the morning for 30 days then 1 whole tablet a day. Indications: MILD TO MODERATE ALZHEIMER'S TYPE DEMENTIA * donepezil 5 mg tablet Commonly known as:  ARICEPT Take 5 mg by mouth daily. finasteride 5 mg tablet Commonly known as:  PROSCAR Take 5 mg by mouth daily. METAMUCIL (SUGAR) Powd Generic drug:  psyllium seed-sucrose Take  by mouth.  
  
 multivitamin tablet Commonly known as:  ONE A DAY Take 1 Tab by mouth daily. rosuvastatin 20 mg tablet Commonly known as:  CRESTOR  
 TAKE ONE TABLET BY MOUTH ONE TIME DAILY SPIRIVA WITH HANDIHALER 18 mcg inhalation capsule Generic drug:  tiotropium INHALE ONE CAPSULE VIA DEVICE BY MOUTH ONE TIME DAILY  
  
 tamsulosin 0.4 mg capsule Commonly known as:  FLOMAX Take 0.4 mg by mouth daily. * Notice: This list has 2 medication(s) that are the same as other medications prescribed for you. Read the directions carefully, and ask your doctor or other care provider to review them with you. Follow-up Instructions Return in about 3 months (around 6/27/2018). To-Do List   
 03/27/2018 Imaging:  XR CHEST PA LAT Patient Instructions Learning About Benefits From Quitting Smoking How does quitting smoking make you healthier? If you're thinking about quitting smoking, you may have a few reasons to be smoke-free. Your health may be one of them. · When you quit smoking, you lower your risks for cancer, lung disease, heart attack, stroke, blood vessel disease, and blindness from macular degeneration. · When you're smoke-free, you get sick less often, and you heal faster. You are less likely to get colds, flu, bronchitis, and pneumonia. · As a nonsmoker, you may find that your mood is better and you are less stressed. When and how will you feel healthier? Quitting has real health benefits that start from day 1 of being smoke-free. And the longer you stay smoke-free, the healthier you get and the better you feel. The first hours · After just 20 minutes, your blood pressure and heart rate go down. That means there's less stress on your heart and blood vessels. · Within 12 hours, the level of carbon monoxide in your blood drops back to normal. That makes room for more oxygen. With more oxygen in your body, you may notice that you have more energy than when you smoked. After 2 weeks · Your lungs start to work better. · Your risk of heart attack starts to drop. After 1 month · When your lungs are clear, you cough less and breathe deeper, so it's easier to be active. · Your sense of taste and smell return. That means you can enjoy food more than you have since you started smoking. Over the years · After 1 year, your risk of heart disease is half what it would be if you kept smoking. · After 5 years, your risk of stroke starts to shrink. Within a few years after that, it's about the same as if you'd never smoked. · After 10 years, your risk of dying from lung cancer is cut by about half. And your risk for many other types of cancer is lower too. How would quitting help others in your life? When you quit smoking, you improve the health of everyone who now breathes in your smoke. · Their heart, lung, and cancer risks drop, much like yours. · They are sick less. For babies and small children, living smoke-free means they're less likely to have ear infections, pneumonia, and bronchitis. · If you're a woman who is or will be pregnant someday, quitting smoking means a healthier . · Children who are close to you are less likely to become adult smokers. Where can you learn more? Go to http://samina-cyn.info/. Enter [id number] [id number] in the search box to learn more about \"Learning About Benefits From Quitting Smoking. \" Current as of: 2017 Content Version: 11.4 © 2720-3493 Healthwise, Incorporated. Care instructions adapted under license by [employer] (which disclaims liability or warranty for this information). If you have questions about a medical condition or this instruction, always ask your healthcare professional. [place] [address] any warranty or liability for your use of this information. Introducing [facility]! [place] [employer] introduces [employer] Net patient portal. Now you can access parts of your medical record, email your doctor's office, and request medication refills online. 1. In your internet browser, go to [email or web address] [email or web address]/Grows Upt 2. Click on the First Time User? Click Here link in the Sign In box. You will see the New Member Sign Up page. 3. Enter your Equip[employer]com Access Code exactly as it appears below. You will not need to use this code after youve completed the sign-up process. If you do not sign up before the expiration date, you must request a new code. · Equ[employer] Access Code: [email or web address] Expires: 6/10/2018  1:46 PM 
 
4. Enter the last four digits of your Social Security Number (xxxx) and Date of Birth (mm/dd/yyyy) as indicated and click Submit. You will be taken to the next sign-up page. 5. Create a Lumiyt ID. This will be your Equipio.com login ID and cannot be changed, so think of one that is secure and easy to remember. 6. Create a Equip[employer].com password. You can change your password at any time. 7. Enter your Password Reset Question and Answer. This can be used at a later time if you forget your password. 8. Enter your e-mail address. You will receive e-mail notification when new information is available in [address] 9. Click Sign Up. You can now view and download portions of your medical record. 10. Click the Download Summary menu link to download a portable copy of your medical information. If you have questions, please visit the Frequently Asked Questions section of the Equipio.[email or web address] website. Remember, Equip[employer]com is NOT to be used for urgent needs. For medical emergencies, dial 911. Now available from your iPhone and Android! Please provide this summary of care documentation to your next provider. Your primary care clinician is listed as Professor Butt 108 If you have any questions after today's visit, please call [phone].

## 2018-03-27 NOTE — PROGRESS NOTES
Ellie Coburn is a 80 y.o. male who presents for evaluation of routine follow up. Last seen by me dec 26, 2017. Has gained 8 lbs since then. Overall doing well. Has home health assistance for most days of the week, helps tremendously since his broken hip and worsening dementia. Still has significant nocturia, at least 4x per night.       ROS:  Constitutional: negative for fevers, chills, anorexia and weight loss  Eyes:   negative for visual disturbance and irritation  ENT:   negative for tinnitus,sore throat,nasal congestion,ear pain,hoarseness  Respiratory:  negative for cough, hemoptysis, dyspnea,wheezing  CV:   negative for chest pain, palpitations, lower extremity edema  GI:   negative for nausea, vomiting, diarrhea, abdominal pain,melena  Genitourinary: negative for frequency, dysuria and hematuria  Musculoskel: negative for myalgias, arthralgias, back pain, muscle weakness, joint pain  Neurological:  negative for headaches, dizziness, focal weakness, numbness  Psychiatric:     Negative for depression or anxiety      Past Medical History:   Diagnosis Date    Arthritis     BPH (benign prostatic hypertrophy) 2010    COPD (chronic obstructive pulmonary disease) with emphysema ([facility] [address] [facility] 2012    HLD (hyperlipidemia) 2010    HTN (hypertension) 2010    Memory disorder     Psychiatric disorder     anxiety and depression       Past Surgical History:   Procedure Laterality Date    COLONOSCOPY,NURA FERNÁNDEZ,REINALDO  2015    2 sessile sigmoid polyps, 3 & 5 mm; Dr. Shera Mcardle; no further screening recommended    ENDOSCOPY, COLON, DIAGNOSTIC      negative; 10 year repeat; Dr. Shon Crouch  years ago    left    HX HERNIA REPAIR      right inguinal    MA COLSC FLX W/RMVL OF TUMOR POLYP LESION SNARE TQ  2012            Family History   Problem Relation Age of Onset    Heart Disease Mother       at 80    Heart Disease Father       at 80    Liver Disease Brother  Stroke Brother     Heart Attack Brother     Other Sister 8     ? polio    Cancer Sister      lung       Social History     Social History    Marital status:      Spouse name: N/A    Number of children: N/A    Years of education: N/A     Occupational History    Not on file. Social History Main Topics    Smoking status: Current Every Day Smoker     Packs/day: 1.00     Years: 60.00     Types: Cigarettes    Smokeless tobacco: Former User     Quit date: 10/5/2014    Alcohol use 4.2 oz/week     7 Glasses of wine per week      Comment: 1 drinks per evening    Drug use: No    Sexual activity: No     Other Topics Concern    Not on file     Social History Narrative            Visit Vitals    /73 (BP 1 Location: Left arm, BP Patient Position: Sitting)    Pulse 76    Temp 97.7 °F (36.5 °C) (Oral)    Resp 16    Ht 5' 3\" (1.6 m)    Wt 114 lb (51.7 kg)    SpO2 97%    BMI 20.19 kg/m2       Physical Examination:   General - Well appearing male  HEENT - PERRL, TM no erythema/opacification, normal nasal turbinates, no oropharyngeal erythema or exudate, MMM  Neck - supple, no bruits, no thyroidomegaly, no lymphadenopathy  Pulm - clear to auscultation bilaterally  Cardio - RRR, normal S1 S2, no murmur  Abd - soft, nontender, no masses, no HSM  Extrem - no edema, +2 distal pulses  Neuro-  No focal deficits, CN intact     Assessment/Plan:    1. Nocturia--continue flomax and proscar. Asked him to follow up with urology. 2.  Left sided pleural effusion--check cxr today  3. Right hip fx--doing well, but continues to benefit and need a home health aide to help. 4.  Dementia--stable, but recently started on aricept. Home health aide helps here tremendously as well, and need will persist indefinitely. 5.  hyperlipids--on crestor  6.   Copd--continue spiriva  7.  adhd--continue strattera    rtc 3 months        Marcela Montemayor III, DO

## 2018-03-27 NOTE — PATIENT INSTRUCTIONS

## 2018-03-27 NOTE — PROGRESS NOTES
Reviewed record in preparation for visit and have obtained necessary documentation. Identified pt with two pt identifiers(name and ). Chief Complaint   Patient presents with    Hypertension     follow up    Cholesterol Problem       There are no preventive care reminders to display for this patient. Mr. Hoang Ford has a reminder for a \"due or due soon\" health maintenance. I have asked that he discuss health maintenance topic(s) due with His  primary care provider. Coordination of Care Questionnaire:  :     1) Have you been to an emergency room, urgent care clinic since your last visit? no   Hospitalized since your last visit? no             2) Have you seen or consulted any other health care providers outside of [address] [place] [address] since your last visit? no  (Include any pap smears or colon screenings in this section.)    3) Do you have an Advance Directive on file? yes    4) Are you interested in receiving information on Advance Directives? NO    Patient is accompanied by self I have received verbal consent from Hugo Shields to discuss any/all medical information while they are present in the room.

## 2018-04-03 ENCOUNTER — HOSPITAL ENCOUNTER (OUTPATIENT)
Dept: CT IMAGING | Age: 83
Discharge: HOME | End: 2018-04-03
Attending: INTERNAL MEDICINE
Payer: MEDICARE

## 2018-04-03 DIAGNOSIS — J90 PLEURAL EFFUSION, LEFT: ICD-10-CM

## 2018-04-03 LAB — CREAT BLD-MCNC: 0.7 MG/DL (ref 0.6–1.3)

## 2018-04-03 PROCEDURE — 74011250636 HC RX REV CODE- 250/636: Performed by: INTERNAL MEDICINE

## 2018-04-03 PROCEDURE — 82565 ASSAY OF CREATININE: CPT

## 2018-04-03 PROCEDURE — 71260 CT THORAX DX C+: CPT

## 2018-04-03 PROCEDURE — 74011636320 HC RX REV CODE- 636/320: Performed by: INTERNAL MEDICINE

## 2018-04-03 RX ORDER — SODIUM CHLORIDE 9 MG/ML
50 INJECTION, SOLUTION INTRAVENOUS
Status: COMPLETED | OUTPATIENT
Start: 2018-04-03 | End: 2018-04-03

## 2018-04-03 RX ORDER — SODIUM CHLORIDE 0.9 % (FLUSH) 0.9 %
10 SYRINGE (ML) INJECTION
Status: COMPLETED | OUTPATIENT
Start: 2018-04-03 | End: 2018-04-03

## 2018-04-03 RX ADMIN — SODIUM CHLORIDE 50 ML/HR: 900 INJECTION, SOLUTION INTRAVENOUS at 12:33

## 2018-04-03 RX ADMIN — IOPAMIDOL 100 ML: 755 INJECTION, SOLUTION INTRAVENOUS at 12:33

## 2018-04-03 RX ADMIN — Medication 10 ML: at 12:33

## 2018-05-10 RX ORDER — ATOMOXETINE 80 MG/1
80 CAPSULE ORAL DAILY
Qty: 30 CAP | Refills: 5 | Status: SHIPPED | OUTPATIENT
Start: 2018-05-10 | End: 2018-07-10

## 2018-06-20 ENCOUNTER — TELEPHONE (OUTPATIENT)
Dept: INTERNAL MEDICINE CLINIC | Age: 83
End: 2018-06-20

## 2018-06-20 NOTE — TELEPHONE ENCOUNTER
Pt daughter, Maged Atkins, would like a call from provider or nurse before her father's appt on 06/26/18. She would like to discuss some things that are going on regarding his dementia before they get to the appt. She said there are a couple of things she feels he doesn't want to bring up in front of doctor.        Message received & copied from [facility]

## 2018-06-21 NOTE — TELEPHONE ENCOUNTER
Spoke with patients daughter after 2 patient identifiers being note and advised that she wanted Dr. Darin Salazar to know that her dad is rapidly declining he is having the following difficulty, Poor hygiene, Starting to place himself in danger, [place], climbing up on the tub to get the shower curtain down, he cant remember things most of the time, and his family is considering placing him in a nursing home.  I advised I would send these concerns to the MD

## 2018-06-22 ENCOUNTER — TELEPHONE (OUTPATIENT)
Dept: INTERNAL MEDICINE CLINIC | Age: 83
End: 2018-06-22

## 2018-06-22 NOTE — TELEPHONE ENCOUNTER
----- Message from Rubia Duarte sent at 6/21/2018  4:05 PM EDT -----  Regarding: Evie/Telephone  Pt's daughter Jessie Si would like to advise that  faxed over results on 06.06.18 at 3:45pm    Best contact for Mello Ross [phone]      Message copied/pasted from Brianda Haywood

## 2018-06-22 NOTE — TELEPHONE ENCOUNTER
Spoke with patients daughter after 2 patient identifiers being note and advised that we had received office notes from Dr. Alejandra Pina.

## 2018-06-26 ENCOUNTER — OFFICE VISIT (OUTPATIENT)
Dept: INTERNAL MEDICINE CLINIC | Age: 83
End: 2018-06-26

## 2018-06-26 VITALS
TEMPERATURE: 97.8 F | DIASTOLIC BLOOD PRESSURE: 65 MMHG | SYSTOLIC BLOOD PRESSURE: 108 MMHG | BODY MASS INDEX: 20.02 KG/M2 | WEIGHT: 113 LBS | HEART RATE: 75 BPM | RESPIRATION RATE: 14 BRPM | OXYGEN SATURATION: 97 % | HEIGHT: 63 IN

## 2018-06-26 DIAGNOSIS — F48.2 PBA (PSEUDOBULBAR AFFECT): ICD-10-CM

## 2018-06-26 DIAGNOSIS — G30.1 LATE ONSET ALZHEIMER'S DISEASE WITHOUT BEHAVIORAL DISTURBANCE (HCC): Primary | Chronic | ICD-10-CM

## 2018-06-26 DIAGNOSIS — Z23 ENCOUNTER FOR IMMUNIZATION: ICD-10-CM

## 2018-06-26 DIAGNOSIS — E78.2 MIXED HYPERLIPIDEMIA: Chronic | ICD-10-CM

## 2018-06-26 DIAGNOSIS — F02.80 LATE ONSET ALZHEIMER'S DISEASE WITHOUT BEHAVIORAL DISTURBANCE (HCC): Primary | Chronic | ICD-10-CM

## 2018-06-26 DIAGNOSIS — I10 ESSENTIAL HYPERTENSION: Chronic | ICD-10-CM

## 2018-06-26 DIAGNOSIS — Z00.00 MEDICARE ANNUAL WELLNESS VISIT, SUBSEQUENT: ICD-10-CM

## 2018-06-26 RX ORDER — MEMANTINE HYDROCHLORIDE 10 MG/1
5 TABLET ORAL DAILY
Qty: 30 TAB | Refills: 2 | Status: SHIPPED | OUTPATIENT
Start: 2018-06-26 | End: 2018-01-01 | Stop reason: DRUGHIGH

## 2018-06-26 NOTE — PROGRESS NOTES
Ramona Loera is a 80 y.o. male who presents for evaluation of follow up for dementia. Last seen by me march 27, 2018. Since then had neuropsych testing with dr torrez, results consistent with dementia, and no signs of adhd. With daughter again today to discuss results, as well as treatment options. Daughter states his memory is continuing to worsen. Has been on aricept for about a year now--felt like it slowed down his forgetfulness initially, but not as of late. Also having increased arguments at home, and his emotions are becoming more labile with unprovoked tearful spells. Daughter is looking into ltc facilities for him, which would help improve his social outlook.       ROS:  Constitutional: negative for fevers, chills, anorexia and weight loss  Eyes:   negative for visual disturbance and irritation  ENT:   negative for tinnitus,sore throat,nasal congestion,ear pain,hoarseness  Respiratory:  negative for cough, hemoptysis, dyspnea,wheezing  CV:   negative for chest pain, palpitations, lower extremity edema  GI:   negative for nausea, vomiting, diarrhea, abdominal pain,melena  Genitourinary: negative for frequency, dysuria and hematuria  Musculoskel: negative for myalgias, arthralgias, back pain, muscle weakness, joint pain  Neurological:  negative for headaches, dizziness, focal weakness, numbness  Psychiatric:     Negative for depression or anxiety      Past Medical History:   Diagnosis Date    Arthritis     BPH (benign prostatic hypertrophy) 4/12/2010    COPD (chronic obstructive pulmonary disease) with emphysema ([facility] [address] 7/25/2012    HLD (hyperlipidemia) 4/12/2010    HTN (hypertension) 4/12/2010    Memory disorder     Psychiatric disorder     anxiety and depression       Past Surgical History:   Procedure Laterality Date    COLONOSCOPY,NURA FERNÁNDEZ,SNARE  8/5/2015    2 sessile sigmoid polyps, 3 & 5 mm; Dr. Saadia Isaacs; no further screening recommended    ENDOSCOPY, COLON, DIAGNOSTIC  2002 negative; 10 year repeat; Dr. Ahmet Medrano  years ago    left    HX HERNIA REPAIR      right inguinal    [place] COLSC FLX W/RMVL OF TUMOR POLYP LESION SNARE TQ  2012            Family History   Problem Relation Age of Onset    Heart Disease Mother       at 80    Heart Disease Father       at 80    Liver Disease Brother     Stroke Brother     Heart Attack Brother     Other Sister 8     ? polio    Cancer Sister      lung       Social History     Social History    Marital status:      Spouse name: N/A    Number of children: N/A    Years of education: N/A     Occupational History    Not on file. Social History Main Topics    Smoking status: Current Every Day Smoker     Packs/day: 1.00     Years: 60.00     Types: Cigarettes    Smokeless tobacco: Former User     Quit date: 10/5/2014    Alcohol use 4.2 oz/week     7 Glasses of wine per week      Comment: 1 drinks per evening    Drug use: No    Sexual activity: No     Other Topics Concern    Not on file     Social History Narrative            Visit Vitals    /65 (BP 1 Location: Left arm, BP Patient Position: Sitting)    Pulse 75    Temp 97.8 °F (36.6 °C) (Oral)    Resp 14    Ht 5' 3\" (1.6 m)    Wt 113 lb (51.3 kg)    SpO2 97%    BMI 20.02 kg/m2       Physical Examination:   General - Well appearing male  HEENT - PERRL, TM no erythema/opacification, normal nasal turbinates, no oropharyngeal erythema or exudate, MMM  Neck - supple, no bruits, no thyroidomegaly, no lymphadenopathy  Pulm - clear to auscultation bilaterally  Cardio - RRR, normal S1 S2, no murmur  Abd - soft, nontender, no masses, no HSM  Extrem - no edema, +2 distal pulses  Neuro-  No focal deficits, CN intact     Assessment/Plan:    1.  alz dementia--continue aricept. Will also start namenda  2. pba--from above. Start nuedexta  3.  hyperlipids--continue crestor  4. Recent right hip fx--doing well now, only uses cane on occasion.   No longer needs walker  5. Nocturia with bph--continue proscar, flomax. 6.  Routine adult health maintenance--pneumovax 23 given today. rx given for shingrix. rtc 2 months    No signs of adhd on neuropsych testing, so will stop strattera. Iraida Richardson III, DO              This is the Subsequent Medicare Annual Wellness Exam, performed 12 months or more after the Initial AWV or the last Subsequent AWV    I have reviewed the patient's medical history in detail and updated the computerized patient record. History     Past Medical History:   Diagnosis Date    Arthritis     BPH (benign prostatic hypertrophy) 4/12/2010    COPD (chronic obstructive pulmonary disease) with emphysema ([facility] [address] 7/25/2012    HLD (hyperlipidemia) 4/12/2010    HTN (hypertension) 4/12/2010    Memory disorder     Psychiatric disorder     anxiety and depression      Past Surgical History:   Procedure Laterality Date    Fabiola Bautista  8/5/2015    2 sessile sigmoid polyps, 3 & 5 mm; Dr. Sammy Balbuena; no further screening recommended    ENDOSCOPY, COLON, DIAGNOSTIC  2002    negative; 10 year repeat; Dr. Wilson Ly  years ago    left    HX HERNIA REPAIR  2001    right inguinal    [place] COLSC FLX W/RMVL OF TUMOR POLYP LESION SNARE TQ  7/27/2012          Current Outpatient Prescriptions   Medication Sig Dispense Refill    atomoxetine (STRATTERA) 80 mg capsule Take 1 Cap by mouth daily. 30 Cap 5    psyllium seed-sucrose (METAMUCIL, SUGAR,) powd Take  by mouth.  docusate sodium (COLACE) 100 mg capsule Take 100 mg by mouth as needed for Constipation.  donepezil (ARICEPT) 10 mg tablet Take one half tablet a day in the morning for 30 days then 1 whole tablet a day. Indications: MILD TO MODERATE ALZHEIMER'S TYPE DEMENTIA 30 Tab 11    acetaminophen (TYLENOL) 500 mg tablet Take 500 mg by mouth every six (6) hours as needed for Pain or Fever.       rosuvastatin (CRESTOR) 20 mg tablet TAKE ONE TABLET BY MOUTH ONE TIME DAILY 90 Tab 3    SPIRIVA WITH HANDIHALER 18 mcg inhalation capsule INHALE ONE CAPSULE VIA DEVICE BY MOUTH ONE TIME DAILY 30 Cap 8    multivitamin (ONE A DAY) tablet Take 1 Tab by mouth daily.  tamsulosin (FLOMAX) 0.4 mg capsule Take 0.4 mg by mouth daily.  finasteride (PROSCAR) 5 mg tablet Take 5 mg by mouth daily.  aspirin 81 mg Tab Take 81 mg by mouth daily. Allergies   Allergen Reactions    Kiwi Anaphylaxis    Zetia [Ezetimibe] Other (comments)     C/o back pain     Family History   Problem Relation Age of Onset    Heart Disease Mother       at 80    Heart Disease Father       at 80    Liver Disease Brother     Stroke Brother     Heart Attack Brother     Other Sister 8     ?  polio    Cancer Sister      lung     Social History   Substance Use Topics    Smoking status: Current Every Day Smoker     Packs/day: 1.00     Years: 60.00     Types: Cigarettes    Smokeless tobacco: Former User     Quit date: 10/5/2014    Alcohol use 4.2 oz/week     7 Glasses of wine per week      Comment: 1 drinks per evening     Patient Active Problem List   Diagnosis Code    HLD (hyperlipidemia) E78.5    DJD (degenerative joint disease) M19.90    Smoker F17.200    Post herpetic neuralgia B02.29    COPD (chronic obstructive pulmonary disease) with emphysema (HCC) J43.9    Essential tremor G25.0    Anxiety state F41.1    Depression F32.9    ADHD (attention deficit hyperactivity disorder) F90.9    Abnormal CT lung screening R91.8    Prediabetes R73.03    Essential hypertension I10    Constipation K59.00    Closed right hip fracture (HCC) S72.001A    Benign prostatic hyperplasia with weak urinary stream N40.1, R39.12    Late onset Alzheimer's disease without behavioral disturbance G30.1, F02.80       Depression Risk Factor Screening:     PHQ over the last two weeks 2016   Little interest or pleasure in doing things Not at all   Feeling down, depressed or hopeless Not at all   Total Score PHQ 2 0     Alcohol Risk Factor Screening: You do not drink alcohol or very rarely. Functional Ability and Level of Safety:   Hearing Loss  Hearing is good. Activities of Daily Living  The home contains: handrails and grab bars. Has shower chair and raised toilet seat. Patient needs help with:  transportation, shopping, preparing meals, managing medications and managing money    Fall Risk  Fall Risk Assessment, last 12 mths 6/26/2018   Able to walk? Yes   Fall in past 12 months? Yes   Fall with injury? Yes   Number of falls in past 12 months 1   Fall Risk Score 2       Abuse Screen  Patient is not abused. Lives with daughter and her . Cognitive Screening   Evaluation of Cognitive Function:  Has your family/caregiver stated any concerns about your memory: yes  Abnormal    Patient Care Team   Patient Care Team:  Alexandro Reina DO as PCP - General (Internal Medicine)  Floyd Quevedo RN as Ambulatory Care Navigator  Shalonda Rodrigez MD (Gastroenterology)  Daniel Lemos MD (Neurology)  Mono Bustamante MD (Urology)  Samuel Mensah DO (Dermatology)  Maria Fernanda Naranjo RN as Ambulatory Care Navigator (Internal Medicine)    Assessment/Plan   Education and counseling provided:  Are appropriate based on today's review and evaluation  End-of-Life planning (with patient's consent)  Pneumococcal Vaccine    Diagnoses and all orders for this visit:    1.  Medicare annual wellness visit, subsequent      Health Maintenance Due   Topic Date Due    MEDICARE YEARLY EXAM  05/17/2018

## 2018-06-26 NOTE — MR AVS SNAPSHOT
[address] [facility] 
[phone] Patient: Yulissa Churchill MRN: [id number] FBV:[id number] Visit Information Date & Time Provider Department Dept. Phone Encounter #  
 6/26/2018 11:30 AM Ambar Dominguez, [address]  [id number] Follow-up Instructions Return in about 2 months (around 8/26/2018). Your Appointments 7/10/2018 11:00 AM  
Follow Up with Katherine Magallon MD  
Neurology [facility] at [facility]) Appt Note: follow up memory loss $ 0 CP jll 1/2/18; follow up memory loss $ 0 CP jll 1/2/18-tlw 4/9/18  
 [address] [place] [address], 
[facility], [address] [address]  
[address] [facility], [address] [place] [address], [address] [place] [address] [address] [address] Upcoming Health Maintenance Date Due  
 MEDICARE YEARLY EXAM 5/17/2018 Influenza Age 5 to Adult 8/1/2018 GLAUCOMA SCREENING Q2Y 6/27/2019 DTaP/Tdap/Td series (2 - Td) 3/14/2022 Allergies as of 6/26/2018  Review Complete On: 6/26/2018 By: Maura Lawton III, DO Severity Noted Reaction Type Reactions Kiwi High 12/26/2017    Anaphylaxis Zetia [Ezetimibe]  04/12/2010    Other (comments) C/o back pain Current Immunizations  Reviewed on 6/26/2018 Name Date Influenza High Dose Vaccine PF 10/15/2015 Influenza Vaccine 10/15/2014, 10/7/2013 Influenza Vaccine Whole 11/1/2010 Pneumococcal Conjugate (PCV-13) 11/23/2015 TDAP Vaccine 3/14/2012 ZZZ-RETIRED (DO NOT USE) Pneumococcal Vaccine (Unspecified Type) 11/14/2011, 10/1/1998 Zoster 9/24/2011 Reviewed by Maura Lawton III, DO on 6/26/2018 at 11:55 AM  
You Were Diagnosed With   
  
 Codes Comments Late onset Alzheimer's disease without behavioral disturbance    -  Primary ICD-10-CM: G30.1, F02.80 ICD-9-CM: 331.0, 294.10 Medicare annual wellness visit, subsequent     ICD-10-CM: Z00.00 ICD-9-CM: V70.0   
 PBA (pseudobulbar affect)     ICD-10-CM: F48.2 ICD-9-CM: 310.81 Essential hypertension     ICD-10-CM: I10 
ICD-9-CM: 401.9 Mixed hyperlipidemia     ICD-10-CM: E78.2 ICD-9-CM: 272.2 Vitals BP Pulse Temp Resp Height(growth percentile) Weight(growth percentile) 108/65 (BP 1 Location: Left arm, BP Patient Position: Sitting) 75 97.8 °F (36.6 °C) (Oral) 14 5' 3\" (1.6 m) 113 lb (51.3 kg) SpO2 BMI Smoking Status 97% 20.02 kg/m2 Current Every Day Smoker Vitals History BMI and BSA Data Body Mass Index Body Surface Area 20.02 kg/m 2 1.51 m 2 Preferred Pharmacy Pharmacy Name Phone [address] [phone] Your Updated Medication List  
  
   
This list is accurate as of 6/26/18 12:12 PM.  Always use your most recent med list.  
  
  
  
  
 acetaminophen 500 mg tablet Commonly known as:  TYLENOL Take 500 mg by mouth every six (6) hours as needed for Pain or Fever. aspirin 81 mg tablet Take 81 mg by mouth daily. atomoxetine 80 mg capsule Commonly known as:  STRATTERA Take 1 Cap by mouth daily. dextromethorphan-quiNIDine 20-10 mg per capsule Commonly known as:  Carleene Hones Take 1 Cap by mouth daily. docusate sodium 100 mg capsule Commonly known as:  Elana Sauce Take 100 mg by mouth as needed for Constipation. donepezil 10 mg tablet Commonly known as:  ARICEPT Take one half tablet a day in the morning for 30 days then 1 whole tablet a day. Indications: MILD TO MODERATE ALZHEIMER'S TYPE DEMENTIA  
  
 finasteride 5 mg tablet Commonly known as:  PROSCAR Take 5 mg by mouth daily. memantine 10 mg tablet Commonly known as:  Star Ramp Take 0.5 Tabs by mouth daily. Indications: MODERATE TO SEVERE ALZHEIMER'S TYPE DEMENTIA METAMUCIL (SUGAR) Powd Generic drug:  psyllium seed-sucrose Take  by mouth.  
  
 multivitamin tablet Commonly known as:  ONE A DAY Take 1 Tab by mouth daily. rosuvastatin 20 mg tablet Commonly known as:  CRESTOR  
TAKE ONE TABLET BY MOUTH ONE TIME DAILY SPIRIVA WITH HANDIHALER 18 mcg inhalation capsule Generic drug:  tiotropium INHALE ONE CAPSULE VIA DEVICE BY MOUTH ONE TIME DAILY  
  
 tamsulosin 0.4 mg capsule Commonly known as:  FLOMAX Take 0.4 mg by mouth daily. varicella-zoster recombinant (PF) 50 mcg/0.5 mL Susr injection Commonly known as:  SHINGRIX (PF)  
0.5 mL by IntraMUSCular route once for 1 dose. Repeat 2nd dose in 2-6 months. Prescriptions Printed Refills  
 varicella-zoster recombinant, PF, (SHINGRIX, PF,) 50 mcg/0.5 mL susr injection 1 Si.5 mL by IntraMUSCular route once for 1 dose. Repeat 2nd dose in 2-6 months. Class: Print Route: IntraMUSCular Prescriptions Sent to Pharmacy Refills  
 dextromethorphan-quiNIDine (NUEDEXTA) 20-10 mg per capsule 11 Sig: Take 1 Cap by mouth daily. Class: Normal  
 Pharmacy: [address] Ph #: [phone] Route: Oral  
 memantine (NAMENDA) 10 mg tablet 2 Sig: Take 0.5 Tabs by mouth daily. Indications: MODERATE TO SEVERE ALZHEIMER'S TYPE DEMENTIA Class: Normal  
 Pharmacy: [address] Ph #: [phone] Route: Oral  
  
Follow-up Instructions Return in about 2 months (around 2018). Patient Instructions Medicare Wellness Visit, Male The best way to live healthy is to have a lifestyle where you eat a well-balanced diet, exercise regularly, limit alcohol use, and quit all forms of tobacco/nicotine, if applicable. Regular preventive services are another way to keep healthy.  Preventive services (vaccines, screening tests, monitoring & exams) can help personalize your care plan, which helps you manage your own care. Screening tests can find health problems at the earliest stages, when they are easiest to treat. [facility] follows the current, evidence-based guidelines published by the [facility] Seth ([facility]) when recommending preventive services for our patients. Because we follow these guidelines, sometimes recommendations change over time as research supports it. (For example, a prostate screening blood test is no longer routinely recommended for men with no symptoms.) Of course, you and your provider may decide to screen more often for some diseases, based on your risk and co-morbidities (chronic disease you are already diagnosed with). Preventive services for you include: - Medicare offers their members a free annual wellness visit, which is time for you and your primary care provider to discuss and plan for your preventive service needs. Take advantage of this benefit every year! 
 
-All people over age 72 should receive the recommended pneumonia vaccines. Current USPSTF guidelines recommend a series of two vaccines for the best pneumonia protection.  
 
-All adults should have a yearly flu vaccine and a tetanus vaccine every 10 years.  All adults age 61 years should receive a shingles vaccine once in their lifetime.   
 
-All adults age 38-68 years who are overweight should have a diabetes screening test once every three years.  
 
-Other screening tests & preventive services for persons with diabetes include: an eye exam to screen for diabetic retinopathy, a kidney function test, a foot exam, and stricter control over your cholesterol.  
 
-Cardiovascular screening for adults with routine risk involves an electrocardiogram (ECG) at intervals determined by the provider.  
 
-Colorectal cancer screenings should be done for adults age 54-65 years with normal risk. There are a number of acceptable methods of screening for this type of cancer. Each test has its own benefits and drawbacks. Discuss with your provider what is most appropriate for you during your annual wellness visit. The different tests include: colonoscopy (considered the best screening method), a fecal occult blood test, a fecal DNA test, and sigmoidoscopy. 
 
-All adults born between [facility][1] should be screened once for Hepatitis C. 
 
-An Abdominal Aortic Aneurysm (AAA) Screening is recommended for men age 73-68 who has ever smoked in their lifetime. Here is a list of your current Health Maintenance items (your personalized list of preventive services) with a due date: 
Health Maintenance Due Topic Date Due  
 Annual Well Visit  05/17/2018 May stop strattera today, no need to taper dose down. Introducing [facility]! [facility] introduces [employer] patient portal. Now you can access parts of your medical record, email your doctor's office, and request medication refills online. 1. In your internet browser, go to [email or web address] 2. Click on the First Time User? Click Here link in the Sign In box. You will see the New Member Sign Up page. 3. Enter your [employer] Access Code exactly as it appears below. You will not need to use this code after youve completed the sign-up process. If you do not sign up before the expiration date, you must request a new code. · [employer] Access Code: [email or web address] Expires: 9/24/2018 12:11 PM 
 
4. Enter the last four digits of your Social Security Number (xxxx) and Date of Birth (mm/dd/yyyy) as indicated and click Submit. You will be taken to the next sign-up page. 5. Create a [employer] ID. This will be your [employer] login ID and cannot be changed, so think of one that is secure and easy to remember. 6. Create a Qmindert password. You can change your password at any time. 7. Enter your Password Reset Question and Answer. This can be used at a later time if you forget your password. 8. Enter your e-mail address. You will receive e-mail notification when new information is available in [address] 9. Click Sign Up. You can now view and download portions of your medical record. 10. Click the Download Summary menu link to download a portable copy of your medical information. If you have questions, please visit the Frequently Asked Questions section of the [employer] website. Remember, [employer] is NOT to be used for urgent needs. For medical emergencies, dial 911. Now available from your iPhone and Android! Please provide this summary of care documentation to your next provider. Your primary care clinician is listed as Reid Romero If you have any questions after today's visit, please call [phone].

## 2018-06-26 NOTE — PROGRESS NOTES
Reviewed record in preparation for visit and have obtained necessary documentation. Identified pt with two pt identifiers(name and ). Chief Complaint   Patient presents with    Follow-up    Other     neuro results from Dr. Alejandra Pina to be reviewed       Health Maintenance Due   Topic Date Due    MEDICARE Parul Gilman  2018       Mr. Harrington has a reminder for a \"due or due soon\" health maintenance. I have asked that he discuss health maintenance topic(s) due with His  primary care provider. Coordination of Care Questionnaire:  :     1) Have you been to an emergency room, urgent care clinic since your last visit? no   Hospitalized since your last visit? no             2) Have you seen or consulted any other health care providers outside of [address] [place] [address] since your last visit? no  (Include any pap smears or colon screenings in this section.)    3) Do you have an Advance Directive on file? yes    4) Are you interested in receiving information on Advance Directives? NO    Patient is accompanied by self I have received verbal consent from Geneva Fonseca to discuss any/all medical information while they are present in the room.

## 2018-06-26 NOTE — PATIENT INSTRUCTIONS
Medicare Wellness Visit, Male    The best way to live healthy is to have a lifestyle where you eat a well-balanced diet, exercise regularly, limit alcohol use, and quit all forms of tobacco/nicotine, if applicable. Regular preventive services are another way to keep healthy. Preventive services (vaccines, screening tests, monitoring & exams) can help personalize your care plan, which helps you manage your own care. Screening tests can find health problems at the earliest stages, when they are easiest to treat. [facility] follows the current, evidence-based guidelines published by the [facility] ([facility]) when recommending preventive services for our patients. Because we follow these guidelines, sometimes recommendations change over time as research supports it. (For example, a prostate screening blood test is no longer routinely recommended for men with no symptoms.)    Of course, you and your provider may decide to screen more often for some diseases, based on your risk and co-morbidities (chronic disease you are already diagnosed with). Preventive services for you include:    - Medicare offers their members a free annual wellness visit, which is time for you and your primary care provider to discuss and plan for your preventive service needs. Take advantage of this benefit every year!    -All people over age 72 should receive the recommended pneumonia vaccines. Current USPSTF guidelines recommend a series of two vaccines for the best pneumonia protection.     -All adults should have a yearly flu vaccine and a tetanus vaccine every 10 years.  All adults age 61 years should receive a shingles vaccine once in their lifetime.      -All adults age 38-68 years who are overweight should have a diabetes screening test once every three years.     -Other screening tests & preventive services for persons with diabetes include: an eye exam to screen for diabetic retinopathy, a kidney function test, a foot exam, and stricter control over your cholesterol.     -Cardiovascular screening for adults with routine risk involves an electrocardiogram (ECG) at intervals determined by the provider.     -Colorectal cancer screenings should be done for adults age 54-65 years with normal risk. There are a number of acceptable methods of screening for this type of cancer. Each test has its own benefits and drawbacks. Discuss with your provider what is most appropriate for you during your annual wellness visit. The different tests include: colonoscopy (considered the best screening method), a fecal occult blood test, a fecal DNA test, and sigmoidoscopy.    -All adults born between [facility] should be screened once for Hepatitis C.    -An Abdominal Aortic Aneurysm (AAA) Screening is recommended for men age 73-68 who has ever smoked in their lifetime. Here is a list of your current Health Maintenance items (your personalized list of preventive services) with a due date:  Health Maintenance Due   Topic Date Due    Annual Well Visit  05/17/2018       May stop strattera today, no need to taper dose down.

## 2018-07-03 ENCOUNTER — TELEPHONE (OUTPATIENT)
Dept: INTERNAL MEDICINE CLINIC | Age: 83
End: 2018-07-03

## 2018-07-03 NOTE — TELEPHONE ENCOUNTER
Patient's daughter, Jovany Mcmanus states she needs a call back to get the date of patient's last TB injection that was done. Please call to advise.  Thank you

## 2018-07-03 NOTE — TELEPHONE ENCOUNTER
Spoke with patients daughter after 2 patient identifiers being note and advised that we did not have any record of a TB test being done. Patients daughter expressed understanding and has no further questions at this time.

## 2018-07-09 ENCOUNTER — CLINICAL SUPPORT (OUTPATIENT)
Dept: INTERNAL MEDICINE CLINIC | Age: 83
End: 2018-07-09

## 2018-07-09 DIAGNOSIS — Z23 ENCOUNTER FOR IMMUNIZATION: Primary | ICD-10-CM

## 2018-07-09 DIAGNOSIS — Z11.1 SCREENING EXAMINATION FOR PULMONARY TUBERCULOSIS: ICD-10-CM

## 2018-07-10 ENCOUNTER — OFFICE VISIT (OUTPATIENT)
Dept: NEUROLOGY | Age: 83
End: 2018-07-10

## 2018-07-10 VITALS
WEIGHT: 113 LBS | HEIGHT: 63 IN | HEART RATE: 68 BPM | SYSTOLIC BLOOD PRESSURE: 110 MMHG | DIASTOLIC BLOOD PRESSURE: 70 MMHG | OXYGEN SATURATION: 97 % | BODY MASS INDEX: 20.02 KG/M2

## 2018-07-10 DIAGNOSIS — E78.2 MIXED HYPERLIPIDEMIA: ICD-10-CM

## 2018-07-10 DIAGNOSIS — I10 ESSENTIAL HYPERTENSION: ICD-10-CM

## 2018-07-10 DIAGNOSIS — F03.90 DEMENTIA WITHOUT BEHAVIORAL DISTURBANCE, UNSPECIFIED DEMENTIA TYPE: Primary | ICD-10-CM

## 2018-07-10 NOTE — PROGRESS NOTES
After obtaining consent, and per orders of Dr. Jada Clayton, injection of TB given by Jennifer Rubio LPN. Patient instructed to remain in clinic for 20 minutes afterwards, and to report any adverse reaction to me immediately.

## 2018-07-10 NOTE — MR AVS SNAPSHOT
[address], 
[id number], [address] [facility] 
[phone] Patient: Reta Bah MRN: [id number] Visit Information Date & Time Provider Department Dept. Phone Encounter #  
 7/10/2018 11:00 AM Darius Sharp MD Neurology Clinic at [facility] [phone] [id number] Your Appointments 2018  2:00 PM  
ROUTINE CARE with Cristian Schroeder III, DO [facility] [address] [place] [address]) Appt Note: 2 month follow up  
 [address]  
  
   
 [address]  [address] [address]  
  
    
 2018 10:40 AM  
Follow Up with Darius Sharp MD  
Neurology Clinic at [address]) Appt Note: follow up memory loss$ 0 CP jll 7/10/18  
 [address] [place] [address], 
[facility] [address] [address]  
[address], [address], [address] [address] Upcoming Health Maintenance Date Due Influenza Age 5 to Adult 2018 GLAUCOMA SCREENING Q2Y 2019 MEDICARE YEARLY EXAM 2019 DTaP/Tdap/Td series (2 - Td) 3/14/2022 Allergies as of 7/10/2018  Review Complete On: 7/10/2018 By: Jose Raman LPN Severity Noted Reaction Type Reactions Kiwi High 2017    Anaphylaxis Zetia [Ezetimibe]  2010    Other (comments) C/o back pain Current Immunizations  Reviewed on 2018 Name Date Influenza High Dose Vaccine PF 10/15/2015 Influenza Vaccine 10/15/2014, 10/7/2013 Influenza Vaccine Whole 2010 Pneumococcal Conjugate (PCV-13) 2015 Pneumococcal Polysaccharide (PPSV-23) 2018, 2018 TDAP Vaccine 3/14/2012 ZZZ-RETIRED (DO NOT USE) Pneumococcal Vaccine (Unspecified Type) 2011, 10/1/1998 Zoster 2011 Not reviewed this visit Vitals BP Pulse Height(growth percentile) Weight(growth percentile) SpO2 BMI  
 110/70 68 5' 3\" (1.6 m) 113 lb (51.3 kg) 97% 20.02 kg/m2 Smoking Status Current Every Day Smoker Vitals History BMI and BSA Data Body Mass Index Body Surface Area 20.02 kg/m 2 1.51 m 2 Preferred Pharmacy Pharmacy Name Phone [facility], [address] [phone] Your Updated Medication List  
  
   
This list is accurate as of 7/10/18 11:35 AM.  Always use your most recent med list.  
  
  
  
  
 acetaminophen 500 mg tablet Commonly known as:  TYLENOL Take 500 mg by mouth every six (6) hours as needed for Pain or Fever. aspirin 81 mg tablet Take 81 mg by mouth daily. dextromethorphan-quiNIDine 20-10 mg per capsule Commonly known as:  Zaheer [place] Take 1 Cap by mouth daily. docusate sodium 100 mg capsule Commonly known as:  Ethyl Hoit Take 100 mg by mouth as needed for Constipation. donepezil 10 mg tablet Commonly known as:  ARICEPT Take one half tablet a day in the morning for 30 days then 1 whole tablet a day. Indications: MILD TO MODERATE ALZHEIMER'S TYPE DEMENTIA  
  
 finasteride 5 mg tablet Commonly known as:  PROSCAR Take 5 mg by mouth daily. memantine 10 mg tablet Commonly known as:  Jerolyn Michael Take 0.5 Tabs by mouth daily. Indications: MODERATE TO SEVERE ALZHEIMER'S TYPE DEMENTIA METAMUCIL (SUGAR) Powd Generic drug:  psyllium seed-sucrose Take  by mouth.  
  
 multivitamin tablet Commonly known as:  ONE A DAY Take 1 Tab by mouth daily. rosuvastatin 20 mg tablet Commonly known as:  CRESTOR  
TAKE ONE TABLET BY MOUTH ONE TIME DAILY SPIRIVA WITH HANDIHALER 18 mcg inhalation capsule Generic drug:  tiotropium INHALE ONE CAPSULE VIA DEVICE BY MOUTH ONE TIME DAILY  
  
 tamsulosin 0.4 mg capsule Commonly known as:  FLOMAX Take 0.4 mg by mouth daily. Patient Instructions Office Policies 
o Phone calls/patient messages: Please allow up to 24 hours for someone in the office to contact you about your call or message. Be mindful your provider may be out of the office or your message may require further review. We encourage you to use [employer] for your messages as this is a faster, more efficient way to communicate with our office 
o Medication Refills: 
Prescription medications require up to 48 business hours to process. We encourage you to use [employer] for your refills. For controlled medications: Please allow up to 72 business hours to process. Certain medications may require you to  a written prescription at our office. NO narcotic/controlled medications will be prescribed after 4pm Monday through Friday or on weekends 
o Form/Paperwork Completion: 
Please note there is a $25 fee for all paperwork completed by our providers. We ask that you allow 7-14 business days. Pre-payment is due prior to picking up/faxing the completed form. You may also download your forms to [employer] to have your doctor print off. 
o Test Results: In order for a patient to obtain test results, an appointment must be made with the physician to review the results. Test results cannot be discussed over the phone. Introducing [facility]! Sherry Green introduces [employer] patient portal. Now you can access parts of your medical record, email your doctor's office, and request medication refills online. 1. In your internet browser, go to [email or web address] 2. Click on the First Time User? Click Here link in the Sign In box. You will see the New Member Sign Up page. 3. Enter your [employer] Access Code exactly as it appears below. You will not need to use this code after youve completed the sign-up process. If you do not sign up before the expiration date, you must request a new code. · [employer] Access Code: [email or web address] Expires: 9/24/2018 12:11 PM 
 
4. Enter the last four digits of your Social Security Number (xxxx) and Date of Birth (mm/dd/yyyy) as indicated and click Submit. You will be taken to the next sign-up page. 5. Create a [employer] ID. This will be your [employer] login ID and cannot be changed, so think of one that is secure and easy to remember. 6. Create a [employer] password. You can change your password at any time. 7. Enter your Password Reset Question and Answer. This can be used at a later time if you forget your password. 8. Enter your e-mail address. You will receive e-mail notification when new information is available in [address] 9. Click Sign Up. You can now view and download portions of your medical record. 10. Click the Download Summary menu link to download a portable copy of your medical information. If you have questions, please visit the Frequently Asked Questions section of the [employer] website. Remember, [employer] is NOT to be used for urgent needs. For medical emergencies, dial 911. Now available from your iPhone and Android! Please provide this summary of care documentation to your next provider. Your primary care clinician is listed as Professor Butt [address] If you have any questions after today's visit, please call [phone].

## 2018-07-10 NOTE — PROGRESS NOTES
HISTORY OF PRESENT ILLNESS  Reta Bah is a 80 y.o. male. HPI Comments: Frankie Casey returns for follow-up with his daughter. Her care physician is now taken him off the Strattera. He is taking Aricept 10 mg a day, he is now started on 5 mg of memantine a day. The daughter states she is tolerating it well without problems, she did not notice any change in his behavior memory or activity with the discontinuation of Strattera. Memory Loss    The history is provided by the patient and relative. This is a chronic problem. Pertinent negatives include no seizures and no tingling. Mental status baseline is mild dementia. Review of Systems   Constitutional: Negative for chills, diaphoresis, fever and malaise/fatigue. Review of systems reveals no new complaints, he does not have any trouble with urinary incontinence or constipation. HENT: Negative for nosebleeds and tinnitus. Eyes: Negative for double vision and photophobia. Skin: Negative. Neurological: Negative for dizziness, tingling, tremors, sensory change, speech change, focal weakness, seizures, loss of consciousness and headaches. Psychiatric/Behavioral: Positive for memory loss. Negative for depression and suicidal ideas. Current Outpatient Prescriptions on File Prior to Visit   Medication Sig Dispense Refill    dextromethorphan-quiNIDine (NUEDEXTA) 20-10 mg per capsule Take 1 Cap by mouth daily. 30 Cap 11    memantine (NAMENDA) 10 mg tablet Take 0.5 Tabs by mouth daily. Indications: MODERATE TO SEVERE ALZHEIMER'S TYPE DEMENTIA 30 Tab 2    psyllium seed-sucrose (METAMUCIL, SUGAR,) powd Take  by mouth.  docusate sodium (COLACE) 100 mg capsule Take 100 mg by mouth as needed for Constipation.  donepezil (ARICEPT) 10 mg tablet Take one half tablet a day in the morning for 30 days then 1 whole tablet a day.   Indications: MILD TO MODERATE ALZHEIMER'S TYPE DEMENTIA 30 Tab 11    acetaminophen (TYLENOL) 500 mg tablet Take 500 mg by mouth every six (6) hours as needed for Pain or Fever.  rosuvastatin (CRESTOR) 20 mg tablet TAKE ONE TABLET BY MOUTH ONE TIME DAILY 90 Tab 3    SPIRIVA WITH HANDIHALER 18 mcg inhalation capsule INHALE ONE CAPSULE VIA DEVICE BY MOUTH ONE TIME DAILY 30 Cap 8    multivitamin (ONE A DAY) tablet Take 1 Tab by mouth daily.  tamsulosin (FLOMAX) 0.4 mg capsule Take 0.4 mg by mouth daily.  finasteride (PROSCAR) 5 mg tablet Take 5 mg by mouth daily.  aspirin 81 mg Tab Take 81 mg by mouth daily. No current facility-administered medications on file prior to visit. Past Medical History:   Diagnosis Date    Arthritis     BPH (benign prostatic hypertrophy) 4/12/2010    [facility] 7/[facility]/2012    HLD (hyperlipidemia) 4/12/2010    HTN (hypertension) 4/12/2010    Memory disorder     Psychiatric disorder     anxiety and depression     /70  Pulse 68  Ht 5' 3\" (1.6 m)  Wt 113 lb (51.3 kg)  SpO2 97%  BMI 20.02 kg/m2      Physical Exam   Constitutional: He appears well-developed and well-nourished. No distress. He has no cervical adenopathy. Neurological: He is alert. He has normal reflexes. He displays normal reflexes. No cranial nerve deficit. He exhibits normal muscle tone. I did not do a formal MMSE today however he does have some memory issues. Skin: Skin is warm and dry. No rash noted. He is not diaphoretic. No erythema. Psychiatric: He has a normal mood and affect. His behavior is normal.       ASSESSMENT and PLAN  DEMENTIA  He is now taking Aricept 10 mg a day and memantine 5 mg a day for presumed dementia. I will plan to see him back in 4 months and increase him to 10 mg a day of the memantine at that time. I will slowly increase until he hits 20 mg a day over 18 months.   HYPERLIPIDEMIA  Stroke risk, stable, managed by primary care  HYPERTENSION  Stroke risk, stable, managed by primary care  This note will not be viewable in [address]

## 2018-07-10 NOTE — PATIENT INSTRUCTIONS
Office Policies  o Phone calls/patient messages:  Please allow up to 24 hours for someone in the office to contact you about your call or message. Be mindful your provider may be out of the office or your message may require further review. We encourage you to use [employer] for your messages as this is a faster, more efficient way to communicate with our office  o Medication Refills:  Prescription medications require up to 48 business hours to process. We encourage you to use [employer] for your refills. For controlled medications: Please allow up to 72 business hours to process. Certain medications may require you to  a written prescription at our office. NO narcotic/controlled medications will be prescribed after 4pm Monday through Friday or on weekends  o Form/Paperwork Completion:  Please note there is a $25 fee for all paperwork completed by our providers. We ask that you allow 7-14 business days. Pre-payment is due prior to picking up/faxing the completed form. You may also download your forms to [employer] to have your doctor print off.  o Test Results: In order for a patient to obtain test results, an appointment must be made with the physician to review the results. Test results cannot be discussed over the phone.

## 2018-07-11 ENCOUNTER — CLINICAL SUPPORT (OUTPATIENT)
Dept: INTERNAL MEDICINE CLINIC | Age: 83
End: 2018-07-11

## 2018-07-11 DIAGNOSIS — Z11.1 PPD SCREENING TEST: Primary | ICD-10-CM

## 2018-07-11 NOTE — PROGRESS NOTES
PPD read on left forearm at 0 mms. Negative read.  Md made aware and [facility] will be requesting results shortly

## 2018-07-14 ENCOUNTER — APPOINTMENT (OUTPATIENT)
Dept: GENERAL RADIOLOGY | Age: 83
End: 2018-07-14
Attending: EMERGENCY MEDICINE
Payer: MEDICARE

## 2018-07-14 ENCOUNTER — HOSPITAL ENCOUNTER (EMERGENCY)
Age: 83
Discharge: HOME | End: 2018-07-14
Attending: EMERGENCY MEDICINE
Payer: MEDICARE

## 2018-07-14 VITALS
RESPIRATION RATE: 18 BRPM | SYSTOLIC BLOOD PRESSURE: 152 MMHG | OXYGEN SATURATION: 97 % | TEMPERATURE: 98.4 F | HEIGHT: 63 IN | DIASTOLIC BLOOD PRESSURE: 73 MMHG | HEART RATE: 68 BPM | BODY MASS INDEX: 19.8 KG/M2 | WEIGHT: 111.77 LBS

## 2018-07-14 DIAGNOSIS — T14.8XXA ABRASION: Primary | ICD-10-CM

## 2018-07-14 DIAGNOSIS — S70.01XA CONTUSION OF RIGHT HIP, INITIAL ENCOUNTER: ICD-10-CM

## 2018-07-14 PROCEDURE — 74011250637 HC RX REV CODE- 250/637: Performed by: EMERGENCY MEDICINE

## 2018-07-14 PROCEDURE — 73502 X-RAY EXAM HIP UNI 2-3 VIEWS: CPT

## 2018-07-14 PROCEDURE — 93005 ELECTROCARDIOGRAM TRACING: CPT

## 2018-07-14 PROCEDURE — 99284 EMERGENCY DEPT VISIT MOD MDM: CPT

## 2018-07-14 RX ORDER — ACETAMINOPHEN 325 MG/1
650 TABLET ORAL ONCE
Status: COMPLETED | OUTPATIENT
Start: 2018-07-14 | End: 2018-07-14

## 2018-07-14 RX ADMIN — ACETAMINOPHEN 650 MG: 325 TABLET ORAL at 20:11

## 2018-07-14 NOTE — ED PROVIDER NOTES
EMERGENCY DEPARTMENT HISTORY AND PHYSICAL EXAM 
 
 
Date: 7/14/2018 Patient Name: Everett Bernal History of Presenting Illness Chief Complaint Patient presents with  Fall  
  ambulatory into triage with walker; pt's daughter reports pt is a dementia pt, and experienced an witnessed fall around \"330am today\"; denies n/v; denies LOC  Hip Pain RIGHT post fall; pt's daughter reports \"lilian and pin\" placed in right hip in November History Provided By: Patient and Patient's Daughter HPI: Everett Bernal, 80 y.o. male with PMHx significant for HTN, HLD, COPD, anxiety, depression, memory disorder, arthritis, presents ambulatory with walker to the ED with cc of new onset R hip pain s/p GLF x 0330 this morning Pt reports that he slipped on tile floor on the way to the bathroom. Pt landed on his R side initially and was found on his back by his daughter. Pt does have hx of R hip fx and she is concerned that he has re-injured it. He reports that his RLE \"feels like its got a weight on it\" when he moves it. He did take OTC tylenol at 0900 this morning with relief. He denies hitting his head, LOC. There are no other complaints, changes, or physical findings at this time. PCP: Kole Blevins III, DO 
 
Current Outpatient Prescriptions Medication Sig Dispense Refill  dextromethorphan-quiNIDine (NUEDEXTA) 20-10 mg per capsule Take 1 Cap by mouth daily. 30 Cap 11  
 memantine (NAMENDA) 10 mg tablet Take 0.5 Tabs by mouth daily. 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  docusate sodium (COLACE) 100 mg capsule Take 100 mg by mouth as needed for Constipation.  donepezil (ARICEPT) 10 mg tablet Take one half tablet a day in the morning for 30 days then 1 whole tablet a day.   Indications: MILD TO MODERATE ALZHEIMER'S TYPE DEMENTIA 30 Tab 11  
 acetaminophen (TYLENOL) 500 mg tablet Take 500 mg by mouth every six (6) hours as needed for Pain or Fever.  rosuvastatin (CRESTOR) 20 mg tablet TAKE ONE TABLET BY MOUTH ONE TIME DAILY 90 Tab 3  
 SPIRIVA WITH HANDIHALER 18 mcg inhalation capsule INHALE ONE CAPSULE VIA DEVICE BY MOUTH ONE TIME DAILY 30 Cap 8  
 multivitamin (ONE A DAY) tablet Take 1 Tab by mouth daily.  tamsulosin (FLOMAX) 0.4 mg capsule Take 0.4 mg by mouth daily.  finasteride (PROSCAR) 5 mg tablet Take 5 mg by mouth daily.  aspirin 81 mg Tab Take 81 mg by mouth daily. Past History Past Medical History: 
Past Medical History:  
Diagnosis Date  Arthritis  BPH (benign prostatic hypertrophy) 2010  COPD (chronic obstructive pulmonary disease) with emphysema ([facility] 2012  HLD (hyperlipidemia) 2010  
 HTN (hypertension) 2010  Memory disorder  Psychiatric disorder   
 anxiety and depression Past Surgical History: 
Past Surgical History:  
Procedure Laterality Date  COLONOSCOPY,REMV JOLENE,SNARE  2015  
 2 sessile sigmoid polyps, 3 & 5 mm; Dr. Cornelio West; no further screening recommended  ENDOSCOPY, COLON, DIAGNOSTIC    
 negative; 10 year repeat; Dr. Cornelio West  HX HERNIA REPAIR  years ago  
 left  HX HERNIA REPAIR    
 right inguinal  
 AK COLSC FLX W/RMVL OF TUMOR POLYP LESION SNARE TQ  2012 Family History: 
Family History Problem Relation Age of Onset  Heart Disease Mother   
   at 80  
 Heart Disease Father   
   at 80  Liver Disease Brother  Stroke Brother  Heart Attack Brother  Other Sister 8 ? polio  Cancer Sister   
  lung Social History: 
Social History Substance Use Topics  Smoking status: Current Every Day Smoker Packs/day: 1.00 Years: 60.00 Types: Cigarettes  Smokeless tobacco: Former User Quit date: 10/5/2014  Alcohol use 4.2 oz/week  
  7 Glasses of wine per week    Comment: 1 drinks per evening Allergies: Allergies Allergen Reactions  Kiwi Anaphylaxis  Zetia [Ezetimibe] Other (comments) C/o back pain Review of Systems Review of Systems Constitutional: Negative for fatigue and fever. HENT: Negative. Eyes: Negative. Respiratory: Negative for shortness of breath and wheezing. Cardiovascular: Negative for chest pain and leg swelling. Gastrointestinal: Negative for blood in stool, constipation, diarrhea, nausea and vomiting. Endocrine: Negative. Genitourinary: Negative for difficulty urinating and dysuria. Musculoskeletal: Positive for arthralgias (R hip). Skin: Negative for rash. Allergic/Immunologic: Negative. Neurological: Negative for syncope, weakness and numbness. Np head injury Hematological: Negative. Psychiatric/Behavioral: Negative. Physical Exam  
Physical Exam  
Constitutional: He is oriented to person, place, and time. He appears well-developed and well-nourished. No distress. HENT:  
Head: Normocephalic and atraumatic. Mouth/Throat: Oropharynx is clear and moist.  
Eyes: Conjunctivae and EOM are normal.  
Neck: Neck supple. No JVD present. No tracheal deviation present. Cardiovascular: Normal rate, regular rhythm and intact distal pulses. Exam reveals no gallop and no friction rub. No murmur heard. Pulses: 
     Dorsalis pedis pulses are 2+ on the right side, and 2+ on the left side. Pulmonary/Chest: Effort normal and breath sounds normal. No stridor. No respiratory distress. He has no wheezes. Abdominal: Soft. Bowel sounds are normal. He exhibits no distension and no mass. There is no tenderness. There is no guarding. Musculoskeletal: Normal range of motion. He exhibits no edema (no peripheral edema) or tenderness. No deformity Normal but painful ROM of R hip Neurological: He is alert and oriented to person, place, and time. He has normal strength. No focal deficits Skin: Skin is warm, dry and intact. No rash noted. Abrasion over R lateral hip Psychiatric: He has a normal mood and affect. His behavior is normal. Judgment and thought content normal.  
Nursing note and vitals reviewed. Diagnostic Study Results Labs - Recent Results (from the past 12 hour(s)) EKG, 12 LEAD, INITIAL Collection Time: 07/14/18  7:19 PM  
Result Value Ref Range Ventricular Rate 68 BPM  
 Atrial Rate 68 BPM  
 P-R Interval 160 ms QRS Duration 136 ms  
 Q-T Interval 416 ms  
 QTC Calculation (Bezet) 442 ms Calculated P Axis 70 degrees Calculated R Axis -40 degrees Calculated T Axis 49 degrees Diagnosis Normal sinus rhythm Left axis deviation Right bundle branch block When compared with ECG of 28-NOV-2017 22:00, No significant change was found Radiologic Studies - Initial Result:  
  Impression:  
  IMPRESSION:  No acute abnormality. 
 
 
  
  Narrative:  
  EXAM:  XR HIP RT W OR WO PELV 2-3 VWS 
 
INDICATION:   Trauma. COMPARISON: 11/29/2017. Pili Charlton FINDINGS: An AP view of the pelvis and a frogleg lateral view of the right hip 
demonstrate no acute fracture and no dislocation. There is femoral 
intramedullary lilian and head/neck pin fixation of the previous hip fracture, with 
stable appearance. Medical Decision Making I am the first provider for this patient. I reviewed the vital signs, available nursing notes, past medical history, past surgical history, family history and social history. Vital Signs-Reviewed the patient's vital signs. Patient Vitals for the past 12 hrs: 
 Temp Pulse Resp BP SpO2  
07/14/18 1912 98.4 °F (36.9 °C) 68 18 152/73 97 % Pulse Oximetry Analysis - 97% on RA Cardiac Monitor:  
Rate: 68 bpm 
Rhythm: Normal Sinus Rhythm EKG interpretation: (Preliminary) 1919 Rhythm: normal sinus rhythm with RBBB; and regular .  Rate (approx.): 68 bpm; Axis: left axis deviation; IL interval: normal; QRS interval: prolonged; ST/T wave: no ST changes. Written by aCrlos Dominguez ED Scribe, as dictated by Michael Mcguire DO. Records Reviewed: Nursing Notes and Old Medical Records Provider Notes (Medical Decision Making): DDx: sprain, strain, fracture, contusion, abrasion ED Course:  
Initial assessment performed. The patients presenting problems have been discussed, and they are in agreement with the care plan formulated and outlined with them. I have encouraged them to ask questions as they arise throughout their visit. Disposition: 
DISCHARGE NOTE: 
8:37 PM 
Pt has been reexamined. Pt has no new complaints, changes, or physical findings. Care plan outlined and precautions discussed. All available results reviewed with pt. All medications reviewed with pt. All of pts questions and concerns addressed. Pt agrees to f/u as instructed and agrees to return to ED upon further deterioration. Pt is ready to go home. Written by Carlos Dominguez ED Scribe as dictated by Michael Mcguire DO 
 
 
PLAN: 
1. Current Discharge Medication List  
  
 
2. Follow-up Information Follow up With Details Comments Contact Info Alfred Arriaga III, DO Schedule an appointment as soon as possible for a visit  [address] [facility] [address] [facility] 
[phone] [facility] EMERGENCY DEPT  As needed, If symptoms worsen [address] [facility] 
[phone] Return to ED if worse Diagnosis Clinical Impression: 1. Abrasion 2. Contusion of right hip, initial encounter Attestations: This note is prepared by Carlos Dominguez acting as scribe for DO Michael Kuhn DO : The scribe's documentation has been prepared under my direction and personally reviewed by me in its entirety. I confirm that the note above accurately reflects all work, treatment, procedures, and medical decision making performed by me.

## 2018-07-15 LAB
ATRIAL RATE: 68 BPM
CALCULATED P AXIS, ECG09: 70 DEGREES
CALCULATED R AXIS, ECG10: -40 DEGREES
CALCULATED T AXIS, ECG11: 49 DEGREES
DIAGNOSIS, 93000: NORMAL
P-R INTERVAL, ECG05: 160 MS
Q-T INTERVAL, ECG07: 416 MS
QRS DURATION, ECG06: 136 MS
QTC CALCULATION (BEZET), ECG08: 442 MS
VENTRICULAR RATE, ECG03: 68 BPM

## 2018-07-15 NOTE — ED NOTES
Dr. Litzy Oconnor reviewed discharge instructions with the patient. The patient verbalized understanding. All questions and concerns were addressed. The patient was taken out the department in a wheelchair and is discharged ambulatory in the care of family members with instructions and prescriptions in hand. Pt is alert and oriented x 4. Respirations are clear and unlabored.

## 2018-07-16 ENCOUNTER — TELEPHONE (OUTPATIENT)
Dept: INTERNAL MEDICINE CLINIC | Age: 83
End: 2018-07-16

## 2018-07-16 NOTE — TELEPHONE ENCOUNTER
Spoke with patients admission person from [facility] after 2 patient identifiers being note and advised that form could be addended but it would take 7-10 business days. Patients admission person stated that was fine and expressed understanding and has no further questions at this time.

## 2018-07-16 NOTE — TELEPHONE ENCOUNTER
Pat with [facility] has a question on the H&P that was sent over.   Please call #[phone]

## 2018-08-02 NOTE — TELEPHONE ENCOUNTER
Prieto Sharpe, with [address] [facility] is requesting a call back in regards to the pt missing his dose of Namenda.  You would like medication instructions Best contact number: [phone]       Message received & copied from [facility]

## 2018-08-02 NOTE — TELEPHONE ENCOUNTER
Spoke to [place] at [facility] [place] and informed [place] per Dr. Armando Lin to have pt continue regular dosing of namenda.

## 2018-08-28 PROBLEM — F48.2 PBA (PSEUDOBULBAR AFFECT): Chronic | Status: ACTIVE | Noted: 2018-01-01

## 2018-08-28 NOTE — MR AVS SNAPSHOT
[address] [facility] 
[phone] Patient: Abelardo Rose MRN: [id number] ABT:9/53/[id number] Visit Information Date & Time Provider Department Dept. Phone Encounter #  
 8/28/2018  2:00 PM Hadley Nye, [address] [place] [address] [phone] Follow-up Instructions Return in about 6 months (around 2/28/2019). Your Appointments 11/13/2018 10:40 AM  
Follow Up with Darin Mehta MD  
Neurology Clinic at [facility] Appt Note: follow up memory loss$ 0 CP jll 7/10/18  
 [address] [place] [address], 
[address] [place] [address], [address] [phone]  
[address] [facility], [address] [facility], [address] [address] Upcoming Health Maintenance Date Due Influenza Age 5 to Adult 8/1/2018 GLAUCOMA SCREENING Q2Y 6/27/2019 MEDICARE YEARLY EXAM 6/27/2019 DTaP/Tdap/Td series (2 - Td) 3/14/2022 Allergies as of 8/28/2018  Review Complete On: 8/28/2018 By: Ba Ramírez III, DO Severity Noted Reaction Type Reactions Kiwi High 12/26/2017    Anaphylaxis Zetia [Ezetimibe]  04/12/2010    Other (comments) C/o back pain Current Immunizations  Reviewed on 7/14/2018 Name Date Influenza High Dose Vaccine PF 10/15/2015 Influenza Vaccine 10/15/2014, 10/7/2013 Influenza Vaccine Whole 11/1/2010 Pneumococcal Conjugate (PCV-13) 11/23/2015 Pneumococcal Polysaccharide (PPSV-23) 6/26/2018, 6/26/2018 TB Skin Test (PPD) 7/11/2018 TB Skin Test (PPD) Intradermal  Incomplete TDAP Vaccine 3/14/2012 ZZZ-RETIRED (DO NOT USE) Pneumococcal Vaccine (Unspecified Type) 11/14/2011, 10/1/1998 Zoster 9/24/2011 Not reviewed this visit You Were Diagnosed With   
  
 Codes Comments Late onset Alzheimer's disease without behavioral disturbance    -  Primary ICD-10-CM: G30.1, F02.80 ICD-9-CM: 331.0, 294.10 PBA (pseudobulbar affect)     ICD-10-CM: F48.2 ICD-9-CM: 310.81 Pulmonary emphysema, unspecified emphysema type ([facility]     ICD-10-CM: J43.9 ICD-9-CM: 492.8 Essential hypertension     ICD-10-CM: I10 
ICD-9-CM: 401.9 Mixed hyperlipidemia     ICD-10-CM: E78.2 ICD-9-CM: 272.2 Smoker     ICD-10-CM: F21.304 ICD-9-CM: 305.1 Vitals BP Pulse Temp Resp Height(growth percentile) Weight(growth percentile)  
 119/62 (BP 1 Location: Left arm, BP Patient Position: Sitting) 63 98.1 °F (36.7 °C) (Oral) 16 5' 3\" (1.6 m) 114 lb 6.4 oz (51.9 kg) SpO2 BMI Smoking Status 95% 20.27 kg/m2 Current Every Day Smoker Vitals History BMI and BSA Data Body Mass Index Body Surface Area  
 20.27 kg/m 2 1.52 m 2 Preferred Pharmacy Pharmacy Name Phone [facility]  [address] [phone] Your Updated Medication List  
  
   
This list is accurate as of 8/28/18  2:18 PM.  Always use your most recent med list.  
  
  
  
  
 acetaminophen 500 mg tablet Commonly known as:  TYLENOL Take 500 mg by mouth every six (6) hours as needed for Pain or Fever. aspirin 81 mg tablet Take 81 mg by mouth daily. dextromethorphan-quiNIDine 20-10 mg per capsule Commonly known as:  Charli Maizes Take 1 Cap by mouth every twelve (12) hours. Indications: PSEUDOBULBAR AFFECT  
  
 docusate sodium 100 mg capsule Commonly known as:  Duy Pata Take 100 mg by mouth as needed for Constipation. donepezil 10 mg tablet Commonly known as:  ARICEPT Take one half tablet a day in the morning for 30 days then 1 whole tablet a day. Indications: MILD TO MODERATE ALZHEIMER'S TYPE DEMENTIA  
  
 finasteride 5 mg tablet Commonly known as:  PROSCAR Take 5 mg by mouth daily. memantine 10 mg tablet Commonly known as:  Luis Manuel Gu Take 0.5 Tabs by mouth daily. Indications: MODERATE TO SEVERE ALZHEIMER'S TYPE DEMENTIA METAMUCIL (SUGAR) Powd Generic drug:  psyllium seed-sucrose Take  by mouth.  
  
 multivitamin tablet Commonly known as:  ONE A DAY Take 1 Tab by mouth daily. rosuvastatin 20 mg tablet Commonly known as:  CRESTOR  
TAKE ONE TABLET BY MOUTH ONE TIME DAILY SPIRIVA WITH HANDIHALER 18 mcg inhalation capsule Generic drug:  tiotropium INHALE ONE CAPSULE VIA DEVICE BY MOUTH ONE TIME DAILY  
  
 tamsulosin 0.4 mg capsule Commonly known as:  FLOMAX Take 0.4 mg by mouth daily. Prescriptions Printed Refills  
 dextromethorphan-quiNIDine (NUEDEXTA) 20-10 mg per capsule 11 Sig: Take 1 Cap by mouth every twelve (12) hours. Indications: PSEUDOBULBAR AFFECT Class: Print Route: Oral  
  
Follow-up Instructions Return in about 6 months (around 2/28/2019). Patient Instructions Learning About Benefits From Quitting Smoking How does quitting smoking make you healthier? If you're thinking about quitting smoking, you may have a few reasons to be smoke-free. Your health may be one of them. · When you quit smoking, you lower your risks for cancer, lung disease, heart attack, stroke, blood vessel disease, and blindness from macular degeneration. · When you're smoke-free, you get sick less often, and you heal faster. You are less likely to get colds, flu, bronchitis, and pneumonia. · As a nonsmoker, you may find that your mood is better and you are less stressed. When and how will you feel healthier? Quitting has real health benefits that start from day 1 of being smoke-free. And the longer you stay smoke-free, the healthier you get and the better you feel. The first hours · After just 20 minutes, your blood pressure and heart rate go down. That means there's less stress on your heart and blood vessels. · Within 12 hours, the level of carbon monoxide in your blood drops back to normal. That makes room for more oxygen.  With more oxygen in your body, you may notice that you have more energy than when you smoked. After 2 weeks · Your lungs start to work better. · Your risk of heart attack starts to drop. After 1 month · When your lungs are clear, you cough less and breathe deeper, so it's easier to be active. · Your sense of taste and smell return. That means you can enjoy food more than you have since you started smoking. Over the years · After 1 year, your risk of heart disease is half what it would be if you kept smoking. · After 5 years, your risk of stroke starts to shrink. Within a few years after that, it's about the same as if you'd never smoked. · After 10 years, your risk of dying from lung cancer is cut by about half. And your risk for many other types of cancer is lower too. How would quitting help others in your life? When you quit smoking, you improve the health of everyone who now breathes in your smoke. · Their heart, lung, and cancer risks drop, much like yours. · They are sick less. For babies and small children, living smoke-free means they're less likely to have ear infections, pneumonia, and bronchitis. · If you're a woman who is or will be pregnant someday, quitting smoking means a healthier . · Children who are close to you are less likely to become adult smokers. Where can you learn more? Go to http://samina-cyn.info/. Enter [id number] [id number] in the search box to learn more about \"Learning About Benefits From Quitting Smoking. \" Current as of: 2017 Content Version: 11.7 © 2105-9959 [employer]. Care instructions adapted under license by [employer] (which disclaims liability or warranty for this information). If you have questions about a medical condition or this instruction, always ask your healthcare professional. [place] [address] any warranty or liability for your use of this information. Introducing [facility]! [employer] introduces Tackle Grab patient portal. Now you can access parts of your medical record, email your doctor's office, and request medication refills online. 1. In your internet browser, go to [email or web address]. [employer] 2. Click on the First Time User? Click Here link in the Sign In box. You will see the New Member Sign Up page. 3. Enter your Tackle Grab Access Code exactly as it appears below. You will not need to use this code after youve completed the sign-up process. If you do not sign up before the expiration date, you must request a new code. · Tackle Grab Access Code: [email or web address] Expires: 9/24/2018 12:11 PM 
 
4. Enter the last four digits of your Social Security Number (xxxx) and Date of Birth (mm/dd/yyyy) as indicated and click Submit. You will be taken to the next sign-up page. 5. Create a Tackle Grab ID. This will be your Tackle Grab login ID and cannot be changed, so think of one that is secure and easy to remember. 6. Create a Tackle Grab password. You can change your password at any time. 7. Enter your Password Reset Question and Answer. This can be used at a later time if you forget your password. 8. Enter your e-mail address. You will receive e-mail notification when new information is available in [address] 9. Click Sign Up. You can now view and download portions of your medical record. 10. Click the Download Summary menu link to download a portable copy of your medical information. If you have questions, please visit the Frequently Asked Questions section of the Tackle Grab website. Remember, Tackle Grab is NOT to be used for urgent needs. For medical emergencies, dial 911. Now available from your iPhone and Android! Please provide this summary of care documentation to your next provider. Your primary care clinician is listed as Ousmane Mayer If you have any questions after today's visit, please call [phone].

## 2018-08-28 NOTE — PROGRESS NOTES
Olena Sheppard is a 80 y.o. male who presents for evaluation of routine follow up. Last seen by me 2018. Since then has moved into facility, [facility]. Doing well. Started nuedexta at last visit, tolerating it well with no issues. ROS: 
Constitutional: negative for fevers, chills, anorexia and weight loss Eyes:   negative for visual disturbance and irritation ENT:   negative for tinnitus,sore throat,nasal congestion,ear pain,hoarseness Respiratory:  negative for cough, hemoptysis, dyspnea,wheezing CV:   negative for chest pain, palpitations, lower extremity edema GI:   negative for nausea, vomiting, diarrhea, abdominal pain,melena Genitourinary: negative for frequency, dysuria and hematuria Musculoskel: negative for myalgias, arthralgias, back pain, muscle weakness, joint pain Neurological:  negative for headaches, dizziness, focal weakness, numbness Psychiatric:     Negative for depression or anxiety Past Medical History:  
Diagnosis Date  Arthritis  BPH (benign prostatic hypertrophy) 2010  COPD (chronic obstructive pulmonary disease) with emphysema ([facility] 2012  HLD (hyperlipidemia) 2010  
 HTN (hypertension) 2010  Memory disorder  Psychiatric disorder   
 anxiety and depression Past Surgical History:  
Procedure Laterality Date  COLONOSCOPY,NURA FERNÁNDEZ,SNARE  2015  
 2 sessile sigmoid polyps, 3 & 5 mm; Dr. Adelaide Collier; no further screening recommended  ENDOSCOPY, COLON, DIAGNOSTIC    
 negative; 10 year repeat; Dr. Adelaide Collier  HX HERNIA REPAIR  years ago  
 left  HX HERNIA REPAIR    
 right inguinal  
 NH COLSC FLX W/RMVL OF TUMOR POLYP LESION SNARE TQ  2012 Family History Problem Relation Age of Onset  Heart Disease Mother   
   at 80  
 Heart Disease Father   
   at 80  Liver Disease Brother  Stroke Brother  Heart Attack Brother  Other Sister 8 ? polio  Cancer Sister   
  lung Social History Social History  Marital status:  Spouse name: N/A  
 Number of children: N/A  
 Years of education: N/A Occupational History  Not on file. Social History Main Topics  Smoking status: Current Every Day Smoker Packs/day: 1.00 Years: 60.00 Types: Cigarettes  Smokeless tobacco: Former User Quit date: 10/5/2014  Alcohol use 4.2 oz/week  
  7 Glasses of wine per week Comment: 1 drinks per evening  Drug use: No  
 Sexual activity: No  
 
Other Topics Concern  Not on file Social History Narrative Visit Vitals  /62 (BP 1 Location: Left arm, BP Patient Position: Sitting)  Pulse 63  Temp 98.1 °F (36.7 °C) (Oral)  Resp 16  
 Ht 5' 3\" (1.6 m)  Wt 114 lb 6.4 oz (51.9 kg)  SpO2 95%  BMI 20.27 kg/m2 Physical Examination:  
General - Well appearing male HEENT - PERRL, TM no erythema/opacification, normal nasal turbinates, no oropharyngeal erythema or exudate, MMM Neck - supple, no bruits, no thyroidomegaly, no lymphadenopathy Pulm - clear to auscultation bilaterally Cardio - RRR, normal S1 S2, no murmur Abd - soft, nontender, no masses, no HSM Extrem - no edema, +2 distal pulses Neuro-  No focal deficits, CN intact Assessment/Plan: 1. pba--increase nuedexta to bid 2. Dementia--continue aricept and namenda 3. Tobacco abuse--working on cutting back 4.  hyperlipids--on crestor. Also on asa 5. bph--continue proscar, flomax 6. Copd--continue spiriva 
 
rtc 6 months Waldotoi Stauffer III, DO

## 2018-08-28 NOTE — PROGRESS NOTES
Reviewed record in preparation for visit and have obtained necessary documentation. Identified pt with two pt identifiers(name and ). Chief Complaint Patient presents with  Hypertension  
  f/up Vitals:  
 18 1348 Weight: 114 lb 6.4 oz (51.9 kg) Height: 5' 3\" (1.6 m) PainSc:   0 - No pain Medication reconciliation was completed verbally with the patient, all medications, route, dose, were verified by the patient. Any changes are noted in the medication list.   
 
Coordination of Care Questionnaire: 
:  
 
1) Have you been to an emergency room, urgent care clinic since your last visit? Yes, [facility], due to fall Hospitalized since your last visit? no          
 
2) Have you seen or consulted any other health care providers outside of [address] [place] [address] since your last visit? no  (Include any pap smears or colon screenings in this section.) Health Maintenance Due Topic Date Due  Influenza Age 5 to Adult  2018 Rosario Arguello RN

## 2018-09-05 NOTE — TELEPHONE ENCOUNTER
[facility] sent a fax confirming prescriptions for this patient. This nurse went through each script for this patient and found only one discrepancy and that was Acetaminophen was 500 mg in the patients chart and the pharmacy was asking for 325 mg of acetaminophen. Per verbal order & signature from Dr. Brett Arriaga leave in our chart as is and make no changes. Information faxed to  [address] @ 1 [phone]. Fax went through. Yenny Martins LPN

## 2018-10-08 NOTE — TELEPHONE ENCOUNTER
Patient's Daughter, Mu Valiente, states she needs a call back to be advised if patient has had his Flu Shot this year. Please call.  Thank you

## 2018-10-08 NOTE — TELEPHONE ENCOUNTER
Spoke with patients daughter after 2 patient identifiers being note and advised that patient had not had a flu shot this year. Patients daughter expressed understanding and has no further questions at this time.

## 2018-11-27 NOTE — PATIENT INSTRUCTIONS
1.  Namenda dose adjustment  2. Continue Aricept  3. Follow-up in 3 months    Office Policies  · Phone calls/patient messages:  Please allow up to 24 hours for someone in the office to contact you about your call or message. Be mindful your provider may be out of the office or your message may require further review. We encourage you to use [email or web address] for your messages as this is a faster, more efficient way to communicate with our office  · Medication Refills:  Prescription medications require up to 48 business hours to process. We encourage you to use [email or web address] for your refills. For controlled medications: Please allow up to 72 business hours to process. Certain medications may require you to  a written prescription at our office. NO narcotic/controlled medications will be prescribed after 4pm Monday through Friday or on weekends  · Form/Paperwork Completion:  Please note there is a $25 fee for all paperwork completed by our providers. We ask that you allow 7-14 business days. Pre-payment is due prior to picking up/faxing the completed form. You may also download your forms to [email or web address] to have your doctor print off.
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
## 2019-01-01 ENCOUNTER — PATIENT OUTREACH (OUTPATIENT)
Dept: INTERNAL MEDICINE CLINIC | Age: 84
End: 2019-01-01

## 2019-01-01 ENCOUNTER — ANESTHESIA (OUTPATIENT)
Dept: SURGERY | Age: 84
DRG: 467 | End: 2019-01-01
Payer: MEDICARE

## 2019-01-01 ENCOUNTER — HOSPITAL ENCOUNTER (INPATIENT)
Age: 84
LOS: 3 days | Discharge: NURSING, REHAB OR LONG-TERM CARE | DRG: 470 | End: 2019-04-02
Attending: EMERGENCY MEDICINE | Admitting: INTERNAL MEDICINE
Payer: MEDICARE

## 2019-01-01 ENCOUNTER — PATIENT OUTREACH (OUTPATIENT)
Dept: CASE MANAGEMENT | Age: 84
End: 2019-01-01

## 2019-01-01 ENCOUNTER — TELEPHONE (OUTPATIENT)
Dept: NEUROLOGY | Age: 84
End: 2019-01-01

## 2019-01-01 ENCOUNTER — ANESTHESIA EVENT (OUTPATIENT)
Dept: SURGERY | Age: 84
DRG: 470 | End: 2019-01-01
Payer: MEDICARE

## 2019-01-01 ENCOUNTER — TELEPHONE (OUTPATIENT)
Dept: INTERNAL MEDICINE CLINIC | Age: 84
End: 2019-01-01

## 2019-01-01 ENCOUNTER — APPOINTMENT (OUTPATIENT)
Dept: ULTRASOUND IMAGING | Age: 84
DRG: 465 | End: 2019-01-01
Attending: NURSE PRACTITIONER
Payer: MEDICARE

## 2019-01-01 ENCOUNTER — PATIENT OUTREACH (OUTPATIENT)
Dept: FAMILY MEDICINE CLINIC | Age: 84
End: 2019-01-01

## 2019-01-01 ENCOUNTER — APPOINTMENT (OUTPATIENT)
Dept: GENERAL RADIOLOGY | Age: 84
DRG: 470 | End: 2019-01-01
Attending: EMERGENCY MEDICINE
Payer: MEDICARE

## 2019-01-01 ENCOUNTER — APPOINTMENT (OUTPATIENT)
Dept: GENERAL RADIOLOGY | Age: 84
DRG: 871 | End: 2019-01-01
Attending: INTERNAL MEDICINE
Payer: MEDICARE

## 2019-01-01 ENCOUNTER — HOSPITAL ENCOUNTER (INPATIENT)
Age: 84
LOS: 4 days | Discharge: HOSPICE | DRG: 871 | End: 2019-07-19
Attending: STUDENT IN AN ORGANIZED HEALTH CARE EDUCATION/TRAINING PROGRAM | Admitting: INTERNAL MEDICINE
Payer: MEDICARE

## 2019-01-01 ENCOUNTER — APPOINTMENT (OUTPATIENT)
Dept: GENERAL RADIOLOGY | Age: 84
DRG: 470 | End: 2019-01-01
Attending: PHYSICIAN ASSISTANT
Payer: MEDICARE

## 2019-01-01 ENCOUNTER — ANESTHESIA (OUTPATIENT)
Dept: SURGERY | Age: 84
DRG: 465 | End: 2019-01-01
Payer: MEDICARE

## 2019-01-01 ENCOUNTER — HOSPITAL ENCOUNTER (INPATIENT)
Age: 84
LOS: 4 days | Discharge: NURSING, REHAB OR LONG-TERM CARE | DRG: 467 | End: 2019-04-19
Attending: EMERGENCY MEDICINE | Admitting: ORTHOPAEDIC SURGERY
Payer: MEDICARE

## 2019-01-01 ENCOUNTER — HOSPITAL ENCOUNTER (INPATIENT)
Age: 84
LOS: 1 days | Discharge: NURSING, REHAB OR LONG-TERM CARE | DRG: 641 | End: 2019-07-08
Attending: EMERGENCY MEDICINE | Admitting: INTERNAL MEDICINE
Payer: MEDICARE

## 2019-01-01 ENCOUNTER — APPOINTMENT (OUTPATIENT)
Dept: GENERAL RADIOLOGY | Age: 84
DRG: 467 | End: 2019-01-01
Attending: PHYSICIAN ASSISTANT
Payer: MEDICARE

## 2019-01-01 ENCOUNTER — OFFICE VISIT (OUTPATIENT)
Dept: NEUROLOGY | Age: 84
End: 2019-01-01

## 2019-01-01 ENCOUNTER — APPOINTMENT (OUTPATIENT)
Dept: GENERAL RADIOLOGY | Age: 84
DRG: 465 | End: 2019-01-01
Attending: EMERGENCY MEDICINE
Payer: MEDICARE

## 2019-01-01 ENCOUNTER — OFFICE VISIT (OUTPATIENT)
Dept: INTERNAL MEDICINE CLINIC | Age: 84
End: 2019-01-01

## 2019-01-01 ENCOUNTER — APPOINTMENT (OUTPATIENT)
Dept: GENERAL RADIOLOGY | Age: 84
DRG: 871 | End: 2019-01-01
Attending: STUDENT IN AN ORGANIZED HEALTH CARE EDUCATION/TRAINING PROGRAM
Payer: MEDICARE

## 2019-01-01 ENCOUNTER — ANESTHESIA EVENT (OUTPATIENT)
Dept: SURGERY | Age: 84
DRG: 465 | End: 2019-01-01
Payer: MEDICARE

## 2019-01-01 ENCOUNTER — APPOINTMENT (OUTPATIENT)
Dept: CT IMAGING | Age: 84
DRG: 871 | End: 2019-01-01
Attending: STUDENT IN AN ORGANIZED HEALTH CARE EDUCATION/TRAINING PROGRAM
Payer: MEDICARE

## 2019-01-01 ENCOUNTER — APPOINTMENT (OUTPATIENT)
Dept: MRI IMAGING | Age: 84
DRG: 871 | End: 2019-01-01
Attending: INTERNAL MEDICINE
Payer: MEDICARE

## 2019-01-01 ENCOUNTER — HOSPITAL ENCOUNTER (INPATIENT)
Age: 84
LOS: 7 days | Discharge: NURSING, REHAB OR LONG-TERM CARE | DRG: 465 | End: 2019-05-17
Attending: EMERGENCY MEDICINE | Admitting: ORTHOPAEDIC SURGERY
Payer: MEDICARE

## 2019-01-01 ENCOUNTER — ANESTHESIA (OUTPATIENT)
Dept: SURGERY | Age: 84
DRG: 470 | End: 2019-01-01
Payer: MEDICARE

## 2019-01-01 ENCOUNTER — APPOINTMENT (OUTPATIENT)
Dept: CT IMAGING | Age: 84
DRG: 641 | End: 2019-01-01
Attending: EMERGENCY MEDICINE
Payer: MEDICARE

## 2019-01-01 ENCOUNTER — APPOINTMENT (OUTPATIENT)
Dept: GENERAL RADIOLOGY | Age: 84
DRG: 641 | End: 2019-01-01
Attending: EMERGENCY MEDICINE
Payer: MEDICARE

## 2019-01-01 ENCOUNTER — APPOINTMENT (OUTPATIENT)
Dept: GENERAL RADIOLOGY | Age: 84
DRG: 641 | End: 2019-01-01
Attending: INTERNAL MEDICINE
Payer: MEDICARE

## 2019-01-01 ENCOUNTER — ANESTHESIA EVENT (OUTPATIENT)
Dept: SURGERY | Age: 84
DRG: 467 | End: 2019-01-01
Payer: MEDICARE

## 2019-01-01 ENCOUNTER — DOCUMENTATION ONLY (OUTPATIENT)
Dept: CASE MANAGEMENT | Age: 84
End: 2019-01-01

## 2019-01-01 VITALS
OXYGEN SATURATION: 93 % | WEIGHT: 123.24 LBS | HEIGHT: 63 IN | RESPIRATION RATE: 16 BRPM | DIASTOLIC BLOOD PRESSURE: 74 MMHG | HEART RATE: 79 BPM | SYSTOLIC BLOOD PRESSURE: 126 MMHG | BODY MASS INDEX: 21.84 KG/M2 | TEMPERATURE: 98.4 F

## 2019-01-01 VITALS
BODY MASS INDEX: 18.03 KG/M2 | HEIGHT: 64 IN | OXYGEN SATURATION: 99 % | TEMPERATURE: 97.6 F | WEIGHT: 105.6 LBS | SYSTOLIC BLOOD PRESSURE: 126 MMHG | RESPIRATION RATE: 18 BRPM | DIASTOLIC BLOOD PRESSURE: 59 MMHG | HEART RATE: 76 BPM

## 2019-01-01 VITALS
SYSTOLIC BLOOD PRESSURE: 127 MMHG | OXYGEN SATURATION: 96 % | WEIGHT: 117.5 LBS | HEIGHT: 64 IN | DIASTOLIC BLOOD PRESSURE: 79 MMHG | BODY MASS INDEX: 20.06 KG/M2 | RESPIRATION RATE: 18 BRPM | HEART RATE: 77 BPM | TEMPERATURE: 98.3 F

## 2019-01-01 VITALS
SYSTOLIC BLOOD PRESSURE: 143 MMHG | DIASTOLIC BLOOD PRESSURE: 74 MMHG | OXYGEN SATURATION: 96 % | TEMPERATURE: 98.3 F | HEART RATE: 73 BPM | RESPIRATION RATE: 16 BRPM

## 2019-01-01 VITALS
RESPIRATION RATE: 18 BRPM | TEMPERATURE: 97.6 F | BODY MASS INDEX: 19.49 KG/M2 | HEART RATE: 71 BPM | WEIGHT: 110 LBS | DIASTOLIC BLOOD PRESSURE: 60 MMHG | SYSTOLIC BLOOD PRESSURE: 97 MMHG | HEIGHT: 63 IN | OXYGEN SATURATION: 94 %

## 2019-01-01 VITALS
SYSTOLIC BLOOD PRESSURE: 125 MMHG | WEIGHT: 114 LBS | HEIGHT: 63 IN | DIASTOLIC BLOOD PRESSURE: 75 MMHG | BODY MASS INDEX: 20.2 KG/M2 | HEART RATE: 58 BPM | OXYGEN SATURATION: 98 %

## 2019-01-01 VITALS
SYSTOLIC BLOOD PRESSURE: 124 MMHG | HEIGHT: 62 IN | HEART RATE: 76 BPM | RESPIRATION RATE: 18 BRPM | DIASTOLIC BLOOD PRESSURE: 68 MMHG | OXYGEN SATURATION: 97 % | WEIGHT: 97.88 LBS | BODY MASS INDEX: 18.01 KG/M2 | TEMPERATURE: 98.4 F

## 2019-01-01 DIAGNOSIS — Z96.649 S/P HIP HEMIARTHROPLASTY: ICD-10-CM

## 2019-01-01 DIAGNOSIS — F02.80 LATE ONSET ALZHEIMER'S DISEASE WITHOUT BEHAVIORAL DISTURBANCE (HCC): ICD-10-CM

## 2019-01-01 DIAGNOSIS — F48.2 PBA (PSEUDOBULBAR AFFECT): ICD-10-CM

## 2019-01-01 DIAGNOSIS — F03.90 DEMENTIA WITHOUT BEHAVIORAL DISTURBANCE, UNSPECIFIED DEMENTIA TYPE: ICD-10-CM

## 2019-01-01 DIAGNOSIS — J43.9 PULMONARY EMPHYSEMA, UNSPECIFIED EMPHYSEMA TYPE (HCC): ICD-10-CM

## 2019-01-01 DIAGNOSIS — R41.82 ALTERED MENTAL STATUS, UNSPECIFIED ALTERED MENTAL STATUS TYPE: Primary | ICD-10-CM

## 2019-01-01 DIAGNOSIS — E78.2 MIXED HYPERLIPIDEMIA: ICD-10-CM

## 2019-01-01 DIAGNOSIS — S73.015A POSTERIOR DISLOCATION OF LEFT HIP, INITIAL ENCOUNTER (HCC): Primary | ICD-10-CM

## 2019-01-01 DIAGNOSIS — Z96.642 STATUS POST TOTAL REPLACEMENT OF LEFT HIP: ICD-10-CM

## 2019-01-01 DIAGNOSIS — E86.0 DEHYDRATION: Primary | ICD-10-CM

## 2019-01-01 DIAGNOSIS — R79.89 AZOTEMIA: ICD-10-CM

## 2019-01-01 DIAGNOSIS — R40.0 SOMNOLENCE: ICD-10-CM

## 2019-01-01 DIAGNOSIS — J18.9 COMMUNITY ACQUIRED PNEUMONIA, UNSPECIFIED LATERALITY: ICD-10-CM

## 2019-01-01 DIAGNOSIS — F02.80 LATE ONSET ALZHEIMER'S DISEASE WITHOUT BEHAVIORAL DISTURBANCE (HCC): Primary | ICD-10-CM

## 2019-01-01 DIAGNOSIS — S73.035A ANTERIOR DISLOCATION OF LEFT HIP, INITIAL ENCOUNTER (HCC): Primary | ICD-10-CM

## 2019-01-01 DIAGNOSIS — S73.005A CLOSED DISLOCATION OF LEFT HIP, INITIAL ENCOUNTER (HCC): ICD-10-CM

## 2019-01-01 DIAGNOSIS — G30.1 LATE ONSET ALZHEIMER'S DISEASE WITHOUT BEHAVIORAL DISTURBANCE (HCC): ICD-10-CM

## 2019-01-01 DIAGNOSIS — R41.89 ALTERED THOUGHT PROCESSES: ICD-10-CM

## 2019-01-01 DIAGNOSIS — G30.1 LATE ONSET ALZHEIMER'S DISEASE WITHOUT BEHAVIORAL DISTURBANCE (HCC): Primary | ICD-10-CM

## 2019-01-01 DIAGNOSIS — I63.9 ACUTE CVA (CEREBROVASCULAR ACCIDENT) (HCC): ICD-10-CM

## 2019-01-01 DIAGNOSIS — I10 ESSENTIAL HYPERTENSION: ICD-10-CM

## 2019-01-01 DIAGNOSIS — S72.002A LEFT DISPLACED FEMORAL NECK FRACTURE (HCC): Primary | ICD-10-CM

## 2019-01-01 LAB
ABO + RH BLD: NORMAL
ALBUMIN SERPL-MCNC: 1.7 G/DL (ref 3.5–5)
ALBUMIN SERPL-MCNC: 1.9 G/DL (ref 3.5–5)
ALBUMIN SERPL-MCNC: 1.9 G/DL (ref 3.5–5)
ALBUMIN SERPL-MCNC: 2.1 G/DL (ref 3.5–5)
ALBUMIN SERPL-MCNC: 2.7 G/DL (ref 3.5–5)
ALBUMIN SERPL-MCNC: 3.3 G/DL (ref 3.5–5)
ALBUMIN SERPL-MCNC: 3.4 G/DL (ref 3.5–5)
ALBUMIN SERPL-MCNC: 3.6 G/DL (ref 3.5–5)
ALBUMIN/GLOB SERPL: 0.4 {RATIO} (ref 1.1–2.2)
ALBUMIN/GLOB SERPL: 0.5 {RATIO} (ref 1.1–2.2)
ALBUMIN/GLOB SERPL: 0.7 {RATIO} (ref 1.1–2.2)
ALBUMIN/GLOB SERPL: 0.7 {RATIO} (ref 1.1–2.2)
ALBUMIN/GLOB SERPL: 0.8 {RATIO} (ref 1.1–2.2)
ALBUMIN/GLOB SERPL: 0.8 {RATIO} (ref 1.1–2.2)
ALP SERPL-CCNC: 101 U/L (ref 45–117)
ALP SERPL-CCNC: 112 U/L (ref 45–117)
ALP SERPL-CCNC: 81 U/L (ref 45–117)
ALP SERPL-CCNC: 82 U/L (ref 45–117)
ALP SERPL-CCNC: 85 U/L (ref 45–117)
ALP SERPL-CCNC: 91 U/L (ref 45–117)
ALP SERPL-CCNC: 93 U/L (ref 45–117)
ALP SERPL-CCNC: 94 U/L (ref 45–117)
ALT SERPL-CCNC: 13 U/L (ref 12–78)
ALT SERPL-CCNC: 14 U/L (ref 12–78)
ALT SERPL-CCNC: 16 U/L (ref 12–78)
ALT SERPL-CCNC: 18 U/L (ref 12–78)
ALT SERPL-CCNC: 19 U/L (ref 12–78)
ALT SERPL-CCNC: 21 U/L (ref 12–78)
ALT SERPL-CCNC: 21 U/L (ref 12–78)
ALT SERPL-CCNC: 25 U/L (ref 12–78)
AMORPH CRY URNS QL MICRO: ABNORMAL
ANION GAP SERPL CALC-SCNC: 4 MMOL/L (ref 5–15)
ANION GAP SERPL CALC-SCNC: 5 MMOL/L (ref 5–15)
ANION GAP SERPL CALC-SCNC: 6 MMOL/L (ref 5–15)
ANION GAP SERPL CALC-SCNC: 7 MMOL/L (ref 5–15)
ANION GAP SERPL CALC-SCNC: 8 MMOL/L (ref 5–15)
ANION GAP SERPL CALC-SCNC: 9 MMOL/L (ref 5–15)
APPEARANCE UR: ABNORMAL
APPEARANCE UR: CLEAR
APTT PPP: 28.5 SEC (ref 22.1–32)
AST SERPL-CCNC: 15 U/L (ref 15–37)
AST SERPL-CCNC: 16 U/L (ref 15–37)
AST SERPL-CCNC: 20 U/L (ref 15–37)
AST SERPL-CCNC: 22 U/L (ref 15–37)
AST SERPL-CCNC: 25 U/L (ref 15–37)
AST SERPL-CCNC: 27 U/L (ref 15–37)
AST SERPL-CCNC: 32 U/L (ref 15–37)
AST SERPL-CCNC: 35 U/L (ref 15–37)
ATRIAL RATE: 68 BPM
ATRIAL RATE: 77 BPM
ATRIAL RATE: 78 BPM
ATRIAL RATE: 84 BPM
BACTERIA SPEC CULT: ABNORMAL
BACTERIA SPEC CULT: NORMAL
BACTERIA URNS QL MICRO: NEGATIVE /HPF
BASOPHILS # BLD: 0 K/UL (ref 0–0.1)
BASOPHILS NFR BLD: 0 % (ref 0–1)
BASOPHILS NFR BLD: 1 % (ref 0–1)
BILIRUB SERPL-MCNC: 0.4 MG/DL (ref 0.2–1)
BILIRUB SERPL-MCNC: 0.5 MG/DL (ref 0.2–1)
BILIRUB SERPL-MCNC: 0.6 MG/DL (ref 0.2–1)
BILIRUB SERPL-MCNC: 0.7 MG/DL (ref 0.2–1)
BILIRUB SERPL-MCNC: 0.8 MG/DL (ref 0.2–1)
BILIRUB UR QL CFM: NEGATIVE
BILIRUB UR QL CFM: NEGATIVE
BILIRUB UR QL: NEGATIVE
BLD PROD TYP BPU: NORMAL
BLOOD GROUP ANTIBODIES SERPL: NORMAL
BPU ID: NORMAL
BUN SERPL-MCNC: 11 MG/DL (ref 6–20)
BUN SERPL-MCNC: 13 MG/DL (ref 6–20)
BUN SERPL-MCNC: 13 MG/DL (ref 6–20)
BUN SERPL-MCNC: 16 MG/DL (ref 6–20)
BUN SERPL-MCNC: 18 MG/DL (ref 6–20)
BUN SERPL-MCNC: 18 MG/DL (ref 6–20)
BUN SERPL-MCNC: 20 MG/DL (ref 6–20)
BUN SERPL-MCNC: 21 MG/DL (ref 6–20)
BUN SERPL-MCNC: 25 MG/DL (ref 6–20)
BUN SERPL-MCNC: 25 MG/DL (ref 6–20)
BUN SERPL-MCNC: 26 MG/DL (ref 6–20)
BUN SERPL-MCNC: 40 MG/DL (ref 6–20)
BUN SERPL-MCNC: 40 MG/DL (ref 6–20)
BUN SERPL-MCNC: 8 MG/DL (ref 6–20)
BUN SERPL-MCNC: 9 MG/DL (ref 6–20)
BUN SERPL-MCNC: 9 MG/DL (ref 6–20)
BUN/CREAT SERPL: 13 (ref 12–20)
BUN/CREAT SERPL: 14 (ref 12–20)
BUN/CREAT SERPL: 20 (ref 12–20)
BUN/CREAT SERPL: 20 (ref 12–20)
BUN/CREAT SERPL: 22 (ref 12–20)
BUN/CREAT SERPL: 22 (ref 12–20)
BUN/CREAT SERPL: 23 (ref 12–20)
BUN/CREAT SERPL: 23 (ref 12–20)
BUN/CREAT SERPL: 24 (ref 12–20)
BUN/CREAT SERPL: 25 (ref 12–20)
BUN/CREAT SERPL: 29 (ref 12–20)
BUN/CREAT SERPL: 31 (ref 12–20)
BUN/CREAT SERPL: 32 (ref 12–20)
BUN/CREAT SERPL: 38 (ref 12–20)
BUN/CREAT SERPL: 39 (ref 12–20)
BUN/CREAT SERPL: 51 (ref 12–20)
CALCIUM SERPL-MCNC: 7.4 MG/DL (ref 8.5–10.1)
CALCIUM SERPL-MCNC: 7.7 MG/DL (ref 8.5–10.1)
CALCIUM SERPL-MCNC: 7.8 MG/DL (ref 8.5–10.1)
CALCIUM SERPL-MCNC: 7.9 MG/DL (ref 8.5–10.1)
CALCIUM SERPL-MCNC: 7.9 MG/DL (ref 8.5–10.1)
CALCIUM SERPL-MCNC: 8.1 MG/DL (ref 8.5–10.1)
CALCIUM SERPL-MCNC: 8.1 MG/DL (ref 8.5–10.1)
CALCIUM SERPL-MCNC: 8.2 MG/DL (ref 8.5–10.1)
CALCIUM SERPL-MCNC: 8.3 MG/DL (ref 8.5–10.1)
CALCIUM SERPL-MCNC: 8.5 MG/DL (ref 8.5–10.1)
CALCIUM SERPL-MCNC: 8.6 MG/DL (ref 8.5–10.1)
CALCIUM SERPL-MCNC: 8.7 MG/DL (ref 8.5–10.1)
CALCIUM SERPL-MCNC: 8.9 MG/DL (ref 8.5–10.1)
CALCIUM SERPL-MCNC: 9.2 MG/DL (ref 8.5–10.1)
CALCIUM SERPL-MCNC: 9.3 MG/DL (ref 8.5–10.1)
CALCIUM SERPL-MCNC: 9.5 MG/DL (ref 8.5–10.1)
CALCULATED P AXIS, ECG09: 39 DEGREES
CALCULATED P AXIS, ECG09: 82 DEGREES
CALCULATED P AXIS, ECG09: 84 DEGREES
CALCULATED P AXIS, ECG09: 94 DEGREES
CALCULATED R AXIS, ECG10: -17 DEGREES
CALCULATED R AXIS, ECG10: -26 DEGREES
CALCULATED R AXIS, ECG10: -35 DEGREES
CALCULATED R AXIS, ECG10: -37 DEGREES
CALCULATED T AXIS, ECG11: 113 DEGREES
CALCULATED T AXIS, ECG11: 51 DEGREES
CALCULATED T AXIS, ECG11: 52 DEGREES
CALCULATED T AXIS, ECG11: 59 DEGREES
CC UR VC: NORMAL
CHLORIDE SERPL-SCNC: 101 MMOL/L (ref 97–108)
CHLORIDE SERPL-SCNC: 102 MMOL/L (ref 97–108)
CHLORIDE SERPL-SCNC: 103 MMOL/L (ref 97–108)
CHLORIDE SERPL-SCNC: 103 MMOL/L (ref 97–108)
CHLORIDE SERPL-SCNC: 104 MMOL/L (ref 97–108)
CHLORIDE SERPL-SCNC: 105 MMOL/L (ref 97–108)
CHLORIDE SERPL-SCNC: 106 MMOL/L (ref 97–108)
CHLORIDE SERPL-SCNC: 107 MMOL/L (ref 97–108)
CHLORIDE SERPL-SCNC: 107 MMOL/L (ref 97–108)
CHLORIDE SERPL-SCNC: 108 MMOL/L (ref 97–108)
CHLORIDE SERPL-SCNC: 109 MMOL/L (ref 97–108)
CHLORIDE SERPL-SCNC: 110 MMOL/L (ref 97–108)
CHLORIDE SERPL-SCNC: 111 MMOL/L (ref 97–108)
CO2 SERPL-SCNC: 22 MMOL/L (ref 21–32)
CO2 SERPL-SCNC: 23 MMOL/L (ref 21–32)
CO2 SERPL-SCNC: 23 MMOL/L (ref 21–32)
CO2 SERPL-SCNC: 24 MMOL/L (ref 21–32)
CO2 SERPL-SCNC: 25 MMOL/L (ref 21–32)
CO2 SERPL-SCNC: 26 MMOL/L (ref 21–32)
CO2 SERPL-SCNC: 26 MMOL/L (ref 21–32)
CO2 SERPL-SCNC: 27 MMOL/L (ref 21–32)
CO2 SERPL-SCNC: 27 MMOL/L (ref 21–32)
CO2 SERPL-SCNC: 28 MMOL/L (ref 21–32)
CO2 SERPL-SCNC: 28 MMOL/L (ref 21–32)
CO2 SERPL-SCNC: 29 MMOL/L (ref 21–32)
CO2 SERPL-SCNC: 30 MMOL/L (ref 21–32)
CO2 SERPL-SCNC: 30 MMOL/L (ref 21–32)
COLOR UR: ABNORMAL
COLOR UR: NORMAL
COLOR UR: NORMAL
COMMENT, HOLDF: NORMAL
CREAT SERPL-MCNC: 0.41 MG/DL (ref 0.7–1.3)
CREAT SERPL-MCNC: 0.42 MG/DL (ref 0.7–1.3)
CREAT SERPL-MCNC: 0.46 MG/DL (ref 0.7–1.3)
CREAT SERPL-MCNC: 0.49 MG/DL (ref 0.7–1.3)
CREAT SERPL-MCNC: 0.54 MG/DL (ref 0.7–1.3)
CREAT SERPL-MCNC: 0.55 MG/DL (ref 0.7–1.3)
CREAT SERPL-MCNC: 0.62 MG/DL (ref 0.7–1.3)
CREAT SERPL-MCNC: 0.65 MG/DL (ref 0.7–1.3)
CREAT SERPL-MCNC: 0.8 MG/DL (ref 0.7–1.3)
CREAT SERPL-MCNC: 0.81 MG/DL (ref 0.7–1.3)
CREAT SERPL-MCNC: 0.81 MG/DL (ref 0.7–1.3)
CREAT SERPL-MCNC: 0.83 MG/DL (ref 0.7–1.3)
CREAT SERPL-MCNC: 0.87 MG/DL (ref 0.7–1.3)
CREAT SERPL-MCNC: 1.06 MG/DL (ref 0.7–1.3)
CREAT SERPL-MCNC: 1.11 MG/DL (ref 0.7–1.3)
CREAT SERPL-MCNC: 1.38 MG/DL (ref 0.7–1.3)
CROSSMATCH RESULT,%XM: NORMAL
CRP SERPL-MCNC: 12.9 MG/DL (ref 0–0.6)
DATE LAST DOSE: ABNORMAL
DATE LAST DOSE: NORMAL
DIAGNOSIS, 93000: NORMAL
DIFFERENTIAL METHOD BLD: ABNORMAL
EOSINOPHIL # BLD: 0 K/UL (ref 0–0.4)
EOSINOPHIL # BLD: 0.1 K/UL (ref 0–0.4)
EOSINOPHIL # BLD: 0.2 K/UL (ref 0–0.4)
EOSINOPHIL # BLD: 0.3 K/UL (ref 0–0.4)
EOSINOPHIL NFR BLD: 0 % (ref 0–7)
EOSINOPHIL NFR BLD: 1 % (ref 0–7)
EOSINOPHIL NFR BLD: 4 % (ref 0–7)
EOSINOPHIL NFR BLD: 5 % (ref 0–7)
EPITH CASTS URNS QL MICRO: ABNORMAL /LPF
EPITH CASTS URNS QL MICRO: NORMAL /LPF
EPITH CASTS URNS QL MICRO: NORMAL /LPF
ERYTHROCYTE [DISTWIDTH] IN BLOOD BY AUTOMATED COUNT: 13.4 % (ref 11.5–14.5)
ERYTHROCYTE [DISTWIDTH] IN BLOOD BY AUTOMATED COUNT: 13.6 % (ref 11.5–14.5)
ERYTHROCYTE [DISTWIDTH] IN BLOOD BY AUTOMATED COUNT: 13.8 % (ref 11.5–14.5)
ERYTHROCYTE [DISTWIDTH] IN BLOOD BY AUTOMATED COUNT: 15.8 % (ref 11.5–14.5)
ERYTHROCYTE [DISTWIDTH] IN BLOOD BY AUTOMATED COUNT: 15.8 % (ref 11.5–14.5)
ERYTHROCYTE [DISTWIDTH] IN BLOOD BY AUTOMATED COUNT: 15.9 % (ref 11.5–14.5)
ERYTHROCYTE [DISTWIDTH] IN BLOOD BY AUTOMATED COUNT: 16 % (ref 11.5–14.5)
ERYTHROCYTE [DISTWIDTH] IN BLOOD BY AUTOMATED COUNT: 16.7 % (ref 11.5–14.5)
ERYTHROCYTE [DISTWIDTH] IN BLOOD BY AUTOMATED COUNT: 16.8 % (ref 11.5–14.5)
ERYTHROCYTE [DISTWIDTH] IN BLOOD BY AUTOMATED COUNT: 16.8 % (ref 11.5–14.5)
ERYTHROCYTE [DISTWIDTH] IN BLOOD BY AUTOMATED COUNT: 16.9 % (ref 11.5–14.5)
ERYTHROCYTE [DISTWIDTH] IN BLOOD BY AUTOMATED COUNT: 17 % (ref 11.5–14.5)
ERYTHROCYTE [DISTWIDTH] IN BLOOD BY AUTOMATED COUNT: 17 % (ref 11.5–14.5)
ERYTHROCYTE [DISTWIDTH] IN BLOOD BY AUTOMATED COUNT: 17.1 % (ref 11.5–14.5)
ERYTHROCYTE [DISTWIDTH] IN BLOOD BY AUTOMATED COUNT: 17.2 % (ref 11.5–14.5)
ERYTHROCYTE [DISTWIDTH] IN BLOOD BY AUTOMATED COUNT: 17.7 % (ref 11.5–14.5)
ERYTHROCYTE [SEDIMENTATION RATE] IN BLOOD: 63 MM/HR (ref 0–20)
ERYTHROCYTE [SEDIMENTATION RATE] IN BLOOD: 79 MM/HR (ref 0–20)
GLOBULIN SER CALC-MCNC: 3.9 G/DL (ref 2–4)
GLOBULIN SER CALC-MCNC: 4.2 G/DL (ref 2–4)
GLOBULIN SER CALC-MCNC: 4.4 G/DL (ref 2–4)
GLOBULIN SER CALC-MCNC: 4.4 G/DL (ref 2–4)
GLOBULIN SER CALC-MCNC: 4.5 G/DL (ref 2–4)
GLOBULIN SER CALC-MCNC: 5 G/DL (ref 2–4)
GLUCOSE SERPL-MCNC: 100 MG/DL (ref 65–100)
GLUCOSE SERPL-MCNC: 103 MG/DL (ref 65–100)
GLUCOSE SERPL-MCNC: 109 MG/DL (ref 65–100)
GLUCOSE SERPL-MCNC: 111 MG/DL (ref 65–100)
GLUCOSE SERPL-MCNC: 118 MG/DL (ref 65–100)
GLUCOSE SERPL-MCNC: 119 MG/DL (ref 65–100)
GLUCOSE SERPL-MCNC: 130 MG/DL (ref 65–100)
GLUCOSE SERPL-MCNC: 161 MG/DL (ref 65–100)
GLUCOSE SERPL-MCNC: 82 MG/DL (ref 65–100)
GLUCOSE SERPL-MCNC: 88 MG/DL (ref 65–100)
GLUCOSE SERPL-MCNC: 90 MG/DL (ref 65–100)
GLUCOSE SERPL-MCNC: 92 MG/DL (ref 65–100)
GLUCOSE SERPL-MCNC: 92 MG/DL (ref 65–100)
GLUCOSE SERPL-MCNC: 93 MG/DL (ref 65–100)
GLUCOSE SERPL-MCNC: 94 MG/DL (ref 65–100)
GLUCOSE SERPL-MCNC: 97 MG/DL (ref 65–100)
GLUCOSE UR STRIP.AUTO-MCNC: NEGATIVE MG/DL
GRAM STN SPEC: ABNORMAL
HCT VFR BLD AUTO: 23.9 % (ref 36.6–50.3)
HCT VFR BLD AUTO: 24.5 % (ref 36.6–50.3)
HCT VFR BLD AUTO: 25.7 % (ref 36.6–50.3)
HCT VFR BLD AUTO: 27.2 % (ref 36.6–50.3)
HCT VFR BLD AUTO: 27.5 % (ref 36.6–50.3)
HCT VFR BLD AUTO: 27.7 % (ref 36.6–50.3)
HCT VFR BLD AUTO: 27.9 % (ref 36.6–50.3)
HCT VFR BLD AUTO: 28.1 % (ref 36.6–50.3)
HCT VFR BLD AUTO: 28.5 % (ref 36.6–50.3)
HCT VFR BLD AUTO: 28.7 % (ref 36.6–50.3)
HCT VFR BLD AUTO: 29.1 % (ref 36.6–50.3)
HCT VFR BLD AUTO: 29.4 % (ref 36.6–50.3)
HCT VFR BLD AUTO: 30.8 % (ref 36.6–50.3)
HCT VFR BLD AUTO: 33 % (ref 36.6–50.3)
HCT VFR BLD AUTO: 33.4 % (ref 36.6–50.3)
HCT VFR BLD AUTO: 33.6 % (ref 36.6–50.3)
HCT VFR BLD AUTO: 36.2 % (ref 36.6–50.3)
HCT VFR BLD AUTO: 41.5 % (ref 36.6–50.3)
HGB BLD-MCNC: 10.1 G/DL (ref 12.1–17)
HGB BLD-MCNC: 10.3 G/DL (ref 12.1–17)
HGB BLD-MCNC: 10.3 G/DL (ref 12.1–17)
HGB BLD-MCNC: 10.4 G/DL (ref 12.1–17)
HGB BLD-MCNC: 10.8 G/DL (ref 12.1–17)
HGB BLD-MCNC: 11.3 G/DL (ref 12.1–17)
HGB BLD-MCNC: 14 G/DL (ref 12.1–17)
HGB BLD-MCNC: 7.2 G/DL (ref 12.1–17)
HGB BLD-MCNC: 7.8 G/DL (ref 12.1–17)
HGB BLD-MCNC: 8.1 G/DL (ref 12.1–17)
HGB BLD-MCNC: 8.1 G/DL (ref 12.1–17)
HGB BLD-MCNC: 8.4 G/DL (ref 12.1–17)
HGB BLD-MCNC: 8.5 G/DL (ref 12.1–17)
HGB BLD-MCNC: 8.6 G/DL (ref 12.1–17)
HGB BLD-MCNC: 8.6 G/DL (ref 12.1–17)
HGB BLD-MCNC: 8.7 G/DL (ref 12.1–17)
HGB BLD-MCNC: 8.8 G/DL (ref 12.1–17)
HGB BLD-MCNC: 8.8 G/DL (ref 12.1–17)
HGB BLD-MCNC: 9 G/DL (ref 12.1–17)
HGB BLD-MCNC: 9.1 G/DL (ref 12.1–17)
HGB BLD-MCNC: 9.1 G/DL (ref 12.1–17)
HGB UR QL STRIP: NEGATIVE
HYALINE CASTS URNS QL MICRO: ABNORMAL /LPF (ref 0–5)
HYALINE CASTS URNS QL MICRO: NORMAL /LPF (ref 0–5)
HYALINE CASTS URNS QL MICRO: NORMAL /LPF (ref 0–5)
IMM GRANULOCYTES # BLD AUTO: 0 K/UL (ref 0–0.04)
IMM GRANULOCYTES # BLD AUTO: 0.1 K/UL (ref 0–0.04)
IMM GRANULOCYTES # BLD AUTO: 0.2 K/UL (ref 0–0.04)
IMM GRANULOCYTES NFR BLD AUTO: 0 % (ref 0–0.5)
IMM GRANULOCYTES NFR BLD AUTO: 1 % (ref 0–0.5)
INR PPP: 1 (ref 0.9–1.1)
INR PPP: 1.1 (ref 0.9–1.1)
INR PPP: 1.1 (ref 0.9–1.1)
KETONES UR QL STRIP.AUTO: 15 MG/DL
KETONES UR QL STRIP.AUTO: 40 MG/DL
KETONES UR QL STRIP.AUTO: NEGATIVE MG/DL
LACTATE SERPL-SCNC: 1 MMOL/L (ref 0.4–2)
LEUKOCYTE ESTERASE UR QL STRIP.AUTO: ABNORMAL
LEUKOCYTE ESTERASE UR QL STRIP.AUTO: NEGATIVE
LYMPHOCYTES # BLD: 0.8 K/UL (ref 0.8–3.5)
LYMPHOCYTES # BLD: 0.8 K/UL (ref 0.8–3.5)
LYMPHOCYTES # BLD: 1 K/UL (ref 0.8–3.5)
LYMPHOCYTES # BLD: 1 K/UL (ref 0.8–3.5)
LYMPHOCYTES # BLD: 1.1 K/UL (ref 0.8–3.5)
LYMPHOCYTES # BLD: 1.3 K/UL (ref 0.8–3.5)
LYMPHOCYTES # BLD: 1.4 K/UL (ref 0.8–3.5)
LYMPHOCYTES # BLD: 1.6 K/UL (ref 0.8–3.5)
LYMPHOCYTES # BLD: 1.6 K/UL (ref 0.8–3.5)
LYMPHOCYTES # BLD: 1.7 K/UL (ref 0.8–3.5)
LYMPHOCYTES # BLD: 1.8 K/UL (ref 0.8–3.5)
LYMPHOCYTES NFR BLD: 11 % (ref 12–49)
LYMPHOCYTES NFR BLD: 12 % (ref 12–49)
LYMPHOCYTES NFR BLD: 13 % (ref 12–49)
LYMPHOCYTES NFR BLD: 15 % (ref 12–49)
LYMPHOCYTES NFR BLD: 18 % (ref 12–49)
LYMPHOCYTES NFR BLD: 20 % (ref 12–49)
LYMPHOCYTES NFR BLD: 22 % (ref 12–49)
LYMPHOCYTES NFR BLD: 30 % (ref 12–49)
LYMPHOCYTES NFR BLD: 4 % (ref 12–49)
LYMPHOCYTES NFR BLD: 8 % (ref 12–49)
MAGNESIUM SERPL-MCNC: 1.8 MG/DL (ref 1.6–2.4)
MAGNESIUM SERPL-MCNC: 2 MG/DL (ref 1.6–2.4)
MAGNESIUM SERPL-MCNC: 2 MG/DL (ref 1.6–2.4)
MCH RBC QN AUTO: 28.8 PG (ref 26–34)
MCH RBC QN AUTO: 29 PG (ref 26–34)
MCH RBC QN AUTO: 29 PG (ref 26–34)
MCH RBC QN AUTO: 29.2 PG (ref 26–34)
MCH RBC QN AUTO: 29.3 PG (ref 26–34)
MCH RBC QN AUTO: 29.4 PG (ref 26–34)
MCH RBC QN AUTO: 29.5 PG (ref 26–34)
MCH RBC QN AUTO: 29.6 PG (ref 26–34)
MCH RBC QN AUTO: 29.7 PG (ref 26–34)
MCH RBC QN AUTO: 29.9 PG (ref 26–34)
MCH RBC QN AUTO: 29.9 PG (ref 26–34)
MCH RBC QN AUTO: 30.7 PG (ref 26–34)
MCH RBC QN AUTO: 31.7 PG (ref 26–34)
MCH RBC QN AUTO: 32.1 PG (ref 26–34)
MCH RBC QN AUTO: 32.3 PG (ref 26–34)
MCH RBC QN AUTO: 32.4 PG (ref 26–34)
MCH RBC QN AUTO: 32.5 PG (ref 26–34)
MCH RBC QN AUTO: 32.6 PG (ref 26–34)
MCHC RBC AUTO-ENTMCNC: 29.5 G/DL (ref 30–36.5)
MCHC RBC AUTO-ENTMCNC: 29.9 G/DL (ref 30–36.5)
MCHC RBC AUTO-ENTMCNC: 30.6 G/DL (ref 30–36.5)
MCHC RBC AUTO-ENTMCNC: 30.7 G/DL (ref 30–36.5)
MCHC RBC AUTO-ENTMCNC: 30.8 G/DL (ref 30–36.5)
MCHC RBC AUTO-ENTMCNC: 30.8 G/DL (ref 30–36.5)
MCHC RBC AUTO-ENTMCNC: 30.9 G/DL (ref 30–36.5)
MCHC RBC AUTO-ENTMCNC: 31.2 G/DL (ref 30–36.5)
MCHC RBC AUTO-ENTMCNC: 31.3 G/DL (ref 30–36.5)
MCHC RBC AUTO-ENTMCNC: 31.5 G/DL (ref 30–36.5)
MCHC RBC AUTO-ENTMCNC: 31.5 G/DL (ref 30–36.5)
MCHC RBC AUTO-ENTMCNC: 31.6 G/DL (ref 30–36.5)
MCHC RBC AUTO-ENTMCNC: 31.6 G/DL (ref 30–36.5)
MCHC RBC AUTO-ENTMCNC: 32.6 G/DL (ref 30–36.5)
MCHC RBC AUTO-ENTMCNC: 33.1 G/DL (ref 30–36.5)
MCHC RBC AUTO-ENTMCNC: 33.7 G/DL (ref 30–36.5)
MCV RBC AUTO: 100 FL (ref 80–99)
MCV RBC AUTO: 100 FL (ref 80–99)
MCV RBC AUTO: 100.4 FL (ref 80–99)
MCV RBC AUTO: 101.5 FL (ref 80–99)
MCV RBC AUTO: 102.8 FL (ref 80–99)
MCV RBC AUTO: 91.9 FL (ref 80–99)
MCV RBC AUTO: 92.1 FL (ref 80–99)
MCV RBC AUTO: 94.6 FL (ref 80–99)
MCV RBC AUTO: 95.2 FL (ref 80–99)
MCV RBC AUTO: 95.2 FL (ref 80–99)
MCV RBC AUTO: 95.7 FL (ref 80–99)
MCV RBC AUTO: 95.7 FL (ref 80–99)
MCV RBC AUTO: 96.3 FL (ref 80–99)
MCV RBC AUTO: 96.3 FL (ref 80–99)
MCV RBC AUTO: 97.6 FL (ref 80–99)
MCV RBC AUTO: 97.6 FL (ref 80–99)
MCV RBC AUTO: 99 FL (ref 80–99)
MCV RBC AUTO: 99.3 FL (ref 80–99)
MONOCYTES # BLD: 0.4 K/UL (ref 0–1)
MONOCYTES # BLD: 0.4 K/UL (ref 0–1)
MONOCYTES # BLD: 0.5 K/UL (ref 0–1)
MONOCYTES # BLD: 0.5 K/UL (ref 0–1)
MONOCYTES # BLD: 0.6 K/UL (ref 0–1)
MONOCYTES # BLD: 0.7 K/UL (ref 0–1)
MONOCYTES # BLD: 0.7 K/UL (ref 0–1)
MONOCYTES # BLD: 0.8 K/UL (ref 0–1)
MONOCYTES # BLD: 0.8 K/UL (ref 0–1)
MONOCYTES NFR BLD: 10 % (ref 5–13)
MONOCYTES NFR BLD: 10 % (ref 5–13)
MONOCYTES NFR BLD: 4 % (ref 5–13)
MONOCYTES NFR BLD: 4 % (ref 5–13)
MONOCYTES NFR BLD: 5 % (ref 5–13)
MONOCYTES NFR BLD: 6 % (ref 5–13)
MONOCYTES NFR BLD: 7 % (ref 5–13)
MONOCYTES NFR BLD: 8 % (ref 5–13)
MUCOUS THREADS URNS QL MICRO: ABNORMAL /LPF
NEUTS SEG # BLD: 17.1 K/UL (ref 1.8–8)
NEUTS SEG # BLD: 3.3 K/UL (ref 1.8–8)
NEUTS SEG # BLD: 4.2 K/UL (ref 1.8–8)
NEUTS SEG # BLD: 4.2 K/UL (ref 1.8–8)
NEUTS SEG # BLD: 5 K/UL (ref 1.8–8)
NEUTS SEG # BLD: 6.4 K/UL (ref 1.8–8)
NEUTS SEG # BLD: 6.6 K/UL (ref 1.8–8)
NEUTS SEG # BLD: 6.7 K/UL (ref 1.8–8)
NEUTS SEG # BLD: 8.1 K/UL (ref 1.8–8)
NEUTS SEG # BLD: 8.3 K/UL (ref 1.8–8)
NEUTS SEG # BLD: 8.7 K/UL (ref 1.8–8)
NEUTS SEG # BLD: 9 K/UL (ref 1.8–8)
NEUTS SEG # BLD: 9.3 K/UL (ref 1.8–8)
NEUTS SEG NFR BLD: 55 % (ref 32–75)
NEUTS SEG NFR BLD: 68 % (ref 32–75)
NEUTS SEG NFR BLD: 68 % (ref 32–75)
NEUTS SEG NFR BLD: 71 % (ref 32–75)
NEUTS SEG NFR BLD: 75 % (ref 32–75)
NEUTS SEG NFR BLD: 76 % (ref 32–75)
NEUTS SEG NFR BLD: 77 % (ref 32–75)
NEUTS SEG NFR BLD: 78 % (ref 32–75)
NEUTS SEG NFR BLD: 78 % (ref 32–75)
NEUTS SEG NFR BLD: 82 % (ref 32–75)
NEUTS SEG NFR BLD: 82 % (ref 32–75)
NEUTS SEG NFR BLD: 88 % (ref 32–75)
NEUTS SEG NFR BLD: 91 % (ref 32–75)
NITRITE UR QL STRIP.AUTO: NEGATIVE
NRBC # BLD: 0 K/UL (ref 0–0.01)
NRBC BLD-RTO: 0 PER 100 WBC
P-R INTERVAL, ECG05: 126 MS
P-R INTERVAL, ECG05: 152 MS
P-R INTERVAL, ECG05: 168 MS
P-R INTERVAL, ECG05: 172 MS
PH UR STRIP: 5 [PH] (ref 5–8)
PH UR STRIP: 5.5 [PH] (ref 5–8)
PH UR STRIP: 6 [PH] (ref 5–8)
PH UR STRIP: 7 [PH] (ref 5–8)
PH UR STRIP: 7.5 [PH] (ref 5–8)
PHOSPHATE SERPL-MCNC: 2 MG/DL (ref 2.6–4.7)
PLATELET # BLD AUTO: 132 K/UL (ref 150–400)
PLATELET # BLD AUTO: 189 K/UL (ref 150–400)
PLATELET # BLD AUTO: 191 K/UL (ref 150–400)
PLATELET # BLD AUTO: 203 K/UL (ref 150–400)
PLATELET # BLD AUTO: 204 K/UL (ref 150–400)
PLATELET # BLD AUTO: 218 K/UL (ref 150–400)
PLATELET # BLD AUTO: 239 K/UL (ref 150–400)
PLATELET # BLD AUTO: 239 K/UL (ref 150–400)
PLATELET # BLD AUTO: 255 K/UL (ref 150–400)
PLATELET # BLD AUTO: 276 K/UL (ref 150–400)
PLATELET # BLD AUTO: 284 K/UL (ref 150–400)
PLATELET # BLD AUTO: 296 K/UL (ref 150–400)
PLATELET # BLD AUTO: 298 K/UL (ref 150–400)
PLATELET # BLD AUTO: 301 K/UL (ref 150–400)
PLATELET # BLD AUTO: 308 K/UL (ref 150–400)
PLATELET # BLD AUTO: 350 K/UL (ref 150–400)
PLATELET # BLD AUTO: 88 K/UL (ref 150–400)
PLATELET # BLD AUTO: 94 K/UL (ref 150–400)
PMV BLD AUTO: 10.2 FL (ref 8.9–12.9)
PMV BLD AUTO: 10.4 FL (ref 8.9–12.9)
PMV BLD AUTO: 10.6 FL (ref 8.9–12.9)
PMV BLD AUTO: 10.6 FL (ref 8.9–12.9)
PMV BLD AUTO: 10.7 FL (ref 8.9–12.9)
PMV BLD AUTO: 10.7 FL (ref 8.9–12.9)
PMV BLD AUTO: 10.8 FL (ref 8.9–12.9)
PMV BLD AUTO: 11 FL (ref 8.9–12.9)
PMV BLD AUTO: 11.1 FL (ref 8.9–12.9)
PMV BLD AUTO: 9.8 FL (ref 8.9–12.9)
PMV BLD AUTO: 9.9 FL (ref 8.9–12.9)
POTASSIUM SERPL-SCNC: 3.4 MMOL/L (ref 3.5–5.1)
POTASSIUM SERPL-SCNC: 3.5 MMOL/L (ref 3.5–5.1)
POTASSIUM SERPL-SCNC: 3.7 MMOL/L (ref 3.5–5.1)
POTASSIUM SERPL-SCNC: 3.8 MMOL/L (ref 3.5–5.1)
POTASSIUM SERPL-SCNC: 3.8 MMOL/L (ref 3.5–5.1)
POTASSIUM SERPL-SCNC: 3.9 MMOL/L (ref 3.5–5.1)
POTASSIUM SERPL-SCNC: 4.1 MMOL/L (ref 3.5–5.1)
POTASSIUM SERPL-SCNC: 4.2 MMOL/L (ref 3.5–5.1)
PROT SERPL-MCNC: 5.9 G/DL (ref 6.4–8.2)
PROT SERPL-MCNC: 6.3 G/DL (ref 6.4–8.2)
PROT SERPL-MCNC: 6.4 G/DL (ref 6.4–8.2)
PROT SERPL-MCNC: 6.5 G/DL (ref 6.4–8.2)
PROT SERPL-MCNC: 6.6 G/DL (ref 6.4–8.2)
PROT SERPL-MCNC: 7.9 G/DL (ref 6.4–8.2)
PROT SERPL-MCNC: 8.1 G/DL (ref 6.4–8.2)
PROT SERPL-MCNC: 8.3 G/DL (ref 6.4–8.2)
PROT UR STRIP-MCNC: 30 MG/DL
PROT UR STRIP-MCNC: ABNORMAL MG/DL
PROT UR STRIP-MCNC: ABNORMAL MG/DL
PROT UR STRIP-MCNC: NEGATIVE MG/DL
PROT UR STRIP-MCNC: NEGATIVE MG/DL
PROTHROMBIN TIME: 10.7 SEC (ref 9–11.1)
PROTHROMBIN TIME: 11.4 SEC (ref 9–11.1)
PROTHROMBIN TIME: 11.7 SEC (ref 9–11.1)
Q-T INTERVAL, ECG07: 390 MS
Q-T INTERVAL, ECG07: 394 MS
Q-T INTERVAL, ECG07: 408 MS
Q-T INTERVAL, ECG07: 424 MS
QRS DURATION, ECG06: 114 MS
QRS DURATION, ECG06: 116 MS
QRS DURATION, ECG06: 120 MS
QRS DURATION, ECG06: 124 MS
QTC CALCULATION (BEZET), ECG08: 445 MS
QTC CALCULATION (BEZET), ECG08: 450 MS
QTC CALCULATION (BEZET), ECG08: 460 MS
QTC CALCULATION (BEZET), ECG08: 465 MS
RBC # BLD AUTO: 2.39 M/UL (ref 4.1–5.7)
RBC # BLD AUTO: 2.5 M/UL (ref 4.1–5.7)
RBC # BLD AUTO: 2.51 M/UL (ref 4.1–5.7)
RBC # BLD AUTO: 2.71 M/UL (ref 4.1–5.7)
RBC # BLD AUTO: 2.74 M/UL (ref 4.1–5.7)
RBC # BLD AUTO: 2.84 M/UL (ref 4.1–5.7)
RBC # BLD AUTO: 2.91 M/UL (ref 4.1–5.7)
RBC # BLD AUTO: 2.93 M/UL (ref 4.1–5.7)
RBC # BLD AUTO: 2.94 M/UL (ref 4.1–5.7)
RBC # BLD AUTO: 2.97 M/UL (ref 4.1–5.7)
RBC # BLD AUTO: 2.97 M/UL (ref 4.1–5.7)
RBC # BLD AUTO: 3.04 M/UL (ref 4.1–5.7)
RBC # BLD AUTO: 3.08 M/UL (ref 4.1–5.7)
RBC # BLD AUTO: 3.47 M/UL (ref 4.1–5.7)
RBC # BLD AUTO: 3.51 M/UL (ref 4.1–5.7)
RBC # BLD AUTO: 3.59 M/UL (ref 4.1–5.7)
RBC # BLD AUTO: 3.93 M/UL (ref 4.1–5.7)
RBC # BLD AUTO: 4.31 M/UL (ref 4.1–5.7)
RBC #/AREA URNS HPF: ABNORMAL /HPF (ref 0–5)
RBC #/AREA URNS HPF: NORMAL /HPF (ref 0–5)
RBC #/AREA URNS HPF: NORMAL /HPF (ref 0–5)
RBC MORPH BLD: ABNORMAL
REPORTED DOSE,DOSE: ABNORMAL UNITS
REPORTED DOSE,DOSE: NORMAL UNITS
REPORTED DOSE/TIME,TMG: 1400
REPORTED DOSE/TIME,TMG: ABNORMAL
SAMPLES BEING HELD,HOLD: NORMAL
SERVICE CMNT-IMP: ABNORMAL
SERVICE CMNT-IMP: NORMAL
SODIUM SERPL-SCNC: 134 MMOL/L (ref 136–145)
SODIUM SERPL-SCNC: 136 MMOL/L (ref 136–145)
SODIUM SERPL-SCNC: 137 MMOL/L (ref 136–145)
SODIUM SERPL-SCNC: 137 MMOL/L (ref 136–145)
SODIUM SERPL-SCNC: 138 MMOL/L (ref 136–145)
SODIUM SERPL-SCNC: 139 MMOL/L (ref 136–145)
SODIUM SERPL-SCNC: 140 MMOL/L (ref 136–145)
SODIUM SERPL-SCNC: 141 MMOL/L (ref 136–145)
SODIUM SERPL-SCNC: 142 MMOL/L (ref 136–145)
SP GR UR REFRACTOMETRY: 1.01 (ref 1–1.03)
SP GR UR REFRACTOMETRY: 1.01 (ref 1–1.03)
SP GR UR REFRACTOMETRY: 1.02 (ref 1–1.03)
SP GR UR REFRACTOMETRY: 1.02 (ref 1–1.03)
SP GR UR REFRACTOMETRY: 1.03 (ref 1–1.03)
SPECIMEN EXP DATE BLD: NORMAL
STATUS OF UNIT,%ST: NORMAL
THERAPEUTIC RANGE,PTTT: NORMAL SECS (ref 58–77)
UA: UC IF INDICATED,UAUC: ABNORMAL
UA: UC IF INDICATED,UAUC: ABNORMAL
UA: UC IF INDICATED,UAUC: NORMAL
UNIT DIVISION, %UDIV: 0
UR CULT HOLD, URHOLD: NORMAL
UR CULT HOLD, URHOLD: NORMAL
UROBILINOGEN UR QL STRIP.AUTO: 0.2 EU/DL (ref 0.2–1)
UROBILINOGEN UR QL STRIP.AUTO: 1 EU/DL (ref 0.2–1)
VANCOMYCIN TROUGH SERPL-MCNC: 15.7 UG/ML (ref 5–10)
VANCOMYCIN TROUGH SERPL-MCNC: 7.3 UG/ML (ref 5–10)
VENTRICULAR RATE, ECG03: 68 BPM
VENTRICULAR RATE, ECG03: 77 BPM
VENTRICULAR RATE, ECG03: 78 BPM
VENTRICULAR RATE, ECG03: 84 BPM
WBC # BLD AUTO: 10 K/UL (ref 4.1–11.1)
WBC # BLD AUTO: 11.1 K/UL (ref 4.1–11.1)
WBC # BLD AUTO: 11.4 K/UL (ref 4.1–11.1)
WBC # BLD AUTO: 11.7 K/UL (ref 4.1–11.1)
WBC # BLD AUTO: 13.5 K/UL (ref 4.1–11.1)
WBC # BLD AUTO: 18.9 K/UL (ref 4.1–11.1)
WBC # BLD AUTO: 5.7 K/UL (ref 4.1–11.1)
WBC # BLD AUTO: 6 K/UL (ref 4.1–11.1)
WBC # BLD AUTO: 6.2 K/UL (ref 4.1–11.1)
WBC # BLD AUTO: 6.2 K/UL (ref 4.1–11.1)
WBC # BLD AUTO: 6.9 K/UL (ref 4.1–11.1)
WBC # BLD AUTO: 7.1 K/UL (ref 4.1–11.1)
WBC # BLD AUTO: 8.1 K/UL (ref 4.1–11.1)
WBC # BLD AUTO: 8.1 K/UL (ref 4.1–11.1)
WBC # BLD AUTO: 8.6 K/UL (ref 4.1–11.1)
WBC # BLD AUTO: 8.9 K/UL (ref 4.1–11.1)
WBC # BLD AUTO: 9 K/UL (ref 4.1–11.1)
WBC # BLD AUTO: 9.5 K/UL (ref 4.1–11.1)
WBC URNS QL MICRO: ABNORMAL /HPF (ref 0–4)
WBC URNS QL MICRO: NORMAL /HPF (ref 0–4)
WBC URNS QL MICRO: NORMAL /HPF (ref 0–4)

## 2019-01-01 PROCEDURE — 80048 BASIC METABOLIC PNL TOTAL CA: CPT

## 2019-01-01 PROCEDURE — 77030031139 HC SUT VCRL2 J&J -A: Performed by: ORTHOPAEDIC SURGERY

## 2019-01-01 PROCEDURE — 85610 PROTHROMBIN TIME: CPT

## 2019-01-01 PROCEDURE — 74011000272 HC RX REV CODE- 272: Performed by: ORTHOPAEDIC SURGERY

## 2019-01-01 PROCEDURE — 74011250637 HC RX REV CODE- 250/637: Performed by: PHYSICIAN ASSISTANT

## 2019-01-01 PROCEDURE — 97165 OT EVAL LOW COMPLEX 30 MIN: CPT | Performed by: OCCUPATIONAL THERAPIST

## 2019-01-01 PROCEDURE — 36415 COLL VENOUS BLD VENIPUNCTURE: CPT

## 2019-01-01 PROCEDURE — 77030018723 HC ELCTRD BLD COVD -A: Performed by: ORTHOPAEDIC SURGERY

## 2019-01-01 PROCEDURE — 84100 ASSAY OF PHOSPHORUS: CPT

## 2019-01-01 PROCEDURE — 0SHB08Z INSERTION OF SPACER INTO LEFT HIP JOINT, OPEN APPROACH: ICD-10-PCS | Performed by: ORTHOPAEDIC SURGERY

## 2019-01-01 PROCEDURE — 74011250637 HC RX REV CODE- 250/637: Performed by: NURSE PRACTITIONER

## 2019-01-01 PROCEDURE — 96361 HYDRATE IV INFUSION ADD-ON: CPT

## 2019-01-01 PROCEDURE — 97116 GAIT TRAINING THERAPY: CPT | Performed by: PHYSICAL THERAPIST

## 2019-01-01 PROCEDURE — 74011250637 HC RX REV CODE- 250/637: Performed by: INTERNAL MEDICINE

## 2019-01-01 PROCEDURE — 74011250636 HC RX REV CODE- 250/636: Performed by: EMERGENCY MEDICINE

## 2019-01-01 PROCEDURE — 74011250636 HC RX REV CODE- 250/636: Performed by: PHYSICIAN ASSISTANT

## 2019-01-01 PROCEDURE — 77010033678 HC OXYGEN DAILY

## 2019-01-01 PROCEDURE — 76060000034 HC ANESTHESIA 1.5 TO 2 HR: Performed by: ORTHOPAEDIC SURGERY

## 2019-01-01 PROCEDURE — 86923 COMPATIBILITY TEST ELECTRIC: CPT

## 2019-01-01 PROCEDURE — 97116 GAIT TRAINING THERAPY: CPT

## 2019-01-01 PROCEDURE — 74011250637 HC RX REV CODE- 250/637: Performed by: HOSPITALIST

## 2019-01-01 PROCEDURE — 74011250636 HC RX REV CODE- 250/636: Performed by: ANESTHESIOLOGY

## 2019-01-01 PROCEDURE — 77030005518 HC CATH URETH FOL 2W BARD -B

## 2019-01-01 PROCEDURE — 77030036660

## 2019-01-01 PROCEDURE — 77030021678 HC GLIDESCP STAT DISP VERT -B: Performed by: ANESTHESIOLOGY

## 2019-01-01 PROCEDURE — 85027 COMPLETE CBC AUTOMATED: CPT

## 2019-01-01 PROCEDURE — 77030011640 HC PAD GRND REM COVD -A: Performed by: ORTHOPAEDIC SURGERY

## 2019-01-01 PROCEDURE — 92526 ORAL FUNCTION THERAPY: CPT | Performed by: SPEECH-LANGUAGE PATHOLOGIST

## 2019-01-01 PROCEDURE — 97162 PT EVAL MOD COMPLEX 30 MIN: CPT | Performed by: PHYSICAL THERAPIST

## 2019-01-01 PROCEDURE — 80053 COMPREHEN METABOLIC PANEL: CPT

## 2019-01-01 PROCEDURE — 71045 X-RAY EXAM CHEST 1 VIEW: CPT

## 2019-01-01 PROCEDURE — 74011250636 HC RX REV CODE- 250/636

## 2019-01-01 PROCEDURE — 74011000258 HC RX REV CODE- 258: Performed by: EMERGENCY MEDICINE

## 2019-01-01 PROCEDURE — 76210000006 HC OR PH I REC 0.5 TO 1 HR: Performed by: ORTHOPAEDIC SURGERY

## 2019-01-01 PROCEDURE — 81001 URINALYSIS AUTO W/SCOPE: CPT

## 2019-01-01 PROCEDURE — 65270000029 HC RM PRIVATE

## 2019-01-01 PROCEDURE — 86140 C-REACTIVE PROTEIN: CPT

## 2019-01-01 PROCEDURE — 85025 COMPLETE CBC W/AUTO DIFF WBC: CPT

## 2019-01-01 PROCEDURE — P9016 RBC LEUKOCYTES REDUCED: HCPCS

## 2019-01-01 PROCEDURE — 77030018836 HC SOL IRR NACL ICUM -A: Performed by: ORTHOPAEDIC SURGERY

## 2019-01-01 PROCEDURE — 87075 CULTR BACTERIA EXCEPT BLOOD: CPT

## 2019-01-01 PROCEDURE — 65660000000 HC RM CCU STEPDOWN

## 2019-01-01 PROCEDURE — 94760 N-INVAS EAR/PLS OXIMETRY 1: CPT

## 2019-01-01 PROCEDURE — 74011000258 HC RX REV CODE- 258: Performed by: INTERNAL MEDICINE

## 2019-01-01 PROCEDURE — 92611 MOTION FLUOROSCOPY/SWALLOW: CPT | Performed by: SPEECH-LANGUAGE PATHOLOGIST

## 2019-01-01 PROCEDURE — 74011250636 HC RX REV CODE- 250/636: Performed by: INTERNAL MEDICINE

## 2019-01-01 PROCEDURE — 97165 OT EVAL LOW COMPLEX 30 MIN: CPT

## 2019-01-01 PROCEDURE — 0SRB0JZ REPLACEMENT OF LEFT HIP JOINT WITH SYNTHETIC SUBSTITUTE, OPEN APPROACH: ICD-10-PCS | Performed by: ORTHOPAEDIC SURGERY

## 2019-01-01 PROCEDURE — 97530 THERAPEUTIC ACTIVITIES: CPT | Performed by: PHYSICAL THERAPIST

## 2019-01-01 PROCEDURE — C1776 JOINT DEVICE (IMPLANTABLE): HCPCS | Performed by: ORTHOPAEDIC SURGERY

## 2019-01-01 PROCEDURE — 51798 US URINE CAPACITY MEASURE: CPT

## 2019-01-01 PROCEDURE — 87147 CULTURE TYPE IMMUNOLOGIC: CPT

## 2019-01-01 PROCEDURE — 76010000153 HC OR TIME 1.5 TO 2 HR: Performed by: ORTHOPAEDIC SURGERY

## 2019-01-01 PROCEDURE — 94664 DEMO&/EVAL PT USE INHALER: CPT

## 2019-01-01 PROCEDURE — 85018 HEMOGLOBIN: CPT

## 2019-01-01 PROCEDURE — 74011000258 HC RX REV CODE- 258

## 2019-01-01 PROCEDURE — 87040 BLOOD CULTURE FOR BACTERIA: CPT

## 2019-01-01 PROCEDURE — 77030022704 HC SUT VLOC COVD -B: Performed by: ORTHOPAEDIC SURGERY

## 2019-01-01 PROCEDURE — 99218 HC RM OBSERVATION: CPT

## 2019-01-01 PROCEDURE — 77030018547 HC SUT ETHBND1 J&J -B: Performed by: ORTHOPAEDIC SURGERY

## 2019-01-01 PROCEDURE — 74011000250 HC RX REV CODE- 250

## 2019-01-01 PROCEDURE — 93005 ELECTROCARDIOGRAM TRACING: CPT

## 2019-01-01 PROCEDURE — 73502 X-RAY EXAM HIP UNI 2-3 VIEWS: CPT

## 2019-01-01 PROCEDURE — C1751 CATH, INF, PER/CENT/MIDLINE: HCPCS

## 2019-01-01 PROCEDURE — 77030003666 HC NDL SPINAL BD -A: Performed by: ORTHOPAEDIC SURGERY

## 2019-01-01 PROCEDURE — 99285 EMERGENCY DEPT VISIT HI MDM: CPT

## 2019-01-01 PROCEDURE — 73562 X-RAY EXAM OF KNEE 3: CPT

## 2019-01-01 PROCEDURE — 77030020782 HC GWN BAIR PAWS FLX 3M -B

## 2019-01-01 PROCEDURE — 36573 INSJ PICC RS&I 5 YR+: CPT | Performed by: NURSE PRACTITIONER

## 2019-01-01 PROCEDURE — 92526 ORAL FUNCTION THERAPY: CPT

## 2019-01-01 PROCEDURE — 76210000016 HC OR PH I REC 1 TO 1.5 HR: Performed by: ORTHOPAEDIC SURGERY

## 2019-01-01 PROCEDURE — 97535 SELF CARE MNGMENT TRAINING: CPT

## 2019-01-01 PROCEDURE — 0SRS01Z REPLACEMENT OF LEFT HIP JOINT, FEMORAL SURFACE WITH METAL SYNTHETIC SUBSTITUTE, OPEN APPROACH: ICD-10-PCS | Performed by: ORTHOPAEDIC SURGERY

## 2019-01-01 PROCEDURE — 97530 THERAPEUTIC ACTIVITIES: CPT

## 2019-01-01 PROCEDURE — 77030019908 HC STETH ESOPH SIMS -A: Performed by: ANESTHESIOLOGY

## 2019-01-01 PROCEDURE — 74011000250 HC RX REV CODE- 250: Performed by: ORTHOPAEDIC SURGERY

## 2019-01-01 PROCEDURE — 77030032490 HC SLV COMPR SCD KNE COVD -B

## 2019-01-01 PROCEDURE — 86900 BLOOD TYPING SEROLOGIC ABO: CPT

## 2019-01-01 PROCEDURE — 77030008684 HC TU ET CUF COVD -B: Performed by: ANESTHESIOLOGY

## 2019-01-01 PROCEDURE — 74011250636 HC RX REV CODE- 250/636: Performed by: ORTHOPAEDIC SURGERY

## 2019-01-01 PROCEDURE — 74011250637 HC RX REV CODE- 250/637: Performed by: ORTHOPAEDIC SURGERY

## 2019-01-01 PROCEDURE — 0SPB0JZ REMOVAL OF SYNTHETIC SUBSTITUTE FROM LEFT HIP JOINT, OPEN APPROACH: ICD-10-PCS | Performed by: ORTHOPAEDIC SURGERY

## 2019-01-01 PROCEDURE — 94761 N-INVAS EAR/PLS OXIMETRY MLT: CPT

## 2019-01-01 PROCEDURE — 77030006835 HC BLD SAW SAG STRY -B: Performed by: ORTHOPAEDIC SURGERY

## 2019-01-01 PROCEDURE — 77030039929 HC BN CEM GENT ZIMM -D: Performed by: ORTHOPAEDIC SURGERY

## 2019-01-01 PROCEDURE — 76937 US GUIDE VASCULAR ACCESS: CPT

## 2019-01-01 PROCEDURE — 76010000162 HC OR TIME 1.5 TO 2 HR INTENSV-TIER 1: Performed by: ORTHOPAEDIC SURGERY

## 2019-01-01 PROCEDURE — 77030018846 HC SOL IRR STRL H20 ICUM -A: Performed by: ORTHOPAEDIC SURGERY

## 2019-01-01 PROCEDURE — 99284 EMERGENCY DEPT VISIT MOD MDM: CPT

## 2019-01-01 PROCEDURE — 87186 SC STD MICRODIL/AGAR DIL: CPT

## 2019-01-01 PROCEDURE — 77030026438 HC STYL ET INTUB CARD -A: Performed by: ANESTHESIOLOGY

## 2019-01-01 PROCEDURE — 85730 THROMBOPLASTIN TIME PARTIAL: CPT

## 2019-01-01 PROCEDURE — 77030011943

## 2019-01-01 PROCEDURE — L1830 KO IMMOB CANVAS LONG PRE OTS: HCPCS

## 2019-01-01 PROCEDURE — 77030013708 HC HNDPC SUC IRR PULS STRY –B: Performed by: ORTHOPAEDIC SURGERY

## 2019-01-01 PROCEDURE — 83735 ASSAY OF MAGNESIUM: CPT

## 2019-01-01 PROCEDURE — 77030020061 HC IV BLD WRMR ADMIN SET 3M -B: Performed by: ANESTHESIOLOGY

## 2019-01-01 PROCEDURE — 72170 X-RAY EXAM OF PELVIS: CPT

## 2019-01-01 PROCEDURE — 97161 PT EVAL LOW COMPLEX 20 MIN: CPT

## 2019-01-01 PROCEDURE — 74011000258 HC RX REV CODE- 258: Performed by: PHYSICIAN ASSISTANT

## 2019-01-01 PROCEDURE — 96360 HYDRATION IV INFUSION INIT: CPT

## 2019-01-01 PROCEDURE — 93971 EXTREMITY STUDY: CPT

## 2019-01-01 PROCEDURE — 77030033138 HC SUT PGA STRATFX J&J -B: Performed by: ORTHOPAEDIC SURGERY

## 2019-01-01 PROCEDURE — 85652 RBC SED RATE AUTOMATED: CPT

## 2019-01-01 PROCEDURE — 74230 X-RAY XM SWLNG FUNCJ C+: CPT

## 2019-01-01 PROCEDURE — 96374 THER/PROPH/DIAG INJ IV PUSH: CPT

## 2019-01-01 PROCEDURE — 30233N1 TRANSFUSION OF NONAUTOLOGOUS RED BLOOD CELLS INTO PERIPHERAL VEIN, PERCUTANEOUS APPROACH: ICD-10-PCS | Performed by: PHYSICIAN ASSISTANT

## 2019-01-01 PROCEDURE — 77030018673: Performed by: ORTHOPAEDIC SURGERY

## 2019-01-01 PROCEDURE — 77030018786 HC NDL GD F/USND BARD -B

## 2019-01-01 PROCEDURE — 94762 N-INVAS EAR/PLS OXIMTRY CONT: CPT

## 2019-01-01 PROCEDURE — P9045 ALBUMIN (HUMAN), 5%, 250 ML: HCPCS

## 2019-01-01 PROCEDURE — 70450 CT HEAD/BRAIN W/O DYE: CPT

## 2019-01-01 PROCEDURE — 77030035236 HC SUT PDS STRATFX BARB J&J -B: Performed by: ORTHOPAEDIC SURGERY

## 2019-01-01 PROCEDURE — 92610 EVALUATE SWALLOWING FUNCTION: CPT | Performed by: SPEECH-LANGUAGE PATHOLOGIST

## 2019-01-01 PROCEDURE — 77030008467 HC STPLR SKN COVD -B: Performed by: ORTHOPAEDIC SURGERY

## 2019-01-01 PROCEDURE — 77030037878 HC DRSG MEPILEX >48IN BORD MOLN -B

## 2019-01-01 PROCEDURE — 0SWSXJZ REVISION OF SYNTHETIC SUBSTITUTE IN LEFT HIP JOINT, FEMORAL SURFACE, EXTERNAL APPROACH: ICD-10-PCS | Performed by: EMERGENCY MEDICINE

## 2019-01-01 PROCEDURE — 77030013079 HC BLNKT BAIR HGGR 3M -A: Performed by: ANESTHESIOLOGY

## 2019-01-01 PROCEDURE — 0QS704Z REPOSITION LEFT UPPER FEMUR WITH INTERNAL FIXATION DEVICE, OPEN APPROACH: ICD-10-PCS | Performed by: ORTHOPAEDIC SURGERY

## 2019-01-01 PROCEDURE — 36430 TRANSFUSION BLD/BLD COMPNT: CPT

## 2019-01-01 PROCEDURE — 94640 AIRWAY INHALATION TREATMENT: CPT

## 2019-01-01 PROCEDURE — 80202 ASSAY OF VANCOMYCIN: CPT

## 2019-01-01 PROCEDURE — 77030027138 HC INCENT SPIROMETER -A

## 2019-01-01 PROCEDURE — 77030014647 HC SEAL FBRN TISSL BAXT -D: Performed by: ORTHOPAEDIC SURGERY

## 2019-01-01 PROCEDURE — 77030020788: Performed by: ORTHOPAEDIC SURGERY

## 2019-01-01 PROCEDURE — 87086 URINE CULTURE/COLONY COUNT: CPT

## 2019-01-01 PROCEDURE — 87205 SMEAR GRAM STAIN: CPT

## 2019-01-01 PROCEDURE — L1830 KO IMMOB CANVAS LONG PRE OTS: HCPCS | Performed by: ORTHOPAEDIC SURGERY

## 2019-01-01 PROCEDURE — 77030010785: Performed by: ORTHOPAEDIC SURGERY

## 2019-01-01 PROCEDURE — 77030032490 HC SLV COMPR SCD KNE COVD -B: Performed by: ORTHOPAEDIC SURGERY

## 2019-01-01 PROCEDURE — 97530 THERAPEUTIC ACTIVITIES: CPT | Performed by: OCCUPATIONAL THERAPIST

## 2019-01-01 PROCEDURE — 77030039267 HC ADH SKN EXOFIN S2SG -B: Performed by: ORTHOPAEDIC SURGERY

## 2019-01-01 PROCEDURE — 74011000250 HC RX REV CODE- 250: Performed by: INTERNAL MEDICINE

## 2019-01-01 PROCEDURE — 70551 MRI BRAIN STEM W/O DYE: CPT

## 2019-01-01 PROCEDURE — 83605 ASSAY OF LACTIC ACID: CPT

## 2019-01-01 PROCEDURE — 87077 CULTURE AEROBIC IDENTIFY: CPT

## 2019-01-01 DEVICE — SHELL BPLR OD45MM ID28MM HIP CO CHROM RINGLOC: Type: IMPLANTABLE DEVICE | Site: HIP | Status: FUNCTIONAL

## 2019-01-01 DEVICE — IMPLANTABLE DEVICE
Type: IMPLANTABLE DEVICE | Site: HIP | Status: NON-FUNCTIONAL
Brand: ECHO® BI-METRIC® HIP SYSTEM
Removed: 2019-04-16

## 2019-01-01 DEVICE — IMPLANTABLE DEVICE
Type: IMPLANTABLE DEVICE | Site: HIP | Status: FUNCTIONAL
Brand: BIOMET® HIP SYSTEM

## 2019-01-01 DEVICE — IMPLANTABLE DEVICE
Type: IMPLANTABLE DEVICE | Site: HIP | Status: FUNCTIONAL
Brand: ECHO BI-METRIC HIP SYSTEM

## 2019-01-01 DEVICE — CEMENT BNE W/GENTAMICIN R1X40 --: Type: IMPLANTABLE DEVICE | Site: HIP | Status: FUNCTIONAL

## 2019-01-01 DEVICE — HIP PRI POR BIPLR STD HD ECHO -- H4: Type: IMPLANTABLE DEVICE | Status: FUNCTIONAL

## 2019-01-01 RX ORDER — OXYCODONE HYDROCHLORIDE 5 MG/1
5 TABLET ORAL
Status: DISCONTINUED | OUTPATIENT
Start: 2019-01-01 | End: 2019-01-01 | Stop reason: HOSPADM

## 2019-01-01 RX ORDER — SODIUM CHLORIDE 9 MG/ML
250 INJECTION, SOLUTION INTRAVENOUS AS NEEDED
Status: DISCONTINUED | OUTPATIENT
Start: 2019-01-01 | End: 2019-01-01

## 2019-01-01 RX ORDER — MULTIVITAMIN
1 TABLET ORAL DAILY
COMMUNITY
End: 2019-01-01

## 2019-01-01 RX ORDER — SUCCINYLCHOLINE CHLORIDE 20 MG/ML
INJECTION INTRAMUSCULAR; INTRAVENOUS AS NEEDED
Status: DISCONTINUED | OUTPATIENT
Start: 2019-01-01 | End: 2019-01-01 | Stop reason: HOSPADM

## 2019-01-01 RX ORDER — ROSUVASTATIN CALCIUM 10 MG/1
20 TABLET, COATED ORAL DAILY
Status: DISCONTINUED | OUTPATIENT
Start: 2019-01-01 | End: 2019-01-01 | Stop reason: HOSPADM

## 2019-01-01 RX ORDER — GUAIFENESIN 100 MG/5ML
81 LIQUID (ML) ORAL 2 TIMES DAILY
Status: DISCONTINUED | OUTPATIENT
Start: 2019-01-01 | End: 2019-01-01 | Stop reason: HOSPADM

## 2019-01-01 RX ORDER — AMOXICILLIN 250 MG
2 CAPSULE ORAL
Status: DISCONTINUED | OUTPATIENT
Start: 2019-01-01 | End: 2019-01-01 | Stop reason: HOSPADM

## 2019-01-01 RX ORDER — LANOLIN ALCOHOL/MO/W.PET/CERES
6 CREAM (GRAM) TOPICAL
Status: DISCONTINUED | OUTPATIENT
Start: 2019-01-01 | End: 2019-01-01 | Stop reason: HOSPADM

## 2019-01-01 RX ORDER — AMOXICILLIN 250 MG
2 CAPSULE ORAL
COMMUNITY

## 2019-01-01 RX ORDER — TAMSULOSIN HYDROCHLORIDE 0.4 MG/1
0.4 CAPSULE ORAL DAILY
Status: DISCONTINUED | OUTPATIENT
Start: 2019-01-01 | End: 2019-01-01 | Stop reason: HOSPADM

## 2019-01-01 RX ORDER — FENTANYL CITRATE 50 UG/ML
50 INJECTION, SOLUTION INTRAMUSCULAR; INTRAVENOUS AS NEEDED
Status: DISCONTINUED | OUTPATIENT
Start: 2019-01-01 | End: 2019-01-01 | Stop reason: HOSPADM

## 2019-01-01 RX ORDER — SODIUM CHLORIDE 9 MG/ML
125 INJECTION, SOLUTION INTRAVENOUS CONTINUOUS
Status: DISPENSED | OUTPATIENT
Start: 2019-01-01 | End: 2019-01-01

## 2019-01-01 RX ORDER — SODIUM CHLORIDE, SODIUM LACTATE, POTASSIUM CHLORIDE, CALCIUM CHLORIDE 600; 310; 30; 20 MG/100ML; MG/100ML; MG/100ML; MG/100ML
25 INJECTION, SOLUTION INTRAVENOUS CONTINUOUS
Status: DISCONTINUED | OUTPATIENT
Start: 2019-01-01 | End: 2019-01-01 | Stop reason: HOSPADM

## 2019-01-01 RX ORDER — ACETAMINOPHEN 325 MG/1
650 TABLET ORAL EVERY 6 HOURS
Status: DISCONTINUED | OUTPATIENT
Start: 2019-01-01 | End: 2019-01-01 | Stop reason: HOSPADM

## 2019-01-01 RX ORDER — ACETAMINOPHEN 500 MG
1000 TABLET ORAL EVERY 6 HOURS
Status: DISCONTINUED | OUTPATIENT
Start: 2019-01-01 | End: 2019-01-01 | Stop reason: HOSPADM

## 2019-01-01 RX ORDER — DIPHENHYDRAMINE HYDROCHLORIDE 50 MG/ML
12.5 INJECTION, SOLUTION INTRAMUSCULAR; INTRAVENOUS AS NEEDED
Status: DISCONTINUED | OUTPATIENT
Start: 2019-01-01 | End: 2019-01-01 | Stop reason: HOSPADM

## 2019-01-01 RX ORDER — NALOXONE HYDROCHLORIDE 0.4 MG/ML
0.4 INJECTION, SOLUTION INTRAMUSCULAR; INTRAVENOUS; SUBCUTANEOUS AS NEEDED
Status: DISCONTINUED | OUTPATIENT
Start: 2019-01-01 | End: 2019-01-01

## 2019-01-01 RX ORDER — PROPOFOL 10 MG/ML
INJECTION, EMULSION INTRAVENOUS AS NEEDED
Status: DISCONTINUED | OUTPATIENT
Start: 2019-01-01 | End: 2019-01-01 | Stop reason: HOSPADM

## 2019-01-01 RX ORDER — SODIUM CHLORIDE 0.9 % (FLUSH) 0.9 %
5-40 SYRINGE (ML) INJECTION EVERY 8 HOURS
Status: DISCONTINUED | OUTPATIENT
Start: 2019-01-01 | End: 2019-01-01

## 2019-01-01 RX ORDER — AMOXICILLIN 250 MG
2 CAPSULE ORAL 2 TIMES DAILY
Status: DISCONTINUED | OUTPATIENT
Start: 2019-01-01 | End: 2019-01-01

## 2019-01-01 RX ORDER — THERA TABS 400 MCG
1 TAB ORAL DAILY
Status: DISCONTINUED | OUTPATIENT
Start: 2019-01-01 | End: 2019-01-01

## 2019-01-01 RX ORDER — CEFAZOLIN SODIUM/WATER 2 G/20 ML
2 SYRINGE (ML) INTRAVENOUS EVERY 8 HOURS
Status: COMPLETED | OUTPATIENT
Start: 2019-01-01 | End: 2019-01-01

## 2019-01-01 RX ORDER — HYDROMORPHONE HYDROCHLORIDE 2 MG/ML
INJECTION, SOLUTION INTRAMUSCULAR; INTRAVENOUS; SUBCUTANEOUS AS NEEDED
Status: DISCONTINUED | OUTPATIENT
Start: 2019-01-01 | End: 2019-01-01 | Stop reason: HOSPADM

## 2019-01-01 RX ORDER — ONDANSETRON 2 MG/ML
4 INJECTION INTRAMUSCULAR; INTRAVENOUS AS NEEDED
Status: DISCONTINUED | OUTPATIENT
Start: 2019-01-01 | End: 2019-01-01 | Stop reason: HOSPADM

## 2019-01-01 RX ORDER — GUAIFENESIN 100 MG/5ML
81 LIQUID (ML) ORAL DAILY
Qty: 30 TAB | Refills: 0 | Status: SHIPPED | OUTPATIENT
Start: 2019-01-01 | End: 2019-08-19

## 2019-01-01 RX ORDER — GUAIFENESIN 600 MG/1
600 TABLET, EXTENDED RELEASE ORAL EVERY 12 HOURS
Status: DISCONTINUED | OUTPATIENT
Start: 2019-01-01 | End: 2019-01-01 | Stop reason: HOSPADM

## 2019-01-01 RX ORDER — OXYCODONE HYDROCHLORIDE 5 MG/1
2.5-5 TABLET ORAL
Qty: 30 TAB | Refills: 0 | Status: SHIPPED | OUTPATIENT
Start: 2019-01-01 | End: 2019-01-01

## 2019-01-01 RX ORDER — ACETAMINOPHEN 10 MG/ML
INJECTION, SOLUTION INTRAVENOUS AS NEEDED
Status: DISCONTINUED | OUTPATIENT
Start: 2019-01-01 | End: 2019-01-01 | Stop reason: HOSPADM

## 2019-01-01 RX ORDER — SODIUM CHLORIDE 9 MG/ML
75 INJECTION, SOLUTION INTRAVENOUS CONTINUOUS
Status: DISCONTINUED | OUTPATIENT
Start: 2019-01-01 | End: 2019-01-01

## 2019-01-01 RX ORDER — CEFAZOLIN SODIUM/WATER 2 G/20 ML
2 SYRINGE (ML) INTRAVENOUS EVERY 8 HOURS
Status: CANCELLED | OUTPATIENT
Start: 2019-01-01 | End: 2019-01-01

## 2019-01-01 RX ORDER — MEMANTINE HYDROCHLORIDE 10 MG/1
10 TABLET ORAL 2 TIMES DAILY
COMMUNITY

## 2019-01-01 RX ORDER — OXYCODONE HYDROCHLORIDE 5 MG/1
2.5 TABLET ORAL
Status: DISCONTINUED | OUTPATIENT
Start: 2019-01-01 | End: 2019-01-01

## 2019-01-01 RX ORDER — PHENYLEPHRINE HYDROCHLORIDE 10 MG/ML
INJECTION INTRAVENOUS AS NEEDED
Status: DISCONTINUED | OUTPATIENT
Start: 2019-01-01 | End: 2019-01-01

## 2019-01-01 RX ORDER — ROSUVASTATIN CALCIUM 10 MG/1
20 TABLET, COATED ORAL
Status: DISCONTINUED | OUTPATIENT
Start: 2019-01-01 | End: 2019-01-01 | Stop reason: CLARIF

## 2019-01-01 RX ORDER — POLYETHYLENE GLYCOL 3350 17 G/17G
17 POWDER, FOR SOLUTION ORAL DAILY
COMMUNITY

## 2019-01-01 RX ORDER — CEFAZOLIN SODIUM/WATER 2 G/20 ML
2 SYRINGE (ML) INTRAVENOUS
Status: DISCONTINUED | OUTPATIENT
Start: 2019-01-01 | End: 2019-01-01 | Stop reason: SDUPTHER

## 2019-01-01 RX ORDER — MORPHINE SULFATE 10 MG/ML
INJECTION, SOLUTION INTRAMUSCULAR; INTRAVENOUS AS NEEDED
Status: DISCONTINUED | OUTPATIENT
Start: 2019-01-01 | End: 2019-01-01 | Stop reason: HOSPADM

## 2019-01-01 RX ORDER — POLYETHYLENE GLYCOL 3350 17 G/17G
17 POWDER, FOR SOLUTION ORAL DAILY
Status: DISCONTINUED | OUTPATIENT
Start: 2019-01-01 | End: 2019-01-01 | Stop reason: HOSPADM

## 2019-01-01 RX ORDER — TAMSULOSIN HYDROCHLORIDE 0.4 MG/1
0.4 CAPSULE ORAL DAILY
Status: DISCONTINUED | OUTPATIENT
Start: 2019-01-01 | End: 2019-01-01

## 2019-01-01 RX ORDER — ACETAMINOPHEN 500 MG
1000 TABLET ORAL EVERY 6 HOURS
Qty: 112 TAB | Refills: 0 | Status: ON HOLD | OUTPATIENT
Start: 2019-01-01 | End: 2019-01-01

## 2019-01-01 RX ORDER — HEPARIN 100 UNIT/ML
300 SYRINGE INTRAVENOUS AS NEEDED
Status: DISCONTINUED | OUTPATIENT
Start: 2019-01-01 | End: 2019-01-01 | Stop reason: HOSPADM

## 2019-01-01 RX ORDER — MORPHINE SULFATE 2 MG/ML
1 INJECTION, SOLUTION INTRAMUSCULAR; INTRAVENOUS
Status: ACTIVE | OUTPATIENT
Start: 2019-01-01 | End: 2019-01-01

## 2019-01-01 RX ORDER — FINASTERIDE 5 MG/1
5 TABLET, FILM COATED ORAL DAILY
Status: DISCONTINUED | OUTPATIENT
Start: 2019-01-01 | End: 2019-01-01 | Stop reason: HOSPADM

## 2019-01-01 RX ORDER — OXYCODONE HYDROCHLORIDE 5 MG/1
2.5-5 TABLET ORAL
Qty: 40 TAB | Refills: 0 | Status: SHIPPED | OUTPATIENT
Start: 2019-01-01 | End: 2019-01-01

## 2019-01-01 RX ORDER — ETOMIDATE 2 MG/ML
INJECTION INTRAVENOUS AS NEEDED
Status: DISCONTINUED | OUTPATIENT
Start: 2019-01-01 | End: 2019-01-01 | Stop reason: HOSPADM

## 2019-01-01 RX ORDER — TRAMADOL HYDROCHLORIDE 50 MG/1
50 TABLET ORAL
Qty: 10 TAB | Refills: 0 | Status: SHIPPED | OUTPATIENT
Start: 2019-01-01 | End: 2019-07-29

## 2019-01-01 RX ORDER — VANCOMYCIN HYDROCHLORIDE
1250 ONCE
Status: DISCONTINUED | OUTPATIENT
Start: 2019-01-01 | End: 2019-01-01 | Stop reason: DRUGHIGH

## 2019-01-01 RX ORDER — OXYCODONE HYDROCHLORIDE 5 MG/1
5 TABLET ORAL
Status: DISCONTINUED | OUTPATIENT
Start: 2019-01-01 | End: 2019-01-01

## 2019-01-01 RX ORDER — MEMANTINE HYDROCHLORIDE 5 MG/1
TABLET ORAL
Qty: 120 TAB | Refills: 2 | Status: SHIPPED | OUTPATIENT
Start: 2019-01-01 | End: 2019-01-01 | Stop reason: DRUGHIGH

## 2019-01-01 RX ORDER — NALOXONE HYDROCHLORIDE 0.4 MG/ML
0.4 INJECTION, SOLUTION INTRAMUSCULAR; INTRAVENOUS; SUBCUTANEOUS AS NEEDED
Status: DISCONTINUED | OUTPATIENT
Start: 2019-01-01 | End: 2019-01-01 | Stop reason: HOSPADM

## 2019-01-01 RX ORDER — DEXAMETHASONE SODIUM PHOSPHATE 4 MG/ML
10 INJECTION, SOLUTION INTRA-ARTICULAR; INTRALESIONAL; INTRAMUSCULAR; INTRAVENOUS; SOFT TISSUE ONCE
Status: COMPLETED | OUTPATIENT
Start: 2019-01-01 | End: 2019-01-01

## 2019-01-01 RX ORDER — FENTANYL CITRATE 50 UG/ML
INJECTION, SOLUTION INTRAMUSCULAR; INTRAVENOUS AS NEEDED
Status: DISCONTINUED | OUTPATIENT
Start: 2019-01-01 | End: 2019-01-01 | Stop reason: HOSPADM

## 2019-01-01 RX ORDER — GUAIFENESIN/DEXTROMETHORPHAN 100-10MG/5
10 SYRUP ORAL EVERY 6 HOURS
Status: DISCONTINUED | OUTPATIENT
Start: 2019-01-01 | End: 2019-01-01 | Stop reason: HOSPADM

## 2019-01-01 RX ORDER — SODIUM CHLORIDE 0.9 % (FLUSH) 0.9 %
5-40 SYRINGE (ML) INJECTION EVERY 8 HOURS
Status: DISCONTINUED | OUTPATIENT
Start: 2019-01-01 | End: 2019-01-01 | Stop reason: HOSPADM

## 2019-01-01 RX ORDER — CEFAZOLIN SODIUM/WATER 2 G/20 ML
SYRINGE (ML) INTRAVENOUS
Status: COMPLETED
Start: 2019-01-01 | End: 2019-01-01

## 2019-01-01 RX ORDER — DONEPEZIL HYDROCHLORIDE 5 MG/1
10 TABLET, FILM COATED ORAL DAILY
Status: DISCONTINUED | OUTPATIENT
Start: 2019-01-01 | End: 2019-01-01 | Stop reason: HOSPADM

## 2019-01-01 RX ORDER — AMOXICILLIN 250 MG
1 CAPSULE ORAL DAILY
Qty: 30 TAB | Refills: 0 | Status: SHIPPED | OUTPATIENT
Start: 2019-01-01 | End: 2019-01-01

## 2019-01-01 RX ORDER — PROPOFOL 10 MG/ML
20 INJECTION, EMULSION INTRAVENOUS
Status: COMPLETED | OUTPATIENT
Start: 2019-01-01 | End: 2019-01-01

## 2019-01-01 RX ORDER — ENOXAPARIN SODIUM 100 MG/ML
40 INJECTION SUBCUTANEOUS EVERY 24 HOURS
Status: DISCONTINUED | OUTPATIENT
Start: 2019-01-01 | End: 2019-01-01 | Stop reason: HOSPADM

## 2019-01-01 RX ORDER — FENTANYL CITRATE 50 UG/ML
50 INJECTION, SOLUTION INTRAMUSCULAR; INTRAVENOUS
Status: ACTIVE | OUTPATIENT
Start: 2019-01-01 | End: 2019-01-01

## 2019-01-01 RX ORDER — LIDOCAINE HYDROCHLORIDE 10 MG/ML
0.1 INJECTION, SOLUTION EPIDURAL; INFILTRATION; INTRACAUDAL; PERINEURAL AS NEEDED
Status: DISCONTINUED | OUTPATIENT
Start: 2019-01-01 | End: 2019-01-01 | Stop reason: HOSPADM

## 2019-01-01 RX ORDER — DONEPEZIL HYDROCHLORIDE 10 MG/1
10 TABLET, FILM COATED ORAL
Status: DISCONTINUED | OUTPATIENT
Start: 2019-01-01 | End: 2019-01-01 | Stop reason: HOSPADM

## 2019-01-01 RX ORDER — MEMANTINE HYDROCHLORIDE 10 MG/1
10 TABLET ORAL 2 TIMES DAILY
Status: DISCONTINUED | OUTPATIENT
Start: 2019-01-01 | End: 2019-01-01 | Stop reason: HOSPADM

## 2019-01-01 RX ORDER — SODIUM CHLORIDE 9 MG/ML
75 INJECTION, SOLUTION INTRAVENOUS CONTINUOUS
Status: DISCONTINUED | OUTPATIENT
Start: 2019-01-01 | End: 2019-01-01 | Stop reason: HOSPADM

## 2019-01-01 RX ORDER — AMOXICILLIN 250 MG
1 CAPSULE ORAL 2 TIMES DAILY
Status: DISCONTINUED | OUTPATIENT
Start: 2019-01-01 | End: 2019-01-01

## 2019-01-01 RX ORDER — SODIUM CHLORIDE 0.9 % (FLUSH) 0.9 %
5-40 SYRINGE (ML) INJECTION AS NEEDED
Status: DISCONTINUED | OUTPATIENT
Start: 2019-01-01 | End: 2019-01-01 | Stop reason: HOSPADM

## 2019-01-01 RX ORDER — LIDOCAINE HYDROCHLORIDE 20 MG/ML
INJECTION, SOLUTION EPIDURAL; INFILTRATION; INTRACAUDAL; PERINEURAL AS NEEDED
Status: DISCONTINUED | OUTPATIENT
Start: 2019-01-01 | End: 2019-01-01 | Stop reason: HOSPADM

## 2019-01-01 RX ORDER — EPHEDRINE SULFATE 50 MG/ML
INJECTION, SOLUTION INTRAVENOUS AS NEEDED
Status: DISCONTINUED | OUTPATIENT
Start: 2019-01-01 | End: 2019-01-01 | Stop reason: HOSPADM

## 2019-01-01 RX ORDER — LANOLIN ALCOHOL/MO/W.PET/CERES
6 CREAM (GRAM) TOPICAL
COMMUNITY

## 2019-01-01 RX ORDER — FAMOTIDINE 20 MG/1
20 TABLET, FILM COATED ORAL DAILY
Status: DISCONTINUED | OUTPATIENT
Start: 2019-01-01 | End: 2019-01-01 | Stop reason: HOSPADM

## 2019-01-01 RX ORDER — AMOXICILLIN AND CLAVULANATE POTASSIUM 600; 42.9 MG/5ML; MG/5ML
600 POWDER, FOR SUSPENSION ORAL EVERY 12 HOURS
Status: DISCONTINUED | OUTPATIENT
Start: 2019-01-01 | End: 2019-01-01 | Stop reason: HOSPADM

## 2019-01-01 RX ORDER — MORPHINE SULFATE 10 MG/ML
2 INJECTION, SOLUTION INTRAMUSCULAR; INTRAVENOUS
Status: DISCONTINUED | OUTPATIENT
Start: 2019-01-01 | End: 2019-01-01 | Stop reason: HOSPADM

## 2019-01-01 RX ORDER — PROPOFOL 10 MG/ML
40 INJECTION, EMULSION INTRAVENOUS
Status: COMPLETED | OUTPATIENT
Start: 2019-01-01 | End: 2019-01-01

## 2019-01-01 RX ORDER — SODIUM CHLORIDE 0.9 % (FLUSH) 0.9 %
5-40 SYRINGE (ML) INJECTION EVERY 8 HOURS
Status: DISCONTINUED | OUTPATIENT
Start: 2019-01-01 | End: 2019-01-01 | Stop reason: SDUPTHER

## 2019-01-01 RX ORDER — POLYETHYLENE GLYCOL 3350 17 G/17G
17 POWDER, FOR SOLUTION ORAL
Status: DISCONTINUED | OUTPATIENT
Start: 2019-01-01 | End: 2019-01-01 | Stop reason: HOSPADM

## 2019-01-01 RX ORDER — LANOLIN ALCOHOL/MO/W.PET/CERES
325 CREAM (GRAM) TOPICAL
Status: DISCONTINUED | OUTPATIENT
Start: 2019-01-01 | End: 2019-01-01 | Stop reason: HOSPADM

## 2019-01-01 RX ORDER — SODIUM CHLORIDE 0.9 % (FLUSH) 0.9 %
5-40 SYRINGE (ML) INJECTION AS NEEDED
Status: DISCONTINUED | OUTPATIENT
Start: 2019-01-01 | End: 2019-01-01 | Stop reason: SDUPTHER

## 2019-01-01 RX ORDER — IPRATROPIUM BROMIDE AND ALBUTEROL SULFATE 2.5; .5 MG/3ML; MG/3ML
3 SOLUTION RESPIRATORY (INHALATION)
Status: DISCONTINUED | OUTPATIENT
Start: 2019-01-01 | End: 2019-01-01

## 2019-01-01 RX ORDER — FACIAL-BODY WIPES
10 EACH TOPICAL DAILY PRN
Status: DISCONTINUED | OUTPATIENT
Start: 2019-01-01 | End: 2019-01-01 | Stop reason: HOSPADM

## 2019-01-01 RX ORDER — DIPHENHYDRAMINE HYDROCHLORIDE 50 MG/ML
12.5 INJECTION, SOLUTION INTRAMUSCULAR; INTRAVENOUS
Status: DISCONTINUED | OUTPATIENT
Start: 2019-01-01 | End: 2019-01-01 | Stop reason: HOSPADM

## 2019-01-01 RX ORDER — ACETAMINOPHEN 325 MG/1
650 TABLET ORAL
Status: DISCONTINUED | OUTPATIENT
Start: 2019-01-01 | End: 2019-01-01 | Stop reason: HOSPADM

## 2019-01-01 RX ORDER — FERROUS SULFATE, DRIED 160(50) MG
1 TABLET, EXTENDED RELEASE ORAL
Status: DISCONTINUED | OUTPATIENT
Start: 2019-01-01 | End: 2019-01-01 | Stop reason: HOSPADM

## 2019-01-01 RX ORDER — ACETAMINOPHEN 325 MG/1
650 TABLET ORAL EVERY 6 HOURS
Qty: 112 TAB | Refills: 0 | Status: SHIPPED | OUTPATIENT
Start: 2019-01-01 | End: 2019-01-01

## 2019-01-01 RX ORDER — ONDANSETRON 2 MG/ML
4 INJECTION INTRAMUSCULAR; INTRAVENOUS
Status: DISCONTINUED | OUTPATIENT
Start: 2019-01-01 | End: 2019-01-01 | Stop reason: HOSPADM

## 2019-01-01 RX ORDER — KETOROLAC TROMETHAMINE 30 MG/ML
15 INJECTION, SOLUTION INTRAMUSCULAR; INTRAVENOUS EVERY 6 HOURS
Status: COMPLETED | OUTPATIENT
Start: 2019-01-01 | End: 2019-01-01

## 2019-01-01 RX ORDER — HEPARIN SODIUM 5000 [USP'U]/ML
5000 INJECTION, SOLUTION INTRAVENOUS; SUBCUTANEOUS EVERY 12 HOURS
Status: DISCONTINUED | OUTPATIENT
Start: 2019-01-01 | End: 2019-01-01 | Stop reason: HOSPADM

## 2019-01-01 RX ORDER — BACITRACIN 500 UNIT/G
1 PACKET (EA) TOPICAL AS NEEDED
Status: DISCONTINUED | OUTPATIENT
Start: 2019-01-01 | End: 2019-01-01 | Stop reason: HOSPADM

## 2019-01-01 RX ORDER — ACETAMINOPHEN 325 MG/1
650 TABLET ORAL EVERY 6 HOURS
Status: DISCONTINUED | OUTPATIENT
Start: 2019-01-01 | End: 2019-01-01

## 2019-01-01 RX ORDER — OXYCODONE HYDROCHLORIDE 5 MG/1
10 TABLET ORAL
Status: DISCONTINUED | OUTPATIENT
Start: 2019-01-01 | End: 2019-01-01 | Stop reason: HOSPADM

## 2019-01-01 RX ORDER — ACETAMINOPHEN 325 MG/1
650 TABLET ORAL DAILY
COMMUNITY

## 2019-01-01 RX ORDER — ALBUMIN HUMAN 50 G/1000ML
SOLUTION INTRAVENOUS AS NEEDED
Status: DISCONTINUED | OUTPATIENT
Start: 2019-01-01 | End: 2019-01-01 | Stop reason: HOSPADM

## 2019-01-01 RX ORDER — DIPHENHYDRAMINE HCL 12.5MG/5ML
12.5 LIQUID (ML) ORAL
Status: DISCONTINUED | OUTPATIENT
Start: 2019-01-01 | End: 2019-01-01 | Stop reason: HOSPADM

## 2019-01-01 RX ORDER — AMOXICILLIN 250 MG
2 CAPSULE ORAL 2 TIMES DAILY
Status: DISCONTINUED | OUTPATIENT
Start: 2019-01-01 | End: 2019-01-01 | Stop reason: HOSPADM

## 2019-01-01 RX ORDER — PHENYLEPHRINE HCL IN 0.9% NACL 0.4MG/10ML
SYRINGE (ML) INTRAVENOUS AS NEEDED
Status: DISCONTINUED | OUTPATIENT
Start: 2019-01-01 | End: 2019-01-01 | Stop reason: HOSPADM

## 2019-01-01 RX ORDER — PROPOFOL 10 MG/ML
50 INJECTION, EMULSION INTRAVENOUS
Status: DISPENSED | OUTPATIENT
Start: 2019-01-01 | End: 2019-01-01

## 2019-01-01 RX ORDER — AMOXICILLIN AND CLAVULANATE POTASSIUM 600; 42.9 MG/5ML; MG/5ML
5 POWDER, FOR SUSPENSION ORAL EVERY 12 HOURS
Qty: 40 ML | Refills: 0 | Status: SHIPPED | OUTPATIENT
Start: 2019-01-01 | End: 2019-01-01

## 2019-01-01 RX ORDER — ASPIRIN 81 MG/1
81 TABLET ORAL DAILY
Status: DISCONTINUED | OUTPATIENT
Start: 2019-01-01 | End: 2019-01-01 | Stop reason: HOSPADM

## 2019-01-01 RX ORDER — FAMOTIDINE 20 MG/1
20 TABLET, FILM COATED ORAL 2 TIMES DAILY
Qty: 60 TAB | Refills: 0 | Status: SHIPPED | OUTPATIENT
Start: 2019-01-01 | End: 2019-01-01

## 2019-01-01 RX ORDER — CEFAZOLIN SODIUM/WATER 2 G/20 ML
2 SYRINGE (ML) INTRAVENOUS ONCE
Status: COMPLETED | OUTPATIENT
Start: 2019-01-01 | End: 2019-01-01

## 2019-01-01 RX ORDER — SODIUM CHLORIDE 9 MG/ML
INJECTION, SOLUTION INTRAVENOUS
Status: DISCONTINUED | OUTPATIENT
Start: 2019-01-01 | End: 2019-01-01 | Stop reason: HOSPADM

## 2019-01-01 RX ORDER — SODIUM CHLORIDE 9 MG/ML
125 INJECTION, SOLUTION INTRAVENOUS CONTINUOUS
Status: DISCONTINUED | OUTPATIENT
Start: 2019-01-01 | End: 2019-01-01

## 2019-01-01 RX ORDER — ACETAMINOPHEN 325 MG/1
650 TABLET ORAL
COMMUNITY
End: 2019-01-01

## 2019-01-01 RX ORDER — NEOSTIGMINE METHYLSULFATE 1 MG/ML
INJECTION INTRAVENOUS AS NEEDED
Status: DISCONTINUED | OUTPATIENT
Start: 2019-01-01 | End: 2019-01-01 | Stop reason: HOSPADM

## 2019-01-01 RX ORDER — ONDANSETRON 2 MG/ML
4 INJECTION INTRAMUSCULAR; INTRAVENOUS
Status: DISCONTINUED | OUTPATIENT
Start: 2019-01-01 | End: 2019-01-01

## 2019-01-01 RX ORDER — ONDANSETRON 4 MG/1
4 TABLET, ORALLY DISINTEGRATING ORAL
Status: DISCONTINUED | OUTPATIENT
Start: 2019-01-01 | End: 2019-01-01 | Stop reason: HOSPADM

## 2019-01-01 RX ORDER — ENOXAPARIN SODIUM 100 MG/ML
40 INJECTION SUBCUTANEOUS DAILY
Status: DISCONTINUED | OUTPATIENT
Start: 2019-01-01 | End: 2019-01-01 | Stop reason: SDUPTHER

## 2019-01-01 RX ORDER — ATORVASTATIN CALCIUM 40 MG/1
40 TABLET, FILM COATED ORAL
Status: DISCONTINUED | OUTPATIENT
Start: 2019-01-01 | End: 2019-01-01

## 2019-01-01 RX ORDER — AMOXICILLIN 250 MG
1 CAPSULE ORAL 2 TIMES DAILY
Status: DISCONTINUED | OUTPATIENT
Start: 2019-01-01 | End: 2019-01-01 | Stop reason: HOSPADM

## 2019-01-01 RX ORDER — DOCUSATE SODIUM 100 MG/1
100 CAPSULE, LIQUID FILLED ORAL DAILY
Status: DISCONTINUED | OUTPATIENT
Start: 2019-01-01 | End: 2019-01-01

## 2019-01-01 RX ORDER — ONDANSETRON 2 MG/ML
4 INJECTION INTRAMUSCULAR; INTRAVENOUS
Status: ACTIVE | OUTPATIENT
Start: 2019-01-01 | End: 2019-01-01

## 2019-01-01 RX ORDER — MEMANTINE HYDROCHLORIDE 10 MG/1
10 TABLET ORAL 2 TIMES DAILY
Status: DISCONTINUED | OUTPATIENT
Start: 2019-01-01 | End: 2019-01-01

## 2019-01-01 RX ORDER — MIDAZOLAM HYDROCHLORIDE 1 MG/ML
1 INJECTION, SOLUTION INTRAMUSCULAR; INTRAVENOUS AS NEEDED
Status: DISCONTINUED | OUTPATIENT
Start: 2019-01-01 | End: 2019-01-01 | Stop reason: HOSPADM

## 2019-01-01 RX ORDER — THERA TABS 400 MCG
1 TAB ORAL DAILY
Status: DISCONTINUED | OUTPATIENT
Start: 2019-01-01 | End: 2019-01-01 | Stop reason: HOSPADM

## 2019-01-01 RX ORDER — GUAIFENESIN 100 MG/5ML
81 LIQUID (ML) ORAL 2 TIMES DAILY
Qty: 60 TAB | Refills: 0 | Status: SHIPPED | OUTPATIENT
Start: 2019-01-01 | End: 2019-01-01

## 2019-01-01 RX ORDER — FAMOTIDINE 20 MG/1
20 TABLET, FILM COATED ORAL 2 TIMES DAILY
COMMUNITY

## 2019-01-01 RX ORDER — ACETAMINOPHEN 325 MG/1
650 TABLET ORAL
Status: DISCONTINUED | OUTPATIENT
Start: 2019-01-01 | End: 2019-01-01

## 2019-01-01 RX ORDER — POLYETHYLENE GLYCOL 3350 17 G/17G
17 POWDER, FOR SOLUTION ORAL
Qty: 15 PACKET | Refills: 0 | Status: SHIPPED | OUTPATIENT
Start: 2019-01-01 | End: 2019-01-01

## 2019-01-01 RX ORDER — ONDANSETRON 2 MG/ML
INJECTION INTRAMUSCULAR; INTRAVENOUS AS NEEDED
Status: DISCONTINUED | OUTPATIENT
Start: 2019-01-01 | End: 2019-01-01 | Stop reason: HOSPADM

## 2019-01-01 RX ORDER — SODIUM CHLORIDE 9 MG/ML
75 INJECTION, SOLUTION INTRAVENOUS CONTINUOUS
Status: DISPENSED | OUTPATIENT
Start: 2019-01-01 | End: 2019-01-01

## 2019-01-01 RX ORDER — BENZONATATE 100 MG/1
100 CAPSULE ORAL
Status: DISCONTINUED | OUTPATIENT
Start: 2019-01-01 | End: 2019-01-01 | Stop reason: HOSPADM

## 2019-01-01 RX ORDER — FENTANYL CITRATE 50 UG/ML
25 INJECTION, SOLUTION INTRAMUSCULAR; INTRAVENOUS
Status: DISCONTINUED | OUTPATIENT
Start: 2019-01-01 | End: 2019-01-01 | Stop reason: HOSPADM

## 2019-01-01 RX ORDER — HYDROMORPHONE HYDROCHLORIDE 1 MG/ML
0.2 INJECTION, SOLUTION INTRAMUSCULAR; INTRAVENOUS; SUBCUTANEOUS
Status: DISCONTINUED | OUTPATIENT
Start: 2019-01-01 | End: 2019-01-01 | Stop reason: HOSPADM

## 2019-01-01 RX ORDER — METHOCARBAMOL 500 MG/1
500 TABLET, FILM COATED ORAL
Status: DISCONTINUED | OUTPATIENT
Start: 2019-01-01 | End: 2019-01-01 | Stop reason: HOSPADM

## 2019-01-01 RX ORDER — TRAMADOL HYDROCHLORIDE 50 MG/1
50 TABLET ORAL
Status: ON HOLD | COMMUNITY
End: 2019-01-01 | Stop reason: SDUPTHER

## 2019-01-01 RX ORDER — GUAIFENESIN 100 MG/5ML
81 LIQUID (ML) ORAL DAILY
Status: DISCONTINUED | OUTPATIENT
Start: 2019-01-01 | End: 2019-01-01

## 2019-01-01 RX ORDER — SODIUM CHLORIDE 0.9 % (FLUSH) 0.9 %
5-40 SYRINGE (ML) INJECTION AS NEEDED
Status: DISCONTINUED | OUTPATIENT
Start: 2019-01-01 | End: 2019-01-01

## 2019-01-01 RX ORDER — BALSAM PERU/CASTOR OIL
OINTMENT (GRAM) TOPICAL 2 TIMES DAILY
Status: DISCONTINUED | OUTPATIENT
Start: 2019-01-01 | End: 2019-01-01 | Stop reason: HOSPADM

## 2019-01-01 RX ORDER — OXYCODONE HYDROCHLORIDE 5 MG/1
2.5 TABLET ORAL
Status: DISCONTINUED | OUTPATIENT
Start: 2019-01-01 | End: 2019-01-01 | Stop reason: HOSPADM

## 2019-01-01 RX ORDER — CEFAZOLIN SODIUM 1 G/3ML
INJECTION, POWDER, FOR SOLUTION INTRAMUSCULAR; INTRAVENOUS AS NEEDED
Status: DISCONTINUED | OUTPATIENT
Start: 2019-01-01 | End: 2019-01-01 | Stop reason: HOSPADM

## 2019-01-01 RX ORDER — IPRATROPIUM BROMIDE AND ALBUTEROL SULFATE 2.5; .5 MG/3ML; MG/3ML
3 SOLUTION RESPIRATORY (INHALATION)
Status: DISCONTINUED | OUTPATIENT
Start: 2019-01-01 | End: 2019-01-01 | Stop reason: HOSPADM

## 2019-01-01 RX ORDER — ROCURONIUM BROMIDE 10 MG/ML
INJECTION, SOLUTION INTRAVENOUS AS NEEDED
Status: DISCONTINUED | OUTPATIENT
Start: 2019-01-01 | End: 2019-01-01 | Stop reason: HOSPADM

## 2019-01-01 RX ORDER — DIPHENHYDRAMINE HCL 12.5MG/5ML
12.5 ELIXIR ORAL
Status: DISCONTINUED | OUTPATIENT
Start: 2019-01-01 | End: 2019-01-01 | Stop reason: CLARIF

## 2019-01-01 RX ORDER — GUAIFENESIN 100 MG/5ML
81 LIQUID (ML) ORAL 2 TIMES DAILY
Status: DISCONTINUED | OUTPATIENT
Start: 2019-01-01 | End: 2019-01-01

## 2019-01-01 RX ORDER — AMOXICILLIN 250 MG
2 CAPSULE ORAL
Qty: 30 TAB | Refills: 0 | Status: SHIPPED | OUTPATIENT
Start: 2019-01-01 | End: 2019-01-01

## 2019-01-01 RX ORDER — PROPOFOL 10 MG/ML
INJECTION, EMULSION INTRAVENOUS
Status: DISCONTINUED | OUTPATIENT
Start: 2019-01-01 | End: 2019-01-01 | Stop reason: HOSPADM

## 2019-01-01 RX ORDER — EPHEDRINE SULFATE/0.9% NACL/PF 50 MG/5 ML
SYRINGE (ML) INTRAVENOUS AS NEEDED
Status: DISCONTINUED | OUTPATIENT
Start: 2019-01-01 | End: 2019-01-01 | Stop reason: HOSPADM

## 2019-01-01 RX ORDER — FAMOTIDINE 20 MG/1
20 TABLET, FILM COATED ORAL 2 TIMES DAILY
Status: DISCONTINUED | OUTPATIENT
Start: 2019-01-01 | End: 2019-01-01 | Stop reason: HOSPADM

## 2019-01-01 RX ORDER — LANOLIN ALCOHOL/MO/W.PET/CERES
325 CREAM (GRAM) TOPICAL
Qty: 30 TAB | Refills: 0 | Status: SHIPPED
Start: 2019-01-01 | End: 2019-01-01

## 2019-01-01 RX ORDER — TIZANIDINE 2 MG/1
2 TABLET ORAL
COMMUNITY
End: 2019-01-01

## 2019-01-01 RX ORDER — FINASTERIDE 5 MG/1
5 TABLET, FILM COATED ORAL DAILY
Status: DISCONTINUED | OUTPATIENT
Start: 2019-01-01 | End: 2019-01-01

## 2019-01-01 RX ORDER — SODIUM CHLORIDE 9 MG/ML
100 INJECTION, SOLUTION INTRAVENOUS CONTINUOUS
Status: DISCONTINUED | OUTPATIENT
Start: 2019-01-01 | End: 2019-01-01

## 2019-01-01 RX ORDER — GUAIFENESIN 100 MG/5ML
81 LIQUID (ML) ORAL DAILY
Status: DISCONTINUED | OUTPATIENT
Start: 2019-01-01 | End: 2019-01-01 | Stop reason: HOSPADM

## 2019-01-01 RX ORDER — GLYCOPYRROLATE 0.2 MG/ML
INJECTION INTRAMUSCULAR; INTRAVENOUS AS NEEDED
Status: DISCONTINUED | OUTPATIENT
Start: 2019-01-01 | End: 2019-01-01 | Stop reason: HOSPADM

## 2019-01-01 RX ORDER — POTASSIUM CHLORIDE 1.5 G/1.77G
40 POWDER, FOR SOLUTION ORAL
Status: COMPLETED | OUTPATIENT
Start: 2019-01-01 | End: 2019-01-01

## 2019-01-01 RX ORDER — DONEPEZIL HYDROCHLORIDE 10 MG/1
10 TABLET, FILM COATED ORAL
COMMUNITY

## 2019-01-01 RX ORDER — FERROUS SULFATE, DRIED 160(50) MG
1 TABLET, EXTENDED RELEASE ORAL DAILY
Qty: 30 TAB | Refills: 0 | Status: SHIPPED
Start: 2019-01-01 | End: 2019-01-01

## 2019-01-01 RX ORDER — ACETAMINOPHEN 325 MG/1
325 TABLET ORAL
COMMUNITY
End: 2019-01-01

## 2019-01-01 RX ORDER — DEXAMETHASONE SODIUM PHOSPHATE 4 MG/ML
INJECTION, SOLUTION INTRA-ARTICULAR; INTRALESIONAL; INTRAMUSCULAR; INTRAVENOUS; SOFT TISSUE AS NEEDED
Status: DISCONTINUED | OUTPATIENT
Start: 2019-01-01 | End: 2019-01-01 | Stop reason: HOSPADM

## 2019-01-01 RX ORDER — ENOXAPARIN SODIUM 100 MG/ML
40 INJECTION SUBCUTANEOUS DAILY
Qty: 5.6 ML | Refills: 0 | Status: SHIPPED
Start: 2019-01-01 | End: 2019-01-01

## 2019-01-01 RX ORDER — SODIUM CHLORIDE, SODIUM LACTATE, POTASSIUM CHLORIDE, CALCIUM CHLORIDE 600; 310; 30; 20 MG/100ML; MG/100ML; MG/100ML; MG/100ML
INJECTION, SOLUTION INTRAVENOUS
Status: DISCONTINUED | OUTPATIENT
Start: 2019-01-01 | End: 2019-01-01 | Stop reason: HOSPADM

## 2019-01-01 RX ORDER — FERROUS SULFATE, DRIED 160(50) MG
1 TABLET, EXTENDED RELEASE ORAL DAILY
Status: DISCONTINUED | OUTPATIENT
Start: 2019-01-01 | End: 2019-01-01

## 2019-01-01 RX ORDER — OXYCODONE HYDROCHLORIDE 5 MG/1
5 TABLET ORAL
Qty: 60 TAB | Refills: 0 | Status: SHIPPED | OUTPATIENT
Start: 2019-01-01 | End: 2019-01-01

## 2019-01-01 RX ORDER — KETOROLAC TROMETHAMINE 30 MG/ML
15 INJECTION, SOLUTION INTRAMUSCULAR; INTRAVENOUS
Status: COMPLETED | OUTPATIENT
Start: 2019-01-01 | End: 2019-01-01

## 2019-01-01 RX ORDER — MORPHINE SULFATE 2 MG/ML
2 INJECTION, SOLUTION INTRAMUSCULAR; INTRAVENOUS
Status: ACTIVE | OUTPATIENT
Start: 2019-01-01 | End: 2019-01-01

## 2019-01-01 RX ORDER — ATORVASTATIN CALCIUM 40 MG/1
40 TABLET, FILM COATED ORAL
Status: DISCONTINUED | OUTPATIENT
Start: 2019-01-01 | End: 2019-01-01 | Stop reason: HOSPADM

## 2019-01-01 RX ORDER — GUAIFENESIN 600 MG/1
600 TABLET, EXTENDED RELEASE ORAL EVERY 12 HOURS
Qty: 60 TAB | Refills: 0 | Status: SHIPPED | OUTPATIENT
Start: 2019-01-01 | End: 2019-08-18

## 2019-01-01 RX ORDER — LANOLIN ALCOHOL/MO/W.PET/CERES
325 CREAM (GRAM) TOPICAL
COMMUNITY
End: 2019-01-01

## 2019-01-01 RX ORDER — POLYETHYLENE GLYCOL 3350 17 G/17G
17 POWDER, FOR SOLUTION ORAL DAILY
Qty: 15 PACKET | Refills: 0 | Status: SHIPPED | OUTPATIENT
Start: 2019-01-01 | End: 2019-01-01

## 2019-01-01 RX ORDER — LORAZEPAM 2 MG/ML
1 INJECTION INTRAMUSCULAR
Status: DISCONTINUED | OUTPATIENT
Start: 2019-01-01 | End: 2019-01-01 | Stop reason: HOSPADM

## 2019-01-01 RX ORDER — DONEPEZIL HYDROCHLORIDE 5 MG/1
10 TABLET, FILM COATED ORAL DAILY
Status: DISCONTINUED | OUTPATIENT
Start: 2019-01-01 | End: 2019-01-01

## 2019-01-01 RX ORDER — CALCIUM CARBONATE 500(1250)
500 TABLET ORAL
Status: DISCONTINUED | OUTPATIENT
Start: 2019-01-01 | End: 2019-01-01 | Stop reason: HOSPADM

## 2019-01-01 RX ORDER — ACETAMINOPHEN 500 MG
1000 TABLET ORAL EVERY 6 HOURS
Qty: 112 TAB | Refills: 0 | Status: SHIPPED | OUTPATIENT
Start: 2019-01-01 | End: 2019-01-01

## 2019-01-01 RX ORDER — FINASTERIDE 5 MG/1
5 TABLET, FILM COATED ORAL
Status: COMPLETED | OUTPATIENT
Start: 2019-01-01 | End: 2019-01-01

## 2019-01-01 RX ORDER — DONEPEZIL HYDROCHLORIDE 5 MG/1
10 TABLET, FILM COATED ORAL
Status: COMPLETED | OUTPATIENT
Start: 2019-01-01 | End: 2019-01-01

## 2019-01-01 RX ORDER — ROSUVASTATIN CALCIUM 10 MG/1
20 TABLET, COATED ORAL
Status: DISCONTINUED | OUTPATIENT
Start: 2019-01-01 | End: 2019-01-01 | Stop reason: HOSPADM

## 2019-01-01 RX ORDER — OXYCODONE HYDROCHLORIDE 5 MG/1
5 TABLET ORAL
COMMUNITY
End: 2019-01-01

## 2019-01-01 RX ORDER — POLYETHYLENE GLYCOL 3350 17 G/17G
17 POWDER, FOR SOLUTION ORAL
Qty: 15 PACKET | Refills: 0 | Status: ON HOLD | OUTPATIENT
Start: 2019-01-01 | End: 2019-01-01

## 2019-01-01 RX ORDER — CEFAZOLIN SODIUM/WATER 2 G/20 ML
2 SYRINGE (ML) INTRAVENOUS
Status: CANCELLED | OUTPATIENT
Start: 2019-01-01 | End: 2019-01-01 | Stop reason: SDUPTHER

## 2019-01-01 RX ORDER — ALBUTEROL SULFATE 0.83 MG/ML
2.5 SOLUTION RESPIRATORY (INHALATION)
Status: DISCONTINUED | OUTPATIENT
Start: 2019-01-01 | End: 2019-01-01 | Stop reason: HOSPADM

## 2019-01-01 RX ADMIN — FINASTERIDE 5 MG: 5 TABLET, FILM COATED ORAL at 08:18

## 2019-01-01 RX ADMIN — SODIUM CHLORIDE 100 ML/HR: 900 INJECTION, SOLUTION INTRAVENOUS at 11:28

## 2019-01-01 RX ADMIN — SODIUM CHLORIDE 75 ML/HR: 900 INJECTION, SOLUTION INTRAVENOUS at 21:17

## 2019-01-01 RX ADMIN — MEMANTINE HYDROCHLORIDE 10 MG: 10 TABLET ORAL at 17:47

## 2019-01-01 RX ADMIN — PIPERACILLIN AND TAZOBACTAM 3.38 G: 3; .375 INJECTION, POWDER, FOR SOLUTION INTRAVENOUS at 12:02

## 2019-01-01 RX ADMIN — ROCURONIUM BROMIDE 5 MG: 10 INJECTION, SOLUTION INTRAVENOUS at 11:35

## 2019-01-01 RX ADMIN — MEMANTINE HYDROCHLORIDE 10 MG: 10 TABLET ORAL at 18:13

## 2019-01-01 RX ADMIN — DONEPEZIL HYDROCHLORIDE 10 MG: 5 TABLET, FILM COATED ORAL at 08:18

## 2019-01-01 RX ADMIN — SODIUM CHLORIDE 125 ML/HR: 900 INJECTION, SOLUTION INTRAVENOUS at 23:01

## 2019-01-01 RX ADMIN — Medication 10 ML: at 06:00

## 2019-01-01 RX ADMIN — TAMSULOSIN HYDROCHLORIDE 0.4 MG: 0.4 CAPSULE ORAL at 08:20

## 2019-01-01 RX ADMIN — FERROUS SULFATE TAB 325 MG (65 MG ELEMENTAL FE) 325 MG: 325 (65 FE) TAB at 08:58

## 2019-01-01 RX ADMIN — Medication 10 ML: at 16:26

## 2019-01-01 RX ADMIN — HEPARIN SODIUM 5000 UNITS: 5000 INJECTION INTRAVENOUS; SUBCUTANEOUS at 21:05

## 2019-01-01 RX ADMIN — Medication 10 ML: at 08:28

## 2019-01-01 RX ADMIN — PIPERACILLIN AND TAZOBACTAM 3.38 G: 3; .375 INJECTION, POWDER, FOR SOLUTION INTRAVENOUS at 21:23

## 2019-01-01 RX ADMIN — DONEPEZIL HYDROCHLORIDE 10 MG: 10 TABLET, FILM COATED ORAL at 21:39

## 2019-01-01 RX ADMIN — POLYETHYLENE GLYCOL 3350 17 G: 17 POWDER, FOR SOLUTION ORAL at 08:51

## 2019-01-01 RX ADMIN — SODIUM CHLORIDE 125 ML/HR: 900 INJECTION, SOLUTION INTRAVENOUS at 22:46

## 2019-01-01 RX ADMIN — ROCURONIUM BROMIDE 15 MG: 10 INJECTION, SOLUTION INTRAVENOUS at 12:05

## 2019-01-01 RX ADMIN — Medication 2 G: at 10:05

## 2019-01-01 RX ADMIN — PROPOFOL 60 MG: 10 INJECTION, EMULSION INTRAVENOUS at 08:32

## 2019-01-01 RX ADMIN — Medication 10 ML: at 16:49

## 2019-01-01 RX ADMIN — FERROUS SULFATE TAB 325 MG (65 MG ELEMENTAL FE) 325 MG: 325 (65 FE) TAB at 06:49

## 2019-01-01 RX ADMIN — FINASTERIDE 5 MG: 5 TABLET, FILM COATED ORAL at 08:45

## 2019-01-01 RX ADMIN — FERROUS SULFATE TAB 325 MG (65 MG ELEMENTAL FE) 325 MG: 325 (65 FE) TAB at 09:41

## 2019-01-01 RX ADMIN — FINASTERIDE 5 MG: 5 TABLET, FILM COATED ORAL at 08:54

## 2019-01-01 RX ADMIN — FENTANYL CITRATE 25 MCG: 50 INJECTION, SOLUTION INTRAMUSCULAR; INTRAVENOUS at 18:12

## 2019-01-01 RX ADMIN — VANCOMYCIN HYDROCHLORIDE 700 MG: 1 INJECTION, POWDER, LYOPHILIZED, FOR SOLUTION INTRAVENOUS at 10:27

## 2019-01-01 RX ADMIN — SODIUM CHLORIDE 125 ML/HR: 900 INJECTION, SOLUTION INTRAVENOUS at 05:05

## 2019-01-01 RX ADMIN — GUAIFENESIN AND DEXTROMETHORPHAN HYDROBROMIDE 10 ML: 100; 10 LIQUID ORAL at 06:24

## 2019-01-01 RX ADMIN — FENTANYL CITRATE 50 MCG: 50 INJECTION, SOLUTION INTRAMUSCULAR; INTRAVENOUS at 08:40

## 2019-01-01 RX ADMIN — FENTANYL CITRATE 25 MCG: 50 INJECTION, SOLUTION INTRAMUSCULAR; INTRAVENOUS at 11:35

## 2019-01-01 RX ADMIN — SENNOSIDES, DOCUSATE SODIUM 1 TABLET: 50; 8.6 TABLET, FILM COATED ORAL at 17:02

## 2019-01-01 RX ADMIN — CEFEPIME HYDROCHLORIDE 2 G: 2 INJECTION, POWDER, FOR SOLUTION INTRAVENOUS at 00:16

## 2019-01-01 RX ADMIN — FAMOTIDINE 20 MG: 20 TABLET ORAL at 17:46

## 2019-01-01 RX ADMIN — UMECLIDINIUM 1 PUFF: 62.5 AEROSOL, POWDER ORAL at 16:25

## 2019-01-01 RX ADMIN — FAMOTIDINE 20 MG: 20 TABLET ORAL at 17:29

## 2019-01-01 RX ADMIN — FERROUS SULFATE TAB 325 MG (65 MG ELEMENTAL FE) 325 MG: 325 (65 FE) TAB at 10:05

## 2019-01-01 RX ADMIN — VANCOMYCIN HYDROCHLORIDE 750 MG: 750 INJECTION, POWDER, LYOPHILIZED, FOR SOLUTION INTRAVENOUS at 09:00

## 2019-01-01 RX ADMIN — ACETAMINOPHEN 1000 MG: 500 TABLET ORAL at 16:11

## 2019-01-01 RX ADMIN — GUAIFENESIN 600 MG: 600 TABLET, EXTENDED RELEASE ORAL at 08:53

## 2019-01-01 RX ADMIN — ATORVASTATIN CALCIUM 40 MG: 40 TABLET, FILM COATED ORAL at 21:24

## 2019-01-01 RX ADMIN — OYSTER SHELL CALCIUM WITH VITAMIN D 1 TABLET: 500; 200 TABLET, FILM COATED ORAL at 08:58

## 2019-01-01 RX ADMIN — Medication 10 ML: at 15:04

## 2019-01-01 RX ADMIN — MEMANTINE HYDROCHLORIDE 10 MG: 10 TABLET ORAL at 17:06

## 2019-01-01 RX ADMIN — ACETAMINOPHEN 1000 MG: 500 TABLET ORAL at 10:06

## 2019-01-01 RX ADMIN — Medication 6 MG: at 22:02

## 2019-01-01 RX ADMIN — UMECLIDINIUM 1 PUFF: 62.5 AEROSOL, POWDER ORAL at 09:14

## 2019-01-01 RX ADMIN — VANCOMYCIN HYDROCHLORIDE 700 MG: 1 INJECTION, POWDER, LYOPHILIZED, FOR SOLUTION INTRAVENOUS at 18:28

## 2019-01-01 RX ADMIN — DONEPEZIL HYDROCHLORIDE 10 MG: 5 TABLET, FILM COATED ORAL at 08:45

## 2019-01-01 RX ADMIN — NEOSTIGMINE METHYLSULFATE 2 MG: 1 INJECTION INTRAVENOUS at 12:58

## 2019-01-01 RX ADMIN — GUAIFENESIN AND DEXTROMETHORPHAN HYDROBROMIDE 10 ML: 100; 10 LIQUID ORAL at 00:00

## 2019-01-01 RX ADMIN — SODIUM CHLORIDE, SODIUM LACTATE, POTASSIUM CHLORIDE, AND CALCIUM CHLORIDE 25 ML/HR: 600; 310; 30; 20 INJECTION, SOLUTION INTRAVENOUS at 16:55

## 2019-01-01 RX ADMIN — Medication 10 ML: at 09:40

## 2019-01-01 RX ADMIN — POTASSIUM CHLORIDE 40 MEQ: 1.5 POWDER, FOR SOLUTION ORAL at 08:49

## 2019-01-01 RX ADMIN — OYSTER SHELL CALCIUM WITH VITAMIN D 1 TABLET: 500; 200 TABLET, FILM COATED ORAL at 14:07

## 2019-01-01 RX ADMIN — CASTOR OIL AND BALSAM, PERU: 788; 87 OINTMENT TOPICAL at 17:42

## 2019-01-01 RX ADMIN — MEMANTINE HYDROCHLORIDE 10 MG: 10 TABLET ORAL at 09:03

## 2019-01-01 RX ADMIN — Medication 10 ML: at 06:32

## 2019-01-01 RX ADMIN — Medication 10 ML: at 07:54

## 2019-01-01 RX ADMIN — SODIUM CHLORIDE 100 ML/HR: 900 INJECTION, SOLUTION INTRAVENOUS at 23:20

## 2019-01-01 RX ADMIN — ACETAMINOPHEN 650 MG: 325 TABLET ORAL at 23:02

## 2019-01-01 RX ADMIN — SENNOSIDES AND DOCUSATE SODIUM 2 TABLET: 8.6; 5 TABLET ORAL at 08:27

## 2019-01-01 RX ADMIN — EPHEDRINE SULFATE 10 MG: 50 INJECTION, SOLUTION INTRAVENOUS at 18:25

## 2019-01-01 RX ADMIN — OXYCODONE HYDROCHLORIDE 5 MG: 5 TABLET ORAL at 03:06

## 2019-01-01 RX ADMIN — GLYCOPYRROLATE 0.4 MG: 0.2 INJECTION INTRAMUSCULAR; INTRAVENOUS at 12:58

## 2019-01-01 RX ADMIN — TAMSULOSIN HYDROCHLORIDE 0.4 MG: 0.4 CAPSULE ORAL at 09:36

## 2019-01-01 RX ADMIN — ACETAMINOPHEN 650 MG: 325 TABLET ORAL at 00:00

## 2019-01-01 RX ADMIN — POLYETHYLENE GLYCOL 3350 17 G: 17 POWDER, FOR SOLUTION ORAL at 09:39

## 2019-01-01 RX ADMIN — MEMANTINE HYDROCHLORIDE 10 MG: 10 TABLET ORAL at 08:53

## 2019-01-01 RX ADMIN — Medication 10 ML: at 14:00

## 2019-01-01 RX ADMIN — ACETAMINOPHEN 650 MG: 325 TABLET ORAL at 00:17

## 2019-01-01 RX ADMIN — PIPERACILLIN AND TAZOBACTAM 3.38 G: 3; .375 INJECTION, POWDER, FOR SOLUTION INTRAVENOUS at 21:54

## 2019-01-01 RX ADMIN — FINASTERIDE 5 MG: 5 TABLET, FILM COATED ORAL at 09:26

## 2019-01-01 RX ADMIN — UMECLIDINIUM 1 PUFF: 62.5 AEROSOL, POWDER ORAL at 09:01

## 2019-01-01 RX ADMIN — CEFEPIME HYDROCHLORIDE 2 G: 2 INJECTION, POWDER, FOR SOLUTION INTRAVENOUS at 00:22

## 2019-01-01 RX ADMIN — Medication 2 G: at 03:48

## 2019-01-01 RX ADMIN — DONEPEZIL HYDROCHLORIDE 10 MG: 10 TABLET, FILM COATED ORAL at 21:18

## 2019-01-01 RX ADMIN — ACETAMINOPHEN 650 MG: 325 TABLET ORAL at 17:29

## 2019-01-01 RX ADMIN — Medication 10 ML: at 07:12

## 2019-01-01 RX ADMIN — FERROUS SULFATE TAB 325 MG (65 MG ELEMENTAL FE) 325 MG: 325 (65 FE) TAB at 06:40

## 2019-01-01 RX ADMIN — ACETAMINOPHEN 650 MG: 325 TABLET ORAL at 16:49

## 2019-01-01 RX ADMIN — TAMSULOSIN HYDROCHLORIDE 0.4 MG: 0.4 CAPSULE ORAL at 09:37

## 2019-01-01 RX ADMIN — FENTANYL CITRATE 25 MCG: 50 INJECTION, SOLUTION INTRAMUSCULAR; INTRAVENOUS at 12:12

## 2019-01-01 RX ADMIN — Medication 10 ML: at 22:01

## 2019-01-01 RX ADMIN — THERA TABS 1 TABLET: TAB at 08:49

## 2019-01-01 RX ADMIN — UMECLIDINIUM 1 PUFF: 62.5 AEROSOL, POWDER ORAL at 09:03

## 2019-01-01 RX ADMIN — MEMANTINE HYDROCHLORIDE 10 MG: 10 TABLET ORAL at 21:19

## 2019-01-01 RX ADMIN — SODIUM CHLORIDE 125 ML/HR: 900 INJECTION, SOLUTION INTRAVENOUS at 07:28

## 2019-01-01 RX ADMIN — OXYCODONE HYDROCHLORIDE 5 MG: 5 TABLET ORAL at 07:47

## 2019-01-01 RX ADMIN — UMECLIDINIUM 1 PUFF: 62.5 AEROSOL, POWDER ORAL at 17:15

## 2019-01-01 RX ADMIN — METHOCARBAMOL TABLETS 500 MG: 500 TABLET, COATED ORAL at 16:49

## 2019-01-01 RX ADMIN — Medication 120 MCG: at 09:21

## 2019-01-01 RX ADMIN — MEMANTINE HYDROCHLORIDE 10 MG: 10 TABLET ORAL at 09:46

## 2019-01-01 RX ADMIN — ACETAMINOPHEN 650 MG: 325 TABLET ORAL at 09:14

## 2019-01-01 RX ADMIN — CASTOR OIL AND BALSAM, PERU: 788; 87 OINTMENT TOPICAL at 18:07

## 2019-01-01 RX ADMIN — MEMANTINE HYDROCHLORIDE 10 MG: 10 TABLET ORAL at 09:11

## 2019-01-01 RX ADMIN — OYSTER SHELL CALCIUM WITH VITAMIN D 1 TABLET: 500; 200 TABLET, FILM COATED ORAL at 17:47

## 2019-01-01 RX ADMIN — EPHEDRINE SULFATE 10 MG: 50 INJECTION, SOLUTION INTRAVENOUS at 18:55

## 2019-01-01 RX ADMIN — PIPERACILLIN AND TAZOBACTAM 3.38 G: 3; .375 INJECTION, POWDER, FOR SOLUTION INTRAVENOUS at 14:27

## 2019-01-01 RX ADMIN — Medication 10 ML: at 05:24

## 2019-01-01 RX ADMIN — METHOCARBAMOL TABLETS 500 MG: 500 TABLET, COATED ORAL at 23:09

## 2019-01-01 RX ADMIN — Medication 5 ML: at 22:00

## 2019-01-01 RX ADMIN — ALBUMIN HUMAN 250 ML: 50 SOLUTION INTRAVENOUS at 18:51

## 2019-01-01 RX ADMIN — CEFEPIME HYDROCHLORIDE 2 G: 2 INJECTION, POWDER, FOR SOLUTION INTRAVENOUS at 00:23

## 2019-01-01 RX ADMIN — METHOCARBAMOL TABLETS 500 MG: 500 TABLET, COATED ORAL at 07:12

## 2019-01-01 RX ADMIN — Medication 80 MCG: at 08:47

## 2019-01-01 RX ADMIN — KETOROLAC TROMETHAMINE 15 MG: 30 INJECTION, SOLUTION INTRAMUSCULAR at 11:57

## 2019-01-01 RX ADMIN — Medication 80 MCG: at 19:08

## 2019-01-01 RX ADMIN — ACETAMINOPHEN 650 MG: 325 TABLET ORAL at 14:27

## 2019-01-01 RX ADMIN — SODIUM CHLORIDE 75 ML/HR: 900 INJECTION, SOLUTION INTRAVENOUS at 20:40

## 2019-01-01 RX ADMIN — Medication 10 ML: at 14:07

## 2019-01-01 RX ADMIN — DONEPEZIL HYDROCHLORIDE 10 MG: 5 TABLET, FILM COATED ORAL at 10:06

## 2019-01-01 RX ADMIN — OYSTER SHELL CALCIUM WITH VITAMIN D 1 TABLET: 500; 200 TABLET, FILM COATED ORAL at 12:07

## 2019-01-01 RX ADMIN — FERROUS SULFATE TAB 325 MG (65 MG ELEMENTAL FE) 325 MG: 325 (65 FE) TAB at 06:56

## 2019-01-01 RX ADMIN — VANCOMYCIN HYDROCHLORIDE 750 MG: 750 INJECTION, POWDER, LYOPHILIZED, FOR SOLUTION INTRAVENOUS at 02:01

## 2019-01-01 RX ADMIN — DONEPEZIL HYDROCHLORIDE 10 MG: 5 TABLET, FILM COATED ORAL at 08:59

## 2019-01-01 RX ADMIN — OYSTER SHELL CALCIUM WITH VITAMIN D 1 TABLET: 500; 200 TABLET, FILM COATED ORAL at 09:41

## 2019-01-01 RX ADMIN — TAMSULOSIN HYDROCHLORIDE 0.4 MG: 0.4 CAPSULE ORAL at 09:46

## 2019-01-01 RX ADMIN — Medication 6 MG: at 21:46

## 2019-01-01 RX ADMIN — MORPHINE SULFATE 2 MG: 10 INJECTION, SOLUTION INTRAMUSCULAR; INTRAVENOUS at 09:03

## 2019-01-01 RX ADMIN — UMECLIDINIUM 1 PUFF: 62.5 AEROSOL, POWDER ORAL at 10:14

## 2019-01-01 RX ADMIN — DONEPEZIL HYDROCHLORIDE 10 MG: 10 TABLET, FILM COATED ORAL at 21:55

## 2019-01-01 RX ADMIN — HEPARIN SODIUM 5000 UNITS: 5000 INJECTION INTRAVENOUS; SUBCUTANEOUS at 09:00

## 2019-01-01 RX ADMIN — DEXAMETHASONE SODIUM PHOSPHATE 10 MG: 4 INJECTION, SOLUTION INTRAMUSCULAR; INTRAVENOUS at 09:11

## 2019-01-01 RX ADMIN — THERA TABS 1 TABLET: TAB at 09:28

## 2019-01-01 RX ADMIN — SENNOSIDES, DOCUSATE SODIUM 1 TABLET: 50; 8.6 TABLET, FILM COATED ORAL at 17:46

## 2019-01-01 RX ADMIN — Medication 10 ML: at 21:14

## 2019-01-01 RX ADMIN — DONEPEZIL HYDROCHLORIDE 10 MG: 5 TABLET, FILM COATED ORAL at 09:36

## 2019-01-01 RX ADMIN — ACETAMINOPHEN 650 MG: 325 TABLET ORAL at 18:27

## 2019-01-01 RX ADMIN — ROSUVASTATIN CALCIUM 20 MG: 10 TABLET, COATED ORAL at 21:46

## 2019-01-01 RX ADMIN — KETOROLAC TROMETHAMINE 15 MG: 30 INJECTION, SOLUTION INTRAMUSCULAR at 16:11

## 2019-01-01 RX ADMIN — ACETAMINOPHEN 650 MG: 325 TABLET ORAL at 08:27

## 2019-01-01 RX ADMIN — POLYETHYLENE GLYCOL 3350 17 G: 17 POWDER, FOR SOLUTION ORAL at 08:31

## 2019-01-01 RX ADMIN — Medication 80 MCG: at 18:46

## 2019-01-01 RX ADMIN — SENNOSIDES AND DOCUSATE SODIUM 2 TABLET: 8.6; 5 TABLET ORAL at 17:07

## 2019-01-01 RX ADMIN — PROPOFOL 20 MG: 10 INJECTION, EMULSION INTRAVENOUS at 19:51

## 2019-01-01 RX ADMIN — FINASTERIDE 5 MG: 5 TABLET, FILM COATED ORAL at 09:14

## 2019-01-01 RX ADMIN — POLYETHYLENE GLYCOL 3350 17 G: 17 POWDER, FOR SOLUTION ORAL at 09:03

## 2019-01-01 RX ADMIN — THERA TABS 1 TABLET: TAB at 08:53

## 2019-01-01 RX ADMIN — OYSTER SHELL CALCIUM WITH VITAMIN D 1 TABLET: 500; 200 TABLET, FILM COATED ORAL at 17:02

## 2019-01-01 RX ADMIN — GUAIFENESIN AND DEXTROMETHORPHAN HYDROBROMIDE 10 ML: 100; 10 LIQUID ORAL at 00:36

## 2019-01-01 RX ADMIN — DONEPEZIL HYDROCHLORIDE 10 MG: 5 TABLET, FILM COATED ORAL at 09:11

## 2019-01-01 RX ADMIN — Medication 80 MCG: at 19:10

## 2019-01-01 RX ADMIN — MELATONIN TAB 3 MG 6 MG: 3 TAB at 23:01

## 2019-01-01 RX ADMIN — ACETAMINOPHEN 650 MG: 325 TABLET ORAL at 18:19

## 2019-01-01 RX ADMIN — ACETAMINOPHEN 650 MG: 325 TABLET ORAL at 08:45

## 2019-01-01 RX ADMIN — Medication 10 ML: at 22:21

## 2019-01-01 RX ADMIN — PSYLLIUM HUSK 1 PACKET: 3.4 POWDER ORAL at 08:57

## 2019-01-01 RX ADMIN — MEMANTINE HYDROCHLORIDE 10 MG: 10 TABLET ORAL at 09:38

## 2019-01-01 RX ADMIN — PSYLLIUM HUSK 1 PACKET: 3.4 POWDER ORAL at 09:40

## 2019-01-01 RX ADMIN — SENNOSIDES,DOCUSATE SODIUM 2 TABLET: 8.6; 5 TABLET, FILM COATED ORAL at 21:55

## 2019-01-01 RX ADMIN — Medication 10 ML: at 22:03

## 2019-01-01 RX ADMIN — VANCOMYCIN HYDROCHLORIDE 750 MG: 750 INJECTION, POWDER, LYOPHILIZED, FOR SOLUTION INTRAVENOUS at 02:00

## 2019-01-01 RX ADMIN — PIPERACILLIN AND TAZOBACTAM 3.38 G: 3; .375 INJECTION, POWDER, FOR SOLUTION INTRAVENOUS at 05:00

## 2019-01-01 RX ADMIN — Medication 10 ML: at 05:38

## 2019-01-01 RX ADMIN — SENNOSIDES AND DOCUSATE SODIUM 1 TABLET: 8.6; 5 TABLET ORAL at 09:02

## 2019-01-01 RX ADMIN — Medication 10 ML: at 14:58

## 2019-01-01 RX ADMIN — SODIUM CHLORIDE 100 ML/HR: 900 INJECTION, SOLUTION INTRAVENOUS at 14:30

## 2019-01-01 RX ADMIN — MELATONIN TAB 3 MG 6 MG: 3 TAB at 22:01

## 2019-01-01 RX ADMIN — ROSUVASTATIN CALCIUM 20 MG: 10 TABLET, COATED ORAL at 23:15

## 2019-01-01 RX ADMIN — MELATONIN TAB 3 MG 6 MG: 3 TAB at 22:03

## 2019-01-01 RX ADMIN — KETOROLAC TROMETHAMINE 15 MG: 30 INJECTION, SOLUTION INTRAMUSCULAR at 01:07

## 2019-01-01 RX ADMIN — GUAIFENESIN AND DEXTROMETHORPHAN HYDROBROMIDE 10 ML: 100; 10 LIQUID ORAL at 17:29

## 2019-01-01 RX ADMIN — LIDOCAINE HYDROCHLORIDE 60 MG: 20 INJECTION, SOLUTION EPIDURAL; INFILTRATION; INTRACAUDAL; PERINEURAL at 18:12

## 2019-01-01 RX ADMIN — CASTOR OIL AND BALSAM, PERU: 788; 87 OINTMENT TOPICAL at 09:26

## 2019-01-01 RX ADMIN — FENTANYL CITRATE 50 MCG: 50 INJECTION, SOLUTION INTRAMUSCULAR; INTRAVENOUS at 08:32

## 2019-01-01 RX ADMIN — SODIUM CHLORIDE 75 ML/HR: 900 INJECTION, SOLUTION INTRAVENOUS at 21:15

## 2019-01-01 RX ADMIN — Medication 10 ML: at 05:21

## 2019-01-01 RX ADMIN — CEFAZOLIN SODIUM 2 G: 1 INJECTION, POWDER, FOR SOLUTION INTRAMUSCULAR; INTRAVENOUS at 18:18

## 2019-01-01 RX ADMIN — Medication 40 MCG: at 09:01

## 2019-01-01 RX ADMIN — METHOCARBAMOL TABLETS 500 MG: 500 TABLET, COATED ORAL at 23:01

## 2019-01-01 RX ADMIN — MEMANTINE HYDROCHLORIDE 10 MG: 10 TABLET ORAL at 17:08

## 2019-01-01 RX ADMIN — METHOCARBAMOL TABLETS 500 MG: 500 TABLET, COATED ORAL at 09:37

## 2019-01-01 RX ADMIN — Medication 10 ML: at 21:24

## 2019-01-01 RX ADMIN — VANCOMYCIN HYDROCHLORIDE 750 MG: 750 INJECTION, POWDER, LYOPHILIZED, FOR SOLUTION INTRAVENOUS at 11:25

## 2019-01-01 RX ADMIN — MEMANTINE HYDROCHLORIDE 10 MG: 10 TABLET ORAL at 08:48

## 2019-01-01 RX ADMIN — Medication 120 MCG: at 19:01

## 2019-01-01 RX ADMIN — SENNOSIDES AND DOCUSATE SODIUM 2 TABLET: 8.6; 5 TABLET ORAL at 22:03

## 2019-01-01 RX ADMIN — SODIUM CHLORIDE: 9 INJECTION, SOLUTION INTRAVENOUS at 11:45

## 2019-01-01 RX ADMIN — MEMANTINE HYDROCHLORIDE 10 MG: 10 TABLET ORAL at 17:46

## 2019-01-01 RX ADMIN — SODIUM CHLORIDE 1000 ML: 900 INJECTION, SOLUTION INTRAVENOUS at 19:12

## 2019-01-01 RX ADMIN — ROSUVASTATIN CALCIUM 20 MG: 10 TABLET, COATED ORAL at 21:42

## 2019-01-01 RX ADMIN — Medication 10 ML: at 23:03

## 2019-01-01 RX ADMIN — CASTOR OIL AND BALSAM, PERU: 788; 87 OINTMENT TOPICAL at 11:25

## 2019-01-01 RX ADMIN — OYSTER SHELL CALCIUM WITH VITAMIN D 1 TABLET: 500; 200 TABLET, FILM COATED ORAL at 13:58

## 2019-01-01 RX ADMIN — FAMOTIDINE 20 MG: 20 TABLET ORAL at 08:27

## 2019-01-01 RX ADMIN — Medication 10 ML: at 05:11

## 2019-01-01 RX ADMIN — GUAIFENESIN 600 MG: 600 TABLET, EXTENDED RELEASE ORAL at 21:46

## 2019-01-01 RX ADMIN — GUAIFENESIN AND DEXTROMETHORPHAN HYDROBROMIDE 10 ML: 100; 10 LIQUID ORAL at 18:19

## 2019-01-01 RX ADMIN — ACETAMINOPHEN 650 MG: 325 TABLET ORAL at 14:30

## 2019-01-01 RX ADMIN — ASPIRIN 81 MG 81 MG: 81 TABLET ORAL at 08:49

## 2019-01-01 RX ADMIN — FINASTERIDE 5 MG: 5 TABLET, FILM COATED ORAL at 08:27

## 2019-01-01 RX ADMIN — ACETAMINOPHEN 650 MG: 325 TABLET ORAL at 23:01

## 2019-01-01 RX ADMIN — ACETAMINOPHEN 650 MG: 325 TABLET ORAL at 05:09

## 2019-01-01 RX ADMIN — CEFEPIME HYDROCHLORIDE 2 G: 2 INJECTION, POWDER, FOR SOLUTION INTRAVENOUS at 13:25

## 2019-01-01 RX ADMIN — Medication 500 MG: at 08:49

## 2019-01-01 RX ADMIN — FAMOTIDINE 20 MG: 20 TABLET ORAL at 09:37

## 2019-01-01 RX ADMIN — Medication 10 ML: at 21:20

## 2019-01-01 RX ADMIN — OXYCODONE HYDROCHLORIDE 5 MG: 5 TABLET ORAL at 17:48

## 2019-01-01 RX ADMIN — DONEPEZIL HYDROCHLORIDE 10 MG: 10 TABLET, FILM COATED ORAL at 21:19

## 2019-01-01 RX ADMIN — SENNOSIDES AND DOCUSATE SODIUM 2 TABLET: 8.6; 5 TABLET ORAL at 22:28

## 2019-01-01 RX ADMIN — FAMOTIDINE 20 MG: 20 TABLET ORAL at 18:27

## 2019-01-01 RX ADMIN — Medication 10 ML: at 13:18

## 2019-01-01 RX ADMIN — FAMOTIDINE 20 MG: 20 TABLET ORAL at 09:11

## 2019-01-01 RX ADMIN — ROSUVASTATIN CALCIUM 20 MG: 10 TABLET, COATED ORAL at 22:07

## 2019-01-01 RX ADMIN — FINASTERIDE 5 MG: 5 TABLET, FILM COATED ORAL at 10:05

## 2019-01-01 RX ADMIN — FENTANYL CITRATE 25 MCG: 50 INJECTION, SOLUTION INTRAMUSCULAR; INTRAVENOUS at 18:34

## 2019-01-01 RX ADMIN — MEMANTINE HYDROCHLORIDE 10 MG: 10 TABLET ORAL at 17:29

## 2019-01-01 RX ADMIN — SUCCINYLCHOLINE CHLORIDE 120 MG: 20 INJECTION INTRAMUSCULAR; INTRAVENOUS at 11:35

## 2019-01-01 RX ADMIN — POLYETHYLENE GLYCOL 3350 17 G: 17 POWDER, FOR SOLUTION ORAL at 09:40

## 2019-01-01 RX ADMIN — AMOXICILLIN AND CLAVULANATE POTASSIUM 600 MG: 600; 42.9 POWDER, FOR SUSPENSION ORAL at 09:37

## 2019-01-01 RX ADMIN — CEFEPIME HYDROCHLORIDE 2 G: 2 INJECTION, POWDER, FOR SOLUTION INTRAVENOUS at 02:00

## 2019-01-01 RX ADMIN — OXYCODONE HYDROCHLORIDE 5 MG: 5 TABLET ORAL at 20:43

## 2019-01-01 RX ADMIN — Medication 10 ML: at 05:06

## 2019-01-01 RX ADMIN — Medication 10 ML: at 13:13

## 2019-01-01 RX ADMIN — GUAIFENESIN AND DEXTROMETHORPHAN HYDROBROMIDE 10 ML: 100; 10 LIQUID ORAL at 00:17

## 2019-01-01 RX ADMIN — FAMOTIDINE 20 MG: 20 TABLET ORAL at 10:06

## 2019-01-01 RX ADMIN — VANCOMYCIN HYDROCHLORIDE 700 MG: 1 INJECTION, POWDER, LYOPHILIZED, FOR SOLUTION INTRAVENOUS at 05:14

## 2019-01-01 RX ADMIN — Medication 10 ML: at 06:40

## 2019-01-01 RX ADMIN — GUAIFENESIN AND DEXTROMETHORPHAN HYDROBROMIDE 10 ML: 100; 10 LIQUID ORAL at 13:10

## 2019-01-01 RX ADMIN — Medication 500 MG: at 08:53

## 2019-01-01 RX ADMIN — DONEPEZIL HYDROCHLORIDE 10 MG: 5 TABLET, FILM COATED ORAL at 09:14

## 2019-01-01 RX ADMIN — Medication 2 G: at 08:37

## 2019-01-01 RX ADMIN — GUAIFENESIN AND DEXTROMETHORPHAN HYDROBROMIDE 10 ML: 100; 10 LIQUID ORAL at 06:11

## 2019-01-01 RX ADMIN — Medication 40 MCG: at 19:22

## 2019-01-01 RX ADMIN — FINASTERIDE 5 MG: 5 TABLET, FILM COATED ORAL at 08:59

## 2019-01-01 RX ADMIN — ROSUVASTATIN CALCIUM 20 MG: 10 TABLET, COATED ORAL at 09:46

## 2019-01-01 RX ADMIN — OYSTER SHELL CALCIUM WITH VITAMIN D 1 TABLET: 500; 200 TABLET, FILM COATED ORAL at 17:06

## 2019-01-01 RX ADMIN — SENNOSIDES AND DOCUSATE SODIUM 2 TABLET: 8.6; 5 TABLET ORAL at 21:56

## 2019-01-01 RX ADMIN — ASPIRIN 81 MG: 81 TABLET, COATED ORAL at 09:03

## 2019-01-01 RX ADMIN — ACETAMINOPHEN 650 MG: 325 TABLET ORAL at 06:24

## 2019-01-01 RX ADMIN — VANCOMYCIN HYDROCHLORIDE 750 MG: 750 INJECTION, POWDER, LYOPHILIZED, FOR SOLUTION INTRAVENOUS at 13:55

## 2019-01-01 RX ADMIN — ROSUVASTATIN CALCIUM 20 MG: 10 TABLET, COATED ORAL at 21:55

## 2019-01-01 RX ADMIN — MEMANTINE HYDROCHLORIDE 10 MG: 10 TABLET ORAL at 14:30

## 2019-01-01 RX ADMIN — TAMSULOSIN HYDROCHLORIDE 0.4 MG: 0.4 CAPSULE ORAL at 11:51

## 2019-01-01 RX ADMIN — MEMANTINE HYDROCHLORIDE 10 MG: 10 TABLET ORAL at 17:07

## 2019-01-01 RX ADMIN — PROPOFOL 40 MG: 10 INJECTION, EMULSION INTRAVENOUS at 19:44

## 2019-01-01 RX ADMIN — ACETAMINOPHEN 1000 MG: 500 TABLET ORAL at 05:37

## 2019-01-01 RX ADMIN — SODIUM CHLORIDE 500 ML: 900 INJECTION, SOLUTION INTRAVENOUS at 20:32

## 2019-01-01 RX ADMIN — Medication 6 MG: at 21:42

## 2019-01-01 RX ADMIN — FAMOTIDINE 20 MG: 20 TABLET ORAL at 09:14

## 2019-01-01 RX ADMIN — Medication 10 ML: at 14:42

## 2019-01-01 RX ADMIN — ACETAMINOPHEN 650 MG: 325 TABLET ORAL at 06:11

## 2019-01-01 RX ADMIN — CASTOR OIL AND BALSAM, PERU: 788; 87 OINTMENT TOPICAL at 18:13

## 2019-01-01 RX ADMIN — DONEPEZIL HYDROCHLORIDE 10 MG: 5 TABLET, FILM COATED ORAL at 17:02

## 2019-01-01 RX ADMIN — KETOROLAC TROMETHAMINE 15 MG: 30 INJECTION, SOLUTION INTRAMUSCULAR at 05:37

## 2019-01-01 RX ADMIN — Medication 10 MG: at 09:27

## 2019-01-01 RX ADMIN — MEMANTINE HYDROCHLORIDE 10 MG: 10 TABLET ORAL at 08:45

## 2019-01-01 RX ADMIN — PROPOFOL 20 MG: 10 INJECTION, EMULSION INTRAVENOUS at 19:45

## 2019-01-01 RX ADMIN — MEMANTINE HYDROCHLORIDE 10 MG: 10 TABLET ORAL at 09:14

## 2019-01-01 RX ADMIN — SUCCINYLCHOLINE CHLORIDE 100 MG: 20 INJECTION INTRAMUSCULAR; INTRAVENOUS at 08:32

## 2019-01-01 RX ADMIN — TAMSULOSIN HYDROCHLORIDE 0.4 MG: 0.4 CAPSULE ORAL at 09:41

## 2019-01-01 RX ADMIN — ACETAMINOPHEN 1000 MG: 10 INJECTION, SOLUTION INTRAVENOUS at 18:35

## 2019-01-01 RX ADMIN — MELATONIN TAB 3 MG 6 MG: 3 TAB at 22:28

## 2019-01-01 RX ADMIN — ACETAMINOPHEN 1000 MG: 500 TABLET ORAL at 14:07

## 2019-01-01 RX ADMIN — LIDOCAINE HYDROCHLORIDE 60 MG: 20 INJECTION, SOLUTION EPIDURAL; INFILTRATION; INTRACAUDAL; PERINEURAL at 08:32

## 2019-01-01 RX ADMIN — PROPOFOL 20 MG: 10 INJECTION, EMULSION INTRAVENOUS at 08:47

## 2019-01-01 RX ADMIN — POLYETHYLENE GLYCOL 3350 17 G: 17 POWDER, FOR SOLUTION ORAL at 08:17

## 2019-01-01 RX ADMIN — MELATONIN TAB 3 MG 6 MG: 3 TAB at 23:02

## 2019-01-01 RX ADMIN — ATORVASTATIN CALCIUM 40 MG: 40 TABLET, FILM COATED ORAL at 23:03

## 2019-01-01 RX ADMIN — Medication 10 ML: at 05:37

## 2019-01-01 RX ADMIN — ACETAMINOPHEN 1000 MG: 500 TABLET ORAL at 17:02

## 2019-01-01 RX ADMIN — FINASTERIDE 5 MG: 5 TABLET, FILM COATED ORAL at 09:40

## 2019-01-01 RX ADMIN — SODIUM CHLORIDE 1000 ML: 900 INJECTION, SOLUTION INTRAVENOUS at 09:47

## 2019-01-01 RX ADMIN — ASPIRIN 81 MG 81 MG: 81 TABLET ORAL at 09:37

## 2019-01-01 RX ADMIN — KETOROLAC TROMETHAMINE 15 MG: 30 INJECTION, SOLUTION INTRAMUSCULAR at 17:06

## 2019-01-01 RX ADMIN — Medication 10 ML: at 23:01

## 2019-01-01 RX ADMIN — MEMANTINE HYDROCHLORIDE 10 MG: 10 TABLET ORAL at 09:37

## 2019-01-01 RX ADMIN — ENOXAPARIN SODIUM 40 MG: 40 INJECTION SUBCUTANEOUS at 13:18

## 2019-01-01 RX ADMIN — UMECLIDINIUM 1 PUFF: 62.5 AEROSOL, POWDER ORAL at 09:12

## 2019-01-01 RX ADMIN — SODIUM CHLORIDE 75 ML/HR: 900 INJECTION, SOLUTION INTRAVENOUS at 13:42

## 2019-01-01 RX ADMIN — EPHEDRINE SULFATE 10 MG: 50 INJECTION, SOLUTION INTRAVENOUS at 18:46

## 2019-01-01 RX ADMIN — ONDANSETRON 4 MG: 2 INJECTION INTRAMUSCULAR; INTRAVENOUS at 08:53

## 2019-01-01 RX ADMIN — EPHEDRINE SULFATE 5 MG: 50 INJECTION, SOLUTION INTRAVENOUS at 19:22

## 2019-01-01 RX ADMIN — FINASTERIDE 5 MG: 5 TABLET, FILM COATED ORAL at 17:02

## 2019-01-01 RX ADMIN — ACETAMINOPHEN 650 MG: 325 TABLET ORAL at 20:40

## 2019-01-01 RX ADMIN — Medication 80 MCG: at 08:51

## 2019-01-01 RX ADMIN — SODIUM CHLORIDE 1000 ML: 900 INJECTION, SOLUTION INTRAVENOUS at 07:35

## 2019-01-01 RX ADMIN — TAMSULOSIN HYDROCHLORIDE 0.4 MG: 0.4 CAPSULE ORAL at 08:49

## 2019-01-01 RX ADMIN — SODIUM CHLORIDE 75 ML/HR: 900 INJECTION, SOLUTION INTRAVENOUS at 14:09

## 2019-01-01 RX ADMIN — Medication 5 ML: at 06:00

## 2019-01-01 RX ADMIN — ACETAMINOPHEN 1000 MG: 500 TABLET ORAL at 08:31

## 2019-01-01 RX ADMIN — TAMSULOSIN HYDROCHLORIDE 0.4 MG: 0.4 CAPSULE ORAL at 09:14

## 2019-01-01 RX ADMIN — Medication 120 MCG: at 18:27

## 2019-01-01 RX ADMIN — POLYETHYLENE GLYCOL 3350 17 G: 17 POWDER, FOR SOLUTION ORAL at 08:58

## 2019-01-01 RX ADMIN — SENNOSIDES AND DOCUSATE SODIUM 2 TABLET: 8.6; 5 TABLET ORAL at 08:45

## 2019-01-01 RX ADMIN — FAMOTIDINE 20 MG: 20 TABLET ORAL at 18:19

## 2019-01-01 RX ADMIN — FENTANYL CITRATE 25 MCG: 50 INJECTION, SOLUTION INTRAMUSCULAR; INTRAVENOUS at 18:30

## 2019-01-01 RX ADMIN — Medication 120 MCG: at 08:32

## 2019-01-01 RX ADMIN — GUAIFENESIN AND DEXTROMETHORPHAN HYDROBROMIDE 10 ML: 100; 10 LIQUID ORAL at 11:56

## 2019-01-01 RX ADMIN — FINASTERIDE 5 MG: 5 TABLET, FILM COATED ORAL at 08:49

## 2019-01-01 RX ADMIN — HYDROMORPHONE HYDROCHLORIDE 0.2 MG: 2 INJECTION, SOLUTION INTRAMUSCULAR; INTRAVENOUS; SUBCUTANEOUS at 18:51

## 2019-01-01 RX ADMIN — ROSUVASTATIN CALCIUM 20 MG: 10 TABLET, COATED ORAL at 22:30

## 2019-01-01 RX ADMIN — MELATONIN TAB 3 MG 6 MG: 3 TAB at 21:57

## 2019-01-01 RX ADMIN — KETOROLAC TROMETHAMINE 15 MG: 30 INJECTION, SOLUTION INTRAMUSCULAR at 05:05

## 2019-01-01 RX ADMIN — Medication 10 ML: at 17:09

## 2019-01-01 RX ADMIN — OXYCODONE HYDROCHLORIDE 5 MG: 5 TABLET ORAL at 09:46

## 2019-01-01 RX ADMIN — MELATONIN TAB 3 MG 6 MG: 3 TAB at 21:13

## 2019-01-01 RX ADMIN — CASTOR OIL AND BALSAM, PERU: 788; 87 OINTMENT TOPICAL at 08:50

## 2019-01-01 RX ADMIN — PROPOFOL 30 MG: 10 INJECTION, EMULSION INTRAVENOUS at 18:12

## 2019-01-01 RX ADMIN — Medication 2 G: at 20:31

## 2019-01-01 RX ADMIN — UMECLIDINIUM 1 PUFF: 62.5 AEROSOL, POWDER ORAL at 09:42

## 2019-01-01 RX ADMIN — MEMANTINE HYDROCHLORIDE 10 MG: 10 TABLET ORAL at 11:51

## 2019-01-01 RX ADMIN — SENNOSIDES, DOCUSATE SODIUM 1 TABLET: 50; 8.6 TABLET, FILM COATED ORAL at 17:06

## 2019-01-01 RX ADMIN — FAMOTIDINE 20 MG: 20 TABLET ORAL at 08:59

## 2019-01-01 RX ADMIN — MEMANTINE HYDROCHLORIDE 10 MG: 10 TABLET ORAL at 18:19

## 2019-01-01 RX ADMIN — SODIUM CHLORIDE 75 ML/HR: 900 INJECTION, SOLUTION INTRAVENOUS at 08:07

## 2019-01-01 RX ADMIN — PROPOFOL 20 MG: 10 INJECTION, EMULSION INTRAVENOUS at 19:46

## 2019-01-01 RX ADMIN — ACETAMINOPHEN 650 MG: 325 TABLET ORAL at 06:22

## 2019-01-01 RX ADMIN — CEFTRIAXONE 1 G: 1 INJECTION, POWDER, FOR SOLUTION INTRAMUSCULAR; INTRAVENOUS at 20:14

## 2019-01-01 RX ADMIN — FAMOTIDINE 20 MG: 20 TABLET ORAL at 17:02

## 2019-01-01 RX ADMIN — FINASTERIDE 5 MG: 5 TABLET, FILM COATED ORAL at 09:11

## 2019-01-01 RX ADMIN — FAMOTIDINE 20 MG: 20 TABLET ORAL at 08:19

## 2019-01-01 RX ADMIN — SODIUM CHLORIDE 75 ML/HR: 900 INJECTION, SOLUTION INTRAVENOUS at 22:20

## 2019-01-01 RX ADMIN — Medication 2 G: at 11:45

## 2019-01-01 RX ADMIN — Medication 10 ML: at 21:57

## 2019-01-01 RX ADMIN — ACETAMINOPHEN 1000 MG: 500 TABLET ORAL at 15:00

## 2019-01-01 RX ADMIN — TAMSULOSIN HYDROCHLORIDE 0.4 MG: 0.4 CAPSULE ORAL at 08:58

## 2019-01-01 RX ADMIN — VANCOMYCIN HYDROCHLORIDE 700 MG: 1 INJECTION, POWDER, LYOPHILIZED, FOR SOLUTION INTRAVENOUS at 22:01

## 2019-01-01 RX ADMIN — ACETAMINOPHEN 1000 MG: 500 TABLET ORAL at 13:58

## 2019-01-01 RX ADMIN — SENNOSIDES AND DOCUSATE SODIUM 2 TABLET: 8.6; 5 TABLET ORAL at 22:01

## 2019-01-01 RX ADMIN — MEMANTINE HYDROCHLORIDE 10 MG: 10 TABLET ORAL at 17:34

## 2019-01-01 RX ADMIN — Medication 10 ML: at 12:06

## 2019-01-01 RX ADMIN — FINASTERIDE 5 MG: 5 TABLET, FILM COATED ORAL at 09:02

## 2019-01-01 RX ADMIN — IPRATROPIUM BROMIDE AND ALBUTEROL SULFATE 3 ML: .5; 3 SOLUTION RESPIRATORY (INHALATION) at 07:40

## 2019-01-01 RX ADMIN — POLYETHYLENE GLYCOL 3350 17 G: 17 POWDER, FOR SOLUTION ORAL at 08:54

## 2019-01-01 RX ADMIN — ACETAMINOPHEN 500 MG: 500 TABLET ORAL at 02:19

## 2019-01-01 RX ADMIN — Medication 10 ML: at 06:24

## 2019-01-01 RX ADMIN — GUAIFENESIN 600 MG: 600 TABLET, EXTENDED RELEASE ORAL at 21:55

## 2019-01-01 RX ADMIN — Medication 80 MCG: at 19:15

## 2019-01-01 RX ADMIN — TAMSULOSIN HYDROCHLORIDE 0.4 MG: 0.4 CAPSULE ORAL at 09:27

## 2019-01-01 RX ADMIN — FERROUS SULFATE TAB 325 MG (65 MG ELEMENTAL FE) 325 MG: 325 (65 FE) TAB at 06:31

## 2019-01-01 RX ADMIN — ASPIRIN 81 MG 81 MG: 81 TABLET ORAL at 18:27

## 2019-01-01 RX ADMIN — DONEPEZIL HYDROCHLORIDE 10 MG: 10 TABLET, FILM COATED ORAL at 21:46

## 2019-01-01 RX ADMIN — GUAIFENESIN AND DEXTROMETHORPHAN HYDROBROMIDE 10 ML: 100; 10 LIQUID ORAL at 05:09

## 2019-01-01 RX ADMIN — TAMSULOSIN HYDROCHLORIDE 0.4 MG: 0.4 CAPSULE ORAL at 08:27

## 2019-01-01 RX ADMIN — FENTANYL CITRATE 25 MCG: 50 INJECTION, SOLUTION INTRAMUSCULAR; INTRAVENOUS at 18:16

## 2019-01-01 RX ADMIN — Medication 10 ML: at 21:09

## 2019-01-01 RX ADMIN — Medication 10 ML: at 05:10

## 2019-01-01 RX ADMIN — ACETAMINOPHEN 650 MG: 325 TABLET ORAL at 17:08

## 2019-01-01 RX ADMIN — PROPOFOL 100 MCG/KG/MIN: 10 INJECTION, EMULSION INTRAVENOUS at 11:40

## 2019-01-01 RX ADMIN — ACETAMINOPHEN 1000 MG: 500 TABLET ORAL at 20:42

## 2019-01-01 RX ADMIN — FAMOTIDINE 20 MG: 20 TABLET ORAL at 17:07

## 2019-01-01 RX ADMIN — UMECLIDINIUM 1 PUFF: 62.5 AEROSOL, POWDER ORAL at 08:46

## 2019-01-01 RX ADMIN — HYDROMORPHONE HYDROCHLORIDE 0.2 MG: 2 INJECTION, SOLUTION INTRAMUSCULAR; INTRAVENOUS; SUBCUTANEOUS at 18:43

## 2019-01-01 RX ADMIN — OYSTER SHELL CALCIUM WITH VITAMIN D 1 TABLET: 500; 200 TABLET, FILM COATED ORAL at 10:06

## 2019-01-01 RX ADMIN — VANCOMYCIN HYDROCHLORIDE 1000 MG: 1 INJECTION, POWDER, LYOPHILIZED, FOR SOLUTION INTRAVENOUS at 14:02

## 2019-01-01 RX ADMIN — ATORVASTATIN CALCIUM 40 MG: 40 TABLET, FILM COATED ORAL at 22:00

## 2019-01-01 RX ADMIN — FINASTERIDE 5 MG: 5 TABLET, FILM COATED ORAL at 11:51

## 2019-01-01 RX ADMIN — DONEPEZIL HYDROCHLORIDE 10 MG: 5 TABLET, FILM COATED ORAL at 09:02

## 2019-01-01 RX ADMIN — KETOROLAC TROMETHAMINE 15 MG: 30 INJECTION, SOLUTION INTRAMUSCULAR at 12:04

## 2019-01-01 RX ADMIN — ASPIRIN 81 MG 81 MG: 81 TABLET ORAL at 08:19

## 2019-01-01 RX ADMIN — ETOMIDATE 10 MG: 2 INJECTION INTRAVENOUS at 18:12

## 2019-01-01 RX ADMIN — Medication 10 ML: at 21:30

## 2019-01-01 RX ADMIN — METHOCARBAMOL TABLETS 500 MG: 500 TABLET, COATED ORAL at 08:27

## 2019-01-01 RX ADMIN — SODIUM CHLORIDE 125 ML/HR: 900 INJECTION, SOLUTION INTRAVENOUS at 15:45

## 2019-01-01 RX ADMIN — OYSTER SHELL CALCIUM WITH VITAMIN D 1 TABLET: 500; 200 TABLET, FILM COATED ORAL at 08:31

## 2019-01-01 RX ADMIN — MEMANTINE HYDROCHLORIDE 10 MG: 10 TABLET ORAL at 09:41

## 2019-01-01 RX ADMIN — ACETAMINOPHEN 1000 MG: 10 INJECTION, SOLUTION INTRAVENOUS at 08:30

## 2019-01-01 RX ADMIN — ROSUVASTATIN CALCIUM 20 MG: 10 TABLET, COATED ORAL at 22:02

## 2019-01-01 RX ADMIN — FAMOTIDINE 20 MG: 20 TABLET ORAL at 17:08

## 2019-01-01 RX ADMIN — TAMSULOSIN HYDROCHLORIDE 0.4 MG: 0.4 CAPSULE ORAL at 09:11

## 2019-01-01 RX ADMIN — FAMOTIDINE 20 MG: 20 TABLET ORAL at 08:45

## 2019-01-01 RX ADMIN — Medication 6 MG: at 21:59

## 2019-01-01 RX ADMIN — Medication 10 ML: at 13:59

## 2019-01-01 RX ADMIN — PIPERACILLIN AND TAZOBACTAM 3.38 G: 3; .375 INJECTION, POWDER, FOR SOLUTION INTRAVENOUS at 16:53

## 2019-01-01 RX ADMIN — SENNOSIDES AND DOCUSATE SODIUM 1 TABLET: 8.6; 5 TABLET ORAL at 17:10

## 2019-01-01 RX ADMIN — MEMANTINE HYDROCHLORIDE 10 MG: 10 TABLET ORAL at 18:27

## 2019-01-01 RX ADMIN — UMECLIDINIUM 1 PUFF: 62.5 AEROSOL, POWDER ORAL at 17:02

## 2019-01-01 RX ADMIN — Medication 2 G: at 03:02

## 2019-01-01 RX ADMIN — MEMANTINE HYDROCHLORIDE 10 MG: 10 TABLET ORAL at 10:06

## 2019-01-01 RX ADMIN — Medication 10 ML: at 15:45

## 2019-01-01 RX ADMIN — MEMANTINE HYDROCHLORIDE 10 MG: 10 TABLET ORAL at 08:19

## 2019-01-01 RX ADMIN — DONEPEZIL HYDROCHLORIDE 10 MG: 5 TABLET, FILM COATED ORAL at 12:04

## 2019-01-01 RX ADMIN — VANCOMYCIN HYDROCHLORIDE 700 MG: 1 INJECTION, POWDER, LYOPHILIZED, FOR SOLUTION INTRAVENOUS at 03:00

## 2019-01-01 RX ADMIN — MEMANTINE HYDROCHLORIDE 10 MG: 10 TABLET ORAL at 08:58

## 2019-01-01 RX ADMIN — ALBUMIN HUMAN 250 ML: 50 SOLUTION INTRAVENOUS at 18:33

## 2019-01-01 RX ADMIN — FAMOTIDINE 20 MG: 20 TABLET ORAL at 09:27

## 2019-01-01 RX ADMIN — MEMANTINE HYDROCHLORIDE 10 MG: 10 TABLET ORAL at 17:42

## 2019-01-01 RX ADMIN — HEPARIN SODIUM 5000 UNITS: 5000 INJECTION INTRAVENOUS; SUBCUTANEOUS at 10:05

## 2019-01-01 RX ADMIN — ASPIRIN 81 MG 81 MG: 81 TABLET ORAL at 18:19

## 2019-01-01 RX ADMIN — ACETAMINOPHEN 650 MG: 325 TABLET ORAL at 00:36

## 2019-01-01 RX ADMIN — KETOROLAC TROMETHAMINE 15 MG: 30 INJECTION, SOLUTION INTRAMUSCULAR at 10:56

## 2019-01-01 RX ADMIN — FAMOTIDINE 20 MG: 20 TABLET ORAL at 09:41

## 2019-01-01 RX ADMIN — DONEPEZIL HYDROCHLORIDE 10 MG: 5 TABLET, FILM COATED ORAL at 08:27

## 2019-01-01 RX ADMIN — SENNOSIDES, DOCUSATE SODIUM 1 TABLET: 50; 8.6 TABLET, FILM COATED ORAL at 10:05

## 2019-01-01 RX ADMIN — ASPIRIN 81 MG 81 MG: 81 TABLET ORAL at 17:29

## 2019-01-01 RX ADMIN — PIPERACILLIN AND TAZOBACTAM 3.38 G: 3; .375 INJECTION, POWDER, FOR SOLUTION INTRAVENOUS at 05:10

## 2019-01-01 RX ADMIN — Medication 10 ML: at 13:11

## 2019-01-01 RX ADMIN — SENNOSIDES, DOCUSATE SODIUM 1 TABLET: 50; 8.6 TABLET, FILM COATED ORAL at 09:41

## 2019-01-01 RX ADMIN — ACETAMINOPHEN 1000 MG: 500 TABLET ORAL at 09:03

## 2019-01-01 RX ADMIN — ASPIRIN 81 MG: 81 TABLET, COATED ORAL at 08:31

## 2019-01-01 RX ADMIN — ACETAMINOPHEN 650 MG: 325 TABLET ORAL at 00:34

## 2019-01-01 RX ADMIN — ACETAMINOPHEN 650 MG: 325 TABLET ORAL at 21:24

## 2019-01-01 RX ADMIN — LORAZEPAM 1 MG: 2 INJECTION INTRAMUSCULAR; INTRAVENOUS at 20:40

## 2019-01-01 RX ADMIN — SUCCINYLCHOLINE CHLORIDE 80 MG: 20 INJECTION INTRAMUSCULAR; INTRAVENOUS at 18:13

## 2019-01-01 RX ADMIN — OYSTER SHELL CALCIUM WITH VITAMIN D 1 TABLET: 500; 200 TABLET, FILM COATED ORAL at 11:57

## 2019-01-01 RX ADMIN — HEPARIN SODIUM 5000 UNITS: 5000 INJECTION INTRAVENOUS; SUBCUTANEOUS at 09:42

## 2019-01-01 RX ADMIN — HEPARIN SODIUM 5000 UNITS: 5000 INJECTION INTRAVENOUS; SUBCUTANEOUS at 21:14

## 2019-01-01 RX ADMIN — ASPIRIN 81 MG 81 MG: 81 TABLET ORAL at 09:11

## 2019-01-01 RX ADMIN — SODIUM CHLORIDE, SODIUM LACTATE, POTASSIUM CHLORIDE, AND CALCIUM CHLORIDE 25 ML/HR: 600; 310; 30; 20 INJECTION, SOLUTION INTRAVENOUS at 10:53

## 2019-01-01 RX ADMIN — SENNOSIDES,DOCUSATE SODIUM 2 TABLET: 8.6; 5 TABLET, FILM COATED ORAL at 21:39

## 2019-01-01 RX ADMIN — SENNOSIDES, DOCUSATE SODIUM 1 TABLET: 50; 8.6 TABLET, FILM COATED ORAL at 08:59

## 2019-01-01 RX ADMIN — Medication 10 ML: at 17:08

## 2019-01-01 RX ADMIN — ACETAMINOPHEN 1000 MG: 500 TABLET ORAL at 00:00

## 2019-01-01 RX ADMIN — Medication 80 MCG: at 09:08

## 2019-01-01 RX ADMIN — MEMANTINE HYDROCHLORIDE 10 MG: 10 TABLET ORAL at 23:03

## 2019-01-01 RX ADMIN — FINASTERIDE 5 MG: 5 TABLET, FILM COATED ORAL at 09:37

## 2019-01-01 RX ADMIN — ACETAMINOPHEN 1000 MG: 500 TABLET ORAL at 03:00

## 2019-01-01 RX ADMIN — SODIUM CHLORIDE 125 ML/HR: 900 INJECTION, SOLUTION INTRAVENOUS at 19:24

## 2019-01-01 RX ADMIN — AMOXICILLIN AND CLAVULANATE POTASSIUM 600 MG: 600; 42.9 POWDER, FOR SUSPENSION ORAL at 14:52

## 2019-01-01 RX ADMIN — ACETAMINOPHEN 650 MG: 325 TABLET ORAL at 11:56

## 2019-01-01 RX ADMIN — MEMANTINE HYDROCHLORIDE 10 MG: 10 TABLET ORAL at 22:02

## 2019-01-01 RX ADMIN — Medication 40 MCG: at 08:45

## 2019-01-01 RX ADMIN — GUAIFENESIN AND DEXTROMETHORPHAN HYDROBROMIDE 10 ML: 100; 10 LIQUID ORAL at 06:22

## 2019-01-01 RX ADMIN — VANCOMYCIN HYDROCHLORIDE 1000 MG: 1 INJECTION, POWDER, LYOPHILIZED, FOR SOLUTION INTRAVENOUS at 21:22

## 2019-01-01 RX ADMIN — ASPIRIN 81 MG 81 MG: 81 TABLET ORAL at 09:27

## 2019-01-01 RX ADMIN — CEFEPIME HYDROCHLORIDE 2 G: 2 INJECTION, POWDER, FOR SOLUTION INTRAVENOUS at 12:21

## 2019-01-01 RX ADMIN — SODIUM CHLORIDE 75 ML/HR: 900 INJECTION, SOLUTION INTRAVENOUS at 04:45

## 2019-01-01 RX ADMIN — CASTOR OIL AND BALSAM, PERU: 788; 87 OINTMENT TOPICAL at 08:53

## 2019-01-01 RX ADMIN — Medication 120 MCG: at 18:55

## 2019-01-01 RX ADMIN — ROCURONIUM BROMIDE 10 MG: 10 INJECTION, SOLUTION INTRAVENOUS at 12:22

## 2019-01-01 RX ADMIN — OYSTER SHELL CALCIUM WITH VITAMIN D 1 TABLET: 500; 200 TABLET, FILM COATED ORAL at 17:46

## 2019-01-01 RX ADMIN — SENNOSIDES AND DOCUSATE SODIUM 1 TABLET: 8.6; 5 TABLET ORAL at 17:47

## 2019-01-01 RX ADMIN — Medication 10 ML: at 15:12

## 2019-01-01 RX ADMIN — OYSTER SHELL CALCIUM WITH VITAMIN D 1 TABLET: 500; 200 TABLET, FILM COATED ORAL at 11:48

## 2019-01-01 RX ADMIN — MELATONIN TAB 3 MG 6 MG: 3 TAB at 21:04

## 2019-01-01 RX ADMIN — Medication 10 ML: at 14:30

## 2019-01-01 RX ADMIN — OYSTER SHELL CALCIUM WITH VITAMIN D 1 TABLET: 500; 200 TABLET, FILM COATED ORAL at 07:47

## 2019-01-01 RX ADMIN — DONEPEZIL HYDROCHLORIDE 10 MG: 5 TABLET, FILM COATED ORAL at 09:40

## 2019-01-01 RX ADMIN — FAMOTIDINE 20 MG: 20 TABLET ORAL at 17:06

## 2019-01-01 RX ADMIN — MEMANTINE HYDROCHLORIDE 10 MG: 10 TABLET ORAL at 17:02

## 2019-01-01 RX ADMIN — TAMSULOSIN HYDROCHLORIDE 0.4 MG: 0.4 CAPSULE ORAL at 08:53

## 2019-01-01 RX ADMIN — FINASTERIDE 5 MG: 5 TABLET, FILM COATED ORAL at 09:46

## 2019-01-01 RX ADMIN — TAMSULOSIN HYDROCHLORIDE 0.4 MG: 0.4 CAPSULE ORAL at 08:45

## 2019-01-01 RX ADMIN — FAMOTIDINE 20 MG: 20 TABLET ORAL at 08:49

## 2019-01-01 RX ADMIN — OXYCODONE HYDROCHLORIDE 5 MG: 5 TABLET ORAL at 10:14

## 2019-01-01 RX ADMIN — Medication 10 ML: at 21:44

## 2019-01-01 RX ADMIN — Medication 10 ML: at 13:27

## 2019-01-01 RX ADMIN — ACETAMINOPHEN 1000 MG: 500 TABLET ORAL at 20:31

## 2019-01-01 RX ADMIN — MEMANTINE HYDROCHLORIDE 10 MG: 10 TABLET ORAL at 08:27

## 2019-01-01 RX ADMIN — ENOXAPARIN SODIUM 40 MG: 40 INJECTION SUBCUTANEOUS at 16:32

## 2019-01-01 RX ADMIN — AMOXICILLIN AND CLAVULANATE POTASSIUM 600 MG: 600; 42.9 POWDER, FOR SUSPENSION ORAL at 22:42

## 2019-01-01 RX ADMIN — SENNOSIDES AND DOCUSATE SODIUM 1 TABLET: 8.6; 5 TABLET ORAL at 08:31

## 2019-01-01 RX ADMIN — CEFEPIME HYDROCHLORIDE 2 G: 2 INJECTION, POWDER, FOR SOLUTION INTRAVENOUS at 15:08

## 2019-01-01 RX ADMIN — LIDOCAINE HYDROCHLORIDE 80 MG: 20 INJECTION, SOLUTION EPIDURAL; INFILTRATION; INTRACAUDAL; PERINEURAL at 11:35

## 2019-01-01 RX ADMIN — ROSUVASTATIN CALCIUM 20 MG: 10 TABLET, COATED ORAL at 23:00

## 2019-01-01 RX ADMIN — SENNOSIDES AND DOCUSATE SODIUM 2 TABLET: 8.6; 5 TABLET ORAL at 17:08

## 2019-01-01 RX ADMIN — ROSUVASTATIN CALCIUM 20 MG: 10 TABLET, COATED ORAL at 22:00

## 2019-01-01 RX ADMIN — Medication 10 ML: at 17:58

## 2019-01-01 RX ADMIN — ROSUVASTATIN CALCIUM 20 MG: 10 TABLET, COATED ORAL at 22:05

## 2019-01-01 RX ADMIN — GUAIFENESIN AND DEXTROMETHORPHAN HYDROBROMIDE 10 ML: 100; 10 LIQUID ORAL at 00:34

## 2019-01-01 RX ADMIN — MEMANTINE HYDROCHLORIDE 10 MG: 10 TABLET ORAL at 17:10

## 2019-01-01 RX ADMIN — POLYETHYLENE GLYCOL 3350 17 G: 17 POWDER, FOR SOLUTION ORAL at 10:06

## 2019-01-01 RX ADMIN — Medication 10 ML: at 20:32

## 2019-01-01 RX ADMIN — ACETAMINOPHEN 650 MG: 325 TABLET ORAL at 01:09

## 2019-01-01 RX ADMIN — TAMSULOSIN HYDROCHLORIDE 0.4 MG: 0.4 CAPSULE ORAL at 10:05

## 2019-01-01 RX ADMIN — SODIUM CHLORIDE 75 ML/HR: 900 INJECTION, SOLUTION INTRAVENOUS at 16:32

## 2019-01-01 RX ADMIN — MEMANTINE HYDROCHLORIDE 10 MG: 10 TABLET ORAL at 09:27

## 2019-01-01 RX ADMIN — FAMOTIDINE 20 MG: 20 TABLET ORAL at 08:53

## 2019-01-01 RX ADMIN — ACETAMINOPHEN 650 MG: 325 TABLET ORAL at 13:07

## 2019-01-01 RX ADMIN — POLYETHYLENE GLYCOL 3350 17 G: 17 POWDER, FOR SOLUTION ORAL at 09:11

## 2019-01-01 RX ADMIN — ONDANSETRON 4 MG: 2 INJECTION INTRAMUSCULAR; INTRAVENOUS at 19:02

## 2019-01-01 RX ADMIN — KETOROLAC TROMETHAMINE 15 MG: 30 INJECTION, SOLUTION INTRAMUSCULAR at 00:57

## 2019-01-01 RX ADMIN — Medication 10 ML: at 13:55

## 2019-01-01 RX ADMIN — PROPOFOL 120 MG: 10 INJECTION, EMULSION INTRAVENOUS at 11:35

## 2019-01-01 RX ADMIN — SODIUM CHLORIDE, SODIUM LACTATE, POTASSIUM CHLORIDE, CALCIUM CHLORIDE: 600; 310; 30; 20 INJECTION, SOLUTION INTRAVENOUS at 08:09

## 2019-01-01 RX ADMIN — Medication 500 MG: at 09:30

## 2019-01-01 RX ADMIN — DEXAMETHASONE SODIUM PHOSPHATE 4 MG: 4 INJECTION, SOLUTION INTRA-ARTICULAR; INTRALESIONAL; INTRAMUSCULAR; INTRAVENOUS; SOFT TISSUE at 18:20

## 2019-01-01 RX ADMIN — Medication 120 MCG: at 19:04

## 2019-01-01 RX ADMIN — VANCOMYCIN HYDROCHLORIDE 700 MG: 1 INJECTION, POWDER, LYOPHILIZED, FOR SOLUTION INTRAVENOUS at 13:07

## 2019-01-01 RX ADMIN — FINASTERIDE 5 MG: 5 TABLET, FILM COATED ORAL at 09:36

## 2019-01-01 RX ADMIN — GUAIFENESIN 600 MG: 600 TABLET, EXTENDED RELEASE ORAL at 08:49

## 2019-02-18 NOTE — TELEPHONE ENCOUNTER
Future Appointments   Date Time Provider Malka Cici   2/25/2019  3:40 PM Rusty Garza  [facility]   2/26/2019  1:45 PM Rich Feliz, [address] [place] [address]                         Last Appointment My Department:  Visit date not found    Please advise of refill below.   Requested Prescriptions     Pending Prescriptions Disp Refills    memantine (NAMENDA) 5 mg tablet 120 Tab 2     Sig: Week 1:  1 tab in AM and 1 tab in PM.  Week 2: 1 tab in AM and 2 tab in PM.  Week 3: 2 tab in AM and 2 tab in PM     Last filled 11/27/18
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
## 2019-02-25 NOTE — PATIENT INSTRUCTIONS

## 2019-02-26 NOTE — PATIENT INSTRUCTIONS
Constipation: Care Instructions Your Care Instructions Constipation means that you have a hard time passing stools (bowel movements). People pass stools from 3 times a day to once every 3 days. What is normal for you may be different. Constipation may occur with pain in the rectum and cramping. The pain may get worse when you try to pass stools. Sometimes there are small amounts of bright red blood on toilet paper or the surface of stools. This is because of enlarged veins near the rectum (hemorrhoids). A few changes in your diet and lifestyle may help you avoid ongoing constipation. Your doctor may also prescribe medicine to help loosen your stool. Some medicines can cause constipation. These include pain medicines and antidepressants. Tell your doctor about all the medicines you take. Your doctor may want to make a medicine change to ease your symptoms. Follow-up care is a key part of your treatment and safety. Be sure to make and go to all appointments, and call your doctor if you are having problems. It's also a good idea to know your test results and keep a list of the medicines you take. How can you care for yourself at home? · Drink plenty of fluids, enough so that your urine is light yellow or clear like water. If you have kidney, heart, or liver disease and have to limit fluids, talk with your doctor before you increase the amount of fluids you drink. · Include high-fiber foods in your diet each day. These include fruits, vegetables, beans, and whole grains. · Get at least 30 minutes of exercise on most days of the week. Walking is a good choice. You also may want to do other activities, such as running, swimming, cycling, or playing tennis or team sports. · Take a fiber supplement, such as Citrucel or Metamucil, every day. Read and follow all instructions on the label. · Schedule time each day for a bowel movement. A daily routine may help. Take your time having your bowel movement. · Support your feet with a small step stool when you sit on the toilet. This helps flex your hips and places your pelvis in a squatting position. · Your doctor may recommend an over-the-counter laxative to relieve your constipation. Examples are Milk of Magnesia and MiraLax. Read and follow all instructions on the label. Do not use laxatives on a long-term basis. When should you call for help? Call your doctor now or seek immediate medical care if: 
  · You have new or worse belly pain.  
  · You have new or worse nausea or vomiting.  
  · You have blood in your stools.  
 Watch closely for changes in your health, and be sure to contact your doctor if: 
  · Your constipation is getting worse.  
  · You do not get better as expected. Where can you learn more? Go to http://samina-cyn.info/. Enter 21 [phone] in the search box to learn more about \"Constipation: Care Instructions. \" Current as of: September 23, 2018 Content Version: 11.9 © 4667-9040 [employer]. Care instructions adapted under license by [employer] (which disclaims liability or warranty for this information). If you have questions about a medical condition or this instruction, always ask your healthcare professional. [place] [address] any warranty or liability for your use of this information. Would use ice to right elbow for 15-20 minutes as needed to help with pain and swelling.

## 2019-02-26 NOTE — PROGRESS NOTES
Reviewed record in preparation for visit and have obtained necessary documentation. Identified pt with two pt identifiers(name and ). Chief Complaint Patient presents with  Follow-up 6 month Health Maintenance Due Topic Date Due  Shingrix Vaccine Age 50> (1 of 2) 08/15/1984  Influenza Age 5 to Adult  2018 Mr. Harrington has a reminder for a \"due or due soon\" health maintenance. I have asked that he discuss health maintenance topic(s) due with His  primary care provider. Coordination of Care Questionnaire: 
:  
 
1) Have you been to an emergency room, urgent care clinic since your last visit? no  
Hospitalized since your last visit? no          
 
2) Have you seen or consulted any other health care providers outside of [facility] since your last visit? no  (Include any pap smears or colon screenings in this section.) 3) Do you have an Advance Directive on file? yes 4) Are you interested in receiving information on Advance Directives? NO Patient is accompanied by self I have received verbal consent from Naomy Weiss to discuss any/all medical information while they are present in the room.

## 2019-02-26 NOTE — PROGRESS NOTES
Abigail Gutierrez is a 80 y.o. male who presents for evaluation of routine follow up. Last seen by me aug 28, 2018. Doing well, likes his new residence, [facility]. Has been there since early aug 2018. States food is very good. No complaints today. Daughter with him today. ROS: 
Constitutional: negative for fevers, chills, anorexia and weight loss Eyes:   negative for visual disturbance and irritation ENT:   negative for tinnitus,sore throat,nasal congestion,ear pain,hoarseness Respiratory:  negative for cough, hemoptysis, dyspnea,wheezing CV:   negative for chest pain, palpitations, lower extremity edema GI:   negative for nausea, vomiting, diarrhea, abdominal pain,melena Genitourinary: negative for frequency, dysuria and hematuria Musculoskel: negative for myalgias, arthralgias, back pain, muscle weakness, joint pain Neurological:  negative for headaches, dizziness, focal weakness, numbness Psychiatric:     Negative for depression or anxiety Past Medical History:  
Diagnosis Date  Arthritis  BPH (benign prostatic hypertrophy) 2010  COPD (chronic obstructive pulmonary disease) with emphysema ([facility] [address] 2012  HLD (hyperlipidemia) 2010  
 HTN (hypertension) 2010  Memory disorder  Psychiatric disorder   
 anxiety and depression Past Surgical History:  
Procedure Laterality Date  COLONOSCOPY,NURA FERNÁNDEZ,SNARE  2015  
 2 sessile sigmoid polyps, 3 & 5 mm; Dr. Magdaleno Lodgepole; no further screening recommended  ENDOSCOPY, COLON, DIAGNOSTIC    
 negative; 10 year repeat; Dr. Magdaleno Lodgepole  HX HERNIA REPAIR  years ago  
 left  HX HERNIA REPAIR    
 right inguinal  
 MS COLSC FLX W/RMVL OF TUMOR POLYP LESION SNARE TQ  2012 Family History Problem Relation Age of Onset  Heart Disease Mother   
      at 80  
 Heart Disease Father   
      at 80  Liver Disease Brother  Stroke Brother  Heart Attack Brother  Other Sister 8 ? polio  Cancer Sister   
     lung Social History Socioeconomic History  Marital status:  Spouse name: Not on file  Number of children: Not on file  Years of education: Not on file  Highest education level: Not on file Social Needs  Financial resource strain: Not on file  Food insecurity - worry: Not on file  Food insecurity - inability: Not on file  Transportation needs - medical: Not on file  Transportation needs - non-medical: Not on file Occupational History  Not on file Tobacco Use  Smoking status: Current Some Day Smoker Packs/day: 1.00 Years: 60.00 Pack years: 60.00 Types: Cigarettes  Smokeless tobacco: Former User Quit date: 10/5/2014 Substance and Sexual Activity  Alcohol use: Yes Alcohol/week: 4.2 oz Types: 7 Glasses of wine per week Comment: 1 drinks per evening  Drug use: No  
 Sexual activity: No  
Other Topics Concern  Not on file Social History Narrative  Not on file Visit Vitals BP 97/60 (BP 1 Location: Left arm, BP Patient Position: Sitting) Pulse 71 Temp 97.6 °F (36.4 °C) (Oral) Resp 18 Ht 5' 3\" (1.6 m) Wt 110 lb (49.9 kg) SpO2 94% BMI 19.49 kg/m² Physical Examination:  
General - Well appearing male HEENT - PERRL, TM no erythema/opacification, normal nasal turbinates, no oropharyngeal erythema or exudate, MMM Neck - supple, no bruits, no thyroidomegaly, no lymphadenopathy Pulm - clear to auscultation bilaterally Cardio - RRR, normal S1 S2, no murmur Abd - soft, nontender, no masses, no HSM Extrem - no edema, +2 distal pulses Neuro-  No focal deficits, CN intact Assessment/Plan: 1. pba--continue nuedexta 2. Late onset alz dementia--continue aricept, namenda 3.  hyperlipids--on crestor 4. bph--continue proscar, flomax 5. Tobacco abuse--down to few cigarettes daily, got caught smoking in bathroom 6.  Copd--continue spiriva 
 
rx given for shingrix. Already had flu shot. rtc 6 months nA Kohler III, DO

## 2019-02-28 NOTE — TELEPHONE ENCOUNTER
Spoke with patients daughter after 2 patient identifiers being note and she reports that he does not need to see MD and his shoulder is much better today

## 2019-03-05 NOTE — TELEPHONE ENCOUNTER
Started PA for Memantine via [address]. Approval  - to 121/31/19 . Faxed to [facility] in [place].

## 2019-03-30 PROBLEM — S72.009A HIP FRACTURE (HCC): Status: ACTIVE | Noted: 2019-01-01

## 2019-03-30 NOTE — ED NOTES
Ortho Attempted to obtain report. RN changing pt at time. Attempted to return report. Ortho will call back. Pt ready for transport.

## 2019-03-30 NOTE — PROGRESS NOTES
Request for tele video monitor placed and placed on waiting list for patient safety measures as pt is attempting to pull at IV and catheter. Pt is currently on bed alarm.

## 2019-03-30 NOTE — CONSULTS
ORTHOPAEDIC CONSULT NOTE Subjective:  
 
Date of Consultation:  2019 Tracie Staley is a 80 y.o. male who is being seen for left hip fracture. GLF occurred last night, was able to get back into bed under his own power. This morning complaints of left groin and left knee pain. Ambulates at baseline w walker, resides assisted living facility. Patient Active Problem List  
 Diagnosis Date Noted  Hip fracture ([facility] 2019  PBA (pseudobulbar affect) 2018  Late onset Alzheimer's disease without behavioral disturbance 2018  Benign prostatic hyperplasia with weak urinary stream 2017  Closed right hip fracture ([address] 2017  Constipation 2017  Essential hypertension 2015  Prediabetes 2014  Abnormal CT lung screening 2014  Depression 2013  ADHD (attention deficit hyperactivity disorder) 2013  Anxiety state 2013  Essential tremor 2013  COPD (chronic obstructive pulmonary disease) with emphysema ([facility] [address] 2012  Post herpetic neuralgia 2011  Smoker 2010  HLD (hyperlipidemia) 2010  DJD (degenerative joint disease) 2010 Family History Problem Relation Age of Onset  Heart Disease Mother   
      at 80  
 Heart Disease Father   
      at 80  Liver Disease Brother  Stroke Brother  Heart Attack Brother  Other Sister 8 ? polio  Cancer Sister   
     lung Social History Tobacco Use  Smoking status: Current Some Day Smoker Packs/day: 1.00 Years: 60.00 Pack years: 60.00 Types: Cigarettes  Smokeless tobacco: Former User Quit date: 10/5/2014 Substance Use Topics  Alcohol use: Yes Alcohol/week: 4.2 oz Types: 7 Glasses of wine per week Comment: 1 drinks per evening Past Medical History:  
Diagnosis Date  Arthritis  BPH (benign prostatic hypertrophy) 2010  COPD (chronic obstructive pulmonary disease) with emphysema ([facility] [address] 7/25/2012  HLD (hyperlipidemia) 4/12/2010  
 HTN (hypertension) 4/12/2010  Memory disorder  Psychiatric disorder   
 anxiety and depression Past Surgical History:  
Procedure Laterality Date  COLONOSCOPY,NURA FERNÁNDEZ,SNARE  8/5/2015  
 2 sessile sigmoid polyps, 3 & 5 mm; Dr. Franklin Bolden; no further screening recommended  ENDOSCOPY, COLON, DIAGNOSTIC  2002  
 negative; 10 year repeat; Dr. Franklin Bolden  HX HERNIA REPAIR  years ago  
 left  HX HERNIA REPAIR  2001  
 right inguinal  
 DC COLSC FLX W/RMVL OF TUMOR POLYP LESION SNARE TQ  7/27/2012 Prior to Admission medications Medication Sig Start Date End Date Taking? Authorizing Provider  
donepezil (ARICEPT) 10 mg tablet Take 10 mg by mouth daily. Yes Provider, Historical  
memantine (NAMENDA) 10 mg tablet Take 10 mg by mouth two (2) times a day. Yes Provider, Historical  
dextromethorphan-quiNIDine (NUEDEXTA) 20-10 mg per capsule Take 1 Cap by mouth every twelve (12) hours. Indications: PSEUDOBULBAR AFFECT 8/28/18  Yes Mitchell Mcallister III, DO  
psyllium seed-sucrose (METAMUCIL, SUGAR,) powd Take 1 Packet by mouth daily. 1/2 tbsp in 8oz liquid   Yes Provider, Historical  
docusate sodium (COLACE) 100 mg capsule Take 100 mg by mouth daily. Yes Provider, Historical  
rosuvastatin (CRESTOR) 20 mg tablet TAKE ONE TABLET BY MOUTH ONE TIME DAILY 10/17/17  Yes Mitchell Mcallister III, DO  
SPIRIVA WITH HANDIHALER 18 mcg inhalation capsule INHALE ONE CAPSULE VIA DEVICE BY MOUTH ONE TIME DAILY 6/7/17  Yes Mitchell Mcallister III, DO  
multivitamin (ONE A DAY) tablet Take 1 Tab by mouth daily. Yes Provider, Historical  
tamsulosin (FLOMAX) 0.4 mg capsule Take 0.4 mg by mouth daily. 5/19/15  Yes Provider, Historical  
finasteride (PROSCAR) 5 mg tablet Take 5 mg by mouth daily.    Yes Provider, Historical  
 aspirin 81 mg Tab Take 81 mg by mouth daily. 4/12/10  Yes Provider, Historical  
acetaminophen (TYLENOL) 325 mg tablet Take 650 mg by mouth every four (4) hours as needed for Pain. Provider, Historical  
 
Current Facility-Administered Medications Medication Dose Route Frequency  fentaNYL citrate (PF) injection 50 mcg  50 mcg IntraVENous NOW  
 . PHARMACY TO SUBSTITUTE PER PROTOCOL (Reordered from: dextromethorphan-quiNIDine (NUEDEXTA) 20-10 mg per capsule)    Per Protocol  [START ON 3/31/2019] docusate sodium (COLACE) capsule 100 mg  100 mg Oral DAILY  [START ON 3/31/2019] donepezil (ARICEPT) tablet 10 mg  10 mg Oral DAILY  [START ON 3/31/2019] finasteride (PROSCAR) tablet 5 mg  5 mg Oral DAILY  memantine (NAMENDA) tablet 10 mg  10 mg Oral BID  [START ON 3/31/2019] therapeutic multivitamin (THERAGRAN) tablet 1 Tab  1 Tab Oral DAILY  [START ON 3/31/2019] psyllium husk-aspartame (METAMUCIL FIBER) packet 1 Packet  1 Packet Oral DAILY  atorvastatin (LIPITOR) tablet 40 mg  40 mg Oral QHS  [START ON 3/31/2019] umeclidinium (INCRUSE ELLIPTA) 62.5 mcg/actuation  1 Puff Inhalation DAILY  [START ON 3/31/2019] tamsulosin (FLOMAX) capsule 0.4 mg  0.4 mg Oral DAILY  albuterol-ipratropium (DUO-NEB) 2.5 MG-0.5 MG/3 ML  3 mL Nebulization Q4H PRN  
 0.9% sodium chloride infusion  100 mL/hr IntraVENous CONTINUOUS  
 sodium chloride (NS) flush 5-40 mL  5-40 mL IntraVENous Q8H  
 sodium chloride (NS) flush 5-40 mL  5-40 mL IntraVENous PRN  
 acetaminophen (TYLENOL) tablet 650 mg  650 mg Oral Q6H  
 naloxone (NARCAN) injection 0.4 mg  0.4 mg IntraVENous PRN  
 ondansetron (ZOFRAN) injection 4 mg  4 mg IntraVENous Q4H PRN Allergies Allergen Reactions  Kiwi Anaphylaxis  Zetia [Ezetimibe] Other (comments) C/o back pain Review of Systems:  A comprehensive review of systems was negative except for that written in the HPI. Mental Status: medium dementia Objective: Patient Vitals for the past 8 hrs: 
 BP Temp Pulse Resp SpO2 Height Weight 19 1235 152/78 96.9 °F (36.1 °C) 72 18 93 %    
19 1140 (!) 122/94 98 °F (36.7 °C) 74 16 94 %    
19 1040 (!) 120/95 98 °F (36.7 °C) 74 16 96 %    
19 0946 (!) 122/95 98 °F (36.7 °C) 74 16 93 % 5' 3\" (1.6 m) 51.3 kg (113 lb)  
19 0945 (!) 122/95    94 %   Temp (24hrs), Av.7 °F (36.5 °C), Min:96.9 °F (36.1 °C), Max:98 °F (36.7 °C) Gen: Well-developed,  in no acute distress,frail/thin build HEENT: Pink conjunctivae, hearing intact to voice, moist mucous membranes Neck: Supple Resp: No respiratory distress -nasal canula Card: RRR, palpable distal pulse-equal bilaterally, birsk cap refill all distal digits Abd:  non-distended Musc: Subtle LLE shortening. NO sign of LE VTE. Intact plant/dorsiflexion, No left knee effusion or tenderness Skin: No skin breakdown noted. Skin warm, pink, dry Neuro: Cranial nerves are grossly intact, no focal motor weakness, follows commands appropriately Psych: Poor insight, oriented to person Imaging Review: XR HIP LT W OR WO PELV 2-3 VWS 
INDICATION: Left hip pain after GLF. COMPARISON: 2018. 
  
FINDINGS: An AP view of the pelvis and a stable lateral view of the left hip 
demonstrate an impacted subcapital femoral neck fracture. There is an 
interlocking pin and intramedullary lilian in the right hip and femur. 
  
IMPRESSION IMPRESSION: Impacted left subcapital femoral neck fracture. Labs:  
Recent Results (from the past 24 hour(s)) EKG, 12 LEAD, INITIAL Collection Time: 19 10:36 AM  
Result Value Ref Range Ventricular Rate 78 BPM  
 Atrial Rate 78 BPM  
 P-R Interval 168 ms QRS Duration 116 ms  
 Q-T Interval 408 ms QTC Calculation (Bezet) 465 ms Calculated P Axis 84 degrees Calculated R Axis -37 degrees Calculated T Axis 52 degrees Diagnosis Normal sinus rhythm Left axis deviation Incomplete right bundle branch block Septal infarct , age undetermined ST & T wave abnormality, consider anterior ischemia When compared with ECG of 14-JUL-2018 19:19, No significant change was found URINALYSIS W/ REFLEX CULTURE Collection Time: 03/30/19 10:40 AM  
Result Value Ref Range Color YELLOW/STRAW Appearance CLEAR CLEAR Specific gravity 1.025 1.003 - 1.030    
 pH (UA) 7.0 5.0 - 8.0 Protein TRACE (A) NEG mg/dL Glucose NEGATIVE  NEG mg/dL Ketone 15 (A) NEG mg/dL Bilirubin NEGATIVE  NEG Blood NEGATIVE  NEG Urobilinogen 0.2 0.2 - 1.0 EU/dL Nitrites NEGATIVE  NEG Leukocyte Esterase NEGATIVE  NEG    
 WBC 0-4 0 - 4 /hpf  
 RBC 0-5 0 - 5 /hpf Epithelial cells FEW FEW /lpf Bacteria NEGATIVE  NEG /hpf  
 UA:UC IF INDICATED CULTURE NOT INDICATED BY UA RESULT CNI Hyaline cast 0-2 0 - 5 /lpf  
CBC WITH AUTOMATED DIFF Collection Time: 03/30/19 10:40 AM  
Result Value Ref Range WBC 9.5 4.1 - 11.1 K/uL  
 RBC 4.31 4.10 - 5.70 M/uL  
 HGB 14.0 12.1 - 17.0 g/dL HCT 41.5 36.6 - 50.3 % MCV 96.3 80.0 - 99.0 FL  
 MCH 32.5 26.0 - 34.0 PG  
 MCHC 33.7 30.0 - 36.5 g/dL  
 RDW 13.6 11.5 - 14.5 % PLATELET 487 (L) 250 - 400 K/uL MPV 11.0 8.9 - 12.9 FL  
 NRBC 0.0 0  WBC ABSOLUTE NRBC 0.00 0.00 - 0.01 K/uL NEUTROPHILS 88 (H) 32 - 75 % LYMPHOCYTES 8 (L) 12 - 49 % MONOCYTES 4 (L) 5 - 13 % EOSINOPHILS 0 0 - 7 % BASOPHILS 0 0 - 1 % IMMATURE GRANULOCYTES 0 0.0 - 0.5 % ABS. NEUTROPHILS 8.3 (H) 1.8 - 8.0 K/UL  
 ABS. LYMPHOCYTES 0.8 0.8 - 3.5 K/UL  
 ABS. MONOCYTES 0.4 0.0 - 1.0 K/UL  
 ABS. EOSINOPHILS 0.0 0.0 - 0.4 K/UL  
 ABS. BASOPHILS 0.0 0.0 - 0.1 K/UL  
 ABS. IMM. GRANS. 0.0 0.00 - 0.04 K/UL  
 DF AUTOMATED    
 RBC COMMENTS NORMOCYTIC, NORMOCHROMIC METABOLIC PANEL, COMPREHENSIVE Collection Time: 03/30/19 10:40 AM  
Result Value Ref Range Sodium 137 136 - 145 mmol/L  Potassium 4.2 3.5 - 5.1 mmol/L  
 Chloride 103 97 - 108 mmol/L  
 CO2 30 21 - 32 mmol/L Anion gap 4 (L) 5 - 15 mmol/L Glucose 118 (H) 65 - 100 mg/dL BUN 21 (H) 6 - 20 MG/DL Creatinine 0.83 0.70 - 1.30 MG/DL  
 BUN/Creatinine ratio 25 (H) 12 - 20 GFR est AA >60 >60 ml/min/1.73m2 GFR est non-AA >60 >60 ml/min/1.73m2 Calcium 9.3 8.5 - 10.1 MG/DL Bilirubin, total 0.7 0.2 - 1.0 MG/DL  
 ALT (SGPT) 25 12 - 78 U/L  
 AST (SGOT) 20 15 - 37 U/L Alk. phosphatase 94 45 - 117 U/L Protein, total 8.1 6.4 - 8.2 g/dL Albumin 3.6 3.5 - 5.0 g/dL Globulin 4.5 (H) 2.0 - 4.0 g/dL A-G Ratio 0.8 (L) 1.1 - 2.2 TYPE & SCREEN Collection Time: 03/30/19 10:40 AM  
Result Value Ref Range Crossmatch Expiration 04/02/2019 ABO/Rh(D) A POSITIVE Antibody screen NEG PROTHROMBIN TIME + INR Collection Time: 03/30/19 11:09 AM  
Result Value Ref Range INR 1.0 0.9 - 1.1 Prothrombin time 10.7 9.0 - 11.1 sec Impression:  
 
Patient Active Problem List  
 Diagnosis Date Noted  Hip fracture ([facility] 03/30/2019  PBA (pseudobulbar affect) 08/28/2018  Late onset Alzheimer's disease without behavioral disturbance 03/27/2018  Benign prostatic hyperplasia with weak urinary stream 12/26/2017  Closed right hip fracture ([facility] [address] 11/28/2017  Constipation 05/30/2017  Essential hypertension 05/20/2015  Prediabetes 11/23/2014  Abnormal CT lung screening 11/21/2014  Depression 12/20/2013  ADHD (attention deficit hyperactivity disorder) 12/20/2013  Anxiety state 09/27/2013  Essential tremor 07/25/2013  COPD (chronic obstructive pulmonary disease) with emph[facility]ma ([facility] 07/25/2012  Post herpetic neuralgia 05/04/2011  Smoker 08/16/2010  HLD (hyperlipidemia) 04/12/2010  DJD (degenerative joint disease) 04/12/2010 Active Problems: 
  Hip fracture ([facility] [address] (3/30/2019) Plan: -  Left hip fxr -  Bedrest,  NPO after midnight - plan of hemiarthroplasty Sunday AM 
 -  Medicine for Pre-Operative Clearence/ Post-Operative management.    
-  DVT Prophylaxis - scds -  D/C planning return to assist living facility Dr. Lisa Schroeder and agrees with plan as above. Manual Qualia, PA-C [employer]

## 2019-03-30 NOTE — ED NOTES
TRANSFER - OUT REPORT: 
 
Verbal report given to Elli RN (name) on Mary Beth Rojas  being transferred to Ortho(unit) for routine progression of care Report consisted of patients Situation, Background, Assessment and  
Recommendations(SBAR). Information from the following report(s) SBAR, Kardex and ED Summary was reviewed with the receiving nurse. Lines:  
Peripheral IV 03/30/19 Left; Lower Antecubital (Active) Site Assessment Clean, dry, & intact 3/30/2019 11:11 AM  
Phlebitis Assessment 0 3/30/2019 11:11 AM  
Infiltration Assessment 0 3/30/2019 11:11 AM  
Dressing Status Clean, dry, & intact 3/30/2019 11:11 AM  
Dressing Type Tape;Transparent 3/30/2019 11:11 AM  
Hub Color/Line Status Pink;Flushed 3/30/2019 11:11 AM  
Action Taken Blood drawn 3/30/2019 11:11 AM  
Alcohol Cap Used No 3/30/2019 11:11 AM  
  
 
Opportunity for questions and clarification was provided. Patient transported with: 
 O2 @ 2 liters

## 2019-03-30 NOTE — PROGRESS NOTES
CONOR Hollingsworth notified via call and voicemail of daughter's arrival back to floor. Call back number [phone] given. POA, DNR, and advance directives received from daughter and copied and placed on chart. Consent forms for blood transfusion and surgery signed by daughter and Van Ignacio.

## 2019-03-30 NOTE — ED PROVIDER NOTES
EMERGENCY DEPARTMENT HISTORY AND PHYSICAL EXAM 
 
 
Date: 3/30/2019 Patient Name: Skylar Yanez Patient Age and Sex: 80 y.o. male History of Presenting Illness Chief Complaint Patient presents with  
 Hip Pain Pt had GLF Fall last night\, placed himself back in bed. this morning has c/o left hip and left knee pain. Pt 88-90% on room aire. Placed on O2 nc  Knee Pain History Provided By: Patient HPI: Skylar Yanez is a 80-year-old male with a history of COPD, ex-smoker, right hip arthroplasty, presenting with left hip pain. Patient states yesterday evening patient slipped and fell on his left hip. Did have slight head injury with an abrasion but denies any loss of consciousness or being on any blood thinners. Patient states that since the fall has had moderate to severe left hip pain as well as left knee pain. Has not taken anything for the pain. Patient does use a walker at the nursing home. There are no other complaints, changes, or physical findings at this time. PCP: Francis Angela, DO No current facility-administered medications on file prior to encounter. Current Outpatient Medications on File Prior to Encounter Medication Sig Dispense Refill  acetaminophen (TYLENOL) 325 mg tablet Take  by mouth every four (4) hours as needed for Pain.  memantine (NAMENDA) 5 mg tablet Week 1:  1 tab in AM and 1 tab in PM.  Week 2: 1 tab in AM and 2 tab in PM.  Week 3: 2 tab in AM and 2 tab in PM (Patient taking differently: Take 10 mg by mouth two (2) times a day. Patient taking 10 mg in am 10 mg  In pm) 120 Tab 2  
 dextromethorphan-quiNIDine (NUEDEXTA) 20-10 mg per capsule Take 1 Cap by mouth every twelve (12) hours. Indications: PSEUDOBULBAR AFFECT 60 Cap 11  
 psyllium seed-sucrose (METAMUCIL, SUGAR,) powd Take  by mouth.  docusate sodium (COLACE) 100 mg capsule Take 100 mg by mouth as needed for Constipation.  donepezil (ARICEPT) 10 mg tablet Take one half tablet a day in the morning for 30 days then 1 whole tablet a day. Indications: MILD TO MODERATE ALZHEIMER'S TYPE DEMENTIA (Patient taking differently: 10 mg. Taking 10 mg daily) 30 Tab 11  
 acetaminophen (TYLENOL) 500 mg tablet Take 500 mg by mouth every six (6) hours as needed for Pain or Fever.  rosuvastatin (CRESTOR) 20 mg tablet TAKE ONE TABLET BY MOUTH ONE TIME DAILY 90 Tab 3  
 SPIRIVA WITH HANDIHALER 18 mcg inhalation capsule INHALE ONE CAPSULE VIA DEVICE BY MOUTH ONE TIME DAILY 30 Cap 8  
 multivitamin (ONE A DAY) tablet Take 1 Tab by mouth daily.  tamsulosin (FLOMAX) 0.4 mg capsule Take 0.4 mg by mouth daily.  finasteride (PROSCAR) 5 mg tablet Take 5 mg by mouth daily.  aspirin 81 mg Tab Take 81 mg by mouth daily. Past History Past Medical History: 
Past Medical History:  
Diagnosis Date  Arthritis  BPH (benign prostatic hypertrophy) 2010  COPD (chronic obstructive pulmonary disease) with emphysema ([facility] [address] 2012  HLD (hyperlipidemia) 2010  
 HTN (hypertension) 2010  Memory disorder  Psychiatric disorder   
 anxiety and depression Past Surgical History: 
Past Surgical History:  
Procedure Laterality Date  COLONOSCOPY,NURA FERNÁNDEZ,REINALDO  2015  
 2 sessile sigmoid polyps, 3 & 5 mm; Dr. Schmid Laughter; no further screening recommended  ENDOSCOPY, COLON, DIAGNOSTIC    
 negative; 10 year repeat; Dr. Schmid Lamadelaine  HX HERNIA REPAIR  years ago  
 left  HX HERNIA REPAIR    
 right inguinal  
 VT COLSC FLX W/RMVL OF TUMOR POLYP LESION SNARE TQ  2012 Family History: 
Family History Problem Relation Age of Onset  Heart Disease Mother   
      at 80  
 Heart Disease Father   
      at 80  Liver Disease Brother  Stroke Brother  Heart Attack Brother  Other Sister 8 ? polio  Cancer Sister   
     lung Social History: Social History Tobacco Use  Smoking status: Current Some Day Smoker Packs/day: 1.00 Years: 60.00 Pack years: 60.00 Types: Cigarettes  Smokeless tobacco: Former User Quit date: 10/5/2014 Substance Use Topics  Alcohol use: Yes Alcohol/week: 4.2 oz Types: 7 Glasses of wine per week Comment: 1 drinks per evening  Drug use: No  
 
 
Allergies: Allergies Allergen Reactions  Kiwi Anaphylaxis  Zetia [Ezetimibe] Other (comments) C/o back pain Review of Systems Review of Systems Constitutional: Negative for chills and fever. Respiratory: Negative for cough and shortness of breath. Cardiovascular: Negative for chest pain. Gastrointestinal: Negative for constipation, diarrhea, nausea and vomiting. Musculoskeletal: Positive for arthralgias. Neurological: Negative for syncope, weakness and numbness. All other systems reviewed and are negative. Physical Exam  
Physical Exam  
Constitutional: He is oriented to person, place, and time. He appears well-developed and well-nourished. HENT:  
Head: Normocephalic and atraumatic. Eyes: Conjunctivae and EOM are normal.  
Neck: Normal range of motion. Neck supple. Cardiovascular: Normal rate and regular rhythm. Pulmonary/Chest: Effort normal and breath sounds normal. No respiratory distress. Abdominal: Soft. He exhibits no distension. There is no tenderness. Musculoskeletal: Normal range of motion. Patient laying supine with left hip and knee and flexion held by pillows. Patient unable to extend knee or hip secondary to pain. 2+ DP pulse and able to move his ankle and toes without difficulty Neurological: He is alert and oriented to person, place, and time. Skin: Skin is warm and dry. Psychiatric: He has a normal mood and affect. Nursing note and vitals reviewed. Diagnostic Study Results Labs -  No results found for this or any previous visit (from the past 12 hour(s)). Radiologic Studies -  
XR KNEE LT 3 V Final Result IMPRESSION: No acute abnormality. XR HIP LT W OR WO PELV 2-3 VWS Final Result IMPRESSION: Impacted left subcapital femoral neck fracture. CT Results  (Last 48 hours) None CXR Results  (Last 48 hours) None Medical Decision Making I am the first provider for this patient. I reviewed the vital signs, available nursing notes, past medical history, past surgical history, family history and social history. Vital Signs-Reviewed the patient's vital signs. Patient Vitals for the past 12 hrs: 
 Temp Pulse Resp BP SpO2  
03/30/19 0946 98 °F (36.7 °C) 74 16 (!) 122/95 93 % Records Reviewed: Nursing Notes and Old Medical Records Provider Notes (Medical Decision Making):  
Patient presents after fall with left hip and knee pain. Will get imaging to further evaluate for fracture vs. Dislocation vs. Contusion. Will treat with analgesics at this time and continue to monitor for changes in mentation. ED Course:  
Initial assessment performed. The patients presenting problems have been discussed, and they are in agreement with the care plan formulated and outlined with them. I have encouraged them to ask questions as they arise throughout their visit. Joana Hernandez MD spoke with Dr. Aleida Godwin and Koko Maldonado, Consult for Orthopedics. Discussed HPI, ROS and PE, available diagnostic tests and all clinical findings. They are in agreement with care plans as outlined. Discussed that they will see pt. Admit to hospitalist.  
Betty Fernandez MD 
 
CONSULT NOTE:  
Betty Fernandez M.D. spoke with Dr. Sully Davila, Specialty: Hospitalist 
Discussed pt's hx, disposition, and available diagnostic and imaging results. Reviewed care plans. Consultant will evaluate pt for admission. Critical Care Time:  
0 Disposition: 
Discharge Note: 
 
Admission Note: Patient is being admitted to the hospital by Dr. Xin Smyth and , Service: Hospitalist.  The results of their tests and reasons for their admission have been discussed with them and available family. They convey agreement and understanding for the need to be admitted and for their admission diagnosis. PLAN: 
1. Current Discharge Medication List  
  
 
2. Follow-up Information None 3. Return to ED if worse Diagnosis Clinical Impression: 1. Left displaced femoral neck fracture ([address] Attestations: 
 
Rashmi Campo M.D. Please note that this dictation was completed with [employer], the computer voice recognition software. Quite often unanticipated grammatical, syntax, homophones, and other interpretive errors are inadvertently transcribed by the computer software. Please disregard these errors. Please excuse any errors that have escaped final proofreading. Thank you.

## 2019-03-30 NOTE — PROGRESS NOTES
Pharmacy Clarification of Prior to Admission Medication Regimen The patient was not interviewed regarding clarification of the prior to admission medication regimen. Patient was uncertain of his medication strengths. MHT called the assisted living facility, [facility], [phone], and spoke with Seng Vega, who was able to verify the patient's medications and strengths and was questioned regarding use of any other inhalers, topical products, over the counter medications, herbal medications, vitamin products or ophthalmic/nasal/otic medication use. Information Obtained From: Transfer papers, Caretaker Pertinent Pharmacy Findings: 
acetaminophen (TYLENOL) 325 mg tablet: Patient's caretaker states this is on the patient's PRN med list but has not received it in the past 30 days. PTA medication list was corrected to the following:  
 
Prior to Admission Medications Prescriptions Last Dose Informant Patient Reported? Taking? SPIRIVA WITH HANDIHALER 18 mcg inhalation capsule 3/29/2019 at Unknown time Transfer Papers No Yes Sig: INHALE ONE CAPSULE VIA DEVICE BY MOUTH ONE TIME DAILY  
acetaminophen (TYLENOL) 325 mg tablet Unknown at Unknown time Transfer Papers Yes No  
Sig: Take 650 mg by mouth every four (4) hours as needed for Pain. aspirin 81 mg Tab 3/29/2019 at Unknown time Transfer Papers Yes Yes Sig: Take 81 mg by mouth daily. dextromethorphan-quiNIDine (NUEDEXTA) 20-10 mg per capsule 3/29/2019 at Unknown time Transfer Papers No Yes Sig: Take 1 Cap by mouth every twelve (12) hours. Indications: PSEUDOBULBAR AFFECT  
docusate sodium (COLACE) 100 mg capsule 3/29/2019 at Unknown time Transfer Papers Yes Yes Sig: Take 100 mg by mouth daily. donepezil (ARICEPT) 10 mg tablet 3/29/2019 at Unknown time Transfer Papers Yes Yes Sig: Take 10 mg by mouth daily. finasteride (PROSCAR) 5 mg tablet 3/29/2019 at Unknown time Transfer Papers Yes Yes Sig: Take 5 mg by mouth daily. memantine (NAMENDA) 10 mg tablet 3/29/2019 at Unknown time Transfer Papers Yes Yes Sig: Take 10 mg by mouth two (2) times a day. multivitamin (ONE A DAY) tablet 3/29/2019 at Unknown time Transfer Papers Yes Yes Sig: Take 1 Tab by mouth daily. psyllium seed-sucrose (METAMUCIL, SUGAR,) powd 3/29/2019 at Unknown time Transfer Papers Yes Yes Sig: Take 1 Packet by mouth daily. 1/2 tbsp in 8oz liquid  
rosuvastatin (CRESTOR) 20 mg tablet 3/29/2019 at Unknown time Transfer Papers No Yes Sig: TAKE ONE TABLET BY MOUTH ONE TIME DAILY  
tamsulosin (FLOMAX) 0.4 mg capsule 3/29/2019 at Unknown time Transfer Papers Yes Yes Sig: Take 0.4 mg by mouth daily. Facility-Administered Medications: None Thank you, Sierra Gutierres Medication History Pharmacy Technician

## 2019-03-30 NOTE — H&P
Hospitalist Admission NoteNAME: Shirley Kovacs :  1934 MRN:  [id number] Date/Time:  3/30/2019 11:03 AM 
 
Patient PCP: Washington Dorantes, DO 
______________________________________________________________________ Given the patient's current clinical presentation, I have a high level of concern for decompensation if discharged from the emergency department. Complex decision making was performed, which includes reviewing the patient's available past medical records, laboratory results, and x-ray films. My assessment of this patient's clinical condition and my plan of care is as follows. Assessment / Plan: 
 
Acute Left hip fracture IMPRESSION IMPRESSION: Impacted left subcapital femoral neck fracture. DVT prophylaxis and pain management per ortho protocol Follow UA ordered Pre-op cardiac risk assessment:  Pt evaluated using revised cardiac risk index and is felt to be low to moderate cardiovascular risk for intermediate risk surgery with a 0.4- 2.4% risk for major complications based on these criteria. This risk has been discussed with the patient and dtr. Pt wishes to proceed. Plan for surgery without further cardiac testing if this risk is acceptable per surgery and anesthesia. Further risk reduction will involve medical management of other comorbid conditions in the perioperative period. 
 xrays ordered 
 ekg unchanged from  
  
Lft  knee  Pain No fx Pain meds prn  
 
Mild cognitive impairment/Dementia MS at baseline per dtr. Confusion may deteriorate due to surgery/anesthesia/hospitalization. Avoid oversedation Re orient frequently Resume meds 
  
COPD , wo exacerbation, cont jet nebs prn, cont Spiriva  
BPH, cont Flomax/proscar Hyperlipidemia , cont Crestor  
  
  
  
Code Status: DNR d/w pt and dtr bedside Surrogate Decision Maker: children, no MPOA  
  
DVT Prophylaxis: per ortho protocol GI Prophylaxis: not indicated 
  
 Baseline:   
             
  
  
Subjective: CHIEF COMPLAINT: fall  Rt hip pain HISTORY OF PRESENT ILLNESS:    
Ngozi Crews is a 80 y.o. male with a history of COPD, ex-smoker, right hip arthroplasty in 2017 , presenting with left hip pain. Patient states yesterday evening patient slipped and fell on his left hip. No head injury, has an abrasion  Left forehead but denies any loss of consciousness or being on any blood thinners. Patient states that since the fall has had moderate to severe left hip pain as well as left knee pain. Has not taken anything for the pain. Patient does use a walker at the nursing home. Pts DTR present in the room. Has no c/o No h/o cad, no c/p or new sob- We were asked to admit for work up and evaluation of the above problems. Past Medical History:  
Diagnosis Date  Arthritis  BPH (benign prostatic hypertrophy) 4/12/2010  COPD (chronic obstructive pulmonary disease) with emphysema ([facility] [address] 7/25/2012  HLD (hyperlipidemia) 4/12/2010  
 HTN (hypertension) 4/12/2010  Memory disorder  Psychiatric disorder   
 anxiety and depression Past Surgical History:  
Procedure Laterality Date  COLONOSCOPY,NURA FERNÁNDEZ,SNARE  8/5/2015  
 2 sessile sigmoid polyps, 3 & 5 mm; Dr. Marliss Siemens; no further screening recommended  ENDOSCOPY, COLON, DIAGNOSTIC  2002  
 negative; 10 year repeat; Dr. Marliss Siemens  HX HERNIA REPAIR  years ago  
 left  HX HERNIA REPAIR  2001  
 right inguinal  
 SD COLSC FLX W/RMVL OF TUMOR POLYP LESION SNARE TQ  7/27/2012 Social History Tobacco Use  Smoking status: Current Some Day Smoker Packs/day: 1.00 Years: 60.00 Pack years: 60.00 Types: Cigarettes  Smokeless tobacco: Former User Quit date: 10/5/2014 Substance Use Topics  Alcohol use: Yes Alcohol/week: 4.2 oz Types: 7 Glasses of wine per week Comment: 1 drinks per evening Family History Problem Relation Age of Onset  Heart Disease Mother   
      at 80  
 Heart Disease Father   
      at 80  Liver Disease Brother  Stroke Brother  Heart Attack Brother  Other Sister 8 ? polio  Cancer Sister   
     lung Allergies Allergen Reactions  Kiwi Anaphylaxis  Zetia [Ezetimibe] Other (comments) C/o back pain Prior to Admission medications Medication Sig Start Date End Date Taking? Authorizing Provider  
acetaminophen (TYLENOL) 325 mg tablet Take  by mouth every four (4) hours as needed for Pain. Provider, Historical  
memantine (NAMENDA) 5 mg tablet Week 1:  1 tab in AM and 1 tab in PM.  Week 2: 1 tab in AM and 2 tab in PM.  Week 3: 2 tab in AM and 2 tab in PM 
Patient taking differently: Take 10 mg by mouth two (2) times a day. Patient taking 10 mg in am 10 mg  In pm 19   Estefanía Chatterjee MD  
dextromethorphan-quiNIDine (NUEDEXTA) 20-10 mg per capsule Take 1 Cap by mouth every twelve (12) hours. Indications: PSEUDOBULBAR AFFECT 18   Vince BAUER III, DO  
psyllium seed-sucrose (METAMUCIL, SUGAR,) powd Take  by mouth. Provider, Historical  
docusate sodium (COLACE) 100 mg capsule Take 100 mg by mouth as needed for Constipation. Provider, Historical  
donepezil (ARICEPT) 10 mg tablet Take one half tablet a day in the morning for 30 days then 1 whole tablet a day. Indications: MILD TO MODERATE ALZHEIMER'S TYPE DEMENTIA Patient taking differently: 10 mg. Taking 10 mg daily 18   Uli Krishnan MD  
acetaminophen (TYLENOL) 500 mg tablet Take 500 mg by mouth every six (6) hours as needed for Pain or Fever.     Provider, Historical  
rosuvastatin (CRESTOR) 20 mg tablet TAKE ONE TABLET BY MOUTH ONE TIME DAILY 10/17/17   Vince BAUER III, DO  
SPIRIVA WITH HANDIHALER 18 mcg inhalation capsule INHALE ONE CAPSULE VIA DEVICE BY MOUTH ONE TIME DAILY 17   Vince Flores III, DO  
 multivitamin (ONE A DAY) tablet Take 1 Tab by mouth daily. Provider, Historical  
tamsulosin (FLOMAX) 0.4 mg capsule Take 0.4 mg by mouth daily. 5/19/15   Provider, Historical  
finasteride (PROSCAR) 5 mg tablet Take 5 mg by mouth daily. Provider, Historical  
aspirin 81 mg Tab Take 81 mg by mouth daily. 4/12/10   Provider, Historical  
 
 
REVIEW OF SYSTEMS:    
I am not able to complete the review of systems because: The patient is intubated and sedated The patient has altered mental status due to his acute medical problems The patient has baseline aphasia from prior stroke(s) The patient has baseline dementia and is not reliable historian The patient is in acute medical distress and unable to provide information Total of 12 systems reviewed as follows:   
   POSITIVE= underlined text  Negative = text not underlined General:  fever, chills, sweats, generalized weakness, weight loss/gain,  
   loss of appetite Eyes:    blurred vision, eye pain, loss of vision, double vision ENT:    rhinorrhea, pharyngitis Respiratory:   cough, sputum production, SOB, SAUCEDO, wheezing, pleuritic pain  
Cardiology:   chest pain, palpitations, orthopnea, PND, edema, syncope Gastrointestinal:  abdominal pain , N/V, diarrhea, dysphagia, constipation, bleeding Genitourinary:  frequency, urgency, dysuria, hematuria, incontinence Muskuloskeletal :  arthralgia, myalgia,left  hip pain  back pain Hematology:  easy bruising, nose or gum bleeding, lymphadenopathy Dermatological: rash, ulceration, pruritis, color change / jaundice Endocrine:   hot flashes or polydipsia Neurological:  headache, dizziness, confusion, focal weakness, paresthesia, Speech difficulties, memory loss, gait difficulty Psychological: Feelings of anxiety, depression, agitation Objective: VITALS:   
Visit Vitals BP (!) 122/95 (BP 1 Location: Left arm, BP Patient Position: At rest) Pulse 74 Temp 98 °F (36.7 °C) Resp 16 Ht 5' 3\" (1.6 m) Wt 51.3 kg (113 lb) SpO2 93% BMI 20.02 kg/m² PHYSICAL EXAM: 
 
General:    Alert, cooperative, no distress, appears stated age. H/o dementia HEENT: Atraumatic, anicteric sclerae, pink conjunctivae No oral ulcers, mucosa moist, throat clear, dentition fair Neck:  Supple, symmetrical,  thyroid: non tender no stepoffs/nttp no meningismus Lungs:   decrease bs  bilaterally. No Wheezing or Rhonchi. No rales. Chest wall:  No tenderness  No Accessory muscle use. Heart:   Regular  rhythm,  No  murmur   No edema Abdomen:   Soft, non-tender. Not distended. Bowel sounds normal 
Extremities: No cyanosis. No clubbing,   
  Skin turgor normal, Capillary refill normal, Radial dial pulse 2+ + Left LE  Externally rotated Skin:     Not pale. Not Jaundiced  No rashes bruise lft scalp nttp Psych:  Fair insight. Not depressed. Not anxious or agitated. Neurologic: EOMs intact. No facial asymmetry. No aphasia or slurred speech. Symmetrical strength, Sensation grossly intact. Alert and oriented X 1. H/o dementia  
 
_______________________________________________________________________ Care Plan discussed with: 
  Comments Patient xx Family  x   
RN x Care Manager Consultant:  x   
_______________________________________________________________________ Expected  Disposition:  
Home with Family HH/PT/OT/RN   
SNF/LTC   
TERRY x  
________________________________________________________________________ TOTAL TIME:  60Minutes Critical Care Provided     Minutes non procedure based Comments  
 x Reviewed previous records  
>50% of visit spent in counseling and coordination of care x Discussion with patient and/or family and questions answered 
  
 
________________________________________________________________________ Signed: Ulices Tse MD 
 
Procedures: see electronic medical records for all procedures/Xrays and details which were not copied into this note but were reviewed prior to creation of Plan. LAB DATA REVIEWED:   
Recent Results (from the past 24 hour(s)) EKG, 12 LEAD, INITIAL Collection Time: 03/30/19 10:36 AM  
Result Value Ref Range Ventricular Rate 78 BPM  
 Atrial Rate 78 BPM  
 P-R Interval 168 ms QRS Duration 116 ms  
 Q-T Interval 408 ms QTC Calculation (Bezet) 465 ms Calculated P Axis 84 degrees Calculated R Axis -37 degrees Calculated T Axis 52 degrees Diagnosis Normal sinus rhythm Left axis deviation Incomplete right bundle branch block Septal infarct , age undetermined ST & T wave abnormality, consider anterior ischemia When compared with ECG of 14-JUL-2018 19:19, No significant change was found URINALYSIS W/ REFLEX CULTURE Collection Time: 03/30/19 10:40 AM  
Result Value Ref Range Color YELLOW/STRAW Appearance CLEAR CLEAR Specific gravity 1.025 1.003 - 1.030    
 pH (UA) 7.0 5.0 - 8.0 Protein TRACE (A) NEG mg/dL Glucose NEGATIVE  NEG mg/dL Ketone 15 (A) NEG mg/dL Bilirubin NEGATIVE  NEG Blood NEGATIVE  NEG Urobilinogen 0.2 0.2 - 1.0 EU/dL Nitrites NEGATIVE  NEG Leukocyte Esterase NEGATIVE  NEG    
 WBC 0-4 0 - 4 /hpf  
 RBC 0-5 0 - 5 /hpf Epithelial cells FEW FEW /lpf Bacteria NEGATIVE  NEG /hpf  
 UA:UC IF INDICATED PENDING Hyaline cast 0-2 0 - 5 /lpf  
CBC WITH AUTOMATED DIFF Collection Time: 03/30/19 10:40 AM  
Result Value Ref Range WBC 9.5 4.1 - 11.1 K/uL  
 RBC 4.31 4.10 - 5.70 M/uL  
 HGB 14.0 12.1 - 17.0 g/dL HCT 41.5 36.6 - 50.3 % MCV 96.3 80.0 - 99.0 FL  
 MCH 32.5 26.0 - 34.0 PG  
 MCHC 33.7 30.0 - 36.5 g/dL  
 RDW 13.6 11.5 - 14.5 % PLATELET 364 (L) 891 - 400 K/uL MPV 11.0 8.9 - 12.9 FL  
 NRBC 0.0 0  WBC ABSOLUTE NRBC 0.00 0.00 - 0.01 K/uL NEUTROPHILS PENDING % LYMPHOCYTES PENDING % MONOCYTES PENDING % EOSINOPHILS PENDING % BASOPHILS PENDING % IMMATURE GRANULOCYTES PENDING %  
 ABS. NEUTROPHILS PENDING K/UL  
 ABS. LYMPHOCYTES PENDING K/UL  
 ABS. MONOCYTES PENDING K/UL  
 ABS. EOSINOPHILS PENDING K/UL  
 ABS. BASOPHILS PENDING K/UL  
 ABS. IMM. GRANS.  PENDING K/UL  
 DF PENDING

## 2019-03-31 NOTE — PERIOP NOTES
TRANSFER - IN REPORT: 
 
Verbal report received from [facility] on Laura Alonso  being received from 3215(unit) for ordered procedure Report consisted of patients Situation, Background, Assessment and  
Recommendations(SBAR). Information from the following report(s) SBAR, MAR and Cardiac Rhythm NSR was reviewed with the receiving nurse. Opportunity for questions and clarification was provided. Assessment completed upon patients arrival to unit and care assumed.

## 2019-03-31 NOTE — ANESTHESIA POSTPROCEDURE EVALUATION
Procedure(s): LEFT HIP HEMIARTHROPLASTY. general 
 
Anesthesia Post Evaluation Patient location during evaluation: PACU Note status: Adequate. Level of consciousness: responsive to verbal stimuli and sleepy but conscious Pain management: satisfactory to patient Airway patency: patent Anesthetic complications: no 
Cardiovascular status: acceptable Respiratory status: acceptable Hydration status: acceptable Comments: +Post-Anesthesia Evaluation and Assessment Patient: Orion Sylvester MRN: [id number]  SSN: [id number] YOB: 1934  Age: 80 y.o. Sex: male Cardiovascular Function/Vital Signs /67   Pulse 70   Temp 36.1 °C (96.9 °F)   Resp 18   Ht 5' 3\" (1.6 m)   Wt 51.3 kg (113 lb)   SpO2 97%   BMI 20.02 kg/m² Patient is status post Procedure(s): LEFT HIP HEMIARTHROPLASTY. Nausea/Vomiting: Controlled. Postoperative hydration reviewed and adequate. Pain: 
Pain Scale 1: FLACC (03/31/19 1100) Pain Intensity 1: 0 (03/31/19 1100) Managed. Neurological Status:  
Neuro (WDL): Exceptions to WDL (03/31/19 1100) At baseline. Mental Status and Level of Consciousness: Arousable. Pulmonary Status:  
O2 Device: Nasal cannula (03/31/19 1100) Adequate oxygenation and airway patent. Complications related to anesthesia: None Post-anesthesia assessment completed. No concerns. Signed By: Priscila Valera MD  
 3/31/2019 Post anesthesia nausea and vomiting:  controlled Vitals Value Taken Time /67 3/31/2019 11:22 AM  
Temp 36.1 °C (96.9 °F) 3/31/2019 11:22 AM  
Pulse 70 3/31/2019 11:22 AM  
Resp 18 3/31/2019 11:22 AM  
SpO2 97 % 3/31/2019 11:22 AM

## 2019-03-31 NOTE — H&P
PRE- OP History and Physical                         Subjective:  
 
Leonie Garcia is a 80 y.o. male who is being seen for left hip fracture. GLF occurred last night, was able to get back into bed under his own power. This morning complaints of left groin and left knee pain. Ambulates at baseline w walker, resides assisted living facility. The patient's condition has been resistant to non-operative treatment and is being admitted for surgical management of this condition. Patient Active Problem List  
 Diagnosis Date Noted  Hip fracture ([address] 03/30/2019  PBA (pseudobulbar affect) 08/28/2018  Late onset Alzheimer's disease without behavioral disturbance 03/27/2018  Benign prostatic hyperplasia with weak urinary stream 12/26/2017  Closed right hip fracture ([address] 11/28/2017  Constipation 05/30/2017  Essential hypertension 05/20/2015  Prediabetes 11/23/2014  Abnormal CT lung screening 11/21/2014  Depression 12/20/2013  ADHD (attention deficit hyperactivity disorder) 12/20/2013  Anxiety state 09/27/2013  Essential tremor 07/25/2013  COPD (chronic obstructive pulmonary disease) with emphysema ([address] 07/25/2012  Post herpetic neuralgia 05/04/2011  Smoker 08/16/2010  HLD (hyperlipidemia) 04/12/2010  DJD (degenerative joint disease) 04/12/2010 Past Medical History:  
Diagnosis Date  Arthritis  BPH (benign prostatic hypertrophy) 4/12/2010  COPD (chronic obstructive pulmonary disease) with emphysema ([address] 7/25/2012  HLD (hyperlipidemia) 4/12/2010  
 HTN (hypertension) 4/12/2010  Memory disorder  Psychiatric disorder   
 anxiety and depression Past Surgical History:  
Procedure Laterality Date  COLONOSCOPY,NURA FERNÁNDEZ,SNARE  8/5/2015  
 2 sessile sigmoid polyps, 3 & 5 mm; Dr. Yash Prince; no further screening recommended  ENDOSCOPY, COLON, DIAGNOSTIC  2002 negative; 10 year repeat; Dr. Beverly Rodriguez  HX HERNIA REPAIR  years ago  
 left  HX HERNIA REPAIR  2001  
 right inguinal  
 AR COLSC FLX W/RMVL OF TUMOR POLYP LESION SNARE TQ  7/27/2012 Prior to Admission medications Medication Sig Start Date End Date Taking? Authorizing Provider  
donepezil (ARICEPT) 10 mg tablet Take 10 mg by mouth daily. Yes Provider, Historical  
memantine (NAMENDA) 10 mg tablet Take 10 mg by mouth two (2) times a day. Yes Provider, Historical  
dextromethorphan-quiNIDine (NUEDEXTA) 20-10 mg per capsule Take 1 Cap by mouth every twelve (12) hours. Indications: PSEUDOBULBAR AFFECT 8/28/18  Yes Mitchell Mcallister III, DO  
psyllium seed-sucrose (METAMUCIL, SUGAR,) powd Take 1 Packet by mouth daily. 1/2 tbsp in 8oz liquid   Yes Provider, Historical  
docusate sodium (COLACE) 100 mg capsule Take 100 mg by mouth daily. Yes Provider, Historical  
rosuvastatin (CRESTOR) 20 mg tablet TAKE ONE TABLET BY MOUTH ONE TIME DAILY 10/17/17  Yes Mitchell Mcallister III, DO  
SPIRIVA WITH HANDIHALER 18 mcg inhalation capsule INHALE ONE CAPSULE VIA DEVICE BY MOUTH ONE TIME DAILY 6/7/17  Yes Mitchell Mcallister III, DO  
multivitamin (ONE A DAY) tablet Take 1 Tab by mouth daily. Yes Provider, Historical  
tamsulosin (FLOMAX) 0.4 mg capsule Take 0.4 mg by mouth daily. 5/19/15  Yes Provider, Historical  
finasteride (PROSCAR) 5 mg tablet Take 5 mg by mouth daily. Yes Provider, Historical  
aspirin 81 mg Tab Take 81 mg by mouth daily. 4/12/10  Yes Provider, Historical  
acetaminophen (TYLENOL) 325 mg tablet Take 650 mg by mouth every four (4) hours as needed for Pain. Provider, Historical  
 
Allergies Allergen Reactions  Kiwi Anaphylaxis  Zetia [Ezetimibe] Other (comments) C/o back pain Social History Tobacco Use  Smoking status: Current Some Day Smoker Packs/day: 1.00 Years: 60.00 Pack years: 60.00 Types: Cigarettes  Smokeless tobacco: Former User Quit date: 10/5/2014 Substance Use Topics  Alcohol use: Yes Alcohol/week: 4.2 oz Types: 7 Glasses of wine per week Comment: 1 drinks per evening Family History Problem Relation Age of Onset  Heart Disease Mother   
      at 80  
 Heart Disease Father   
      at 80  Liver Disease Brother  Stroke Brother  Heart Attack Brother  Other Sister 8 ? polio  Cancer Sister   
     lung Review of Systems A comprehensive review of systems was negative except for that written in the HPI. Objective:  
 
Patient Vitals for the past 8 hrs: 
 BP Temp Pulse Resp SpO2  
19 0711 139/62 98.9 °F (37.2 °C) 74 15   
19 0337 145/77 97.4 °F (36.3 °C) 65 18 92 % Visit Vitals /62 (BP 1 Location: Left arm, BP Patient Position: At rest) Pulse 74 Temp 98.9 °F (37.2 °C) Resp 15 Ht 5' 3\" (1.6 m) Wt 51.3 kg (113 lb) SpO2 92% BMI 20.02 kg/m² General:  Alert, cooperative, no distress, appears stated age. Head:  Normocephalic, without obvious abnormality, atraumatic. Eyes:  Conjunctivae/corneas clear. PERRL, EOMs intact. Ears:  Normal TMs and external ear canals both ears. Nose: Nares normal. Septum midline. Mucosa normal. No drainage or sinus tenderness. Throat: Lips, mucosa, and tongue normal. Teeth and gums normal.  
Neck: Supple, symmetrical, trachea midline, no adenopathy, thyroid: no enlargement/tenderness/nodules, no carotid bruit and no JVD. Back:   Symmetric, no curvature. ROM normal. No CVA tenderness. Lungs:   Clear to auscultation bilaterally. Chest wall:  No tenderness or deformity. Heart:  Regular rate and rhythm, S1, S2, no murmur, click, rub or gallop. Abdomen:   Soft, non-tender. Bowel sounds normal. No masses,  No organomegaly. Extremities: Extremities normal except Subtle LLE shortening.  NO sign of LE VTE. Intact plant/dorsiflexion, No left knee effusion or tenderness 
, atraumatic, no cyanosis or edema. Pulses: 2+ and symmetric all extremities. Skin: Skin color, texture, turgor normal. No rashes or lesions Lymph nodes: Cervical, supraclavicular, and axillary nodes normal.  
Neurologic: CNII-XII intact. Neurovascular exam intact in distal extremities Imaging Review IMPRESSION: Impacted left subcapital femoral neck fracture. 
  
 
Assessment:  
 
Active Problems: 
  Hip fracture ([address] (3/30/2019) Plan:  
Patient has subcapital femoral neck fracture and requires left hip hemiarthroplasty. Operative and non-operative treatments have been discussed with the patient including risks and benefits of each. After consideration of risks, benefits limitations to the consented procedures and alternative options for treatment, the patient has consented to surgical interventions. Questions were answered and Pre-op teaching was completed.  
 
 
CONOR Monique

## 2019-03-31 NOTE — PROGRESS NOTES
Orthopedic End of Shift Note Bedside and Verbal shift change report given to Karlee Suero 44 (oncoming nurse) by Carlos Manuel Currie (offgoing nurse). Report included the following information SBAR, Kardex, Intake/Output, MAR and Recent Results. POD# Significant issues during shift: none Issues for Physician to address: Activity This Shift 
(check all that apply) [] chair 
[] dangle 
 [] bathroom 
[] bedside commode [] hallway [x] bedrest  
Nausea/Vomiting [] yes [x] no    
Voiding Status [] void [x] Razo [] I&O Bowel Movements [] yes [x] no Foot Pumps or SCD [] yes [] no Ice Pack [] yes    [] no Incentive Spirometer [] yes [] no Volume:   
Telemetry Monitoring   [x] yes [] no Rhythm:  
Supplemental O2 [] yes [x] no Sat off O2:   92%

## 2019-03-31 NOTE — PROGRESS NOTES
Occupational Therapy Orders received, chart review completed. Note patient POD #0. OT will follow up tomorrow for evaluation. Recommend OOB to chair three times a day for meals and self-completion of ADLs as able and medically stable. Thank you.   
 
Mj Danielle OT

## 2019-03-31 NOTE — PERIOP NOTES
TRANSFER - OUT REPORT: 
 
Verbal report given to Avtar Spencer RN on Tracie Staley  being transferred to Orthopaedics Unit for routine post - op Report consisted of patients Situation, Background, Assessment and  
Recommendations(SBAR). Information from the following report(s) SBAR, ED Summary, Procedure Summary, Intake/Output, MAR, Accordion and Cardiac Rhythm sinus with bundle branch block was reviewed with the receiving nurse. Lines:  
Peripheral IV 03/31/19 Right; Outer Forearm (Active) Site Assessment Clean, dry, & intact 3/31/2019 11:00 AM  
Phlebitis Assessment 0 3/31/2019 11:00 AM  
Infiltration Assessment 0 3/31/2019 11:00 AM  
Dressing Status Clean, dry, & intact 3/31/2019 11:00 AM  
Dressing Type Transparent;Tape;Gauze 3/31/2019 11:00 AM  
Hub Color/Line Status Blue;Capped;Flushed;Patent 3/31/2019 11:00 AM  
  
 
Opportunity for questions and clarification was provided. Patient transported with: 
 Monitor O2 @ 2 liters Registered Nurse Tech Patient's chart

## 2019-03-31 NOTE — PROGRESS NOTES
Hospitalist Progress Note NAME: Chela Patterson :  1934 MRN:  [id number] Assessment / Plan: 
Acute Left hip fracture Xray of left hip:Impacted left subcapital femoral neck fracture. DVT prophylaxis and pain management per ortho protocol  
S/p left hip hemiarthroplasty on 3/31. Mobilization,PT/OT as per Ortho instructions. Lft  knee  Pain No fx Pain meds prn  
  
Mild cognitive impairment/Dementia MS at baseline per dtr. Confusion may deteriorate due to surgery/anesthesia/hospitalization. Avoid oversedation Re orient frequently   
Resume meds 
  
COPD , wo exacerbation, cont jet nebs prn, cont Spiriva  
BPH, cont Flomax/proscar  
Hyperlipidemia , cont Crestor  
 
Body mass index is 20.02 kg/m². Code Status: DNR d/w pt and dtr bedside  
Surrogate Decision Maker: children, no MPOA  
DVT Prophylaxis: per ortho protocol - on scds Baseline:  
Recommended Disposition:tbd - once cleared by ortho Subjective: Chief Complaint / Reason for Physician Visit Left hip fracture s/p surgery. Pt feeling fine,saying that surgery went well. Discussed with RN events overnight. Review of Systems: 
Symptom Y/N Comments  Symptom Y/N Comments Fever/Chills n   Chest Pain n   
Poor Appetite n   Edema n   
Cough n   Abdominal Pain Sputum    Joint Pain SOB/SAUCEDO n   Pruritis/Rash Nausea/vomit    Tolerating PT/OT Diarrhea    Tolerating Diet n   
Constipation    Other Could NOT obtain due to:   
 
Objective: VITALS:  
Last 24hrs VS reviewed since prior progress note. Most recent are: 
Patient Vitals for the past 24 hrs: 
 Temp Pulse Resp BP SpO2  
19 1222  64 18 107/64 93 % 19 1152  68 18 96/61 92 % 19 1122 96.9 °F (36.1 °C) 70 18 128/67 97 % 19 1100 98 °F (36.7 °C) 80 22 110/65 96 % 19 1045  79 20 104/66 94 % 19 1030  77 16 109/52 99 % 19 1025  77 17 112/56 96 % 19 1020  84 19 106/57 95 % 03/31/19 1015  78 15 115/52 94 % 03/31/19 1010 97.8 °F (36.6 °C) 82 16 109/50 94 % 03/31/19 1007 97.8 °F (36.6 °C) 82  115/52 95 % 03/31/19 0711 98.9 °F (37.2 °C) 74 15 139/62   
03/31/19 0337 97.4 °F (36.3 °C) 65 18 145/77 92 % 03/30/19 2334 98.3 °F (36.8 °C) 64 18 157/78 93 % 03/30/19 2054 98.2 °F (36.8 °C) 64 18 141/64 100 % 03/30/19 1600 96.9 °F (36.1 °C) 71 18 120/63 92 % Intake/Output Summary (Last 24 hours) at 3/31/2019 1427 Last data filed at 3/31/2019 1100 Gross per 24 hour Intake 150 ml Output 750 ml Net -600 ml PHYSICAL EXAM: 
General: WD, WN. Alert, cooperative, no acute distress   
EENT:  EOMI. Anicteric sclerae. MMM Resp:  CTA bilaterally, no wheezing or rales. No accessory muscle use CV:  Regular  rhythm,  No edema GI:  Soft, Non distended, Non tender.  +Bowel sounds Extr:                 S/p left hip surgery - dressing in place. Neurologic:  Alert and oriented X 3, normal speech, Psych:   Good insight. Not anxious nor agitated Skin:  No rashes. No jaundice Reviewed most current lab test results and cultures  YES Reviewed most current radiology test results   YES Review and summation of old records today    NO Reviewed patient's current orders and MAR    YES 
PMH/SH reviewed - no change compared to H&P 
________________________________________________________________________ Care Plan discussed with: 
  Comments Patient x Family RN x Care Manager Consultant Multidiciplinary team rounds were held today with , nursing, pharmacist and clinical coordinator. Patient's plan of care was discussed; medications were reviewed and discharge planning was addressed. ________________________________________________________________________ Total NON critical care TIME: 26   Minutes Total CRITICAL CARE TIME Spent:   Minutes non procedure based Comments >50% of visit spent in counseling and coordination of care x   
________________________________________________________________________ Luigi Ramey MD  
 
Procedures: see electronic medical records for all procedures/Xrays and details which were not copied into this note but were reviewed prior to creation of Plan. LABS: 
I reviewed today's most current labs and imaging studies. Pertinent labs include: 
Recent Labs  
  03/30/19 
1040 WBC 9.5 HGB 14.0  
HCT 41.5 * Recent Labs  
  03/30/19 
1109 03/30/19 
1040 NA  --  137 K  --  4.2 CL  --  103 CO2  --  30  
GLU  --  118* BUN  --  21* CREA  --  0.83 CA  --  9.3 ALB  --  3.6 TBILI  --  0.7 SGOT  --  20 ALT  --  25 INR 1.0  --   
 
 
Signed: Luigi Ramey MD

## 2019-03-31 NOTE — BRIEF OP NOTE
BRIEF OPERATIVE NOTE Date of Procedure: 3/31/2019 Preoperative Diagnosis: left hip fracture Postoperative Diagnosis: LEFT HIP FRACTURE Procedure(s): LEFT HIP HEMIARTHROPLASTY Surgeon(s) and Role: * Saúl Torres MD - Primary Surgical Assistant: Kusum Burdick PA-C  - Irena  
 
Surgical Staff: 
Circ-1: Minnie Sethi RN Scrub Tech-1: Rice Mola Float Staff: Jose Rizo RN; Merry Kim Event Time In Time Out Incision Start 03/31/2019 7217 Incision Close Anesthesia: Other Estimated Blood Loss: 150cc Specimens: * No specimens in log * Findings: see abvoe Complications: none Implants:  
Implant Name Type Inv. Item Serial No.  Lot No. LRB No. Used Action STANDARD FEMORAL STEM   [employer] [id number] Left 1 Implanted MODULAR HEAD COMPONENT   [employer] [id number] Left 1 Implanted CUP ACET BPLR RINGLOK [id number] --  - SNA  CUP ACET BPLR RINGLOK [id number] --  [employer] [id number] Left 1 Implanted
zosyn, lasix, mg sulfate, KCl, ativan, morphine, zofran

## 2019-03-31 NOTE — PERIOP NOTES
Handoff Report from Operating Room to PACU Report received from MALIA Simms RN and VIRIDIANA Arnold CRNA regarding Dale Bolandreyna. Surgeon(s): 
Galen Shaver MD  And Procedure(s) (LRB): LEFT HIP HEMIARTHROPLASTY (Left)  confirmed  
with allergies and dressings discussed. Anesthesia type, drugs, patient history, complications, estimated blood loss, vital signs, intake and output, and last pain medication, lines, reversal medications and temperature were reviewed.

## 2019-03-31 NOTE — PROGRESS NOTES
3/31/2019 
7:52 PM 
 
MD on call paged for post op ancef orders. Patient's BP also low post op: 
 
Recent value: 89/49; HR 57 Patient is sleeping/resting well.  
 
 
7:57 PM 
Estiven PERDOMO returned page; 
 
Orders received for x2 doses 2gm Ancef now and in 8 hours. 500 ml NS bolus now.

## 2019-03-31 NOTE — PROGRESS NOTES
Bedside and Verbal shift change report given to BRAIN Saldaña (oncoming nurse) by BRAIN Jones (offgoing nurse). Report included the following information SBAR, Kardex and MAR.

## 2019-03-31 NOTE — ROUTINE PROCESS
3/31/2019  
6:53 AM 
 
TRANSFER - OUT REPORT: 
 
Verbal report given to BRAIN Olguin(name) on Tracie Staley  being transferred to OR/PreOP(unit) for ordered procedure Report consisted of patients Situation, Background, Assessment and  
Recommendations(SBAR). Information from the following report(s) SBAR, Kardex, ED Summary, Procedure Summary, Intake/Output, MAR, Accordion, Recent Results, Med Rec Status and Cardiac Rhythm NSR with BBB was reviewed with the receiving nurse. Lines:  
Peripheral IV 03/31/19 Right; Outer Forearm (Active) Site Assessment Clean, dry, & intact 3/31/2019  3:04 AM  
Phlebitis Assessment 0 3/31/2019  3:04 AM  
Infiltration Assessment 0 3/31/2019  3:04 AM  
Dressing Status Clean, dry, & intact 3/31/2019  3:04 AM  
Dressing Type Tape;Transparent 3/31/2019  3:04 AM  
Hub Color/Line Status Blue; Infusing 3/31/2019  3:04 AM  
  
 
Opportunity for questions and clarification was provided. Patient transported with: 
 Registered Nurse Tech

## 2019-03-31 NOTE — PERIOP NOTES
Patient update provided to the patient's daughter, Brandon Vega. No questions or concerns were expressed at the time of the update.

## 2019-03-31 NOTE — ANESTHESIA PREPROCEDURE EVALUATION
Anesthetic History No history of anesthetic complications Review of Systems / Medical History Patient summary reviewed, nursing notes reviewed and pertinent labs reviewed Pulmonary COPD (emphysema): mild Smoker (30 pk years) Pertinent negatives: No shortness of breath Comments: Smoker - 1 ppd x 60 years Neuro/Psych Psychiatric history (depression/anxiety) Comments: Anxiety Post-herpetic neuralgia Short term memory loss Essential tremor Cardiovascular Hypertension: well controlled Hyperlipidemia Pertinent negatives: No angina Exercise tolerance: <4 METS: Uses walker Comments: TTE (11/29/17): Mild TI, PAP=38mmHg, EF=65% Incomplete right bundle branch block GI/Hepatic/Renal 
Within defined limits Renal disease Endo/Other Diabetes (prediabetes, no meds) Arthritis Comments: Thrombocytopenia Other Findings Comments: Left Hip Fracture DJD BPH Physical Exam 
 
Airway Mallampati: III 
TM Distance: 4 - 6 cm Neck ROM: normal range of motion Mouth opening: Diminished (comment) Cardiovascular Rhythm: regular Rate: normal 
 
 
 
 Dental 
No notable dental hx Pulmonary Breath sounds clear to auscultation Abdominal 
GI exam deferred Other Findings Anesthetic Plan ASA: 3 Anesthesia type: general 
 
 
 
 
Induction: Intravenous Anesthetic plan and risks discussed with: Patient Mattdescope

## 2019-04-01 NOTE — PROGRESS NOTES
CM made room visit with patient who was not oriented enough to complete assessment with CM. CM contacted Lisa-pt's daughter ([phone]) who stated that her father resides at [address] [place] [address]. Pt's dtr stated that she did not have time to complete assessment with CM at this time but would be at the hospital in about an hour. CM to make room visit with patient when daughter is present to complete assessment. CM will continue to follow.

## 2019-04-01 NOTE — PROGRESS NOTES
Problem: Mobility Impaired (Adult and Pediatric) Goal: *Acute Goals and Plan of Care (Insert Text) Description Physical Therapy Goals Initiated 4/1/2019 1. Patient will move from supine to sit and sit to supine , scoot up and down and roll side to side in bed with supervision/set-up within 4 days. 2. Patient will perform sit to stand with supervision/set-up within 4 days. 3. Patient will ambulate with supervision/set-up for 100 feet with the least restrictive device within 4 days. 5. Patient will verbalize and demonstrate understanding of posterior hip precautions per protocol within 4 days. 6. Patient will perform LE strengthening home exercise program per protocol with supervision/set-up within 4 days. Outcome: Progressing Towards Goal 
PHYSICAL THERAPY EVALUATION Patient: Hyun Hinds (15 y.o. male) Date: 4/1/2019 Primary Diagnosis: Hip fracture ([address] Leana Severino Procedure(s) (LRB): LEFT HIP HEMIARTHROPLASTY (Left) 1 Day Post-Op Precautions:   WBAT(posterior hip, knee immobilizer while in bed) ASSESSMENT : 
Based on the objective data described below, the patient presents with intermittent confusion/baseline dementia, orthostatic hypotension, generalized weakness, decreased endurance/activity tolerance, impaired balance, and overall impaired functional mobility on POD 1 following L hemiarthroplasty. Pt received supine in bed, oriented to self and situation only, however agreeable to participation with therapy. Educated pt regarding posterior hip precautions however question pt's retention of information given confusion. Overall, pt required CG/minAx1 throughout all sit<>stand transfers and brief ambulation trial with RW support.  BP decreased to 88/58 upon pt assuming standing position EOB however BP stabilized with pt returned to seated rest. Given hypotension w/ pt reporting associated lightheadedness/fatigue, ambulation trial limited from bed>>chair distance. Pt denied complaints of pain and tolerated weightbearing through LLE well. Gait slow and shuffled with increased trunk flexion. Anticipate that pt will function best in his familiar environment therefore recommend pt return to nursing home w/ therapy at facility. If therapy is not available at facility, then recommend short stay at SNF prior to returning to [facility]. Patient will benefit from skilled intervention to address the above impairments. Patient?s rehabilitation potential is considered to be Good Factors which may influence rehabilitation potential include:  
? None noted ? Mental ability/status ? Medical condition ? Home/family situation and support systems ? Safety awareness 
? Pain tolerance/management 
? Other: PLAN : 
Recommendations and Planned Interventions: 
?           Bed Mobility Training             ? Neuromuscular Re-Education ? Transfer Training                   ? Orthotic/Prosthetic Training 
? Gait Training                         ? Modalities ? Therapeutic Exercises           ? Edema Management/Control ? Therapeutic Activities            ? Patient and Family Training/Education ? Other (comment): Frequency/Duration: Patient will be followed by physical therapy  twice daily to address goals. Discharge Recommendations: Return to nursing home w/ therapy at facility vs SNF Further Equipment Recommendations for Discharge: Owns RW SUBJECTIVE:  
Patient stated ? Oh, why can't I think of her name???? OBJECTIVE DATA SUMMARY:  
HISTORY:   
Past Medical History:  
Diagnosis Date Arthritis BPH (benign prostatic hypertrophy) 4/12/2010 COPD (chronic obstructive pulmonary disease) with emphysema ([facility] [address] 7/25/2012 HLD (hyperlipidemia) 4/12/2010 HTN (hypertension) 4/12/2010 Memory disorder Psychiatric disorder anxiety and depression Past Surgical History:  
Procedure Laterality Date COLONOSCOPY,REMV LESN,SNARE  8/5/2015  
 2 sessile sigmoid polyps, 3 & 5 mm; Dr. David Galarza; no further screening recommended ENDOSCOPY, COLON, DIAGNOSTIC  2002  
 negative; 10 year repeat; Dr. David Galarza HX HERNIA REPAIR  years ago  
 left HX HERNIA REPAIR  2001  
 right inguinal  
 RI COLSC FLX W/RMVL OF TUMOR POLYP LESION SNARE TQ  7/27/2012 Prior Level of Function/Home Situation: Pt lives at [facility]. Ambulates mod I with use of RW. Denies history of falls over than fall that occurred just PTA (per chart review, pt admitted last year s/p fall and hip fx on RLE). States he stays active and involved in activities at [facility] and ambulates to dining wiley for all meals. Baseline dementia? Personal factors and/or comorbidities impacting plan of care:  
 
Home Situation Home Environment: Assisted living Care Facility Name: 0 One/Two Story Residence: One story Living Alone: No 
Support Systems: Assisted living Patient Expects to be Discharged to[de-identified] Assisted living Current DME Used/Available at Home: Walker, rolling EXAMINATION/PRESENTATION/DECISION MAKING:  
Critical Behavior: 
Neurologic State: Drowsy, Eyes open to voice Orientation Level: Oriented to person, Oriented to place, Oriented to situation, Disoriented to time Cognition: Follows commands, Memory loss Hearing: Auditory Auditory Impairment: None Skin:  dressing clean, dry, intact Edema: none noted Range Of Motion: 
AROM: Generally decreased, functional 
  
  
  
  
  
  
  
Strength:   
Strength: Generally decreased, functional 
  
  
  
  
  
  
Tone & Sensation:  
Tone: Normal 
  
  
  
  
Sensation: Intact Coordination: 
Coordination: Within functional limits Vision:  
  
Functional Mobility: 
Bed Mobility: 
Rolling: Contact guard assistance Supine to Sit: Contact guard assistance Scooting: Contact guard assistance Transfers: Sit to Stand: Minimum assistance;Assist x1 Stand to Sit: Minimum assistance;Assist x1 Bed to Chair: Contact guard assistance Balance:  
Sitting: Intact Standing: Impaired; With support Standing - Static: Fair;Constant support Standing - Dynamic : Fair Ambulation/Gait Training: 
Distance (ft): 3 Feet (ft) Assistive Device: Walker, rolling;Gait belt Ambulation - Level of Assistance: Contact guard assistance;Assist x1 Gait Abnormalities: Decreased step clearance Base of Support: Narrowed Speed/Marie: Pace decreased (<100 feet/min) Step Length: Right shortened;Left shortened Functional Measure: 
Barthel Index: 
Bathin Bladder: 5 Bowels: 5 Groomin Dressin Feeding: 10 Mobility: 0 Stairs: 0 Toilet Use: 5 Transfer (Bed to Chair and Back): 10 Total: 40/100 Percentage of impairment  
0% 1-19% 20-39% 40-59% 60-79% 80-99% 100% Barthel Score 0-100 100 99-80 79-60 59-40 20-39 1-19 
 0 The Barthel ADL Index: Guidelines 1. The index should be used as a record of what a patient does, not as a record of what a patient could do. 2. The main aim is to establish degree of independence from any help, physical or verbal, however minor and for whatever reason. 3. The need for supervision renders the patient not independent. 4. A patient's performance should be established using the best available evidence. Asking the patient, friends/relatives and nurses are the usual sources, but direct observation and common sense are also important. However direct testing is not needed. 5. Usually the patient's performance over the preceding 24-48 hours is important, but occasionally longer periods will be relevant. 6. Middle categories imply that the patient supplies over 50 per cent of the effort. 7. Use of aids to be independent is allowed. Rani Chang., Barthel, D.W. (6161).  Functional evaluation: the Barthel Index. [facility] (14)2. FADUMO Andersen, Cecilia Godinez., Albertina Sood., Sarah, [address] (1999). Measuring the change indisability after inpatient rehabilitation; comparison of the responsiveness of the Barthel Index and Functional [place] Measure. Journal of Neurology, Neurosurgery, and Psychiatry, 66(4), 342-467. BUZZ Arango, ALFIE Bernabe, & Christina Hassan M.A. (2004.) Assessment of post-stroke quality of life in cost-effectiveness studies: The usefulness of the Barthel Index and the EuroQoL-5D. [facility], 13, 650-59 Physical Therapy Evaluation Charge Determination History Examination Presentation Decision-Making MEDIUM  Complexity : 1-2 comorbidities / personal factors will impact the outcome/ POC  MEDIUM Complexity : 3 Standardized tests and measures addressing body structure, function, activity limitation and / or participation in recreation  MEDIUM Complexity : Evolving with changing characteristics  MEDIUM Complexity : FOTO score of 26-74 Based on the above components, the patient evaluation is determined to be of the following complexity level: MEDIUM Pain: 
Pain Scale 1: Numeric (0 - 10) Pain Intensity 1: 0 Pain Location 1: Hip Pain Orientation 1: Left Activity Tolerance:  
Orthostatic hypotension however BP stabilized with seated rest 
Please refer to the flowsheet for vital signs taken during this treatment. After treatment:  
?         Patient left in no apparent distress sitting up in chair ? Patient left in no apparent distress in bed 
? Call bell left within reach ? Nursing notified ? Caregiver present ? Bed alarm activated COMMUNICATION/EDUCATION:  
The patient?s plan of care was discussed with: Occupational Therapist, Registered Nurse and . ?         Fall prevention education was provided and the patient/caregiver indicated understanding. ?         Patient/family have participated as able in goal setting and plan of care. ?         Patient/family agree to work toward stated goals and plan of care. ?         Patient understands intent and goals of therapy, but is neutral about his/her participation. ? Patient is unable to participate in goal setting and plan of care. Thank you for this referral. 
Luz Fernandez, PT, DPT Time Calculation: 28 mins

## 2019-04-01 NOTE — PROGRESS NOTES
Problem: Self Care Deficits Care Plan (Adult) Goal: *Acute Goals and Plan of Care (Insert Text) Description Occupational Therapy Goals Initiated 4/1/2019 1. Patient will perform grooming in standing with contact guard assist within 7 day(s). 2.  Patient will perform bathing with moderate assistance,using most appropriate adaptive aids and adaptive technique  within 7 day(s). 3.  Patient will perform upper body dressing and lower body dressing with minimal assistance, using adaptive aids, within 7 day(s). 4.  Patient will perform toilet transfers with minimal assistancewithin 7 day(s). 5.  Patient will perform all aspects of toileting with contact guard assist within 7 day(s). 6.  Patient will participate in upper extremity therapeutic exercise/activities with supervision/set-up for 5 minutes within 7 day(s). 7.  Patient will perform standing adls for at least 6 minutes with minimal verbal cues within 7 day(s). 8.  Patient will verbalize and demonstrate understanding of hip precautions during adls with minimal cues within 7 days. Outcome: Progressing Towards Goal 
 OCCUPATIONAL THERAPY EVALUATION Patient: Lali Levine (86 y.o. male) Date: 4/1/2019 Primary Diagnosis: Hip fracture ([facility] [address] Ishmael Skaggs Procedure(s) (LRB): LEFT HIP HEMIARTHROPLASTY (Left) 1 Day Post-Op Precautions:   WBAT(posterior hip, knee immobilizer while in bed) ASSESSMENT : 
Based on the objective data described below, the patient presents with baseline dementia, decreased orientation,  hip precautions s/p L hip hemiarthroplasty POD 1, decreased balance, generalized weakness, and functional mobility. Pt is functioning at supervision to total assistance for self care and is generally minimal assistance for functional mobility. Pt would benefit from returning to his AL home with additional assistance and HH therapies vs. SNF rehab if his facility is unable to provided needed care. Patient will benefit from skilled intervention to address the above impairments. Patients rehabilitation potential is considered to be Good Factors which may influence rehabilitation potential include:  
? None noted ? Mental ability/status-dementia/memory ? Medical condition ? Home/family situation and support systems ? Safety awareness ? Pain tolerance/management ? Other: PLAN : 
Recommendations and Planned Interventions: 
?                  Self Care Training                  ? Therapeutic Activities ? Functional Mobility Training    ? Cognitive Retraining 
? Therapeutic Exercises           ? Endurance Activities ? Balance Training                   ? Neuromuscular Re-Education ? Visual/Perceptual Training     ? Home Safety Training 
? Patient Education                 ? Family Training/Education ? Other (comment): Frequency/Duration: Patient will be followed by occupational therapy 5 times a week to address goals. Discharge Recommendations: Home Health and increased assistance at [address] [place] [address] vs. SNF rehab Further Equipment Recommendations for Discharge: ybd SUBJECTIVE:  
Patient was agreeable to OT and moving OOB--he cannot recall hip precautions due to dementia/poor memory The patient stated 0 /3 hip precautions. Reviewed all 3 with patient. OBJECTIVE DATA SUMMARY:  
HISTORY:  
Past Medical History:  
Diagnosis Date  Arthritis  BPH (benign prostatic hypertrophy) 4/12/2010  COPD (chronic obstructive pulmonary disease) with emphysema ([facility] [address] 7/25/2012  HLD (hyperlipidemia) 4/12/2010  
 HTN (hypertension) 4/12/2010  Memory disorder  Psychiatric disorder   
 anxiety and depression Past Surgical History:  
Procedure Laterality Date  COLONOSCOPY,REMDRAGAN FERNÁNDEZ,SNARE  8/5/2015  
 2 sessile sigmoid polyps, 3 & 5 mm; Dr. Bobby Coates; no further screening recommended  ENDOSCOPY, COLON, DIAGNOSTIC  2002  
 negative; 10 year repeat; Dr. Bobby Coates  HX HERNIA REPAIR  years ago  
 left  HX HERNIA REPAIR  2001  
 right inguinal  
 CO COLSC FLX W/RMVL OF TUMOR POLYP LESION SNARE TQ  7/27/2012 Prior Level of Function/Environment/Context: Pt lives in [facility] where he reports that he is independent in [facility] care and mobility. He has stand by Assistance from staff. Pt participated in AL activities and ambulates to the DR for meals Occupations in which the patient is/was successful, what are the barriers preventing that success:  poor memory-hip precautions Performance Patterns (routines, roles, habits, and rituals):  
Personal Interests and/or values: states that he is active in activities at [place] Expanded or extensive additional review of patient history:  
 
Home Situation Home Environment: Assisted living Care Facility Name: 0 One/Two Story Residence: One story Living Alone: No 
Support Systems: Assisted living Patient Expects to be Discharged to[de-identified] Assisted living Current DME Used/Available at Home: Walker, rolling Tub or Shower Type: Shower Hand dominance: right EXAMINATION OF PERFORMANCE DEFICITS: 
Cognitive/Behavioral Status: 
Neurologic State: Alert Orientation Level: Oriented to person Cognition: Follows commands; Impaired decision making;Memory loss;Poor safety awareness Perception: Appears intact Perseveration: No perseveration noted Safety/Judgement: Decreased awareness of environment;Decreased awareness of need for assistance;Decreased awareness of need for safety; Fall prevention Skin: generally intact Edema: none observed; expected post surgery Hearing: Auditory Auditory Impairment: None Vision/Perceptual:   
    
    
    
  
    
    
  
Range of Motion: BUEs:  
AROM: Generally decreased, functional 
  
  
  
  
  
  
  
Strength: BUEs:  
Strength: Generally decreased, functional 
  
  
  
  
Coordination: 
Coordination: Within functional limits Fine Motor Skills-Upper: Left Intact; Right Intact Gross Motor Skills-Upper: Left Intact; Right Intact Tone & Sensation: 
 
Tone: Normal 
Sensation: Intact Balance: 
Sitting: Impaired(due to posterior hip precautions) Sitting - Static: Good (unsupported) Sitting - Dynamic: (does not remember hip precautions) Standing: Impaired; With support Standing - Static: Fair;Constant support Standing - Dynamic : Fair Functional Mobility and Transfers for ADLs: 
Bed Mobility: 
Rolling: Contact guard assistance Supine to Sit: Contact guard assistance Scooting: Contact guard assistance Transfers: 
Sit to Stand: Minimum assistance;Assist x1 Stand to Sit: Minimum assistance;Assist x1 Bed to Chair: Contact guard assistance ADL Assessment: 
Feeding: Setup Oral Facial Hygiene/Grooming: Supervision;Setup Bathing: Maximum assistance Upper Body Dressing: Moderate assistance Lower Body Dressing: Total assistance(has Total hip precautions due to POD1 hemiarthroplasty) ADL Intervention and task modifications: 
  
 
 Pt has overall limited tolerance for activities. Cognitive Retraining Safety/Judgement: Decreased awareness of environment;Decreased awareness of need for assistance;Decreased awareness of need for safety; Fall prevention Therapeutic Exercise: 
Encouraged sitting up in chair for meals and as tolerated for 30 to 60 minutes at a time Functional Measure: 
Barthel Index: 
 
Bathin Bladder: 5 Bowels: 5 Groomin Dressin Feeding: 10 Mobility: 0 Stairs: 0 Toilet Use: 5 Transfer (Bed to Chair and Back): 10 Total: 40/100 Percentage of impairment  
0% 1-19% 20-39% 40-59% 60-79% 80-99% 100% Barthel Score 0-100 100 99-80 79-60 59-40 20-39 1-19 
 0 The Barthel ADL Index: Guidelines 1. The index should be used as a record of what a patient does, not as a record of what a patient could do. 2. The main aim is to establish degree of independence from any help, physical or verbal, however minor and for whatever reason. 3. The need for supervision renders the patient not independent. 4. A patient's performance should be established using the best available evidence. Asking the patient, friends/relatives and nurses are the usual sources, but direct observation and common sense are also important. However direct testing is not needed. 5. Usually the patient's performance over the preceding 24-48 hours is important, but occasionally longer periods will be relevant. 6. Middle categories imply that the patient supplies over 50 per cent of the effort. 7. Use of aids to be independent is allowed. Rani Chang., Barthel, D.W. ([id number]). Functional evaluation: the Barthel Index. [address] [facility] (14)2. Sandria Cogan galo FADUMO Goodwin, Marilu Perez, Pili Mazariegos., [place], [address] [facility] (1999). Measuring the change indisability after inpatient rehabilitation; comparison of the responsiveness of the Barthel Index and Functional [place] Measure. Journal of Neurology, Neurosurgery, and Psychiatry, 66(4), 993-567. Krzysztof Robles NTieraJFADY, ALFIE Bernabe, & Sasha Marx, M.A. (2004.) Assessment of post-stroke quality of life in cost-effectiveness studies: The usefulness of the Barthel Index and the EuroQoL-5D. [facility], 13, 850-00 Occupational Therapy Evaluation Charge Determination History Examination Decision-Making MEDIUM Complexity : Expanded review of history including physical, cognitive and psychosocial  history  MEDIUM Complexity : 3-5 performance deficits relating to physical, cognitive , or psychosocial skils that result in activity limitations and / or participation restrictions MEDIUM Complexity : Patient may present with comorbidities that affect occupational performnce. Miniml to moderate modification of tasks or assistance (eg, physical or verbal ) with assesment(s) is necessary to enable patient to complete evaluation Based on the above components, the patient evaluation is determined to be of the following complexity level: MEDIUM Pain: 
Pain Scale 1: Numeric (0 - 10) Pain Intensity 1: 0 Pain Location 1: Hip Pain Orientation 1: Left Activity Tolerance:  
BP decreased in standing, but was fairly stable in sitting. Please refer to the flowsheet for vital signs taken during this treatment. After treatment:  
? Patient left in no apparent distress sitting up in chair ? Patient left in no apparent distress in bed 
? Call bell left within reach ? Nursing notified ? Caregiver present ? Bed alarm activated COMMUNICATION/EDUCATION:  
The patients plan of care was discussed with: Physical Therapist and Registered Nurse. 
? Home safety education was provided and the patient/caregiver indicated understanding. ? Patient/family have participated as able in goal setting and plan of care. ?    Patient/family agree to work toward stated goals and plan of care. ?    Patient understands intent and goals of therapy, but is neutral about his/her participation. ?    . This patients plan of care is appropriate for delegation to [facility]. Thank you for this referral. 
Gopal Shaw OTR/L Time Calculation: 31 mins

## 2019-04-01 NOTE — PROGRESS NOTES
Occupational Therapy Orders received and medical record reviewed. Pt participated in OT Evaluation this date. [facility], if that is not available to pt, would likely benefit from rehab. Pt demonstrates impaired memory and will need consistent reminders for his hip precautions following L hip hemiarthroplasy  (POD1). Will continue to follow for OT. Full OT note to follow.

## 2019-04-01 NOTE — PROGRESS NOTES
Hospitalist Progress Note NAME: Raudel Douglass :  1934 MRN:  [id number] Assessment / Plan: 
Acute Left hip fracture Xray of left hip:Impacted left subcapital femoral neck fracture. DVT prophylaxis and pain management per ortho protocol  
S/p left hip hemiarthroplasty on 3/31. Mobilization,PT/OT as per Ortho instructions. Consult case management for possible snf 
  
Lft  knee  Pain No fx Pain meds prn  
  
Mild cognitive impairment/Dementia MS at baseline per dtr. Confusion may deteriorate due to surgery/anesthesia/hospitalization. Avoid oversedation Re orient frequently   
Resume meds 
  
COPD , wo exacerbation, cont jet nebs prn, cont Spiriva  
BPH, cont Flomax/proscar  
Hyperlipidemia , cont Crestor  
 
Anemia - new recheck in am 
 
18.5 - 24.9 Normal weight / Body mass index is 20.02 kg/m². Code status: DNR Prophylaxis: SCD's Recommended Disposition: SNF/LTC Subjective: Chief Complaint / Reason for Physician Visit \"no reports of pain or chills\". Discussed with RN events overnight. Review of Systems: 
Symptom Y/N Comments  Symptom Y/N Comments Fever/Chills    Chest Pain Poor Appetite    Edema Cough    Abdominal Pain Sputum    Joint Pain SOB/SAUCEDO    Pruritis/Rash Nausea/vomit    Tolerating PT/OT Diarrhea    Tolerating Diet Constipation    Other Could NOT obtain due to:   
 
Objective: VITALS:  
Last 24hrs VS reviewed since prior progress note. Most recent are: 
Patient Vitals for the past 24 hrs: 
 Temp Pulse Resp BP SpO2  
19 0923  89  104/61   
19 0913  (!) 101  98/67   
19 0912  (!) 101  (!) 88/58   
19 0906  93  (!) 115/94   
19 0900  76  128/67   
19 0817 97.8 °F (36.6 °C) 68 16 119/66 92 % 19 0347 97.4 °F (36.3 °C) 65 18 116/66 98 % 19 2334 97.6 °F (36.4 °C) 65 18 111/54 94 % 19 2150  (!) 57  98/54   
19  (!) 58  100/58  03/31/19 2027 97.3 °F (36.3 °C) (!) 57 16 101/57 98 % 03/31/19 2000  (!) 57 16 (!) 89/49 95 % 03/31/19 1940   16  98 % 03/31/19 1548 96.8 °F (36 °C) 64 16 (!) 88/55 93 % 03/31/19 1222  64 18 107/64 93 % 03/31/19 1152  68 18 96/61 92 % Intake/Output Summary (Last 24 hours) at 4/1/2019 1128 Last data filed at 4/1/2019 3724 Gross per 24 hour Intake  Output 450 ml Net -450 ml PHYSICAL EXAM: 
General: WD, WN. Alert, cooperative, no acute distress   
EENT:  EOMI. Anicteric sclerae. MMM Resp:  CTA bilaterally, no wheezing or rales. No accessory muscle use CV:  Regular  rhythm,  No edema GI:  Soft, Non distended, Non tender.  +Bowel sounds Neurologic:  Alert and oriented X 3, normal speech, Psych:   Good insight. Not anxious nor agitated Skin:  No rashes. No jaundice Reviewed most current lab test results and cultures  YES Reviewed most current radiology test results   YES Review and summation of old records today    NO Reviewed patient's current orders and MAR    YES 
PMH/SH reviewed - no change compared to H&P 
________________________________________________________________________ Care Plan discussed with: 
  Comments Patient Family RN Care Manager Consultant Multidiciplinary team rounds were held today with , nursing, pharmacist and clinical coordinator. Patient's plan of care was discussed; medications were reviewed and discharge planning was addressed. ________________________________________________________________________ Total NON critical care TIME:  30   Minutes Total CRITICAL CARE TIME Spent:   Minutes non procedure based Comments >50% of visit spent in counseling and coordination of care    
________________________________________________________________________ Christa Salvage, DO  
 
Procedures: see electronic medical records for all procedures/Xrays and details which were not copied into this note but were reviewed prior to creation of Plan. LABS: 
I reviewed today's most current labs and imaging studies. Pertinent labs include: 
Recent Labs 04/01/19 0207 03/30/19 
1040 WBC 6.2 9.5 HGB 8.7* 14.0  
HCT 27.5* 41.5 PLT 94* 132* Recent Labs 04/01/19 
0207 03/30/19 
1109 03/30/19 
1040   --  137  
K 4.1  --  4.2 *  --  103 CO2 23  --  30  
GLU 92  --  118* BUN 20  --  21* CREA 0.87  --  0.83 CA 7.4*  --  9.3 ALB  --   --  3.6 TBILI  --   --  0.7 SGOT  --   --  20 ALT  --   --  25 INR  --  1.0  --   
 
 
Signed: Tad Narvaez, DO

## 2019-04-01 NOTE — PROGRESS NOTES
Initial Nutrition Assessment: 
 
INTERVENTIONS/RECOMMENDATIONS:  
· Continue regular diet · Ensure TID (strawberry) · Encourage PO intake · Please document % meals and supplements consumed in flowsheet ASSESSMENT:  
Chart reviewed, medically noted for hip fracture s/p Left hip hemiarthroplasty and PMH shown below. We discussed the role that nutrition plays in healing after a fracture/surgery, specifically focusing on protein sources at each meal as well as nutrition supplements to help meet pt protein needs. He agreed to receive ensure with meals. He reports eating fairly well PTA and unsure of recent weight loss. He was unsure of his usual weight but after telling him his current weight he believes that is about his usual weight. Physically, he does have clavicle muscle wasting however this may be chronic. Looking at his weight history, max weight over the past 5 years was 122 lbs. Past Medical History:  
Diagnosis Date  Arthritis  BPH (benign prostatic hypertrophy) 4/12/2010  COPD (chronic obstructive pulmonary disease) with emphysema ([facility] [address] 7/25/2012  HLD (hyperlipidemia) 4/12/2010  
 HTN (hypertension) 4/12/2010  Memory disorder  Psychiatric disorder   
 anxiety and depression Diet Order: Regular 
% Eaten:  No data found. Pertinent Medications: [x]Reviewed: NS, os-fam, miralax, pericolace, Pertinent Labs: [x]Reviewed:  
Food Allergies: []NKFA  [x]Other (kiwi) Last BM: PTA Edema:  n/a   []RUE   []LUE   []RLE   []LLE Pressure Injury:  n/a    [] Stage I   [] Stage II   [] Stage III   [] Stage IV Wt Readings from Last 30 Encounters:  
03/30/19 51.3 kg (113 lb)  
02/26/19 49.9 kg (110 lb)  
02/25/19 51.7 kg (114 lb) 08/28/18 51.9 kg (114 lb 6.4 oz) 07/14/18 50.7 kg (111 lb 12.4 oz) 07/10/18 51.3 kg (113 lb)  
06/26/18 51.3 kg (113 lb)  
03/27/18 51.7 kg (114 lb) 01/16/18 48.1 kg (106 lb) 01/03/18 48.1 kg (106 lb) 01/02/18 48.1 kg (106 lb) 12/27/17 48.1 kg (106 lb) 12/26/17 48.1 kg (106 lb) 12/16/17 51.7 kg (114 lb) 12/02/17 51.7 kg (114 lb)  
11/28/17 49 kg (108 lb) 05/30/17 53.5 kg (118 lb) 05/16/17 54.4 kg (120 lb) 03/02/17 55.3 kg (122 lb) 12/29/16 53.6 kg (118 lb 3.2 oz)  
11/28/16 54.4 kg (120 lb)  
11/22/16 54.3 kg (119 lb 9.6 oz) 05/23/16 61.8 kg (136 lb 3.2 oz)  
11/23/15 56.2 kg (124 lb) 08/05/15 53.8 kg (118 lb 9 oz) 05/20/15 56.2 kg (124 lb)  
11/19/14 55.8 kg (123 lb)  
08/27/14 55.3 kg (122 lb) 04/16/14 54.7 kg (120 lb 9.6 oz)  
03/20/14 55.3 kg (122 lb) Anthropometrics:  
Height: 5' 3\" (160 cm) Weight: 51.3 kg (113 lb) IBW (%IBW):   ( ) UBW (%UBW):   (  %) Last Weight Metrics: 
Weight Loss Metrics 3/30/2019 2/26/2019 2/25/2019 8/28/2018 7/14/2018 7/10/2018 6/26/2018 Today's Wt 113 lb 110 lb 114 lb 114 lb 6.4 oz 111 lb 12.4 oz 113 lb 113 lb BMI 20.02 kg/m2 19.49 kg/m2 20.19 kg/m2 20.27 kg/m2 19.8 kg/m2 20.02 kg/m2 20.02 kg/m2 BMI: Body mass index is 20.02 kg/m². This BMI is indicative of: 
 []Underweight    [x]Normal    []Overweight    [] Obesity   [] Extreme Obesity (BMI>40) Estimated Nutrition Needs (Based on):  
1400 Kcals/day(BMR: 1100 x 1.3) , 60 g(1.2 g/kg) Protein Carbohydrate: At Least 130 g/day  Fluids: 1400 mL/day (1ml/kcal) or per primary team 
 
NUTRITION DIAGNOSES:  
Problem:  Increased nutrient needs Etiology: related to fracture healing Signs/Symptoms: as evidenced by hip fracture s/p Left hip hemiarthroplasty NUTRITION INTERVENTIONS: 
Meals/Snacks: General/healthful diet   Supplements: Commercial supplement GOAL:  
consume >50% of meals and ONS in 3-5 days LEARNING NEEDS (Diet, Food/Nutrient-Drug Interaction):  
 [] None Identified 
 [x] Identified and Education Provided/Documented 
 [] Identified and Pt declined/was not appropriate Cultureal, Buddhist, OR Ethnic Dietary Needs:  
 [x] None Identified 
 [] Identified and Addressed [x] Interdisciplinary Care Plan Reviewed/Documented  
 [x] Discharge Planning:  General healthy diet with adequate kcal and protein to promote wound healing MONITORING /EVALUATION:  
  
Food/Nutrient Intake Outcomes: Total energy intake Physical Signs/Symptoms Outcomes: Weight/weight change, Electrolyte and renal profile, Glucose profile, GI 
 
NUTRITION RISK:  
 [] High              [x] Moderate           []  Low  []  Minimal/Uncompromised PT SEEN FOR:  
 [x]  MD Consult: []Calorie Count []Diabetic Diet Education []Diet Education []Electrolyte Management 
   [x]General Nutrition Management and Supplements []Management of Tube Feeding []TPN Recommendations []  RN Referral:  []MST score >=2 
   []Enteral/Parenteral Nutrition PTA []Pregnant: Gestational DM or Multigestation 
   []Pressure Ulcer/Wound Care needs 
     
[]  Low BMI 
[]  LOS Referral  
 
 
Nely Hines RDN Pager [phone] Weekend Pager [phone]

## 2019-04-01 NOTE — PROGRESS NOTES
Problem: Mobility Impaired (Adult and Pediatric) Goal: *Acute Goals and Plan of Care (Insert Text) Description Physical Therapy Goals Initiated 4/1/2019 1. Patient will move from supine to sit and sit to supine , scoot up and down and roll side to side in bed with supervision/set-up within 4 days. 2. Patient will perform sit to stand with supervision/set-up within 4 days. 3. Patient will ambulate with supervision/set-up for 100 feet with the least restrictive device within 4 days. 5. Patient will verbalize and demonstrate understanding of posterior hip precautions per protocol within 4 days. 6. Patient will perform LE strengthening home exercise program per protocol with supervision/set-up within 4 days. 4/1/2019 1600 by Tony Chung, PT Outcome: Progressing Towards Goal 
4/1/2019 1205 by Tony Chung, PT Outcome: Progressing Towards Goal 
 PHYSICAL THERAPY TREATMENT Patient: Shirley Kovacs (10 y.o. male) Date: 4/1/2019 Diagnosis: [facility] [S72.009A] <principal problem not specified> Procedure(s) (LRB): LEFT HIP HEMIARTHROPLASTY (Left) 1 Day Post-Op Precautions: WBAT(posterior hip, knee immobilizer while in bed) Chart, physical therapy assessment, plan of care and goals were reviewed. ASSESSMENT: 
Pt received supine in bed, agreeable to participation with therapy. Intermittent confusion remains with pt demonstrating poor recall of posterior hip precautions or any education provided to him from AM therapy session. Pt with improved activity tolerance, demonstrating ability to progress ambulation trial to a distance of 30ft w/ RW and minAx1 before fatigue. Gait remains unsteady with pt exhibiting antalgic, step-to gait pattern with fluctuating gait speed. Pt with x2 episodes of buckling of L LE during midstance/weightbearing positions. Min verbal cues required for safety awareness as pt tended to ambulated outside RW frame.  Vital signs assessed throughout and remained stable, negative for orthostatic hypotension. Continue to recommend pt return to NII w/ therapy at facility and increased assist of staff. However, if this is not available, recommend SNF prior to returning to assisted. Progression toward goals: 
?    Improving appropriately and progressing toward goals ? Improving slowly and progressing toward goals ? Not making progress toward goals and plan of care will be adjusted PLAN: 
Patient continues to benefit from skilled intervention to address the above impairments. Continue treatment per established plan of care. Discharge Recommendations:  return to assisted w/ therapy at facility & increased assist vs SNF Further Equipment Recommendations for Discharge:  none, owns RW SUBJECTIVE:  
Patient stated ? This thigh is just hurting, hurting. ? OBJECTIVE DATA SUMMARY:  
Critical Behavior: 
Neurologic State: Alert Orientation Level: Oriented to person Cognition: Follows commands, Impaired decision making, Memory loss, Poor safety awareness Safety/Judgement: Decreased awareness of environment, Decreased awareness of need for assistance, Decreased awareness of need for safety, Fall prevention Functional Mobility Training: 
Bed Mobility: 
Rolling: Minimum assistance Supine to Sit: Minimum assistance Scooting: Supervision Transfers: 
Sit to Stand: Minimum assistance Stand to Sit: Minimum assistance Bed to Chair: Minimum assistance Balance: 
Sitting: Intact Sitting - Static: Good (unsupported) Sitting - Dynamic: (does not remember hip precautions) Standing: Impaired; With support Standing - Static: Fair;Constant support Standing - Dynamic : Fair Ambulation/Gait Training: 
Distance (ft): 30 Feet (ft) Assistive Device: Walker, rolling;Gait belt Ambulation - Level of Assistance: Minimal assistance Gait Abnormalities: Antalgic; Shuffling gait; Step to gait; Decreased step clearance Base of Support: Narrowed Speed/Marie: Pace decreased (<100 feet/min) Step Length: Left shortened;Right shortened Therapeutic Exercises:  
2x 10 ankle pumps 2x 10 LAQ Pain: 
Pain Scale 1: Numeric (0 - 10) Pain Intensity 1: 0 Activity Tolerance: VSS throughout, negative for orthostatic hypotension Please refer to the flowsheet for vital signs taken during this treatment. After treatment:  
?    Patient left in no apparent distress sitting up in chair ? Patient left in no apparent distress in bed 
? Call bell left within reach ? Nursing notified ? Caregiver present ? Bed alarm activated COMMUNICATION/COLLABORATION:  
The patient?s plan of care was discussed with: Registered Nurse and  Nadege Bailey, PT, DPT Time Calculation: 18 mins

## 2019-04-01 NOTE — PROGRESS NOTES
[facility] - Progress Note POD#1 Left hip hemiarthroplasty Pt resting in bed. No complaints. Pain mild. Tolerating diet VSS Afebrile. Hgb 8.7 Incision and dressing c.d.i Calves soft and supple; No pain with passive stretch Sensation and motor intact SCD/Foot pumps for mechanical DVT proph while in bed PLAN: 
1) PT/OT WBAT, knee immobilizer while in bed 2) Pain control continue current with tylenol and oxycodone as needed 3) Plan d/c pending per PT/OT recommendations and CM, patient resides at assisted living facility and ambulates with walker at baseline.   
 
Raghuen Cancer, PA

## 2019-04-01 NOTE — PROGRESS NOTES
Reason for Admission:  Hip fracture RRAT Score: 20 Do you (patient/family) have any concerns for transition/discharge? No concerns at this time Plan for utilizing home health:  No recommendations at this time Current Advanced Directive/Advance Care Plan: on file Likelihood of readmission?   moderate Transition of Care Plan:       
 
CM made room visit with patient who was alert and oriented x2 with daughter at bedside. Pt dtr confirmed demographics, emergency contact, insurance, and PCP (last seen 2/26/19). Pt is a resident at [address] [place] [address] since 8/1/18. Pt has DME including RW, bedside commode and shower chair. Prior to admission pt was independent with ADLs (human supervision) and IADLs (RW most of the time). Pt does not drive and daughter stated that she transport's patient to doctors appointments. Patient has used [facility] in the past and has also been to [address] for IPR. Pt has no history of SNF. CM requested permission to contact [facility] patient resides at to see if they have a healthcare side of the facility for patient to get skilled nursing and rehab, dtr approved. CM contacted [facility] and was told that they do not have a healthcare side of the facility and the patient would need to go to a SNF prior to returning to [facility]. CM let pt and pt's dtr know of this information. Pt's dtr chose [facility] SNF. FOC signed and on chart. CM has sent referral and waiting for response. CM will continue to follow. Care Management Interventions PCP Verified by CM: Yes Last Visit to PCP: 02/26/19 Mode of Transport at Discharge: BLS Transition of Care Consult (CM Consult): Discharge Planning Discharge Durable Medical Equipment: No 
Physical Therapy Consult: Yes Occupational Therapy Consult: Yes Speech Therapy Consult: No 
Current Support Network: Assisted Living [facility], [address]

## 2019-04-01 NOTE — PROGRESS NOTES
Orders received, chart reviewed and patient evaluated by physical therapy. Recommend patient return to NII w/ therapy at facility. Full evaluation to follow.   
 
Luz Fernandez, PT, DPT

## 2019-04-01 NOTE — ROUTINE PROCESS
4/1/2019 
07:30 Am Orthopedic End of Shift Note Bedside and Verbal shift change report given to Tika (oncoming nurse) by Ludivina Lakhani (offgoing nurse). Report included the following information SBAR, Kardex, ED Summary, OR Summary, Procedure Summary, Intake/Output, MAR, Accordion, Recent Results, Med Rec Status and Cardiac Rhythm NSR 1 avb. POD# 1 Significant issues during shift: DTV Issues for Physician to address: Activity This Shift 
(check all that apply) [] chair 
[] dangle 
 [] bathroom 
[] bedside commode [] hallway [x] bedrest  
Nausea/Vomiting [] yes [x] no    
Voiding Status [] void [x] Razo [] I&O Bowel Movements [] yes [x] no Foot Pumps or SCD [x] yes [] no Ice Pack [x] yes    [] no Incentive Spirometer [x] yes [] no Volume:   250 Telemetry Monitoring   [] yes [x] no Rhythm:  
Supplemental O2 [x] yes [] no Sat off O2:   88%

## 2019-04-01 NOTE — PROGRESS NOTES
Medicare pt has received, reviewed, and signed 2nd IM letter informing them of their right to appeal the discharge. Signed copied has been placed on pt bedside chart. Moris Feliciano [phone]

## 2019-04-02 NOTE — PROGRESS NOTES
Problem: Mobility Impaired (Adult and Pediatric) Goal: *Acute Goals and Plan of Care (Insert Text) Description Physical Therapy Goals Initiated 4/1/2019 1. Patient will move from supine to sit and sit to supine , scoot up and down and roll side to side in bed with supervision/set-up within 4 days. 2. Patient will perform sit to stand with supervision/set-up within 4 days. 3. Patient will ambulate with supervision/set-up for 100 feet with the least restrictive device within 4 days. 5. Patient will verbalize and demonstrate understanding of posterior hip precautions per protocol within 4 days. 6. Patient will perform LE strengthening home exercise program per protocol with supervision/set-up within 4 days. Outcome: Progressing Towards Goal 
 PHYSICAL THERAPY TREATMENT Patient: Everett Bernal (46 y.o. male) Date: 4/2/2019 Diagnosis: Hip fracture ([facility] [S72.009A] <principal problem not specified> Procedure(s) (LRB): LEFT HIP HEMIARTHROPLASTY (Left) 2 Days Post-Op Precautions: WBAT(posterior hip, knee immobilizer while in bed) Chart, physical therapy assessment, plan of care and goals were reviewed. ASSESSMENT: 
Pt cleared by nurse to mobilize. Pt received in bed supine. Pt agreeable to therapy and wanting to get to restroom. Pt very confused requiring education for sequencing with all mobility. Pt performed supine to sit at min A with additional time. Pt requiring constant cueing for sequencing. Pt performed sit to stand transfer at mod A/min A. Pt requiring cueing for hand placement but still pulling up on walker. Pt performed ambulated 10ft and 8ft with RW at min A/CGA. Pt requiring cueing to weight shift to clear LLE. Pt requiring constant cueing to sit safely on commode. Pt started to sweat once on commode and BP stable. Pt agreeable to sitting up in chair. Pt left in chair with all needs met. Pt will benefit from SNF to improve strength and safe mobility. Progression toward goals: ?      Improving appropriately and progressing toward goals ? Improving slowly and progressing toward goals ? Not making progress toward goals and plan of care will be adjusted PLAN: 
Patient continues to benefit from skilled intervention to address the above impairments. Continue treatment per established plan of care. Discharge Recommendations:  Chilo Dillard Further Equipment Recommendations for Discharge:  TBD  by rehab SUBJECTIVE:  
Patient stated ? It's to early to party I'll go with you later. ? The patient stated 0 /3 hip precautions. Reviewed all 3 with patient. OBJECTIVE DATA SUMMARY:  
Critical Behavior: 
Neurologic State: Alert, Confused Orientation Level: Disoriented to situation, Disoriented to time, Oriented to person, Oriented to place Cognition: Follows commands Safety/Judgement: Decreased awareness of environment, Decreased awareness of need for assistance, Decreased awareness of need for safety, Fall prevention Functional Mobility Training: 
Bed Mobility: 
Rolling: Minimum assistance; Additional time Supine to Sit: Minimum assistance; Additional time Scooting: Minimum assistance Transfers: 
Sit to Stand: Minimum assistance; Moderate assistance; Additional time Stand to Sit: Contact guard assistance;Assist x2 Balance: 
Sitting: Intact Sitting - Static: Good (unsupported) Sitting - Dynamic: Fair (occasional) Standing: Impaired; With support Standing - Static: Fair;Constant support Standing - Dynamic : Fair Ambulation/Gait Training: 
Distance (ft): 18 Feet (ft)(10ft and 8ft) Assistive Device: Gait belt;Walker, rolling Ambulation - Level of Assistance: Minimal assistance;Contact guard assistance Gait Abnormalities: Antalgic;Decreased step clearance; Step to gait Base of Support: Narrowed Speed/Marie: Pace decreased (<100 feet/min) Step Length: Right shortened;Left shortened Pain: Pt reported his hip was sore Activity Tolerance:  
Pt moving well although requiring constant cueing for safety. After treatment:  
?  Patient left in no apparent distress sitting up in chair ? Patient left in no apparent distress in bed 
? Call bell left within reach ? Nursing notified ? Caregiver present ? Bed alarm activated COMMUNICATION/COLLABORATION:  
The patient?s plan of care was discussed with: Registered Nurse Jennifer Vaughn PTA Time Calculation: 18 mins

## 2019-04-02 NOTE — PROGRESS NOTES
Called report to Joey Driver at [facility] questions answered. [facility] transport to [facility] pt at 1800.

## 2019-04-02 NOTE — PROGRESS NOTES
Bedside shift change report given to Mike Collado RN (oncoming nurse) by Kathy Spain RN (offgoing nurse). Report included the following information SBAR, Kardex, Procedure Summary, Intake/Output, MAR, Accordion, Recent Results and Med Rec Status.

## 2019-04-02 NOTE — OP NOTES
[address] 
OPERATIVE REPORT Name:  Sukhwinder Baltazar 
MR#:  [id number] :  1934 ACCOUNT #:  [de-identified] DATE OF SERVICE:  2019 ADMITTING DIAGNOSIS:  Left displaced femoral neck fracture. POSTOPERATIVE DIAGNOSIS:   Left displaced femoral neck fracture. PROCEDURE PERFORMED:  Raleigh-prosthetic replacement with Biomet bipolar hemiarthroplasty, size 12 stem standard neck 45 head. SURGEON:  Keeley Clayton. Pradeep Moore MD 
 
ASSISTANT:  Bobby Dallas PA-C 
 
ANESTHESIA:  General endotracheal. 
 
COMPLICATIONS:  None. SPECIMENS REMOVED:  Femoral head and neck. IMPLANTS:  As above Biomet press-fit 12 stem standard neck 45 head. ESTIMATED BLOOD LOSS:  40 mL. INDICATIONS:  The patient fell suffering a displaced femoral neck fracture requires raleigh-prosthetic replacement. This is a complex surgical procedure requiring an assistant for patient positioning, for critical retraction, assistance in dislocating and reducing the hip and the hip prosthesis, assistance placing the prosthesis and wound closure, and one is used. PROCEDURE:  The patient was brought to the operating room theater, given airway, IV antibiotics, rolled over into left side up lateral position. Beanbag exsufflated down side. Axillary roll placed down side. Peroneal nerve padded. Neck placed in neutral extension position on a folded pillow. Left hip was prepped and draped. After time-out, I made an incision down through the skin and soft tissues through the IT band and fascia gricelda exposing the short external rotators and tagged the piriformis and thus divided off the posterior trochanter, then divided the external rotators off the posterior trochanter exposing the capsule, made a T-capsulotomy, releasing the capsule off the femoral neck and then T'ing it to the acetabulum and tagging both corners for later repair. This exposed the fracture.   Then I used the retractor underneath the femoral neck with the help of my assistant who was holding the leg internally rotated and forward flexed to expose the fracture, used the saw to cut the femoral neck one fingerbreadth above the lesser trochanter, removed the femoral neck fragment, and removed the head which was sized at 45. Then, copiously irrigated the acetabulum removing all foreign body and bony fragments. Then, I used rigid reamers up to a size 12 and in 30 degrees of anteversion, used first the cookie cutter and then the broaches, going up to a size 12. Then, put in the final Biomet prosthetic stem 12 standard neck and 45 head, gave good soft tissue tension, no dislocatability. Posterior capsule repaired using #1 Vicryl suture. Piriformis reattached to the posterior trochanter using #1 Vicryl suture. Skin closed in layers. Gilmar White MD 
 
 
[id number] 
D:  04/01/2019 15:32 
T:  04/01/2019 17:18 
JOB #:  [id number]

## 2019-04-02 NOTE — PROGRESS NOTES
Bedside and Verbal shift change report given to Fredy Lyons 15 (oncoming nurse) by Franki Ray (offgoing nurse). Report included the following information SBAR, Intake/Output, MAR, Accordion, Recent Results and Med Rec Status.

## 2019-04-02 NOTE — DISCHARGE SUMMARY
Hospitalist Discharge Summary Patient ID: Rivera Bardales [id number] 
89 y.o. 
1934 PCP on record: Jamaal Bazan DO Admit date: 3/30/2019 Discharge date and time: 4/2/2019 DISCHARGE DIAGNOSIS: 
Acute left hip fracture s/p L hemiarthroplasty with Dr. Sunil Daniel 3/31/19 Post-operative blood loss anemia Lft  knee pain due to fall Mild cognitive impairment/Dementia COPD 
BPH Hyperlipidemia CONSULTATIONS: 
IP CONSULT TO ORTHOPEDIC SURGERY Excerpted HPI from H&P of Lucas Wilson MD: 
Beverly Santizo is a 80 y.o. male with a history of COPD, ex-smoker, right hip arthroplasty in 2017 , presenting with left hip pain.  Patient states yesterday evening patient slipped and fell on his left hip. No head injury, has an abrasion  Left forehead but denies any loss of consciousness or being on any blood thinners.  Patient states that since the fall has had moderate to severe left hip pain as well as left knee pain.  Has not taken anything for the pain.  Patient does use a walker at the nursing home. Pts DTR present in the room. Has no c/o No h/o cad, no c/p or new sob-  
 
______________________________________________________________________ DISCHARGE SUMMARY/HOSPITAL COURSE:  for full details see H&P, daily progress notes, labs, consult notes. Hospital course: 
Acute Left hip fracture: Xray of left hip with impacted left subcapital femoral neck fracture. Pt underwent left hip hemiarthroplasty on 3/31. She did have post-operative anemia but otherwise was doing fairly well on discharge. She will need repeat Hg on Thursday for the next 2 weeks to monitor and has been started on an iron supplement. Hg 7.8 on discharge.  Left knee pain: xray no acute abnormality  
Mild cognitive impairment/Dementia: MS at baseline per dtr.   
COPD, wo exacerbation: cont Spiriva  
BPH, cont Flomax/proscar  
Hyperlipidemia , cont Crestor  
  
 _______________________________________________________________________ Patient seen and examined by me on discharge day. Pertinent Findings: 
Gen: awake, appropriate, NAD HEENT: cl daron, no lesions Chest: CTA bilaterally, no crackles or wheezes Cv: RRR, no murmur, no edema Abd: soft, NT, ND, BS+, no mass Neuro: CN intact 
_______________________________________________________________________ DISCHARGE MEDICATIONS:  
Current Discharge Medication List  
  
START taking these medications Details  
oxyCODONE IR (ROXICODONE) 5 mg immediate release tablet Take 0.5-1 Tabs by mouth every four (4) hours as needed for Pain for up to 14 days. Max Daily Amount: 30 mg. Half tab for pain level 1-5, or whole tab for pain 6-10 Qty: 40 Tab, Refills: 0 Associated Diagnoses: S/P hip hemiarthroplasty  
  
polyethylene glycol (MIRALAX) 17 gram packet Take 1 Packet by mouth daily as needed (constipation) for up to 15 days. Qty: 15 Packet, Refills: 0  
  
calcium-vitamin D (OYSTER SHELL) 500 mg(1,250mg) -200 unit per tablet Take 1 Tab by mouth daily for 30 days. Qty: 30 Tab, Refills: 0  
  
ferrous sulfate 325 mg (65 mg iron) tablet Take 1 Tab by mouth Daily (before breakfast) for 30 days. Qty: 30 Tab, Refills: 0 CONTINUE these medications which have CHANGED Details  
acetaminophen (TYLENOL) 500 mg tablet Take 2 Tabs by mouth every six (6) hours for 14 days. Qty: 112 Tab, Refills: 0 CONTINUE these medications which have NOT CHANGED Details  
donepezil (ARICEPT) 10 mg tablet Take 10 mg by mouth daily. memantine (NAMENDA) 10 mg tablet Take 10 mg by mouth two (2) times a day. dextromethorphan-quiNIDine (NUEDEXTA) 20-10 mg per capsule Take 1 Cap by mouth every twelve (12) hours. Indications: PSEUDOBULBAR AFFECT Qty: 60 Cap, Refills: 11  
  
psyllium seed-sucrose (METAMUCIL, SUGAR,) powd Take 1 Packet by mouth daily. 1/2 tbsp in 8oz liquid docusate sodium (COLACE) 100 mg capsule Take 100 mg by mouth daily. rosuvastatin (CRESTOR) 20 mg tablet TAKE ONE TABLET BY MOUTH ONE TIME DAILY Qty: 90 Tab, Refills: 3 SPIRIVA WITH HANDIHALER 18 mcg inhalation capsule INHALE ONE CAPSULE VIA DEVICE BY MOUTH ONE TIME DAILY Qty: 30 Cap, Refills: 8  
  
multivitamin (ONE A DAY) tablet Take 1 Tab by mouth daily. tamsulosin (FLOMAX) 0.4 mg capsule Take 0.4 mg by mouth daily. Associated Diagnoses: BPH (benign prostatic hypertrophy)  
  
finasteride (PROSCAR) 5 mg tablet Take 5 mg by mouth daily. Associated Diagnoses: BPH (benign prostatic hypertrophy) aspirin 81 mg Tab Take 81 mg by mouth daily. Associated Diagnoses: HLD (hyperlipidemia) My Recommended Diet, Activity, Wound Care, and follow-up labs are listed in the patient's Discharge Insturctions which I have personally completed and reviewed. ______________________________________________________________________ Risk of deterioration: Low 
 
Condition at Discharge:  Stable 
______________________________________________________________________ Disposition SNF/LTC 
______________________________________________________________________ Care Plan discussed with:  
Patient, RN, Care Manager, Consultant (ortho) 
 
______________________________________________________________________ Code Status: DNR/DNI 
______________________________________________________________________ Follow up with: PCP : Slade Shaffer DO Follow-up Information Follow up With Specialties Details Why Contact Info [address] [facility] 
[phone] Aguilar Jordan MD Orthopedic Surgery In 2 weeks  [address] [facility] [address] [facility] 
[phone] Slade Shaffer DO Internal Medicine In 2 weeks routine hospital follow up [address] [facility] [address] [place] 
[phone] Total time in minutes spent coordinating this discharge (includes going over instructions, follow-up, prescriptions, and preparing report for sign off to her PCP) :  35 minutes Signed: Ling Kan MD

## 2019-04-02 NOTE — PROGRESS NOTES
Patient has been accepted to [facility]. CM spoke to Dr. Darnell Jimenez who asked if facility could accept patient today. CM has contacted facility and left a message for admissions coordinator to contact CM back. CM has sent message via [employer] as well. CM will continue to follow. UPDATE 11:45AM 
 
CM attempted to contact [facility] again to see if patient could d/c to them today. CM spoke to Agata Chaudhary who stated she would contact the person who accepted the patient via cclink and see. This was about 30 minutes ago. CM has sent up patient with WILL CALL through [facility] transport services. CM will continue to follow. UPDATE 1:54PM 
 
CM called [facility] again and spoke to admissions who stated that someone was suppose to contact CM back (CM never received a phone call) to let CM know that they are able to accept patient today. CM has requested for 3:00pm [facility] transport time. CM will continue to follow to see if [facility] is able to accommodate that time. UPDATE 2:26PM 
 
[facility] is able to accommodate a 6:00pm transport time. CM has notified facility, nurse, and patient of transport time. CM has attempted to contact daughter x6 however the phone number is not currently working. Per nurse the daughter was in the room earlier and stated she would be back this afternoon. CM requested nurse to let dtr know of transport time if she sees dtr; nurse agreeable. PCS on bedside chart. NN message sent to Dorina Contreras. Call report [phone]. CM has completed needs of patient at this time. Care Management Interventions PCP Verified by CM: Yes Last Visit to PCP: 02/26/19 Mode of Transport at Discharge: BLS([facility]) Transition of Care Consult (CM Consult): Discharge Planning, SNF Discharge Durable Medical Equipment: No 
Physical Therapy Consult: Yes Occupational Therapy Consult: Yes Speech Therapy Consult: No 
Current Support Network: Assisted Living Confirm Follow Up Transport: Family Plan discussed with Pt/Family/Caregiver: Yes Freedom of Choice Offered: Yes Discharge Location Discharge Placement: Skilled nursing facility([facility]) Lars Liriano, [address]

## 2019-04-02 NOTE — PROGRESS NOTES
Ortho NP Note: 
 
Pt seen sitting in chair. Confused - at baseline Pleasant Denies pain  
+ void and tolerating PO well. Hgb stable - asymptomatic Dressing c/d/i 
Knee immobilizer in place Calves soft and supple. Stable for d/c from ortho perspective. ASA 81 mg daily for DVT proph. F/u Dr. Lindalou Goldberg in 2 weeks Kay Addison, NP

## 2019-04-02 NOTE — DISCHARGE INSTRUCTIONS
[address] [place] [address] [place]    Discharge Instruction Sheet: Hip Fracture Repair    Dr. Carol Rossi    Blood Clot Prevention  Aspirin   You will be discharged on Aspirin to prevent blood clots.  You will need to take it for 4 weeks after surgery. Pain control:  Medications   Typically, we will prescribe a narcotic usually 1-2 tabs every three to four hours as needed for your pain. Most patients need this only for the first few weeks.  You should discontinue this as the pain decreases.  Some of these medications contain a large dose of Tylenol (acetaminophen). Therefore, you should consult your pharmacist before taking any additional Tylenol at the same time. You should not drive while taking any narcotic pain medications. Take your pain medications with food to prevent nausea! Your last dose of pain medication in the hospital was given @ :__________    [address] will be discharged home with ice/gel packs.  Continue using these regularly to minimize pain and swelling, especially after physical activity. Constipation   Pain medication and anesthesia can be constipating-this can be prevented by gentle physical activity and drinking plenty of fluid.  It should be treated with over-the-counter medications such as Dulcolax, Miralax, Magnesium Citrate and/or Fleets enema.  You should have a bowel movement at least every other day following surgery. Incision care   You will be discharged with a transparent and waterproof dressing over your incision. o This should remain in place for 5-7 days after discharge.   You will be given one additional dressing to use at home in 5-7 days if you are still having drainage   You may shower with this dressing in place after you are discharged. o After showering pat the dressing/incision dry.       When the incision is no longer draining, it may be left open to the air.   DO NOT rub the incision.  DO NOT take a tub bath or go swimming until cleared by your doctor.  DO NOT apply lotions, oils, or creams to incision. To increase and promote healing:   Stop Smoking (or at least cut back on smoking).  Eat a well-balanced diet (high in protein and vitamin C)   If your appetite is poor, consider nutritional supplements like Ensure, Glucerna, or [place] Instant Breakfast.   If you are diabetic, controlling you blood sugars is very important to prevent infection and promote wound healing. Nutrition:   If you were on a supplement such as Ensure or Glucerna) while in the hospital, please continue using them with each meal for the next 30 days.  Eat a well-balanced diet - High in protein, high in vitamins and minerals, especially vitamin C and zinc.     Home Health:   If you have staples a home health nurse will remove them in about 10 days.  Physical Therapy will come to your home to continue training for walking, transferring and exercises    Physical Activity     You can weight bear as tolerated on your new hip WBAT   Knee immobilizer while in bed. Remove each shift to perform skin checks! Elana Valenzuela  Bed-rest may slow your recovery.  Use your walker and take short walks about every 2 hours.  Increase your distance each day.  NO DRIVING until told to do so. Labs:          Weekly Hg on Thursday for 2 weeks. Hg on day of discharge is 7.8. Warning signs: Please call your physician immediately at [phone] if you have   Bleeding from incision that is constant.  Change in mental status (unusual behavior or confusion)   If your incision develops redness or swelling   Change in wound drainage (increase in amount, color, or foul odor)   Temperature over 101.5 degrees Fahrenheit    Pain in the calf of your leg   Tenderness or redness in the calf of your leg   Increased swelling of the thigh, ankle, calf, or foot.     Emergency: CALL 911 if you have   Shortness of breath   Chest pain   Localized chest pain when coughing or taking a deep breath    Follow-up    Please call your surgeons office at [phone] for a follow up appointment.   o You should call as soon as you get home or the next day after discharge. o Ask to make an appointment in 2 weeks.  You can return to work when cleared by a physician. During normal business hours you may reach Dr. Juan Alberto Garcia' team directly at [phone] if you have concerns or questions.

## 2019-04-02 NOTE — PROGRESS NOTES
D/C instructions and medications reviewed with pt and pts daughter (POA). RX and instructions given to daughter and instructed to give to SNF. Pain medication given to pt before D/C for ride to SNF. IV removed per policy. [facility] transporting pt. Pt and daughter have no questions or concerns.

## 2019-04-03 NOTE — ROUTINE PROCESS
4/2/2019. 
8:41 PM 
 
Accessed discharged chart for help locating discharge instructions for [facility]

## 2019-04-03 NOTE — PROGRESS NOTES
Transition of Care Coordination/Hospital to Post Acute Facility: 
  
Date/Time:  4/3/2019 10:34 AM 
 
Patient was admitted to [facility] on 3/30/19 for treatment of Hip Fracture. Patient was discharged 19 to [facility] for continuation of care. Inpatient RRAT score: 20 Top Challenges reviewed Left hip fx secondary to fall. Forehead abrasion but no head injury. S/p left hip hemiarthroplasty on 3/31. Transferred to [address] [place] [address] Hgb 7.8 at d/c - needs repeat Hgb Thursday for next 2 weeks. Method of communication with care team :chart routing Nurse Navigator(NN) spoke with Mrs. Jordan to provide introduction to self and explanation of the Nurse Navigator Role. Verified name and  as patient identifiers. Discussed and reviewed  diet restrictions, discharge summary, DME orders, fluid restrictions, follow up appointments, medication reconciliation, pending labs at time of discharge, recommendations for future lab/imaging, wound care orders ACP:  
Does the patient have a current ACP (including DDNR):  yes Does the post acute facility have a copy of the patients ACP:  yes Medication(s):  
New Medications at Discharge:  
oxyCODONE IR (ROXICODONE) 5 mg immediate release tablet Take 0.5-1 Tabs by mouth every four (4) hours as needed for Pain for up to 14 days. Max Daily Amount: 30 mg. Half tab for pain level 1-5, or whole tab for pain 6-10 Qty: 40 Tab, Refills: 0  
  Associated Diagnoses: S/P hip hemiarthroplasty  
   
polyethylene glycol (MIRALAX) 17 gram packet Take 1 Packet by mouth daily as needed (constipation) for up to 15 days. Qty: 15 Packet, Refills: 0  
   
calcium-vitamin D (OYSTER SHELL) 500 mg(1,250mg) -200 unit per tablet Take 1 Tab by mouth daily for 30 days. Qty: 30 Tab, Refills: 0  
   
ferrous sulfate 325 mg (65 mg iron) tablet Take 1 Tab by mouth Daily (before breakfast) for 30 days.  
Qty: 30 Tab, Refills: 0  
   
 
 Changed Medications at Discharge:  
acetaminophen (TYLENOL) 500 mg tablet Take 2 Tabs by mouth every six (6) hours for 14 days. Qty: 112 Tab, Refills: 0 Discontinued Medications at Discharge: n/a 
  
PCP/Specialist follow up:  
Future Appointments Date Time Provider Malka Bolanos 6/4/2019 10:00 AM Milo Nicholas MD [address] [place] [address]  
8/27/2019  1:45 PM Ary, [facility]  Opportunity to ask questions was provided. Contact information was provided for future reference or further questions. Will continue to monitor.

## 2019-04-04 NOTE — PROGRESS NOTES
Community Care Team documentation for patient in [facility] Initial Follow Up Patient was discharged to [facility], [facility]. Information included in this progress note has been provided to SNF. Hospital Admission and Diagnosis: [facility] 3/30-4/2 Acute left hip fracture s/p L hemiarthroplasty with Dr. Doris Desir 3/31/19  RRAT Score: 20    Advance Care Planning:  Advance Directive and DDNR on file PCP : Opal Whitfield,  
Nurse Navigator in PCP office: Salbador Catalan Note routed to Nurse Navigator team. 
 
Spoke with SNF team. Ensured patient arrived to SNF safely with admission packet in order. PT/OT continue. Currently min assist with transfers. Ambulating 25 ft with walker and min assist. Min to mod assist with ADLs. Plan for discharge back to [address] [place] [address]. Previously used [facility]. Community Care Team will follow up weekly with [facility] until discharge. Medications were not reconciled and general patient assessment was not completed during this [facility] outreach. Amarjit Natarajan, MSN, RN, ACNS-BC, [facility] Nurse Navigator, [address] [place] [address] [phone]

## 2019-04-12 NOTE — PROGRESS NOTES
Community Care Team Documentation for Patient in [facility] Subsequent Follow up Patient remains at [facility] ([facility]). See previous [facility] Team notes. PCP : Nestor Benitez DO 
Nurse Navigator in PCP office: Gilma Wolff Spoke with SNF team.  PT/OT continue. Currently ambulating 60 ft with walker and min assist. Min assist with upper body ADLs. Max assist with lower body ADLs. Mod assist with toileting. Plan for discharge back to [address] [place] [address]. Previously used [facility]. LOS TBD. Medications were not reconciled and general patient assessment was not completed during this skilled nursing facility outreach. Dc Natarajan, MSN, RN, ACNS-BC, [facility] Nurse Navigator, [address] [place] [address] [phone]

## 2019-04-15 PROBLEM — S73.035A: Status: ACTIVE | Noted: 2019-01-01

## 2019-04-15 PROBLEM — S73.005A CLOSED DISLOCATION OF LEFT HIP (HCC): Status: ACTIVE | Noted: 2019-01-01

## 2019-04-15 NOTE — H&P
PRE- OP History and Physical                         Subjective:  
 
Patient is a 80 y.o. male presented with a history of Left hip fracture treated with a left tiana arthroplasty 2 weeks ago. He was seen in the office for his 1st postop visit, had  Staples removed and x-rays taken. X-rays demonstrated a dislocated left hip  prosthesis. Patient has moderate pain, has been minimally ambulating. His condition requires reduction and revision hemiarthroplasty. Patient Active Problem List  
 Diagnosis Date Noted  Closed dislocation of left hip ([address] 04/15/2019  Hip fracture ([address] 03/30/2019  PBA (pseudobulbar affect) 08/28/2018  Late onset Alzheimer's disease without behavioral disturbance 03/27/2018  Benign prostatic hyperplasia with weak urinary stream 12/26/2017  Closed right hip fracture ([address] 11/28/2017  Constipation 05/30/2017  Essential hypertension 05/20/2015  Prediabetes 11/23/2014  Abnormal CT lung screening 11/21/2014  Depression 12/20/2013  ADHD (attention deficit hyperactivity disorder) 12/20/2013  Anxiety state 09/27/2013  Essential tremor 07/25/2013  COPD (chronic obstructive pulmonary disease) with emphysema ([address] 07/25/2012  Post herpetic neuralgia 05/04/2011  Smoker 08/16/2010  HLD (hyperlipidemia) 04/12/2010  DJD (degenerative joint disease) 04/12/2010 Past Medical History:  
Diagnosis Date  Arthritis  BPH (benign prostatic hypertrophy) 4/12/2010  COPD (chronic obstructive pulmonary disease) with emphysema ([address] 7/25/2012  HLD (hyperlipidemia) 4/12/2010  
 HTN (hypertension) 4/12/2010  Memory disorder  Psychiatric disorder   
 anxiety and depression Past Surgical History:  
Procedure Laterality Date  COLONOSCOPY,NURA FERNÁNDEZ,SNARE  8/5/2015  
 2 sessile sigmoid polyps, 3 & 5 mm; Dr. Osman Malone; no further screening recommended  ENDOSCOPY, COLON, DIAGNOSTIC  2002 negative; 10 year repeat; Dr. Schmid Laughter  HX HERNIA REPAIR  years ago  
 left  HX HERNIA REPAIR  2001  
 right inguinal  
 VT COLSC FLX W/RMVL OF TUMOR POLYP LESION SNARE TQ  7/27/2012 Prior to Admission medications Medication Sig Start Date End Date Taking? Authorizing Provider  
acetaminophen (TYLENOL) 500 mg tablet Take 2 Tabs by mouth every six (6) hours for 14 days. 4/2/19 4/16/19  Rushie Mom, NP  
oxyCODONE IR (ROXICODONE) 5 mg immediate release tablet Take 0.5-1 Tabs by mouth every four (4) hours as needed for Pain for up to 14 days. Max Daily Amount: 30 mg. Half tab for pain level 1-5, or whole tab for pain 6-10 4/2/19 4/16/19  Irvinhie Mom, NP  
polyethylene glycol (MIRALAX) 17 gram packet Take 1 Packet by mouth daily as needed (constipation) for up to 15 days. 4/2/19 4/17/19  Juan Manuel Epperson, NP  
calcium-vitamin D (OYSTER SHELL) 500 mg(1,250mg) -200 unit per tablet Take 1 Tab by mouth daily for 30 days. 4/2/19 5/2/19  Yareli Kaiser MD  
ferrous sulfate 325 mg (65 mg iron) tablet Take 1 Tab by mouth Daily (before breakfast) for 30 days. 4/2/19 5/2/19  Yareli Kaiser MD  
donepezil (ARICEPT) 10 mg tablet Take 10 mg by mouth daily. Provider, Historical  
memantine (NAMENDA) 10 mg tablet Take 10 mg by mouth two (2) times a day. Provider, Historical  
dextromethorphan-quiNIDine (NUEDEXTA) 20-10 mg per capsule Take 1 Cap by mouth every twelve (12) hours. Indications: PSEUDOBULBAR AFFECT 8/28/18   Jammie BAUER III, DO  
psyllium seed-sucrose (METAMUCIL, SUGAR,) powd Take 1 Packet by mouth daily. 1/2 tbsp in [facility] liquid    Provider, Historical  
docusate sodium (COLACE) 100 mg capsule Take 100 mg by mouth daily.     Provider, Historical  
rosuvastatin (CRESTOR) 20 mg tablet TAKE ONE TABLET BY MOUTH ONE TIME DAILY 10/17/17   Johanny Mcallister III, DO  
SPIRIVA WITH HANDIHALER 18 mcg inhalation capsule INHALE ONE CAPSULE VIA DEVICE BY MOUTH ONE TIME DAILY 17   Pavon Body B III, DO  
multivitamin (ONE A DAY) tablet Take 1 Tab by mouth daily. Provider, Historical  
tamsulosin (FLOMAX) 0.4 mg capsule Take 0.4 mg by mouth daily. 5/19/15   Provider, Historical  
finasteride (PROSCAR) 5 mg tablet Take 5 mg by mouth daily. Provider, Historical  
aspirin 81 mg Tab Take 81 mg by mouth daily. 4/12/10   Provider, Historical  
 
Allergies Allergen Reactions  Kiwi Anaphylaxis  Zetia [Ezetimibe] Other (comments) C/o back pain Social History Tobacco Use  Smoking status: Current Some Day Smoker Packs/day: 1.00 Years: 60.00 Pack years: 60.00 Types: Cigarettes  Smokeless tobacco: Former User Quit date: 10/5/2014 Substance Use Topics  Alcohol use: Yes Alcohol/week: 4.2 oz Types: 7 Glasses of wine per week Comment: 1 drinks per evening Family History Problem Relation Age of Onset  Heart Disease Mother   
      at 80  
 Heart Disease Father   
      at 80  Liver Disease Brother  Stroke Brother  Heart Attack Brother  Other Sister 8 ? polio  Cancer Sister   
     lung Review of Systems A comprehensive review of systems was negative except for that written in the HPI. Objective:  
 
Patient Vitals for the past 8 hrs: 
 BP Temp Pulse Resp SpO2 Height Weight 04/15/19 1123 149/60 97.4 °F (36.3 °C) (!) 40 20 99 % 5' 4\" (1.626 m) 51.3 kg (113 lb) Visit Vitals /60 (BP 1 Location: Left arm) Pulse (!) 40 Temp 97.4 °F (36.3 °C) Resp 20 Ht 5' 4\" (1.626 m) Wt 51.3 kg (113 lb) SpO2 99% BMI 19.40 kg/m² General:  Alert, cooperative, no distress, appears stated age. Head:  Normocephalic, without obvious abnormality, atraumatic. Eyes:  Conjunctivae/corneas clear. PERRL, EOMs intact. Ears:  Normal TMs and external ear canals both ears. Nose: Nares normal. Septum midline. Mucosa normal. No drainage or sinus tenderness. Throat: Lips, mucosa, and tongue normal. Teeth and gums normal.  
Neck: Supple, symmetrical, trachea midline, no adenopathy, thyroid: no enlargement/tenderness/nodules, no carotid bruit and no JVD. Back:   Symmetric, no curvature. ROM normal. No CVA tenderness. Lungs:   Clear to auscultation bilaterally. Chest wall:  No tenderness or deformity. Heart:  Regular rate and rhythm, S1, S2, no murmur, click, rub or gallop. Abdomen:   Soft, non-tender. Bowel sounds normal. No masses,  No organomegaly. Extremities: Extremities normal except   Left lower extremity is neurovascularly intact but with some external rotation and noticeable left hip deformity, atraumatic, no cyanosis or edema. Pulses: 2+ and symmetric all extremities. Skin: Skin color, texture, turgor normal. No rashes or lesions Lymph nodes: Cervical, supraclavicular, and axillary nodes normal.  
Neurologic: CNII-XII intact. Neurovascular exam intact in distal extremities Imaging Review Left hip xrays reviewed in the office show disclocated left hip prothesis. Assessment:  
 
Active Problems: 
  Closed dislocation of left hip ([address] (4/15/2019) Plan:  
Revision left hip tiana-arthroplasty. Operative and non-operative treatments have been discussed with the patient including risks and benefits of each. After consideration of risks, benefits limitations to the consented procedures and alternative options for treatment, the patient has consented to surgical interventions. Questions were answered and Pre-op teaching was completed.  
 
 
CONOR Ruiz

## 2019-04-15 NOTE — PROGRESS NOTES
Pharmacy Clarification of the Prior to Admission Medication Regimen Retrospective to the Admission Medication Reconciliation The patient was interviewed regarding clarification of the prior to admission medication regimen. Patient was transferred from [facility], to [facility], with a current med list. Pharmacy called [facility], [phone], and spoke with Zelda Ruby, who was able to verify the patient's last administered doses. Information Obtained From: Transfer papers Recommendations/Findings: The following amendments were made to the patient's active medication list on file at [facility]:  
 
1) Additions:  
melatonin 3 mg tablet 2) Removals: None 3) Changes: None 4) Pertinent Pharmacy Findings: 
Updated patient?s preferred outpatient pharmacy to: [facility] did not update the outpatient pharmacy due to the patient living at a SNF  
 
 PTA medication list was corrected to the following:  
 
Prior to Admission Medications Prescriptions Last Dose Informant Patient Reported? Taking? SPIRIVA WITH HANDIHALER 18 mcg inhalation capsule 4/15/2019 at 0800 Transfer Papers No Yes Sig: INHALE ONE CAPSULE VIA DEVICE BY MOUTH ONE TIME DAILY  
acetaminophen (TYLENOL) 500 mg tablet 4/15/2019 at 0800 Transfer Papers No Yes Sig: Take 2 Tabs by mouth every six (6) hours for 14 days. aspirin 81 mg Tab 4/15/2019 at 0800 Transfer Papers Yes Yes Sig: Take 81 mg by mouth daily. calcium-vitamin D (OYSTER SHELL) 500 mg(1,250mg) -200 unit per tablet 4/15/2019 at 0800 Transfer Papers No Yes Sig: Take 1 Tab by mouth daily for 30 days. dextromethorphan-quiNIDine (NUEDEXTA) 20-10 mg per capsule 4/15/2019 at 0800 Transfer Papers No Yes Sig: Take 1 Cap by mouth every twelve (12) hours. Indications: PSEUDOBULBAR AFFECT  
docusate sodium (COLACE) 100 mg capsule 4/15/2019 at 0800 Transfer Papers Yes Yes Sig: Take 100 mg by mouth daily. donepezil (ARICEPT) 10 mg tablet 4/14/2019 at 1800 Transfer Papers Yes Yes Sig: Take 10 mg by mouth daily. ferrous sulfate 325 mg (65 mg iron) tablet 4/15/2019 at 0600 Transfer Papers No Yes Sig: Take 1 Tab by mouth Daily (before breakfast) for 30 days. finasteride (PROSCAR) 5 mg tablet 4/15/2019 at 0800 Transfer Papers Yes Yes Sig: Take 5 mg by mouth daily. melatonin 3 mg tablet 4/14/2019 at 1800 Transfer Papers Yes Yes Sig: Take 6 mg by mouth nightly. memantine (NAMENDA) 10 mg tablet 4/15/2019 at 0800 Transfer Papers Yes Yes Sig: Take 10 mg by mouth two (2) times a day. multivitamin (ONE A DAY) tablet 4/15/2019 at 0800 Transfer Papers Yes Yes Sig: Take 1 Tab by mouth daily. oxyCODONE IR (ROXICODONE) 5 mg immediate release tablet 4/9/2019 at 0800 Transfer Papers No Yes Sig: Take 0.5-1 Tabs by mouth every four (4) hours as needed for Pain for up to 14 days. Max Daily Amount: 30 mg. Half tab for pain level 1-5, or whole tab for pain 6-10 Patient taking differently: Take 5 mg by mouth every four (4) hours as needed for Pain. Half tab for pain level 1-5, or whole tab for pain 6-10  
polyethylene glycol (MIRALAX) 17 gram packet Not Taking at Unknown time Transfer Papers No No  
Sig: Take 1 Packet by mouth daily as needed (constipation) for up to 15 days. psyllium seed-sucrose (METAMUCIL, SUGAR,) powd 4/15/2019 at 0800 Transfer Papers Yes Yes Sig: Take 1 Packet by mouth daily. 1/2 tbsp in 8oz liquid  
rosuvastatin (CRESTOR) 20 mg tablet 4/14/2019 at 1800 Transfer Papers No Yes Sig: TAKE ONE TABLET BY MOUTH ONE TIME DAILY  
tamsulosin (FLOMAX) 0.4 mg capsule 4/14/2019 at 1800 Transfer Papers Yes Yes Sig: Take 0.4 mg by mouth daily. Facility-Administered Medications: None Thank you, 
Meir Armando Medication History Pharmacy Technician

## 2019-04-16 NOTE — PERIOP NOTES
TRANSFER - IN REPORT: 
 
Verbal report received from [address] RN(name) on Hyun Hinds  being received from Ortho Room 3215(unit) for ordered procedure Report consisted of patients Situation, Background, Assessment and  
Recommendations(SBAR). Information from the following report(s) SBAR, Kardex, Intake/Output, MAR, Recent Results and Procedure Verification was reviewed with the receiving nurse. Opportunity for questions and clarification was provided. Assessment completed upon patients arrival to unit and care assumed.

## 2019-04-16 NOTE — PROGRESS NOTES
Bedside shift change report given to Cody RN (oncoming nurse) by Nayla RN (offgoing nurse). Report included the following information SBAR, Kardex, ED Summary, Intake/Output, MAR and Recent Results.

## 2019-04-16 NOTE — BRIEF OP NOTE
BRIEF OPERATIVE NOTE Date of Procedure: 4/16/2019 Preoperative Diagnosis: PERIPROSTHETIC HIP FRACTURE Postoperative Diagnosis: PERIPROSTHETIC HIP FRACTURE Procedure(s): HIP HEMIARTHROPLASTY Surgeon(s) and Role: * Ace Muro MD - Primary Surgical Assistant: Emily Hogan PA-C  - Assist  
 
Surgical Staff: 
Circ-1: Olvin Hines RN 
Circ-Relief: Alisa Denise RN Scrub Tech-1: João Ruiz Scrub Tech-Relief: Danisha Hargrove Event Time In Time Out Incision Start [id number] Incision Close 1940 Anesthesia: General  
Estimated Blood Loss: 100cc Specimens: * No specimens in log * Findings: see above Complications: none Implants:  
Implant Name Type Inv. Item Serial No.  Lot No. LRB No. Used Action STEM FEM ECHO BIMTRC II [id number] --  - SN/A  STEM FEM ECHO BIMTRC II [id number] --  N/A [employer] [id number] Left 1 Implanted STANDARD FEMORAL STEM   NA [employer] [id number] Left 1 Explanted

## 2019-04-16 NOTE — PROGRESS NOTES
ORTHO - Progress Note Mini Foote     [id number]  male    80 y.o.    1934 Admit date: 4/15/2019 Admitting Physician: Dara Green MD  
Surgeon:  Pb Solis) and Role:   Luly Pulido MD - Primary SUBJECTIVE: 
  
Mini Foote is a 80 y.o. male resting in bed. Patient has no complaints of sig pain. OBJECTIVE: 
 
  
Physical Exam: 
General: alert, cooperative, no distress. Gastrointestinal:  non-distended . Cardiovascular: equal pulses in the lower extremities,  Brisk cap refill in all distal extremities Genitourinary: Razo Respiratory: No respiratory distress Neurological:Baseline dementia/mental status, Neurovascular exam within normal limits. Senstion intact: LE bilat. Motor: + DF/PF/EHL. Musculoskeletal: LLE shortened. Luan's sign negative in bilateral lower extremities. Calves soft, supple, non-tender upon palpation or with passive stretch. Vital Signs:  
  
  
Patient Vitals for the past 8 hrs: 
 BP Temp Pulse Resp SpO2  
19 0943 151/90 98.3 °F (36.8 °C) 70 18 95 % 19 0432 150/82 98.1 °F (36.7 °C) 72 18 96 % Temp (24hrs), Av °F (36.7 °C), Min:97.5 °F (36.4 °C), Max:98.3 °F (36.8 °C) Labs:  
  
 
Recent Labs 04/15/19 
1315 04/15/19 
1138 HCT  --  28.5* HGB  --  9.0* INR 1.1  --   
 
PT/OT:  
 
  
  
  
ASSESSMENT / PLAN:  
Active Problems: 
  Closed dislocation of left hip ([address] (4/15/2019) Closed anterior dislocation of left hip ([address] (4/15/2019) Continue bed rest, SCDs, NPO Plan for OR today - revision hemiarthroplasty Signed By: Medina Préez PA-C

## 2019-04-16 NOTE — PROGRESS NOTES
Ortho POD#0 Left hip revision tiana arthroplasty - Advance diet as tolerated. - Begin with toe touch weight bearing and progress as tolerated - Knee immobilzer to be kept in place - Dressing change if saturated, otherwise wait until POD#2 for honeycomb 
- Pain control with tylenol, oxycodone

## 2019-04-16 NOTE — PROGRESS NOTES
Nuedexta was discontinued per policy. Patient is unable to supply his own since he is from [facility]. ? Formulary Denial: Dextromethorphan/Quinidine ([employer]) o Dextromethorphan/Quinidine ([employer]) will not be added to the [facility] formulary ? Patients may supply their own medication while in the acute care setting ? Pharmacists may discontinue the order and enter a progress note if patient is unable to supply the medication 
o Indications: Treatment of pseudobulbar affect (involuntary outbursts of laughing and/or crying spells) o Advantages: No significant advantage for use in the acute care setting 
o Disadvantages: Cost, potential for side-effects and adverse drug reactions, multiple warnings/precautions/contraindications Ji Christine PHARMD

## 2019-04-16 NOTE — PROGRESS NOTES
TRANSFER - IN REPORT: 
 
Verbal report received from Montana Laurent RN(name) on Laura Alonso  being received from ED(unit) for routine progression of care Report consisted of patients Situation, Background, Assessment and  
Recommendations(SBAR). Information from the following report(s) SBAR, Kardex, ED Summary, Intake/Output, MAR and Recent Results was reviewed with the receiving nurse. Opportunity for questions and clarification was provided. Assessment completed upon patients arrival to unit and care assumed.

## 2019-04-16 NOTE — PROGRESS NOTES
04/16/19 Per chart review patient currently admitted to [facility] from [facility] for closed dislocation of hip. NN will continue to follow.  AR

## 2019-04-16 NOTE — PROGRESS NOTES
Reason for Readmission: closed dislocation of left hip RRAT Score and Risk Level: 24 Level of Readmission:  high Care Conference scheduled:   Not at this time Resources/supports as identified by patient/family: family support Top Challenges facing patient (as identified by patient/family and CM): Finances/Medication cost?  No concerns at this time Transportation    Depends on family Support system or lack thereof? Family Living arrangements? Patient is a resident at [address] [place] [address] Self-care/ADLs/Cognition? ADLs (some human assistance) IADLS (mechanical assistance-RW) Current Advanced Directive/Advance Care Plan: On file Plan for utilizing home health: no recommendations at this time Likelihood of additional readmission:   high Transition of Care Plan:    Based on readmission, the patient's previous Plan of Care 
 has been evaluated and/or modified. The current Transition of Care Plan is:        
 
CM made room visit with patient who was asleep with daughter at bedside. Per pt's daughter patient was still at [facility] for SNF when patient was readmitted. Patient was a direct admit from [facility]. Patient has used [facility] in the past and has been to [facility]) as well as SNF ([facility]). Patient's daughter stated the plan of care would be for patient to d/c back to [facility] for continued therapy. Patient is scheduled to have surgery today. Patient will need transportation upon d/c. Care Management Interventions PCP Verified by CM: Yes Mode of Transport at Discharge: BLS Transition of Care Consult (CM Consult): Discharge Planning, SNF Discharge Durable Medical Equipment: No 
Physical Therapy Consult: Yes Occupational Therapy Consult: Yes Speech Therapy Consult: No 
Current Support Network: [address] [place] [address] Confirm Follow Up Transport: Family Plan discussed with Pt/Family/Caregiver:  Yes 
 Discharge Location Discharge Placement: Skilled nursing facility [facility], [facility], [address]

## 2019-04-16 NOTE — PERIOP NOTES
Handoff Report from Operating Room to PACU Report received from [facility] regarding Refugia June. Surgeon(s): 
Yessica Verdugo MD  And Procedure(s) (LRB): HIP HEMIARTHROPLASTY (Left)  confirmed  
with allergies and dressings discussed. Anesthesia type, drugs, patient history, complications, estimated blood loss, vital signs, intake and output, and lines and temperature were reviewed.

## 2019-04-16 NOTE — ANESTHESIA PREPROCEDURE EVALUATION
Anesthetic History No history of anesthetic complications Review of Systems / Medical History Patient summary reviewed, nursing notes reviewed and pertinent labs reviewed Pulmonary COPD (emphysema): mild Smoker (30 pk years) Pertinent negatives: No shortness of breath Comments: Smoker - 1 ppd x 60 years Neuro/Psych Psychiatric history (depression/anxiety) and dementia Comments: Anxiety/depression Post-herpetic neuralgia Short term memory loss Essential tremor Cardiovascular Hypertension: well controlled Hyperlipidemia Pertinent negatives: No angina Exercise tolerance: <4 METS: Uses walker Comments: TTE (11/29/17): Mild TI, PAP=38mmHg, EF=65% Incomplete right bundle branch block GI/Hepatic/Renal 
Within defined limits Endo/Other Diabetes (prediabetes, no meds): well controlled, type 2 Arthritis Comments: Thrombocytopenia Other Findings Comments: Left Periprosthetic Hip Fracture DJD BPH Physical Exam 
 
Airway Mallampati: III 
TM Distance: 4 - 6 cm Neck ROM: normal range of motion Mouth opening: Diminished (comment) Cardiovascular Rhythm: regular Rate: normal 
 
 
 
 Dental 
 
Dentition: Upper dentition intact and Lower dentition intact Comments: Overbite Pulmonary Decreased breath sounds: bibasilar Abdominal 
GI exam deferred Other Findings Anesthetic Plan ASA: 3 Anesthesia type: general 
 
Monitoring Plan: BIS Induction: Intravenous Anesthetic plan and risks discussed with: Patient Glidescope (Previous Glide scope intubation X2 here)

## 2019-04-16 NOTE — ED NOTES
TRANSFER - OUT REPORT: 
 
Verbal report given to Nayla Iglesias RN on Yesenia Buckley  being transferred to *Ortho for ordered procedure Report consisted of patients Situation, Background, Assessment and  
Recommendations(SBAR). Information from the following report(s) SBAR, ED Summary, MAR and Quality Measures was reviewed with the receiving nurse. Opportunity for questions and clarification was provided. Patient transported with: 
 [employer]

## 2019-04-17 NOTE — PROGRESS NOTES
Lovenox changed to heparin 5000 units q12h for weight < 60 kg per DVT prophylaxis policy.    
 
KAREEN ArchuletaD

## 2019-04-17 NOTE — PERIOP NOTES
TRANSFER - OUT REPORT: 
 
Verbal report given to BRAIN Soto(name) on Yesenia Buckley  being transferred to ortho(unit) for routine post - op Report consisted of patients Situation, Background, Assessment and  
Recommendations(SBAR). Information from the following report(s) SBAR, Kardex, OR Summary and MAR was reviewed with the receiving nurse. Lines:  
Peripheral IV 04/16/19 Posterior;Right Hand (Active) Site Assessment Clean, dry, & intact 4/16/2019  7:50 PM  
Phlebitis Assessment 0 4/16/2019  7:50 PM  
Infiltration Assessment 0 4/16/2019  7:50 PM  
Dressing Status Clean, dry, & intact 4/16/2019  7:50 PM  
Dressing Type Transparent 4/16/2019  7:50 PM  
Hub Color/Line Status Pink; Infusing 4/16/2019  7:50 PM  
   
Peripheral IV Left Hand (Active) Site Assessment Clean, dry, & intact 4/16/2019  7:50 PM  
Phlebitis Assessment 0 4/16/2019  7:50 PM  
Infiltration Assessment 0 4/16/2019  7:50 PM  
Dressing Status Clean, dry, & intact 4/16/2019  7:50 PM  
Dressing Type Transparent 4/16/2019  7:50 PM  
Hub Color/Line Status Capped 4/16/2019  7:50 PM  
  
 
Opportunity for questions and clarification was provided. Patient transported with: 
 Monitor O2 @ 4 liters Tech

## 2019-04-17 NOTE — PROGRESS NOTES
4/17 0600- Patient had uneventful shift. Patient drowsy overnight, arousing to voice. HeldPO meds due to drowsiness. Patient becoming more awake/interactive. Still arousing to voice, alert to self answers questions/follows commands and falls back asleep. Repositioned q2h. Ice packs to incision q4h as ordered. Will continue to monitor. 0700- Patient more awake. Removed polo Per MD order POD#1. Patient tolerated well.

## 2019-04-17 NOTE — OP NOTES
[address] 
OPERATIVE REPORT Name:  Erna Del Cid 
MR#:  [id number] :  1934 ACCOUNT #:  [de-identified] DATE OF SERVICE:  2019 PREOPERATIVE DIAGNOSES:  Dislocation of bipolar hemiarthroplasty, hip prosthesis on the left. POSTOPERATIVE DIAGNOSES:  Dislocation of bipolar hemiarthroplasty, hip prosthesis on the left. PROCEDURE PERFORMED:  Open revision, left arthroplasty, hip. SURGEON:  Sunitha Jara MD 
 
ASSISTANT:  Erika Che PA-C 
 
ANESTHESIA:  get. COMPLICATIONS:  None. SPECIMENS REMOVED:  None. IMPLANTS:  Biomet size 14 stem, standard neck, 45 head. ESTIMATED BLOOD LOSS:  100 mL. INDICATIONS:  This gentleman is 80years old. He has dementia, unable to follow hip precautions. He had an arthroplasty done two weeks ago for a proximal femur fracture, which did well for two weeks, but then due to malpositioning of his leg, he had dislocated it, comes into the office and was therefore admitted to the hospital for revision. This is a complex surgery procedure requiring an assistant for the patient's positioning for holding the leg for implant placement, for reducing the hip and for wound closure, and one is used. PROCEDURE:  The patient was brought to the operating room theatre and given airway, IV antibiotics. Rolled over into the left side up lateral position. Beanbag exsufflated   down side. Axillary roll placed down side. Peroneal nerve padded. Neck placed in neutral extension position on a folded pillow. The left hip was prepped and draped. After a time-out, opened up the previous incision and dissected down through the lateral structures to the dislocated prosthesis. Using the Bone Tamp, I removed the head and cup from the stem and then removed the stem. I used broaches for enlarging the proximal femur up to a size 14.   The patient did suffer greater trochanter fracture, which was wired with 18-gauge wire to stabilize it. Then, put in the final 14 stem, trialed head and neck. Tension seemed, tight; however, I believe this was because he had a dislocated hip for 36 hours. Once we left the trial in for about 10 minutes, his soft tissues seemed to stretch out and the length seems appropriate. We put in the final cup and head, which was the previously used prosthesis on the new stem. I repaired the posterior capsule to the posterior trochanter, copiously irrigated with bacitracin and saline and closed in layers and took the patient to the recovery room in stable condition. Garcia Ramon MD 
 
 
[id number] 
D:  04/17/2019 8:19 
T:  04/17/2019 12:38 JOB #:  [id number]

## 2019-04-17 NOTE — PROGRESS NOTES
Problem: Mobility Impaired (Adult and Pediatric) Goal: *Acute Goals and Plan of Care (Insert Text) Description Physical Therapy Goals Initiated 4/17/2019 1. Patient will move from supine to sit and sit to supine  in bed with minimal assistance/contact guard assist within 7 day(s). 2.  Patient will transfer from bed to chair and chair to bed with minimal assistance/contact guard assist using the least restrictive device within 7 day(s). 3.  Patient will perform sit to stand with minimal assistance/contact guard assist within 7 day(s). 4.  Patient will ambulate with minimal assistance/contact guard assist for 45 feet with the least restrictive device within 7 day(s). Outcome: Progressing Towards Goal 
PHYSICAL THERAPY EVALUATION Patient: Olena Sheppard (5 y.o. male) Date: 4/17/2019 Primary Diagnosis: Closed dislocation of left hip, initial encounter ([address] [N93.733U] Closed anterior dislocation of left hip, initial encounter ([facility] [P03.968C] Procedure(s) (LRB): HIP HEMIARTHROPLASTY (Left) 1 Day Post-Op Precautions:   Fall, WBAT, Total hip(knee immobilizer) ASSESSMENT : 
Based on the objective data described below, the patient presents with decreased functional mobiltiy, impaired balance and gait, impaired cognition s/p admission for L hip dislocation and repair. Pt with recent fall on 3/30 with L hip fx requiring hemiarthroplasty and was discharged to SNF. Pt was having increased hip pain and was found to have dislocated his L hip and now repaired. Pt received supine in bed with knee immobilizer in place. Adjusted knee immobilizer for proper placement. Noted L knee and foot slightly internally rotated; placed a pillow for neutral position. Pt completed supine to sit with mod A and additional time and effort.  Pt performed sit<>stand with RW with min A x 2 and able to side step along the bedside with min A x 2 and needed verbal cues and manual assist to direct RW. Pt returned to supine position with mod A and to maintain hip precautions. Pt with poor recall of hip precautions due to baseline cognitive deficits. Pt will continue to benefit from PT to progress mobility as tolerated and reach highest level of independence. Recommend pt return to SNF upon discharge. Patient will benefit from skilled intervention to address the above impairments. Patient?s rehabilitation potential is considered to be Fair Factors which may influence rehabilitation potential include:  
? None noted ? Mental ability/status ? Medical condition ? Home/family situation and support systems ? Safety awareness 
? Pain tolerance/management 
? Other: PLAN : 
Recommendations and Planned Interventions: 
?           Bed Mobility Training             ? Neuromuscular Re-Education ? Transfer Training                   ? Orthotic/Prosthetic Training 
? Gait Training                         ? Modalities ? Therapeutic Exercises           ? Edema Management/Control ? Therapeutic Activities            ? Patient and Family Training/Education ? Other (comment): Frequency/Duration: Patient will be followed by physical therapy  twice daily to address goals. Discharge Recommendations: Chilo Dillard Further Equipment Recommendations for Discharge: tbd SUBJECTIVE:  
Patient stated ? This thing is jacking me up! .? OBJECTIVE DATA SUMMARY:  
HISTORY:   
Past Medical History:  
Diagnosis Date Arthritis BPH (benign prostatic hypertrophy) 4/12/2010 COPD (chronic obstructive pulmonary disease) with emphysema ([facility] 7/25/2012 HLD (hyperlipidemia) 4/12/2010 HTN (hypertension) 4/12/2010 Ill-defined condition 2018 Dementia Memory disorder Psychiatric disorder   
 anxiety and depression Past Surgical History: Procedure Laterality Date COLONOSCOPY,REMV JOLENE,SNARE  8/5/2015  
 2 sessile sigmoid polyps, 3 & 5 mm; Dr. Justin Siddiqui; no further screening recommended ENDOSCOPY, COLON, DIAGNOSTIC  2002  
 negative; 10 year repeat; Dr. Justin Siddiqui HX HERNIA REPAIR  years ago  
 left HX HERNIA REPAIR  2001  
 right inguinal  
 [facility] COLSC FLX W/RMVL OF TUMOR POLYP LESION SNARE TQ  7/27/2012 Prior Level of Function/Home Situation: pt with recent admission to [facility] on 3/30 for a fall and hip fracture hemiarthroplasty repair and was discharged to SNF. Pt reported he was walking with a RW at SNF prior to this admission. Personal factors and/or comorbidities impacting plan of care:  
 
Home Situation Home Environment: Skilled nursing facility Care Facility Name: [facility] One/Two Story Residence: One story Living Alone: No 
Support Systems: Family member(s) Patient Expects to be Discharged to[de-identified] Skilled nursing facility Current DME Used/Available at Home: Walker, rolling EXAMINATION/PRESENTATION/DECISION MAKING:  
Critical Behavior: 
Neurologic State: Drowsy Orientation Level: Oriented to person Cognition: Follows commands, Impaired decision making Hearing: Auditory Auditory Impairment: None Skin:   
Edema:  
Range Of Motion: 
AROM: Generally decreased, functional 
  
  
  
  
  
  
  
Strength:   
Strength: Generally decreased, functional 
  
  
  
  
  
  
Tone & Sensation:  
  
  
  
  
  
Sensation: Intact Coordination: 
  
Vision:  
  
Functional Mobility: 
Bed Mobility: 
  
Supine to Sit: Moderate assistance;Assist x1 Transfers: 
Sit to Stand: Assist x2;Minimum assistance Stand to Sit: Minimum assistance Balance:  
Sitting: Intact Standing: Impaired; With support Standing - Static: Constant support; Fair 
Standing - Dynamic : Fair Ambulation/Gait Training: 
Distance (ft): 2 Feet (ft) Assistive Device: Gait belt;Walker, rolling;Brace/Splint Ambulation - Level of Assistance: Minimal assistance Gait Abnormalities: Antalgic;Decreased step clearance; Step to gait Left Side Weight Bearing: As tolerated Base of Support: Widened;Center of gravity altered Stance: Left decreased Step Length: Right shortened;Left shortened Stairs: Therapeutic Exercises:  
 
 
Functional Measure: 
Tinetti test: 
 
Sitting Balance: 1 Arises: 0 Attempts to Rise: 0 Immediate Standing Balance: 0 Standing Balance: 1 Nudged: 1 Eyes Closed: 1 Turn 360 Degrees - Continuous/Discontinuous: 0 Turn 360 Degrees - Steady/Unsteady: 0 Sitting Down: 1 Balance Score: 5 Indication of Gait: 0 
R Step Length/Height: 0 
L Step Length/Height: 0 
R Foot Clearance: 1 L Foot Clearance: 1 Step Symmetry: 1 Step Continuity: 0 Path: 0 Trunk: 0 Walking Time: 0 Gait Score: 3 Total Score: 8 Tinetti Tool Score Risk of Falls 
<19 = High Fall Risk 19-24 = Moderate Fall Risk 25-28 = Low Fall Risk Tinetti ME. Performance-Oriented Assessment of Mobility Problems in Elderly Patients. [address]; [id number]. (Scoring Description: PT Bulletin Feb. 10, 1993) Older adults: Toshia Hicks et al, 2009; n = [address] elderly evaluated with ABC, TED, ADL, and IADL) · Mean TED score for males aged 69-68 years = 26.21(3.40) · Mean TED score for females age 69-68 years = 25.16(4.30) · Mean TED score for males over 80 years = 23.29(6.02) · Mean TED score for females over 80 years = 17.20(8.32) Based on the above components, the patient evaluation is determined to be of the following complexity level: LOW Pain: 
Pain Scale 1: Numeric (0 - 10) Pain Intensity 1: 0 Activity Tolerance:  
Fair. Pt hypotensive (see flow sheet) and per ortho PA note recommended 1 unit of blood Please refer to the flowsheet for vital signs taken during this treatment. After treatment: ?         Patient left in no apparent distress sitting up in chair ? Patient left in no apparent distress in bed 
? Call bell left within reach ? Nursing notified ? Caregiver present ? Bed alarm activated COMMUNICATION/EDUCATION:  
The patient?s plan of care was discussed with: Occupational Therapist and Registered Nurse. ?         Fall prevention education was provided and the patient/caregiver indicated understanding. ? Patient/family have participated as able in goal setting and plan of care. ?         Patient/family agree to work toward stated goals and plan of care. ?         Patient understands intent and goals of therapy, but is neutral about his/her participation. ? Patient is unable to participate in goal setting and plan of care. Thank you for this referral. 
Genie Sports, PT, DPT Time Calculation: 20 mins

## 2019-04-17 NOTE — PROGRESS NOTES
Bedside and Verbal shift change report given to [address] [place] [address] (oncoming nurse) by Faisal Fuentes RN (offgoing nurse). Report included the following information SBAR, Kardex, OR Summary, Procedure Summary, Intake/Output, MAR and Recent Results.

## 2019-04-17 NOTE — PROGRESS NOTES
[facility] - Progress Note POD#1 Left hip revision tiana arthroplasty Pt resting in bed. No complaints. Pain minimal. Patient is hungry. VSS Afebrile. Incision and dressing c.d.i, knee immobilizer in place. Patient appears pale, but no dizziness. Calves soft and supple; No pain with passive stretch Sensation and motor intact SCD/Foot pumps for mechanical DVT proph while in bed PLAN: 
1) PT/OT BID - progress WBAT, teach and reinforce hip precautions 2) Pain control continue with scheduled tylenol and oxycodone only if needed 3) Expected post-op anemia - Hgb 7.2 - Will transfuse 1 unit, re-check tomorrow. 4) Left hip x-ray pending 5) d/c planning per CONOR Moses

## 2019-04-17 NOTE — PROGRESS NOTES
Problem: Pain Goal: *Control of Pain Outcome: Progressing Towards Goal 
Goal: *PALLIATIVE CARE:  Alleviation of Pain Outcome: Progressing Towards Goal 
  
Problem: Patient Education: Go to Patient Education Activity Goal: Patient/Family Education Outcome: Progressing Towards Goal 
  
Problem: Falls - Risk of 
Goal: *Absence of Falls Description Document Bishop Wong Fall Risk and appropriate interventions in the flowsheet. Outcome: Progressing Towards Goal 
  
Problem: Patient Education: Go to Patient Education Activity Goal: Patient/Family Education Outcome: Progressing Towards Goal 
  
Problem: Pressure Injury - Risk of 
Goal: *Prevention of pressure injury Description Document Duy Scale and appropriate interventions in the flowsheet. Outcome: Progressing Towards Goal 
  
Problem: Patient Education: Go to Patient Education Activity Goal: Patient/Family Education Outcome: Progressing Towards Goal 
  
Problem: Surgical Wound Care Goal: *Non-infected Wound: Absence of infection signs and symptoms Description Infection control procedures (eg: clean dressings, clean gloves, hand washing, precautions to isolate wound from contamination, sterile instruments used for wound debridement) should be implemented. Outcome: Progressing Towards Goal 
Goal: *Infected Wound: Prevention of further infection and promotion of healing Description Consider the use of systemic antibiotics in patients with cellulitis, osteomyelitis, bacteremia, or sepsis if there are no contraindications. Outcome: Progressing Towards Goal 
Goal: *Improvement of existing wound and maintenance of skin integrity Outcome: Progressing Towards Goal 
  
Problem: Patient Education: Go to Patient Education Activity Goal: Patient/Family Education Outcome: Progressing Towards Goal

## 2019-04-17 NOTE — PROGRESS NOTES
Attempted to see patient for PT session, however patient receiving blood transfusion at this time. Will defer and continue to follow. Thanks.   
Jan Samuels, PT, DPT

## 2019-04-17 NOTE — PROGRESS NOTES
Ortho NP Note: 
 
Pt resting in bed. Nurse at bedside No complaints of dizziness or lightheadedness or CP Pain well controlled. Noted CONOR Sheets note about transfusing blood today, although there is no order for that. I have contacted him to clarify. Hgb today 7.2 - EBL intraop was 100 Noted WBC 13.5 - expected leukocytosis with surgery. Will monitor. Other VSS and BP improved when he got up with PT today Dressing is is bulky but c/d/i Plan return to SNF when medically and orthopaedically clear Randall Garcia NP

## 2019-04-17 NOTE — PROGRESS NOTES
Initial Nutrition Assessment: 
 
INTERVENTIONS/RECOMMENDATIONS:  
· Continue regular diet · Ensure TID (strawberry) ASSESSMENT:  
Chart reviewed, medically noted for s/p HIP HEMIARTHROPLASTY and PMH shown below. Pt known to RD from recent admission earlier this month. His usual appetite is good and consumes ensure daily. Will add TID for added kcal and protein to promote healing. Past Medical History:  
Diagnosis Date  Arthritis  BPH (benign prostatic hypertrophy) 4/12/2010  COPD (chronic obstructive pulmonary disease) with emphysema ([facility] [address] 7/25/2012  HLD (hyperlipidemia) 4/12/2010  
 HTN (hypertension) 4/12/2010  Ill-defined condition 2018 Dementia  Memory disorder  Psychiatric disorder   
 anxiety and depression Diet Order: Regular 
% Eaten:  No data found. Pertinent Medications: [x]Reviewed: NS, os-fam, pepcid, Fe, miralax, pericolace, Pertinent Labs: [x]Reviewed:  
Food Allergies: []NKFA  [x]Other (kiwi) Last BM: PTA Edema:  n/a      []RUE   []LUE   []RLE   []LLE Pressure Injury:  n/a    [] Stage I   [] Stage II   [] Stage III   [] Stage IV Wt Readings from Last 30 Encounters:  
04/17/19 53.3 kg (117 lb 8.1 oz) 04/02/19 55.9 kg (123 lb 3.8 oz) 02/26/19 49.9 kg (110 lb)  
02/25/19 51.7 kg (114 lb) 08/28/18 51.9 kg (114 lb 6.4 oz) 07/14/18 50.7 kg (111 lb 12.4 oz) 07/10/18 51.3 kg (113 lb)  
06/26/18 51.3 kg (113 lb)  
03/27/18 51.7 kg (114 lb) 01/16/18 48.1 kg (106 lb) 01/03/18 48.1 kg (106 lb) 01/02/18 48.1 kg (106 lb) 12/27/17 48.1 kg (106 lb) 12/26/17 48.1 kg (106 lb) 12/16/17 51.7 kg (114 lb) 12/02/17 51.7 kg (114 lb)  
11/28/17 49 kg (108 lb) 05/30/17 53.5 kg (118 lb) 05/16/17 54.4 kg (120 lb) 03/02/17 55.3 kg (122 lb) 12/29/16 53.6 kg (118 lb 3.2 oz)  
11/28/16 54.4 kg (120 lb)  
11/22/16 54.3 kg (119 lb 9.6 oz) 05/23/16 61.8 kg (136 lb 3.2 oz)  
11/23/15 56.2 kg (124 lb) 08/05/15 53.8 kg (118 lb 9 oz) 05/20/15 56.2 kg (124 lb)  
11/19/14 55.8 kg (123 lb)  
08/27/14 55.3 kg (122 lb) 04/16/14 54.7 kg (120 lb 9.6 oz) Anthropometrics:  
Height: 5' 4\" (162.6 cm) Weight: 53.3 kg (117 lb 8.1 oz) IBW (%IBW):   ( ) UBW (%UBW):   (  %) Last Weight Metrics: 
Weight Loss Metrics 4/17/2019 4/2/2019 2/26/2019 2/25/2019 8/28/2018 7/14/2018 7/10/2018 Today's Wt 117 lb 8.1 oz 123 lb 3.8 oz 110 lb 114 lb 114 lb 6.4 oz 111 lb 12.4 oz 113 lb BMI 20.17 kg/m2 21.83 kg/m2 19.49 kg/m2 20.19 kg/m2 20.27 kg/m2 19.8 kg/m2 20.02 kg/m2 BMI: Body mass index is 20.17 kg/m². This BMI is indicative of: 
 []Underweight    [x]Normal    []Overweight    [] Obesity   [] Extreme Obesity (BMI>40) Estimated Nutrition Needs (Based on):  
1450 Kcals/day(BMR: 1125 x 1.3) , 65 g(1.2 g/kg) Protein Carbohydrate: At Least 130 g/day  Fluids: 1450 mL/day (1ml/kcal) or per primary team 
 
NUTRITION DIAGNOSES:  
Problem:  Increased nutrient needs Etiology: related to fracture healing Signs/Symptoms: as evidenced by s/p HIP HEMIARTHROPLASTY NUTRITION INTERVENTIONS: 
Meals/Snacks: General/healthful diet   Supplements: Commercial supplement GOAL:  
consume >50% of meals and ONS in 3-5 days LEARNING NEEDS (Diet, Food/Nutrient-Drug Interaction):  
 [x] None Identified 
 [] Identified and Education Provided/Documented 
 [] Identified and Pt declined/was not appropriate Cultureal, Quaker, OR Ethnic Dietary Needs:  
 [x] None Identified 
 [] Identified and Addressed 
 
 [x] Interdisciplinary Care Plan Reviewed/Documented  
 [x] Discharge Planning:  General healthy diet with adequate kcal and protein to promote fracture healing MONITORING /EVALUATION:  
  
Food/Nutrient Intake Outcomes: Total energy intake Physical Signs/Symptoms Outcomes: Weight/weight change, Electrolyte and renal profile, GI 
 
NUTRITION RISK:  
 [] High              [x] Moderate           []  Low  []  Minimal/Uncompromised PT SEEN FOR:  
 [x]  MD Consult: []Calorie Count []Diabetic Diet Education []Diet Education []Electrolyte Management 
   [x]General Nutrition Management and Supplements []Management of Tube Feeding []TPN Recommendations []  RN Referral:  []MST score >=2 
   []Enteral/Parenteral Nutrition PTA []Pregnant: Gestational DM or Multigestation 
   []Pressure Ulcer/Wound Care needs 
     
[]  Low BMI 
[]  LOS Referral  
 
 
Karina Bowie RDN Pager [phone] Weekend Pager [phone]

## 2019-04-17 NOTE — ANESTHESIA POSTPROCEDURE EVALUATION
Procedure(s): HIP HEMIARTHROPLASTY. general 
 
Anesthesia Post Evaluation Patient location during evaluation: PACU Note status: Adequate. Level of consciousness: responsive to verbal stimuli and sleepy but conscious Pain management: satisfactory to patient Airway patency: patent Anesthetic complications: no 
Cardiovascular status: acceptable Respiratory status: acceptable Hydration status: acceptable Comments: +Post-Anesthesia Evaluation and Assessment Patient: Raudel Douglass MRN: [id number]  SSN: [id number] YOB: 1934  Age: 80 y.o. Sex: male Cardiovascular Function/Vital Signs /56   Pulse 82   Temp 36.4 °C (97.6 °F)   Resp 14   Ht 5' 4\" (1.626 m)   Wt 51.3 kg (113 lb)   SpO2 100%   BMI 19.40 kg/m² Patient is status post Procedure(s): HIP HEMIARTHROPLASTY. Nausea/Vomiting: Controlled. Postoperative hydration reviewed and adequate. Pain: 
Pain Scale 1: Numeric (0 - 10) (04/16/19 2010) Pain Intensity 1: 0 (04/16/19 2010) Managed. Neurological Status:  
Neuro (WDL): Exceptions to WDL (04/16/19 1950) At baseline. Mental Status and Level of Consciousness: Arousable. Pulmonary Status:  
O2 Device: Nasal cannula (04/16/19 1950) Adequate oxygenation and airway patent. Complications related to anesthesia: None Post-anesthesia assessment completed. No concerns. Signed By: Becky Watson MD  
 4/16/2019 Post anesthesia nausea and vomiting:  controlled Vitals Value Taken Time /70 4/16/2019  8:15 PM  
Temp 36.4 °C (97.6 °F) 4/16/2019  7:50 PM  
Pulse 79 4/16/2019  8:24 PM  
Resp 14 4/16/2019  8:24 PM  
SpO2 100 % 4/16/2019  8:24 PM  
Vitals shown include unvalidated device data.

## 2019-04-18 NOTE — PROGRESS NOTES
Problem: Mobility Impaired (Adult and Pediatric) Goal: *Acute Goals and Plan of Care (Insert Text) Description Physical Therapy Goals Initiated 4/17/2019 1. Patient will move from supine to sit and sit to supine  in bed with minimal assistance/contact guard assist within 7 day(s). 2.  Patient will transfer from bed to chair and chair to bed with minimal assistance/contact guard assist using the least restrictive device within 7 day(s). 3.  Patient will perform sit to stand with minimal assistance/contact guard assist within 7 day(s). 4.  Patient will ambulate with minimal assistance/contact guard assist for 45 feet with the least restrictive device within 7 day(s). 4/18/2019 1541 by Jevon Reed, PT Outcome: Progressing Towards Goal 
4/18/2019 0956 by Jevon Reed, PT Outcome: Progressing Towards Goal 
 PHYSICAL THERAPY TREATMENT Patient: Yesenia Buckley (86 y.o. male) Date: 4/18/2019 Diagnosis: Closed dislocation of left [facility] [M35.277P] Closed anterior dislocation of left hip, initial encounter ([facility] [E03.829G] <principal problem not specified> Procedure(s) (LRB): HIP HEMIARTHROPLASTY (Left) 2 Days Post-Op Precautions: Fall, WBAT, Total hip(knee immobilizer) Chart, physical therapy assessment, plan of care and goals were reviewed. ASSESSMENT: 
Pt received supine in bed, agreeable to participation with therapy. Pt with significant improvement in activity tolerance/endurance during PM therapy session, demonstrating ability to progress ambulation trial to a distance of 25ft w/ RW and CGAx1. However, pt did require near constant VC/encouragement to do so as he reported increased pain levels in L hip. Min verbal cues also required for adherence to posterior hip precautions throughout all bed mobility, sit<>stand transfers, and ambulation.  Vital signs significantly improved as well with /67 post activity in bedside chair. Continue to recommend SNF at discharge. Progression toward goals: 
?    Improving appropriately and progressing toward goals ? Improving slowly and progressing toward goals ? Not making progress toward goals and plan of care will be adjusted PLAN: 
Patient continues to benefit from skilled intervention to address the above impairments. Continue treatment per established plan of care. Discharge Recommendations:  Chilo Dillard Further Equipment Recommendations for Discharge:  tbd by facility SUBJECTIVE:  
Patient stated ? I am proud to say that CAMILLE was my wife!? OBJECTIVE DATA SUMMARY:  
Critical Behavior: 
Neurologic State: Alert Orientation Level: Oriented to person Cognition: Follows commands Safety/Judgement: Decreased awareness of need for assistance, Decreased awareness of need for safety, Fall prevention Functional Mobility Training: 
Bed Mobility: 
  
Supine to Sit: Minimum assistance Scooting: Supervision;Stand-by assistance Transfers: 
Sit to Stand: Contact guard assistance Stand to Sit: Contact guard assistance Bed to Chair: Contact guard assistance Balance: 
Sitting: Impaired Sitting - Static: Good (unsupported) Sitting - Dynamic: (impaired due to posterior hip precautions) Standing: Impaired; With support Standing - Static: Constant support; Fair 
Standing - Dynamic : Fair Ambulation/Gait Training: 
Distance (ft): 25 Feet (ft) Assistive Device: Walker, rolling;Gait belt Ambulation - Level of Assistance: Contact guard assistance Gait Abnormalities: Antalgic; Step to gait Left Side Weight Bearing: As tolerated Base of Support: Widened;Center of gravity altered Step Length: Left shortened;Right shortened Pain: 
Pain Scale 1: Numeric (0 - 10) Pain Intensity 1: 0 Pain Location 1: Hip Pain Orientation 1: Left Pain Description 1: Aching Pain Intervention(s) 1:  Ice 
 Activity Tolerance: VSS throughout, /67 post activity Please refer to the flowsheet for vital signs taken during this treatment. After treatment:  
?    Patient left in no apparent distress sitting up in chair ? Patient left in no apparent distress in bed 
? Call bell left within reach ? Nursing notified ? Caregiver present ? Bed alarm activated COMMUNICATION/COLLABORATION:  
The patient?s plan of care was discussed with: Occupational Therapist and Registered Nurse Piper Grant PT, DPT Time Calculation: 18 mins

## 2019-04-18 NOTE — PROGRESS NOTES
[facility] - Progress Note POD#2 Left hip revision tiana arthroplasty Pt resting in bed. No complaints. States \" Im feeling much better\" VSS Afebrile. Hgb 8.1 today after 1 unit of blood yesterday. Incision and bulky dressing c.d.i Calves soft and supple; No pain with passive stretch Sensation and motor intact SCD/Foot pumps for mechanical DVT proph while in bed PLAN: 
1) PT/OT BID - out of bed, transfers, WBAT, teach and reinforce hip precautions, knee immobilizer while in bed. 2) Pain control continue with tylenol and oxycodone only if needed 3) DVT proph with SCD, heparin, 4) Monitor Hgb, stable, 8.1 today 5)  Plan d/c to SNF when stable.  CONOR Monroy

## 2019-04-18 NOTE — PROGRESS NOTES
Orthopedic End of Shift Note Bedside and Verbal shift change report given to 1100 Replaced by [facility] (oncoming nurse) by Jadiel Bowen (offgoing nurse). Report included the following information SBAR, Kardex, Intake/Output, MAR and Recent Results. POD# 2 Significant issues during shift:  
 
Issues for Physician to address: Activity This Shift 
(check all that apply) [x] chair 
[] dangle 
 [] bathroom 
[] bedside commode [] hallway 
[] bedrest  
Nausea/Vomiting [] yes [x] no    
Voiding Status [x] void [] Razo [] I&O Cath Bowel Movements [] yes [x] no Foot Pumps or SCD [x] yes [] no Ice Pack [x] yes    [] no Incentive Spirometer [x] yes [] no Volume:     
Telemetry Monitoring   [] yes [x] no Rhythm:  
Supplemental O2 [] yes [x] no Sat off O2:   96

## 2019-04-18 NOTE — PROGRESS NOTES
Problem: Self Care Deficits Care Plan (Adult) Goal: *Acute Goals and Plan of Care (Insert Text) Description Occupational Therapy Goals Initiated 4/18/2019 1. Patient will perform lower body dressing using adaptive aids prn, at supervision/set-up level within 7 days. 2. Patient will perform toilet transfers at minimal /contact guard assist level using Walkers, Type: Rolling Walker  within 7 days. 3. Patient will perform all aspects of toileting at contact guard assist level within 7 days. 4. Patient will demonstrate 3/3 hip precautions without cues during adls within 7 days. 5. Patient will tolerate at least 8 minutes of standing adls within 7 days. 6.  Patient will perform bathing with moderate assistance, using best technique, within 7 days. Outcome: Progressing Towards Goal 
 OCCUPATIONAL THERAPY EVALUATION Patient: Devin Vergara (8 y.o. male) Date: 4/18/2019 Primary Diagnosis: Closed dislocation of left hip, initial encounter ([facility] [E55.630P] Closed anterior dislocation of left hip, initial encounter ([facility] [T14.298U] Procedure(s) (LRB): HIP HEMIARTHROPLASTY (Left) 2 Days Post-Op Precautions:   Fall, WBAT, Total hip(knee immobilizer) ASSESSMENT : 
Based on the objective data described below, the patient presents with pain, decreased balance, generalized weakness and decreased endurance on POD 2 of L Hip hemiarthroplasty with hip precautions and has been placed in knee immobilizer. Pt is functioning at set up to maximal assitance for self care and needs assistance X2 for functional mobility using the RW--his tolerance for ambulation is decreased but anticipate he will progress in rehab environment. Recommend SNF rehab at discharge. Dao Tom Patient will benefit from skilled intervention to address the above impairments. Patients rehabilitation potential is considered to be Good Factors which may influence rehabilitation potential include:  
? None noted ?x                Mental ability/status ?x                Medical condition ? Home/family situation and support systems ?x                Safety awareness ?x                Pain tolerance/management ? Other: PLAN : 
Recommendations and Planned Interventions: 
?x                  Self Care Training                  ?x           Therapeutic Activities ?x                  Functional Mobility Training    ? Cognitive Retraining ?x                  Therapeutic Exercises           ?x           Endurance Activities 
x? Balance Training                   ? Neuromuscular Re-Education ? Visual/Perceptual Training     ?x      Home Safety Training ?x                  Patient Education                 ?x           Family Training/Education ? Other (comment): Frequency/Duration: Patient will be followed by occupational therapy 5 times a week to address goals. Discharge Recommendations: Rehab Further Equipment Recommendations for Discharge: tbd SUBJECTIVE:  
Patient stated Carlos Gotti was my wife . ..she was an OT.  The patient stated 0/3 hip precautions. Reviewed all 3 with patient. OBJECTIVE DATA SUMMARY:  
HISTORY:  
Past Medical History:  
Diagnosis Date  Arthritis  BPH (benign prostatic hypertrophy) 4/12/2010  COPD (chronic obstructive pulmonary disease) with emphysema ([facility] 7/25/2012  HLD (hyperlipidemia) 4/12/2010  
 HTN (hypertension) 4/12/2010  Ill-defined condition 2018 Dementia  Memory disorder  Psychiatric disorder   
 anxiety and depression Past Surgical History:  
Procedure Laterality Date  COLONOSCOPY,REINALDO ASHTON  8/5/2015  
 2 sessile sigmoid polyps, 3 & 5 mm; Dr. Schmid Laughter; no further screening recommended  ENDOSCOPY, COLON, DIAGNOSTIC  2002  
 negative; 10 year repeat; Dr. Binu Saucedo  HX HERNIA REPAIR  years ago  
 left  HX HERNIA REPAIR  2001 right inguinal  
 HI COLSC FLX W/RMVL OF TUMOR POLYP LESION SNARE TQ  7/27/2012 Prior Level of Function/Environment/Context: Pt was admitted from rehab Occupations in which the patient is/was successful, what are the barriers preventing that success: medical condition Performance Patterns (routines, roles, habits, and rituals):  
Personal Interests and/or values:  
Expanded or extensive additional review of patient history: had original sx Home Situation Home Environment: Skilled nursing facility [facility] Residence: One story Living Alone: No 
Support Systems: Family member(s) Patient Expects to be Discharged to[de-identified] Skilled nursing facility Current DME Used/Available at Home: Walker, rolling Hand dominance: Right EXAMINATION OF PERFORMANCE DEFICITS: 
Cognitive/Behavioral Status: 
Neurologic State: Alert Orientation Level: Oriented to person Cognition: Follows commands Perception: Appears intact Perseveration: No perseveration noted Safety/Judgement: Decreased awareness of need for assistance;Decreased awareness of need for safety; Fall prevention Skin: incision intact Edema: surgical area Hearing: Auditory Auditory Impairment: None Vision/Perceptual:   
    
    
    
  
    
Acuity: (not formally assessed) Corrective Lenses: Glasses Range of Motion: BUEs:  
AROM: Generally decreased, functional 
  
 RLE-impaired due to precautions and knee immobilizer Strength: BUEs:   
Strength: Generally decreased, functional( strength equal and fair) Coordination: 
  
Fine Motor Skills-Upper: Left Intact; Right Intact Gross Motor Skills-Upper: Left Intact; Right Intact Tone & Sensation: Tone is normal 
  
Sensation: Intact Balance: 
Sitting: Impaired Sitting - Static: Good (unsupported) Sitting - Dynamic: (impaired due to posterior hip precautions) Standing: Impaired; With support Standing - Static: Constant support; Fair 
Standing - Dynamic : Fair Functional Mobility and Transfers for ADLs: 
Bed Mobility: 
Supine to Sit: Minimum assistance Scooting: Supervision;Stand-by assistance Transfers: 
Sit to Stand: Contact guard assistance Stand to Sit: Contact guard assistance Bed to Chair: Contact guard assistance ADL Assessment: 
Feeding: Setup Oral Facial Hygiene/Grooming: Supervision;Setup Bathing: Maximum assistance Upper Body Dressing: Minimum assistance Lower Body Dressing: Maximum assistance Toileting: (able to use urinal at bed level with set up) ADL Intervention and task modifications: 
  
Pt educated on role of OT. Pt verbalized understanding. Pt verbalizes discomfort in RLE due to knee immobilizer--removed partially and applied lotion to area of concern-calf area. Pt educated in hip precautions verbally. Impaired ability to recite the precautions and needs cues and assistance to maintain Pt reported being happy to be OOB into chair--alarm placed. Cognitive Retraining Safety/Judgement: Decreased awareness of need for assistance;Decreased awareness of need for safety; Fall prevention Therapeutic Exercise: 
Encouraged sitting up in chair for meals Functional Measure: 
Barthel Index: 
 
Bathin Bladder: 10 Bowels: 10 
Groomin Dressin Feeding: 10 Mobility: 0 Stairs: 0 Toilet Use: 5 Transfer (Bed to Chair and Back): 10 Total: 55/100 Percentage of impairment  
0% 1-19% 20-39% 40-59% 60-79% 80-99% 100% Barthel Score 0-100 100 99-80 79-60 59-40 20-39 1-19 
 0 The Barthel ADL Index: Guidelines 1. The index should be used as a record of what a patient does, not as a record of what a patient could do. 2. The main aim is to establish degree of independence from any help, physical or verbal, however minor and for whatever reason. 3. The need for supervision renders the patient not independent. 4. A patient's performance should be established using the best available evidence. Asking the patient, friends/relatives and nurses are the usual sources, but direct observation and common sense are also important. However direct testing is not needed. 5. Usually the patient's performance over the preceding 24-48 hours is important, but occasionally longer periods will be relevant. 6. Middle categories imply that the patient supplies over 50 per cent of the effort. 7. Use of aids to be independent is allowed. Steven Turk., Barthel, D.W. (2948). Functional evaluation: the Barthel Index. [address] [facility] (14)2. Mabel Betancourt galo FADUMO Goodwin, Lorna Soto., Jm Mcguire., Vero [facility], [address] [facility] (1999). Measuring the change indisability after inpatient rehabilitation; comparison of the responsiveness of the Barthel Index and Functional [place] Measure. Journal of Neurology, Neurosurgery, and Psychiatry, 66(4), 621-743. BUZZ Gleason, ALFIE Bernabe, & Timmy Willis M.A. (2004.) Assessment of post-stroke quality of life in cost-effectiveness studies: The usefulness of the Barthel Index and the EuroQoL-5D. [facility], 13, 190-62 Occupational Therapy Evaluation Charge Determination History Examination Decision-Making LOW Complexity : Brief history review  MEDIUM Complexity : 3-5 performance deficits relating to physical, cognitive , or psychosocial skils that result in activity limitations and / or participation restrictions MEDIUM Complexity : Patient may present with comorbidities that affect occupational performnce. Miniml to moderate modification of tasks or assistance (eg, physical or verbal ) with assesment(s) is necessary to enable patient to complete evaluation Based on the above components, the patient evaluation is determined to be of the following complexity level: LOW Pain: Pain Scale 1: Numeric (0 - 10) Pain Intensity 1: 7 Pain Location 1: Hip Pain Orientation 1: Left Pain Description 1: Aching Pain Intervention(s) 1: Medication (see MAR) Activity Tolerance: No complaints Please refer to the flowsheet for vital signs taken during this treatment. After treatment:  
?x Patient left in no apparent distress sitting up in chair ? Patient left in no apparent distress in bed 
?x Call bell left within reach ?x Nursing notified ? Caregiver present 
?x Bed alarm activated COMMUNICATION/EDUCATION:  
The patients plan of care was discussed with: Physical Therapist and Registered Nurse. 
? Home safety education was provided and the patient/caregiver indicated understanding. ?s    Patient/family have participated as able in goal setting and plan of care. ?    Patient/family agree to work toward stated goals and plan of care. ?    Patient understands intent and goals of therapy, but is neutral about his/her participation. ?x    Patient is unable to participate in goal setting and plan of care . This patients plan of care is appropriate for delegation to [facility]. Thank you for this referral. 
Beulah Prince, OTR/L 
 22 minutes

## 2019-04-18 NOTE — PROGRESS NOTES
Occupational Therapy Medical record reviewed. Pt participated in OT Evaluation performing bed mobility with minimal assistance and verbal cues, and functional mobility using RW with CGA . Pt complained of discomfort in his LLE, especially under the immobilizer near L calf. Applied lotion to pt's skin and propped LLE on support while pt seated in the chair. He stated that he was happy to be out of the bed. Pt will benefit from SNF rehab at discharge. Full OT note to follow.

## 2019-04-18 NOTE — PROGRESS NOTES
Ortho / Neurosurgery NP Note POD# 2 s/p HIP HEMIARTHROPLASTY Pt resting in bed. With complaints of pain to his surgical site, however states that he feels better today. VSS Afebrile. Voiding status: voiding Labs Lab Results Component Value Date/Time HGB 8.1 (L) 04/18/2019 03:27 AM  
  
Lab Results Component Value Date/Time INR 1.1 04/15/2019 01:15 PM  
  
 
Body mass index is 20.17 kg/m². : A BMI > 30 is classified as obesity and > 40 is classified as morbid obesity. Dressing c.d.i Cryotherapy in place over incision Calves soft and supple; No pain with passive stretch Sensation and motor intact SCDs for mechanical DVT proph while in bed PLAN: 
1) PT BID- TTWB 2) Heparin 5000 Units BID for DVT Prophylaxis 3) GI Prophylaxis - Pepcid 4) Post op anemia: HGB 8.1 this morning after 1 unit PRBC's yesterday 5) Pain management 6) Readniess for discharge: 
   [x] Vital Signs stable  
 [x] Hgb stable- will continue to monitor  
 [x] + Voiding  
 [x] Wound intact, drainage minimal  
 [x] Tolerating PO intake   
 [] Cleared by PT (OT if applicable) [] Stair training completed (if applicable) [] Independent / Contact Guard Assist (household distance) [] Bed mobility [] Car transfers  
  [] ADLs [x] Adequate pain control on oral medication alone Plan to discharge back to facility possibly today or tomorrow.    
 
 
Bill Langford, JOE 
DNP, AGACNP-BC

## 2019-04-18 NOTE — PROGRESS NOTES
Orthopedic End of Shift Note Bedside and Verbal shift change report given to [place] (oncoming nurse) by Elsie Stanley (offgoing nurse). Report included the following information SBAR, Kardex, Procedure Summary, Intake/Output, MAR and Recent Results. POD# 2 Significant issues during shift: N/A Issues for Physician to address: N/A Activity This Shift 
(check all that apply) [] chair 
[] dangle 
 [] bathroom 
[] bedside commode [] hallway [x] bedrest  
Nausea/Vomiting [] yes [x] no    
Voiding Status [x] void [] Razo [] I&O Cath Bowel Movements [] yes [x] no Foot Pumps or SCD [x] yes [] no Ice Pack [x] yes    [] no Incentive Spirometer [x] yes [] no Volume:   500 Telemetry Monitoring   [] yes [x] no Rhythm:  
Supplemental O2 [] yes [x] no Sat off O2:   98%

## 2019-04-18 NOTE — PROGRESS NOTES
Problem: Mobility Impaired (Adult and Pediatric) Goal: *Acute Goals and Plan of Care (Insert Text) Description Physical Therapy Goals Initiated 4/17/2019 1. Patient will move from supine to sit and sit to supine  in bed with minimal assistance/contact guard assist within 7 day(s). 2.  Patient will transfer from bed to chair and chair to bed with minimal assistance/contact guard assist using the least restrictive device within 7 day(s). 3.  Patient will perform sit to stand with minimal assistance/contact guard assist within 7 day(s). 4.  Patient will ambulate with minimal assistance/contact guard assist for 45 feet with the least restrictive device within 7 day(s). Outcome: Progressing Towards Goal 
 PHYSICAL THERAPY TREATMENT Patient: Dedra Aldana (37 y.o. male) Date: 4/18/2019 Diagnosis: [facility] [Y11.789H] Closed anterior dislocation of left hip, initial encounter ([facility] [E09.295N] <principal problem not specified> Procedure(s) (LRB): HIP HEMIARTHROPLASTY (Left) 2 Days Post-Op Precautions: Fall, WBAT, Total hip(knee immobilizer) Chart, physical therapy assessment, plan of care and goals were reviewed. ASSESSMENT: 
Pt received supine in bed, agreeable to participation with therapy. Pt with improved activity tolerance, demonstrating ability to ambulate 15ft w/ RW and CGAx2 (2nd person for safety) this date. Gait remains unsteady overall with pt exhibiting antalgic, shuffled gait pattern with mild increase in trunk sway. Vital signs assessed throughout mobility with BP initially decreasing to 84/68 upon pt assuming seated position EOB. However, BP stabilized with activity/seated rest. BP stabilized at 90s/50s post ambulation in bedside chair, however RN requesting pt return to supine position in bed given history of hypotension.  Overall, pt tolerated therapy session well and is making slow, steady gains towards therapy goals and overall mobility. Continue to recommend SNF at discharge. Progression toward goals: 
?    Improving appropriately and progressing toward goals ? Improving slowly and progressing toward goals ? Not making progress toward goals and plan of care will be adjusted PLAN: 
Patient continues to benefit from skilled intervention to address the above impairments. Continue treatment per established plan of care. Discharge Recommendations:  Chilo Dillard Further Equipment Recommendations for Discharge:  tbd by facility SUBJECTIVE:  
Patient stated ?jingle bells, jingle bells!? OBJECTIVE DATA SUMMARY:  
Critical Behavior: 
Neurologic State: Alert Orientation Level: Disoriented to time, Oriented to person, Oriented to place Cognition: Follows commands, Appropriate for age attention/concentration Functional Mobility Training: 
Bed Mobility: 
  
Supine to Sit: Minimum assistance;Assist x1 Transfers: 
Sit to Stand: Contact guard assistance;Assist x1 Stand to Sit: Contact guard assistance;Assist x1 Bed to Chair: Contact guard assistance;Assist x2(2nd person for safety given hypotension) Balance: 
Sitting: Intact Standing: Impaired; With support Standing - Static: Fair;Constant support Standing - Dynamic : Fair Ambulation/Gait Training: 
Distance (ft): 15 Feet (ft) Assistive Device: Walker, rolling;Gait belt Ambulation - Level of Assistance: Contact guard assistance;Assist x2 Gait Abnormalities: Antalgic; Step to gait; Decreased step clearance Left Side Weight Bearing: As tolerated Base of Support: Widened;Center of gravity altered Step Length: Left shortened;Right shortened Therapeutic Exercises:  
2x 10 ankle pumps 1x 5 LAQ Pain: 
Pain Scale 1: Numeric (0 - 10) Pain Intensity 1: 0 Pain Location 1: Hip Pain Orientation 1: Left Pain Description 1: Aching Pain Intervention(s) 1: Medication (see MAR) Activity Tolerance:  
Orthostatic hypotension however BP stabilized with activity/seated rest 
Please refer to the flowsheet for vital signs taken during this treatment. After treatment:  
?    Patient left in no apparent distress sitting up in chair ? Patient left in no apparent distress in bed 
? Call bell left within reach ? Nursing notified ? Caregiver present ? Bed alarm activated COMMUNICATION/COLLABORATION:  
The patient?s plan of care was discussed with: Registered Nurse Joaquin Morton, PT, DPT Time Calculation: 26 mins

## 2019-04-19 NOTE — PROGRESS NOTES
dsg to left hip changed,staples intact,leg immobilized removed for am care, skin intact,no redness or skin breakdown noted.

## 2019-04-19 NOTE — PROGRESS NOTES
CM has been in contact with [facility] [phone][facility] who is currently on quarantine for norovirus and will not be able to accept patient's until the earliest being Sunday. CM was made aware that patient's belongings are still at the [facility] facility. SEBASTIÁN has requested that Amelia with [facility] see if a sister facility can accept patient and then have patient transfer to [facility] when off quarantine. CM spoke to pt's daughter Mynor Smith ([phone]) who stated that she did not want pt to go to the sister facility in [facility]. Pt's daughter was agreeable to [facility], the other sister facility to [facility]. CM also mentioned that [facility] may be full and unable to accept patient and we would need to look into other facilities for patient to go to for the time being. CM attempted to name off SNF facilities to pt's daughter but she stated she would like to see a list in writing. Pt's daughter requested for a list to be left in pt's room and she will look at it when she gets to the hospital between 3:30 and 4:00pm.  CM has sent referral to [facility] and Aaron Dejesus with [facility] has talked to [facility] to let them know that pt is one of their patients. [facility] declined patient stating that they were full. CM has contacted Pt's daughter back to let her know that [facility] was unable to accept. List has been left in pt's room. CM waiting for SNF to be chosen by daughter. UPDATE 3:49PM  
 
CM made room visit with pt and daughter. Daughter chose [facility] and [address] [place] [place] [address]. FOC signed and on bedside chart. CM has sent referral to [facility] and waiting for response. CM will continue to follow. UPDATE 4:49PM 
 
[facility] able to accept patient tonight. NP, Pt/Pt's daughter, nurse made aware. [facility] transport set up for 7:00pm. Facility aware of time. PCS still on chart. Care Management Interventions PCP Verified by CM: Yes Mode of Transport at Discharge: \[facility]\"" 
 Transition of Care Consult (CM Consult): Discharge Planning, SNF Partner SNF: Yes Discharge Durable Medical Equipment: No 
Physical Therapy Consult: Yes Occupational Therapy Consult: Yes Speech Therapy Consult: No 
Current Support Network: [address] [place] [address] [address] Confirm Follow Up Transport: Family Plan discussed with Pt/Family/Caregiver: Yes Freedom of Choice Offered: Yes Discharge Location Discharge Placement: Skilled nursing facility([facility]) [place], [address]

## 2019-04-19 NOTE — PROGRESS NOTES
[facility] [address] 80 y.o.   male Elsa 34   Room: [facility] [address] [facility]  Unit Phone# :  [phone] [address] 
[facility] [address] [address] Dept: [phone] Loc: [id number] SITUATION Admitted:  4/15/2019         Attending Provider:  Hamilton Balbuena MD    
 
Consultations:  IP CONSULT TO ORTHOPEDIC SURGERY 
 
PCP:  Sharon Aguirre, [address] [place] [address] Treatment Team: Attending Provider: Hamilton Balbuena MD; Consulting Provider: Hamilton Balbuena MD; Nurse Practitioner: Chloe Bravo NP; Utilization Review: Destin Gonsales RN; Care Manager: Anne Daley Admitting Dx:  Closed dislocation of left hip, initial encounter ([facility] [C75.779T] Closed anterior dislocation of left hip, initial encounter ([facility] [facility] [B28.898U] Principal Problem: <principal problem not specified> 
 
3 Days Post-Op of  
Procedure(s): HIP HEMIARTHROPLASTY BY: Hamilton Balbuena MD             ON: 4/16/2019 Code Status: Prior Advance Directives:  
Advance Care Planning 4/15/2019 Patient's Healthcare Decision Maker is: Legal Next of Kin Primary Decision Maker Name -  
Primary Decision Maker Phone Number -  
Primary Decision Maker Relationship to Patient -  
Confirm Advance Directive None Patient Would Like to Complete Advance Directive Unable Does the patient have other document types - (Send w/patient) Yes Not W Pt  
 
 
Isolation:  There are currently no Active Isolations       MDRO: No current active infections Pain Medications given:  oxycodone    Last dose: 4/18/2019 at  1014 Special Equipment needed: no  Type of equipment: 
 
 
(Not currently on dialysis) (Not currently on dialysis) (Not currently on dialysis) BACKGROUND Allergies: Allergies Allergen Reactions  Kiwi Anaphylaxis  Zetia [Ezetimibe] Other (comments) C/o back pain Past Medical History:  
Diagnosis Date  Arthritis  BPH (benign prostatic hypertrophy) 2010  COPD (chronic obstructive pulmonary disease) with emphysema ([facility] [address] 2012  HLD (hyperlipidemia) 2010  
 HTN (hypertension) 2010  Ill-defined condition 2018 Dementia  Memory disorder  Psychiatric disorder   
 anxiety and depression Past Surgical History:  
Procedure Laterality Date  COLONOSCOPY,REMV LESN,SNARE  2015  
 2 sessile sigmoid polyps, 3 & 5 mm; Dr. Marliss Siemens; no further screening recommended  ENDOSCOPY, COLON, DIAGNOSTIC    
 negative; 10 year repeat; Dr. Marliss Siemens  HX HERNIA REPAIR  years ago  
 left  HX HERNIA REPAIR    
 right inguinal  
 DC COLSC FLX W/RMVL OF TUMOR POLYP LESION SNARE TQ  2012 Medications Prior to Admission Medication Sig  
 melatonin 3 mg tablet Take 6 mg by mouth nightly.  [] oxyCODONE IR (ROXICODONE) 5 mg immediate release tablet Take 0.5-1 Tabs by mouth every four (4) hours as needed for Pain for up to 14 days. Max Daily Amount: 30 mg. Half tab for pain level 1-5, or whole tab for pain 6-10 (Patient taking differently: Take 5 mg by mouth every four (4) hours as needed for Pain. Half tab for pain level 1-5, or whole tab for pain 6-10)  calcium-vitamin D (OYSTER SHELL) 500 mg(1,250mg) -200 unit per tablet Take 1 Tab by mouth daily for 30 days.  ferrous sulfate 325 mg (65 mg iron) tablet Take 1 Tab by mouth Daily (before breakfast) for 30 days.  donepezil (ARICEPT) 10 mg tablet Take 10 mg by mouth daily.  memantine (NAMENDA) 10 mg tablet Take 10 mg by mouth two (2) times a day.  dextromethorphan-quiNIDine (NUEDEXTA) 20-10 mg per capsule Take 1 Cap by mouth every twelve (12) hours.  Indications: PSEUDOBULBAR AFFECT  
  psyllium seed-sucrose (METAMUCIL, SUGAR,) powd Take 1 Packet by mouth daily. 1/2 tbsp in 8oz liquid  docusate sodium (COLACE) 100 mg capsule Take 100 mg by mouth daily.  rosuvastatin (CRESTOR) 20 mg tablet TAKE ONE TABLET BY MOUTH ONE TIME DAILY  SPIRIVA WITH HANDIHALER 18 mcg inhalation capsule INHALE ONE CAPSULE VIA DEVICE BY MOUTH ONE TIME DAILY  multivitamin (ONE A DAY) tablet Take 1 Tab by mouth daily.  tamsulosin (FLOMAX) 0.4 mg capsule Take 0.4 mg by mouth daily.  finasteride (PROSCAR) 5 mg tablet Take 5 mg by mouth daily.  aspirin 81 mg Tab Take 81 mg by mouth daily.  [DISCONTINUED] acetaminophen (TYLENOL) 500 mg tablet Take 2 Tabs by mouth every six (6) hours for 14 days.  [DISCONTINUED] polyethylene glycol (MIRALAX) 17 gram packet Take 1 Packet by mouth daily as needed (constipation) for up to 15 days. Hard scripts included in transfer packet yes Vaccinations:   
Immunization History Administered Date(s) Administered  (RETIRED) Pneumococcal Vaccine (Unspecified Type) 10/01/1998, 11/14/2011  Influenza High Dose Vaccine PF 10/15/2015, 09/26/2018  Influenza Vaccine 10/07/2013, 10/15/2014  Influenza Vaccine Whole 11/01/2010  Pneumococcal Conjugate (PCV-13) 11/23/2015  Pneumococcal Polysaccharide (PPSV-23) 06/26/2018, 06/26/2018  TB Skin Test (PPD) 07/11/2018  TDAP Vaccine 03/14/2012  Zoster 09/24/2011  Zoster Recombinant 07/04/2018 Readmission Risks:    Known Risks: none The Charlson CoMorbitiy Index tool is an evidenced based tool that has more automatic generated information. The tool looks at many different items such as the age of the patient, how many times they were admitted in the last calendar year, current length of stay in the hospital and their diagnosis.  All of these items are pulled automatically from information documented in the chart from various places and will generate a score that predicts whether a patient is at low (less than 13), medium (13-20) or high (21 or greater) risk of being readmitted. ASSESSMENT Temp: 98.3 °F (36.8 °C) (04/19/19 1527) Pulse (Heart Rate): 77 (04/19/19 1527) Resp Rate: 18 (04/19/19 1527)           BP: 127/79 (04/19/19 1527) O2 Sat (%): 96 % (04/19/19 1527) Weight: 53.3 kg (117 lb 8.1 oz)    Height: 5' 4\" (162.6 cm) (04/15/19 1123) If above not within 1 hour of discharge: 
 
BP:_____  P:____  R:____ T:_____ O2 Sat: ___%  O2: ______ Active Orders Diet DIET REGULAR Orientation: oriented to time, place, person and situation Active Behaviors: None Active Lines/Drains:  (Peg Tube / Razo / CL or S/L?): no 
 
Urinary Status: Voiding     Last BM: Last Bowel Movement Date: 04/19/19 Skin Integrity: Incision (comment), Intact Wound Hip Anterior;Left;Proximal-Dressing Status : Clean, dry, and intact Wound Hip Anterior;Left;Proximal-Dressing Type : 4 x 4, ABD pad Mobility: Slightly limited Weight Bearing Status: WBAT (Weight Bearing as Tolerated) Gait Training Assistive Device: Walker, rolling, Gait belt Ambulation - Level of Assistance: Contact guard assistance, Assist x1 Distance (ft): 35 Feet (ft) Lab Results Component Value Date/Time Glucose 119 (H) 04/17/2019 03:06 AM  
 Hemoglobin A1c 5.9 (H) 05/17/2017 08:49 AM  
 INR 1.1 04/15/2019 01:15 PM  
 INR 1.0 03/30/2019 11:09 AM  
 HGB 8.4 (L) 04/19/2019 04:57 AM  
 HGB 8.1 (L) 04/18/2019 03:27 AM  
  
  RECOMMENDATION See After Visit Summary (AVS) for: · Discharge instructions · [facility] · Special equipment needed (entered pre-discharge by Care Management) · Medication Reconciliation · Follow up Appointment(s) Report given/sent by:  Kelvin Mon RN Verbal report given to: Key Herrera Estimated discharge time:  4/19/2019 at 1900

## 2019-04-19 NOTE — PROGRESS NOTES
Orthopedic End of Shift Note Bedside and Verbal shift change report given to Linn DE LA PAZ (oncoming nurse) by Rosales Mallory (offgoing nurse). Report included the following information SBAR, Kardex, Intake/Output, MAR and Recent Results. POD# 3 Significant issues during shift:  
 
Issues for Physician to address: Activity This Shift 
(check all that apply) [x] chair 
[] dangle [x] bathroom 
[] bedside commode [] hallway 
[] bedrest  
Nausea/Vomiting [] yes [x] no    
Voiding Status [x] void [] Razo [] I&O Cath Bowel Movements [x] yes [] no Foot Pumps or SCD [x] yes [] no Ice Pack [x] yes    [] no Incentive Spirometer [x] yes [] no Volume:     
Telemetry Monitoring   [] yes [x] no Rhythm:  
Supplemental O2 [] yes [x] no Sat off O2:   97

## 2019-04-19 NOTE — PROGRESS NOTES
Bedside and Verbal shift change report given to Shelby Gallagher (oncoming nurse) by Kimberly Cote (offgoing nurse). Report included the following information SBAR, Kardex, Intake/Output, MAR and Recent Results.

## 2019-04-19 NOTE — PROGRESS NOTES
hos note, AVS, prescriptions, H&P, MAR report, and discharge summary placed in discharge folder in chart shweta for discharge to [facility] via [facility]. Daughter given copy of AVS and also given pt belongings. piv taken out with cath tip intact. [facility] anticipated  time is [id number]. Daughter informed of  time as well as discharge to room 63A. Discharge folder information will be given in report to chemo at shift change.

## 2019-04-19 NOTE — PROGRESS NOTES
Ortho / Neurosurgery NP Note POD# 2 s/p HIP HEMIARTHROPLASTY (revision) on 4/16 Pt resting in bed. No specific complaints. VSS Afebrile. Voiding status: voiding Labs Lab Results Component Value Date/Time HGB 8.4 (L) 04/19/2019 04:57 AM  
  
Lab Results Component Value Date/Time INR 1.1 04/15/2019 01:15 PM  
  
 
Body mass index is 20.17 kg/m². : A BMI > 30 is classified as obesity and > 40 is classified as morbid obesity. Bulky ABD Dressing c.d.i Cryotherapy in place over incision Calves soft and supple; No pain with passive stretch Sensation and motor intact SCDs for mechanical DVT proph while in bed PLAN: 
1) PT BID- WBAT; currently with immobilizer at all times. Will check to see if needed at all times. Nursing order for skin check - especially under immobilizer to prevent skin compromise 2) Heparin 5000 Units BID for DVT Prophylaxis 3) GI Prophylaxis - Pepcid 4) Post op anemia: HGB 8.4 today improved from yesterday 5) Pain management 6) Readniess for discharge: 
   [x] Vital Signs stable  
 [x] Hgb stable- will continue to monitor  
 [x] + Voiding  
 [x] Wound intact, drainage minimal ; will change dressing. [x] Tolerating PO intake [x] Cleared by PT (OT if applicable) [x] Stair training completed (if applicable) [x] Independent / Contact Guard Assist (household distance) [x] Bed mobility [x] Car transfers  
  [x] ADLs [x] Adequate pain control on oral medication alone Plan to discharge back to facility [facility] or [facility] facility since [facility] currently on quarantine for Norovirus -  possibly today or tomorrow. Kay Addison NP 
DNP, AGACNP-BC Addendum: 
 
Discussed with Ivy Downing; immobilizer only to be worn while in bed to aid as reminder to abide by hip precautions. Heparin for DVT proh here, then switch to Lovenox x 2 weeks for outpatient DVT proph.  
 
Kay Addison NP

## 2019-04-19 NOTE — PROGRESS NOTES
Bedside and Verbal shift change report given to Gely Gallegos (oncoming nurse) by Byron Gutierres RN (offgoing nurse). Report included the following information SBAR, Kardex, Procedure Summary, Intake/Output, MAR and Recent Results.

## 2019-04-19 NOTE — PROGRESS NOTES
[facility] - Progress Note POD#3 Left hip revision hemiarthroplasty Pt resting in bed. Mild pain at the left hip. Hgb improved to 8.4 today VSS Afebrile. Incision and dressing c.d.i Calves soft and supple; No pain with passive stretch Sensation and motor intact SCD/Foot pumps for mechanical DVT proph while in bed PLAN: 
1) PT/OT today continue reinforcing hip precautions 2) Pain control continue current 3) Dressing change to honeycomb 4) Plan d/c pending clearance with PT/OT CONOR Hunter

## 2019-04-19 NOTE — PROGRESS NOTES
Occupational Therapy Attempting to see pt for OT treatment. He was supine with HOB slightly and eating lunch. Pt declined repositioning to more upright position for lunch and declined OOB to chair. \"Would you please just leave me alone! \"  Informed nursing. Will defer and continue to follow.

## 2019-04-19 NOTE — DISCHARGE SUMMARY
Ortho Discharge Summary Patient ID: Raudel Douglass [id number] 
male 80 y.o. 
1934 Admit date: 4/15/2019 Discharge date: 4/19/2019 Admitting Physician: Faith Mandujano MD  
 
Consulting Physician(s):   Treatment Team: Attending Provider: Gregorio Gonzalez MD; Consulting Provider: Gregorio Gonzalez MD; Nurse Practitioner: Timmy Collado NP; Utilization Review: Reshma Chaparro RN; Care Manager: Onelia Cohen Date of Surgery:   4/16/2019 Preoperative Diagnosis:  PERIPROSTHETIC HIP FRACTURE Postoperative Diagnosis:   PERIPROSTHETIC HIP FRACTURE Procedure(s):   left  HIP HEMIARTHROPLASTY Anesthesia Type:   General  
 
Surgeon: Gregorio Gonzalez MD  
 
                      
HPI:  Pt is a 80 y.o. male who has a history of PERIPROSTHETIC HIP FRACTURE  with pain and limitations of activities of daily living who presents at this time for a left HILDA following the failure of conservative management. PMH:  
Past Medical History:  
Diagnosis Date  Arthritis  BPH (benign prostatic hypertrophy) 4/12/2010  COPD (chronic obstructive pulmonary disease) with emphysema ([address] 7/25/2012  HLD (hyperlipidemia) 4/12/2010  
 HTN (hypertension) 4/12/2010  Ill-defined condition 2018 Dementia  Memory disorder  Psychiatric disorder   
 anxiety and depression Body mass index is 20.17 kg/m². : A BMI > 30 is classified as obesity and > 40 is classified as morbid obesity. Medications upon admission :  
Prior to Admission Medications Prescriptions Last Dose Informant Patient Reported? Taking? SPIRIVA WITH HANDIHALER 18 mcg inhalation capsule 4/15/2019 at 0800 Transfer Papers No Yes Sig: INHALE ONE CAPSULE VIA DEVICE BY MOUTH ONE TIME DAILY  
acetaminophen (TYLENOL) 500 mg tablet 4/15/2019 at 0800 Transfer Papers No No  
Sig: Take 2 Tabs by mouth every six (6) hours for 14 days. aspirin 81 mg Tab 4/15/2019 at 0800 Transfer Papers Yes Yes Sig: Take 81 mg by mouth daily. calcium-vitamin D (OYSTER SHELL) 500 mg(1,250mg) -200 unit per tablet 4/15/2019 at 0800 Transfer Papers No Yes Sig: Take 1 Tab by mouth daily for 30 days. dextromethorphan-quiNIDine (NUEDEXTA) 20-10 mg per capsule 4/15/2019 at 0800 Transfer Papers No Yes Sig: Take 1 Cap by mouth every twelve (12) hours. Indications: PSEUDOBULBAR AFFECT  
docusate sodium (COLACE) 100 mg capsule 4/15/2019 at 0800 Transfer Papers Yes Yes Sig: Take 100 mg by mouth daily. donepezil (ARICEPT) 10 mg tablet 4/14/2019 at 1800 Transfer Papers Yes Yes Sig: Take 10 mg by mouth daily. ferrous sulfate 325 mg (65 mg iron) tablet 4/15/2019 at 0600 Transfer Papers No Yes Sig: Take 1 Tab by mouth Daily (before breakfast) for 30 days. finasteride (PROSCAR) 5 mg tablet 4/15/2019 at 0800 Transfer Papers Yes Yes Sig: Take 5 mg by mouth daily. melatonin 3 mg tablet 4/14/2019 at 1800 Transfer Papers Yes Yes Sig: Take 6 mg by mouth nightly. memantine (NAMENDA) 10 mg tablet 4/15/2019 at 0800 Transfer Papers Yes Yes Sig: Take 10 mg by mouth two (2) times a day. multivitamin (ONE A DAY) tablet 4/15/2019 at 0800 Transfer Papers Yes Yes Sig: Take 1 Tab by mouth daily. oxyCODONE IR (ROXICODONE) 5 mg immediate release tablet 4/9/2019 at 0800 Transfer Papers No Yes Sig: Take 0.5-1 Tabs by mouth every four (4) hours as needed for Pain for up to 14 days. Max Daily Amount: 30 mg. Half tab for pain level 1-5, or whole tab for pain 6-10 Patient taking differently: Take 5 mg by mouth every four (4) hours as needed for Pain. Half tab for pain level 1-5, or whole tab for pain 6-10  
polyethylene glycol (MIRALAX) 17 gram packet Not Taking at Unknown time Transfer Papers No No  
Sig: Take 1 Packet by mouth daily as needed (constipation) for up to 15 days. psyllium seed-sucrose (METAMUCIL, SUGAR,) powd 4/15/2019 at 0800 Transfer Papers Yes Yes Sig: Take 1 Packet by mouth daily. 1/2 tbsp in 8oz liquid  
rosuvastatin (CRESTOR) 20 mg tablet 4/14/2019 at 1800 Transfer Papers No Yes Sig: TAKE ONE TABLET BY MOUTH ONE TIME DAILY  
tamsulosin (FLOMAX) 0.4 mg capsule 4/14/2019 at 1800 Transfer Papers Yes Yes Sig: Take 0.4 mg by mouth daily. Facility-Administered Medications: None Allergies: Allergies Allergen Reactions  Kiwi Anaphylaxis  Zetia [Ezetimibe] Other (comments) C/o back pain Hospital Course: The patient underwent surgery. Complications:  None; patient tolerated the procedure well. Was taken to the PACU in stable condition and then transferred to the ortho floor. Perioperative Antibiotics:  Ancef Postoperative Pain Management:  Oxycodone DVT Prophylaxis: Heparin while inpatient Postoperative transfusions:    Number of units banked PRBCs =   1 for Expected Acute blood loss post-op anemia Post Op complications: none Hemoglobin at discharge:   
Lab Results Component Value Date/Time HGB 8.4 (L) 04/19/2019 04:57 AM  
 INR 1.1 04/15/2019 01:15 PM  
 
 
Dressing was changed on POD # 3. Incision - clean, dry and intact. No significant erythema or swelling. Neurovascular exam found to be within normal limits. Wound appears to be healing without any evidence of infection. Ca Sanchez Physical Therapy started following surgery and participated in bed mobility, transfers and ambulation. Gait:  Gait Base of Support: Widened, Center of gravity altered Step Length: Left shortened, Right shortened Stance: Left decreased Gait Abnormalities: Step to gait, Circumduction Ambulation - Level of Assistance: Contact guard assistance, Assist x1 Distance (ft): 35 Feet (ft) Assistive Device: Walker, rolling, Gait belt Discharged to: SNF. [facility] Condition on Discharge:   stable Discharge instructions: - Anticoagulate with love ox x 2 weeks - Take pain medications as prescribed - Resume pre hospital diet - Discharge activity: activity as tolerated - Ambulate with assistive device as needed. - Weight bearing status WBAT 
- Wound Care Keep wound clean and dry. See discharge instruction sheet. -DISCHARGE MEDICATION LIST Current Discharge Medication List  
  
START taking these medications Details  
famotidine (PEPCID) 20 mg tablet Take 1 Tab by mouth two (2) times a day for 30 days. Qty: 60 Tab, Refills: 0  
  
senna-docusate (PERICOLACE) 8.6-50 mg per tablet Take 1 Tab by mouth daily. Qty: 30 Tab, Refills: 0  
  
enoxaparin (LOVENOX) 40 mg/0.4 mL 0.4 mL by SubCUTAneous route daily for 14 days. Qty: 5.6 mL, Refills: 0 CONTINUE these medications which have CHANGED Details  
acetaminophen (TYLENOL) 500 mg tablet Take 2 Tabs by mouth every six (6) hours for 14 days. Qty: 112 Tab, Refills: 0  
  
oxyCODONE IR (ROXICODONE) 5 mg immediate release tablet Take 0.5-1 Tabs by mouth every four (4) hours as needed for Pain for up to 14 days. Max Daily Amount: 30 mg. Half tab for pain level 1-5, or whole tab for pain level 6-10 Qty: 30 Tab, Refills: 0 Associated Diagnoses: S/P hip hemiarthroplasty  
  
polyethylene glycol (MIRALAX) 17 gram packet Take 1 Packet by mouth daily as needed (constipation) for up to 15 days. Qty: 15 Packet, Refills: 0 CONTINUE these medications which have NOT CHANGED Details  
melatonin 3 mg tablet Take 6 mg by mouth nightly. calcium-vitamin D (OYSTER SHELL) 500 mg(1,250mg) -200 unit per tablet Take 1 Tab by mouth daily for 30 days. Qty: 30 Tab, Refills: 0  
  
ferrous sulfate 325 mg (65 mg iron) tablet Take 1 Tab by mouth Daily (before breakfast) for 30 days. Qty: 30 Tab, Refills: 0  
  
donepezil (ARICEPT) 10 mg tablet Take 10 mg by mouth daily. memantine (NAMENDA) 10 mg tablet Take 10 mg by mouth two (2) times a day.   
  
dextromethorphan-quiNIDine (NUEDEXTA) 20-10 mg per capsule Take 1 Cap by mouth every twelve (12) hours. Indications: PSEUDOBULBAR AFFECT Qty: 60 Cap, Refills: 11  
  
psyllium seed-sucrose (METAMUCIL, SUGAR,) powd Take 1 Packet by mouth daily. 1/2 tbsp in 8oz liquid  
  
docusate sodium (COLACE) 100 mg capsule Take 100 mg by mouth daily. rosuvastatin (CRESTOR) 20 mg tablet TAKE ONE TABLET BY MOUTH ONE TIME DAILY Qty: 90 Tab, Refills: 3 SPIRIVA WITH HANDIHALER 18 mcg inhalation capsule INHALE ONE CAPSULE VIA DEVICE BY MOUTH ONE TIME DAILY Qty: 30 Cap, Refills: 8  
  
multivitamin (ONE A DAY) tablet Take 1 Tab by mouth daily. tamsulosin (FLOMAX) 0.4 mg capsule Take 0.4 mg by mouth daily. Associated Diagnoses: BPH (benign prostatic hypertrophy)  
  
finasteride (PROSCAR) 5 mg tablet Take 5 mg by mouth daily. Associated Diagnoses: BPH (benign prostatic hypertrophy) STOP taking these medications  
  
 aspirin 81 mg Tab Comments:  
Reason for Stopping:   
   
  
 per medical continuation form 
 
 
-Follow up in office in 2 weeks Signed: 
Edward Hussein DNP, ACNP-BC, ONP-C Orthopaedic Nurse Practitioner 4/19/2019 
4:52 PM

## 2019-04-19 NOTE — PROGRESS NOTES
Problem: Mobility Impaired (Adult and Pediatric) Goal: *Acute Goals and Plan of Care (Insert Text) Description Physical Therapy Goals Initiated 4/17/2019 1. Patient will move from supine to sit and sit to supine  in bed with minimal assistance/contact guard assist within 7 day(s). 2.  Patient will transfer from bed to chair and chair to bed with minimal assistance/contact guard assist using the least restrictive device within 7 day(s). 3.  Patient will perform sit to stand with minimal assistance/contact guard assist within 7 day(s). 4.  Patient will ambulate with minimal assistance/contact guard assist for 45 feet with the least restrictive device within 7 day(s). Outcome: Progressing Towards Goal 
 PHYSICAL THERAPY TREATMENT Patient: Tracie Staley (28 y.o. male) Date: 4/19/2019 Diagnosis: Closed dislocation of left hip, [facility] [E50.391R] Closed anterior dislocation of left hip, initial encounter ([facility] [S97.171S] <principal problem not specified> Procedure(s) (LRB): HIP HEMIARTHROPLASTY (Left) 3 Days Post-Op Precautions: Fall, WBAT, Total hip(knee immobilizer) Chart, physical therapy assessment, plan of care and goals were reviewed. ASSESSMENT: 
Pt received supine in bed, agreeable to participation with therapy. Pt with no recall of posterior hip precautions, requiring re=education as well as mod verbal cueing for adherence throughout all aspects of mobility. Overall, pt required CGAx1 throughout all bed mobility, sit<>stand transfers, and ambulation trial of 35ft w/ RW support. Pt required mod verbal cueing to progress ambulation trial this date secondary to reports of L hip pain as well as discomfort associated with knee immobilizer. Pt c/o knee immobilizer \"rubbing\" posterior aspect of L thigh and L calf. Knee immobilizer removed with no skin breakdown or irritation noted. RN and NP notified. Continue to recommend SNF at discharge. Progression toward goals: ?    Improving appropriately and progressing toward goals ? Improving slowly and progressing toward goals ? Not making progress toward goals and plan of care will be adjusted PLAN: 
Patient continues to benefit from skilled intervention to address the above impairments. Continue treatment per established plan of care. Discharge Recommendations:  Chilo Dillard Further Equipment Recommendations for Discharge:  tbd by facility SUBJECTIVE:  
Patient stated ? I want to sit in that chair! .? OBJECTIVE DATA SUMMARY:  
Critical Behavior: 
Neurologic State: Alert, Appropriate for age Orientation Level: Oriented to person Cognition: Follows commands Safety/Judgement: Decreased awareness of need for assistance, Decreased awareness of need for safety, Fall prevention Functional Mobility Training: 
Bed Mobility: 
  
Supine to Sit: Minimum assistance;Assist x1 Scooting: Supervision Transfers: 
Sit to Stand: Contact guard assistance Stand to Sit: Contact guard assistance Bed to Chair: Contact guard assistance Balance: 
Sitting: Intact Standing: Impaired; With support Standing - Static: Fair;Constant support Standing - Dynamic : Fair Ambulation/Gait Training: 
Distance (ft): 35 Feet (ft) Assistive Device: Walker, rolling;Gait belt Ambulation - Level of Assistance: Contact guard assistance;Assist x1 Gait Abnormalities: Step to gait;Circumduction Base of Support: Widened;Center of gravity altered Step Length: Left shortened;Right shortened Pain: 
Pain Scale 1: Numeric (0 - 10) Pain Intensity 1: 0 Activity Tolerance: VSS throughout on RA Please refer to the flowsheet for vital signs taken during this treatment. After treatment:  
?    Patient left in no apparent distress sitting up in chair ? Patient left in no apparent distress in bed 
? Call bell left within reach ? Nursing notified ?    Caregiver present ? Bed alarm activated COMMUNICATION/COLLABORATION:  
The patient?s plan of care was discussed with: Registered Nurse and NP Luz Fernandez PT, DPT Time Calculation: 16 mins

## 2019-04-19 NOTE — PROGRESS NOTES
Chart reviewed. Attempted to see pt for PM therapy session however pt adamantly refusing OOB mobility/participation with therapy. Despite max encouragement/education regarding importance of participation with therapy, pt continued to decline, stating \"Would you just LEAVE ME ALONE?!?!\" Therapy session aborted. Continue to recommend SNF at discharge.   
 
Anne Marie Soriano, PT, DPT

## 2019-04-19 NOTE — PROGRESS NOTES
Medicare pt has received, reviewed, and signed 2nd IM letter informing them of their right to appeal the discharge. Signed copy has been placed on pt bedside chart. Tono Miki, [address], [address]

## 2019-04-19 NOTE — DISCHARGE INSTRUCTIONS
Randall Rodriguez  Surgery: Partial Hip Replacement - Hemiarthroplasty  Surgeon:   Flora Rain MD  Surgery Date:  4/16/2019     To relieve pain:   Use ice/gel packs.  Put the ice pack directly over the wound, or anywhere you are hurting or swollen.  To control pain and swelling, keep ice on regularly, especially after physical activity.  The packs should stay cold for 3-4 hours. When it is not cold anymore, rotate with the packs in the freezer.  Elevate your leg. This will also keep swelling down.  Rest for at least 20 minutes between activity or exercises.  To keep track of your pain medications, write down what you take and when you take it.  The last dose of pain medication you got in the hospital was:       Medication    Dose    Date & Time           Choose your medications based on the pain scale below:     To keep your pain under control, take Tylenol every 6 hours for 14 days - even if you feel like you dont need it.  For mild to moderate pain (4-6 on pain scale), take one pain pill every 3-4 hours as needed.  For severe pain (7-10 on pain scale), take two pain pills every 3-4 hours as needed.  To prevent nausea, take your pain medications with food. Pain Scale              As your pain lessens:     Slowly start taking less pain medication. You may do this by waiting longer between doses or by taking smaller doses.  Stop using the pain medications as soon as you no longer need it, usually in 2-3 weeks. Heparin to prevent blood clots for 30 days.  To prevent stomach upset or bleeding:   Do not take non-steroidal anti-inflammatory medications (Ibuprofen, Advil, Motrin, Naproxen, etc.)    Take Pepcid 20 mg twice a day, or a similar home medication, while you are taking a blood thinner.              OPSITE (Honeycomb dressing)     Keep your clear, waterproof dressing in place for 5-7 days after your leave the hospital.     If you are still having drainage, you will need to change your dressing in 5-7 days. You will be given one extra dressing to use at home.  If there is no more drainage from the wound, you may leave it open to the air. OPSITE DRESSING INSTRUCTIONS                                        DO NOTs:   Do not rub your surgical wound   Do not put lotion or oils on your wound.  Do not take a tub bath or go swimming until your doctor says it is ok.  You may shower with this dressing over your wound.  After showering pat the dressing dry.  If you have staples a home health nurse will remove them in about 10 days.  To increase and promote healing:     Stop Smoking (or at least cut back on       Smoking).  Eat a well-balanced diet (high in protein       and vitamin C).  If you have a poor appetite, drink Ensure, Glucerna, or [place] Instant Breakfast for the next 30 days.  If you are diabetic, you should control your blood         sugars to prevent infection and help your wound         to heal.                         To prevent constipation, stay active and drink plenty of fluid.  While using pain medications, you should also take stool softeners and laxatives, such as Pericolace       and Miralax.  If you are having too many bowel       Movements, then you may need       to stop taking the laxatives.  You should have a bowel movement 3-4 days        after surgery and then at least every other day while       on pain medication.  To improve your recovery, you must be active!  Use your walker and take short walks         (in your home). about every 2 hours during the day.  Try to increase how far you walk each day.  You can weight bear as tolerated on your left leg while walking.             Physical therapy will need to work with your over the next couple of weeks to teach you how to get out of bed, to safely walk and to do your exercises. Keep your immobilizer in place to      [facility] until your surgeon tells you it is ok.  You can return to work when cleared by a physician.  Please call your physician immediately if you have:     Constant bleeding from your wound.  Increasing redness or swelling around your wound (Some warmth, bruising and swelling is normal).  Change in wound drainage (increase in amount, color, or bad smell).  Change in mental status (unusual behavior   Temperature over 101.5 degrees Fahrenheit      Pain or redness in the calf (back of your lower leg - see picture)     Increased swelling of the thigh, ankle, calf, or foot.  Emergency: CALL 911 if you have:     Shortness of breath     Chest pain when you cough or taking a deep breath     Please call your surgeons office at [phone]  for a follow up appointment. _   You should call as soon as you get home or the next day after discharge. Ask to make an appointment in 2 weeks.  If you have questions or concerns during normal business hours, you may reach Dr. Kirstin Wen Team at [phone].

## 2019-04-22 NOTE — PROGRESS NOTES
Transition of Care Coordination/Hospital to Post Acute Facility: 
  
Date/Time:  2019 10:34 AM 
 
Patient was admitted to [facility] on 4/15/19 for treatment of Left Hip Disolocation. Patient was discharged 19 to [address] [place] [address] for continuation of care. Inpatient RRAT score: 22 Top Challenges reviewed Readmit - Left hip dislocation - transferred back to [facility] Pt was seen in office for f/u at [facility] and xray revealed dislocated left hip prosthesis & was sent to ER for admission Underwent left hip hemiarthroplasty Lovenox x2 weeks WBAT Method of communication with care team :chart routing Nurse Navigator(NN) spoke with Enma to provide introduction to self and explanation of the Nurse Navigator Role. Verified name and  as patient identifiers. Discussed and reviewed  diet restrictions, discharge summary, DME orders, follow up appointments, medication reconciliation, pending labs at time of discharge, recommendations for future lab/imaging, wound care orders ACP:  
Does the patient have a current ACP (including DDNR):  yes Does the post acute facility have a copy of the patients ACP:  yes Medication(s):  
New Medications at Discharge:  
famotidine (PEPCID) 20 mg tablet Take 1 Tab by mouth two (2) times a day for 30 days. Qty: 60 Tab, Refills: 0  
   
senna-docusate (PERICOLACE) 8.6-50 mg per tablet Take 1 Tab by mouth daily. Qty: 30 Tab, Refills: 0  
   
enoxaparin (LOVENOX) 40 mg/0.4 mL 0.4 mL by SubCUTAneous route daily for 14 days. Qty: 5.6 mL, Refills: 0  
   
   
 
 
Changed Medications at Discharge:  
acetaminophen (TYLENOL) 500 mg tablet Take 2 Tabs by mouth every six (6) hours for 14 days. Qty: 112 Tab, Refills: 0  
   
oxyCODONE IR (ROXICODONE) 5 mg immediate release tablet Take 0.5-1 Tabs by mouth every four (4) hours as needed for Pain for up to 14 days.  Max Daily Amount: 30 mg. Half tab for pain level 1-5, or whole tab for pain level 6-10 Qty: 30 Tab, Refills: 0  
  Associated Diagnoses: S/P hip hemiarthroplasty  
   
polyethylene glycol (MIRALAX) 17 gram packet Take 1 Packet by mouth daily as needed (constipation) for up to 15 days. Qty: 15 Packet, Refills: 0 Discontinued Medications at Discharge:  
 aspirin 81 mg Tab Comments:  
Reason for Stopping:   
     
 
  
PCP/Specialist follow up:  
Future Appointments Date Time Provider [facility] 6/4/2019 10:00 AM Carmel Velazquez MD [address] [place] [address]  
8/27/2019  1:45 PM Ary [address] [place] [place] [address] Opportunity to ask questions was provided. Contact information was provided for future reference or further questions. Will continue to monitor. 04/23/19 Patient was admitted to ER from [facility] by Sujatha Gold for dislocated hip.  At time of discharge patient initially transferred to [facility] due to no beds at [facility], patient was transferred back to [facility] on 4/22/19 from Edilia banda. [address] [place] [address]

## 2019-04-23 NOTE — PROGRESS NOTES
Community Care Team Documentation for Patient in [facility] Discharge Note Patient has been discharged from [facility] ([facility]). See previous [facility] Team notes. PCP : Tony Moran DO 
Nurse Navigator in PCP office: Nick Shrestha Note routed to Nurse Navigator team. 
 
Spoke with SNF team.  Patient was transferred to [facility] on 4/22 for continued care. CCT will follow up with Sonia on 4/25. Community Care Team will sign off at this time. Medications were not reconciled and general patient assessment was not completed during this skilled nursing facility outreach. Diedra Harada May, MSN, RN, ACNS-BC, [facility] Nurse Navigator, [address] [place] [address] [phone]

## 2019-04-25 NOTE — PROGRESS NOTES
Community Care Team Documentation for Patient in [facility] Subsequent Follow up Patient remains at [facility] ([facility]). See previous [facility] Team notes. PCP : Negro Snyder DO 
Nurse Navigator in PCP office: Simón Perrea Spoke with SNF team.  Provided needed hospital follow up appointments:  Alcides Licea MD 2 weeks call [phone] to schedule appt. PT/OT providing skilled therapy in SNF. Currently Min assist with transfers and with sit to stand. Ambulating 50 ft with min assist. Mod assist with toileting. Max assist with lower body ADLs. Goal to discharge back to [facility]. Previously used [facility]. LOS TBD. Medications were not reconciled and general patient assessment was not completed during this skilled nursing facility outreach. Rosita Natarajan, MSN, RN, ACNS-BC, [facility] Nurse Navigator, [address] [place] [address] [phone]

## 2019-05-02 NOTE — PROGRESS NOTES
Community Care Team Documentation for Patient in [facility] Subsequent Follow up Patient remains at [facility] ([facility]). See previous [facility] Team notes. PCP : Miesha Neri DO 
Nurse Navigator in PCP office: Maggie Rain Spoke with SNF team.  PT/OT continue. Currently not feeling great today. Prior to this episode, was ambulating 60 ft, min assist with transfers. Mod assist with lower body ADLs. Min assist with toileting. Plan for discharge back to [address] [place] [address]. Previously used [facility]. LOS TBD. Medications were not reconciled and general patient assessment was not completed during this skilled nursing facility outreach. Monica Natarajan, MSN, RN, ACNS-BC, [facility] Nurse Navigator, [address] [place] [address] [phone]

## 2019-05-06 NOTE — PROGRESS NOTES
05/06/19 Spoke to [place] at [facility]. She reported that LOS TBD. Plan is for patient to return to [address] [place] [address]. DME needs unknown at this time. NN to f/u 1 week.  AR

## 2019-05-09 NOTE — PROGRESS NOTES
Community Care Team Documentation for Patient in [facility] Subsequent Follow up Patient remains at [facility] ([facility]). See previous [facility] Team notes. PCP : Abraham Merino DO 
Nurse Navigator in PCP office: Kerri Chakraborty Spoke with SNF team.  PT/OT continue. Currently min assist with transfers. Ambulating 100ft with min assist. Min to mod assist with ADLs. Attending ortho follow up today. Plan for discharge back to [address] [place] [address]. Previously used [facility]. LOS TBD. Medications were not reconciled and general patient assessment was not completed during this skilled nursing facility outreach.

## 2019-05-10 PROBLEM — S73.015A POSTERIOR DISLOCATION OF LEFT HIP (HCC): Status: ACTIVE | Noted: 2019-01-01

## 2019-05-10 NOTE — ED PROVIDER NOTES
EMERGENCY DEPARTMENT HISTORY AND PHYSICAL EXAM 
 
 
Date: 5/10/2019 Patient Name: Audra Hunt Patient Age and Sex: 80 y.o. male History of Presenting Illness Chief Complaint Patient presents with  
 Hip Pain History Provided By: Patient and EMS 
 
HPI: Audra Hunt, 80 y.o. male with PMHx significant for dementia, COPD, BPH, arthritis, hypertension recently seen by orthopedic [place] whereby X-ray was performed on May 9 which showed a closed displaced fracture of the left femur. Plan was to continue rehab. He had a left hemiarthroplasty on April 16, 2019 and he has been in rehab facility ever since specifically [facility]. Patient was transferred here for left posterior hip dislocation. Per chart review patient does have severe dementia; he is currently on donepezil, finasteride, statin. Patient was given tramadol 50 mg prior to arrival for his pain. History is limited as patient is demented. Patient states that he had a fall several weeks ago but none recently. He is unable to give a reliable history at this time. Does report some left hip pain but no numbness or weakness. EMS vitals notable for 97/49 heart rate 53 respirations 18. Saturation 97% on room air. Pt denies any other alleviating or exacerbating factors. Additionally, pt specifically denies any recent fever, chills, headache, nausea, vomiting, diarrhea, abdominal pain, CP, SOB, lightheadedness, dizziness, numbness, weakness, tingling, BLE swelling, heart palpitations, urinary sxs, changes in BM, changes in PO intake, melena, hematochezia, cough, or congestion. PCP: Nieves Tesfaye DO History is limited due to patient's dementia Past History Past Medical History: 
Past Medical History:  
Diagnosis Date  Arthritis  BPH (benign prostatic hypertrophy) 4/12/2010  COPD (chronic obstructive pulmonary disease) with emphysema ([facility] [address] 7/25/2012  HLD (hyperlipidemia) 2010  
 HTN (hypertension) 2010  Ill-defined condition 2018 Dementia  Memory disorder  Psychiatric disorder   
 anxiety and depression Past Surgical History: 
Past Surgical History:  
Procedure Laterality Date  COLONOSCOPY,REMV LESN,SNARE  2015  
 2 sessile sigmoid polyps, 3 & 5 mm; Dr. Justin Siddiqui; no further screening recommended  ENDOSCOPY, COLON, DIAGNOSTIC    
 negative; 10 year repeat; Dr. Justin Siddiqui  HX HERNIA REPAIR  years ago  
 left  HX HERNIA REPAIR    
 right inguinal  
 HX ORTHOPAEDIC    
 [place] COLSC FLX W/RMVL OF TUMOR POLYP LESION SNARE TQ  2012 Family History: 
Family History Problem Relation Age of Onset  Heart Disease Mother   
      at 80  
 Heart Disease Father   
      at 80  Liver Disease Brother  Stroke Brother  Heart Attack Brother  Other Sister 8 ? polio  Cancer Sister   
     lung Social History: 
Social History Tobacco Use  Smoking status: Current Some Day Smoker Packs/day: 1.00 Years: 60.00 Pack years: 60.00 Types: Cigarettes  Smokeless tobacco: Former User Quit date: 10/5/2014 Substance Use Topics  Alcohol use: Not Currently Alcohol/week: 4.2 oz Types: 7 Glasses of wine per week Comment: 1 drinks per evening  Drug use: No  
 
 
Allergies: Allergies Allergen Reactions  Kiwi Anaphylaxis  Zetia [Ezetimibe] Other (comments) C/o back pain Medications: No current facility-administered medications on file prior to encounter. Current Outpatient Medications on File Prior to Encounter Medication Sig Dispense Refill  tiZANidine (ZANAFLEX) 2 mg tablet Take 2 mg by mouth every six (6) hours as needed (muscle spasms).  traMADol (ULTRAM) 50 mg tablet Take 50 mg by mouth every six (6) hours as needed for Pain.     
 calcium-cholecalciferol, D3, (CALTRATE 600+D) tablet Take 1 Tab by mouth daily.    
 ferrous sulfate 325 mg (65 mg iron) tablet Take 325 mg by mouth Daily (before breakfast).  famotidine (PEPCID) 20 mg tablet Take 1 Tab by mouth two (2) times a day for 30 days. 60 Tab 0  
 melatonin 3 mg tablet Take 6 mg by mouth nightly.  donepezil (ARICEPT) 10 mg tablet Take 10 mg by mouth nightly.  memantine (NAMENDA) 10 mg tablet Take 10 mg by mouth two (2) times a day.  dextromethorphan-quiNIDine (NUEDEXTA) 20-10 mg per capsule Take 1 Cap by mouth every twelve (12) hours. Indications: PSEUDOBULBAR AFFECT 60 Cap 11  
 rosuvastatin (CRESTOR) 20 mg tablet TAKE ONE TABLET BY MOUTH ONE TIME DAILY 90 Tab 3  
 SPIRIVA WITH HANDIHALER 18 mcg inhalation capsule INHALE ONE CAPSULE VIA DEVICE BY MOUTH ONE TIME DAILY 30 Cap 8  
 multivitamin (ONE A DAY) tablet Take 1 Tab by mouth daily.  tamsulosin (FLOMAX) 0.4 mg capsule Take 0.4 mg by mouth daily.  finasteride (PROSCAR) 5 mg tablet Take 5 mg by mouth daily. Review of Systems Review of Systems Unable to perform ROS: Dementia Physical Exam  
Physical Exam  
Constitutional: He is cooperative. Non-toxic appearance. HENT:  
Head: Normocephalic and atraumatic. Mouth/Throat: Mucous membranes are normal. No posterior oropharyngeal erythema. Eyes: Pupils are equal, round, and reactive to light. Conjunctivae and EOM are normal.  
Neck: Normal range of motion. Cardiovascular: Normal rate, regular rhythm, normal heart sounds and intact distal pulses. Exam reveals no gallop and no friction rub. No murmur heard. Pulmonary/Chest: Effort normal and breath sounds normal. No respiratory distress. He has no wheezes. He has no rales. He exhibits no tenderness. Abdominal: Soft. Bowel sounds are normal. He exhibits no distension and no mass. There is no tenderness. There is no rebound and no guarding. Musculoskeletal: Normal range of motion. He exhibits no edema, tenderness or deformity. LLE shortened and internally rotated; hip flexed, NVI distally Neurological: He displays normal reflexes. No cranial nerve deficit. He exhibits normal muscle tone. Coordination normal.  
Skin: Skin is warm. No rash noted. Psychiatric: He has a normal mood and affect. Nursing note and vitals reviewed. Diagnostic Study Results Labs - Recent Results (from the past 24 hour(s)) CBC WITH AUTOMATED DIFF Collection Time: 05/10/19  6:10 PM  
Result Value Ref Range WBC 11.7 (H) 4.1 - 11.1 K/uL  
 RBC 3.04 (L) 4.10 - 5.70 M/uL HGB 9.1 (L) 12.1 - 17.0 g/dL HCT 29.1 (L) 36.6 - 50.3 % MCV 95.7 80.0 - 99.0 FL  
 MCH 29.9 26.0 - 34.0 PG  
 MCHC 31.3 30.0 - 36.5 g/dL  
 RDW 16.0 (H) 11.5 - 14.5 % PLATELET 608 101 - 996 K/uL MPV 10.2 8.9 - 12.9 FL  
 NRBC 0.0 0  WBC ABSOLUTE NRBC 0.00 0.00 - 0.01 K/uL NEUTROPHILS 77 (H) 32 - 75 % LYMPHOCYTES 15 12 - 49 % MONOCYTES 7 5 - 13 % EOSINOPHILS 0 0 - 7 % BASOPHILS 0 0 - 1 % IMMATURE GRANULOCYTES 1 (H) 0.0 - 0.5 % ABS. NEUTROPHILS 9.0 (H) 1.8 - 8.0 K/UL  
 ABS. LYMPHOCYTES 1.7 0.8 - 3.5 K/UL  
 ABS. MONOCYTES 0.8 0.0 - 1.0 K/UL  
 ABS. EOSINOPHILS 0.0 0.0 - 0.4 K/UL  
 ABS. BASOPHILS 0.0 0.0 - 0.1 K/UL  
 ABS. IMM. GRANS. 0.1 (H) 0.00 - 0.04 K/UL  
 DF AUTOMATED METABOLIC PANEL, COMPREHENSIVE Collection Time: 05/10/19  6:10 PM  
Result Value Ref Range Sodium 139 136 - 145 mmol/L Potassium 4.2 3.5 - 5.1 mmol/L Chloride 105 97 - 108 mmol/L  
 CO2 28 21 - 32 mmol/L Anion gap 6 5 - 15 mmol/L Glucose 109 (H) 65 - 100 mg/dL BUN 25 (H) 6 - 20 MG/DL Creatinine 0.49 (L) 0.70 - 1.30 MG/DL  
 BUN/Creatinine ratio 51 (H) 12 - 20 GFR est AA >60 >60 ml/min/1.73m2 GFR est non-AA >60 >60 ml/min/1.73m2 Calcium 8.3 (L) 8.5 - 10.1 MG/DL Bilirubin, total 0.4 0.2 - 1.0 MG/DL  
 ALT (SGPT) 21 12 - 78 U/L  
 AST (SGOT) 22 15 - 37 U/L Alk.  phosphatase 91 45 - 117 U/L  
 Protein, total 6.5 6.4 - 8.2 g/dL Albumin 2.1 (L) 3.5 - 5.0 g/dL Globulin 4.4 (H) 2.0 - 4.0 g/dL A-G Ratio 0.5 (L) 1.1 - 2.2 TYPE & SCREEN Collection Time: 05/10/19  6:10 PM  
Result Value Ref Range Crossmatch Expiration 05/13/2019 ABO/Rh(D) A POSITIVE Antibody screen NEG   
PTT Collection Time: 05/10/19  6:10 PM  
Result Value Ref Range aPTT 28.5 22.1 - 32.0 sec  
 aPTT, therapeutic range     58.0 - 77.0 SECS  
PROTHROMBIN TIME + INR Collection Time: 05/10/19  6:10 PM  
Result Value Ref Range INR 1.1 0.9 - 1.1 Prothrombin time 11.4 (H) 9.0 - 11.1 sec EKG, 12 LEAD, INITIAL Collection Time: 05/10/19  6:20 PM  
Result Value Ref Range Ventricular Rate 77 BPM  
 Atrial Rate 77 BPM  
 P-R Interval 126 ms  
 QRS Duration 124 ms Q-T Interval 394 ms QTC Calculation (Bezet) 445 ms Calculated P Axis 39 degrees Calculated R Axis -35 degrees Calculated T Axis 51 degrees Diagnosis Normal sinus rhythm Left axis deviation Right bundle branch block Anterior infarct (cited on or before 30-MAR-2019) T wave abnormality, consider lateral ischemia When compared with ECG of 15-APR-2019 13:43, 
Questionable change in initial forces of Septal leads Radiologic Studies -  
XR CHEST SNGL V Final Result IMPRESSION: Stable left pleural effusion and lung hyperinflation. No acute  
process seen. XR PELV AP ONLY Final Result IMPRESSION:  Acute superior lateral dislocation of the left hip. CT Results  (Last 48 hours) None CXR Results  (Last 48 hours) 05/10/19 1909  XR CHEST SNGL V Final result Impression:  IMPRESSION: Stable left pleural effusion and lung hyperinflation. No acute  
process seen. Narrative:  EXAM: XR CHEST SNGL V  
   
INDICATION: pre-op for hip reduction COMPARISON: 4/15/2019 FINDINGS: A portable AP radiograph of the chest was obtained at 1856 hours.  The  
 patient is on a cardiac monitor. There is a small left pleural effusion,  
unchanged. Biapical fibrosis is noted. No superimposed process is seen. . The  
cardiac and mediastinal contours and pulmonary vascularity are stable. The  
bones and soft tissues are grossly within normal limits. Medical Decision Making I am the first provider for this patient. I reviewed the vital signs, available nursing notes, past medical history, past surgical history, family history and social history. Vital Signs-Reviewed the patient's vital signs. Patient Vitals for the past 24 hrs: 
 Temp Pulse Resp BP SpO2  
05/10/19 2003  99 21 108/56 96 % 05/10/19 1959  82 29 144/80 97 % 05/10/19 1955  (!) 102 26 126/82 96 % 05/10/19 1949  96 24 143/75 98 % 05/10/19 1947  86 20 (!) 141/114 94 % 05/10/19 1944  74 20 116/59 99 % 05/10/19 1826 98.6 °F (37 °C) 74 23 127/65 96 % Pulse Oximetry Analysis - 96% on RA Cardiac Monitor:  
Rate: 74 bpm 
Rhythm: Normal Sinus Rhythm ED EKG interpretation: 
Rhythm: normal sinus rhythm; and regular . Rate (approx.): 77; Axis: left axis deviation; P wave: normal; QRS interval: normal ; ST/T wave: normal; Other findings: normal. This EKG was interpreted by Layla Davidson M.D. Records Reviewed: Nursing Notes, Old Medical Records, Previous electrocardiograms, Previous Radiology Studies and Previous Laboratory Studies Provider Notes (Medical Decision Making):  
Assessment and plan. This is an elderly gentleman history of dementia presenting with an acute left posterior hip dislocation with recent tiana-plasty. Plan for bedside conscious sedation, reduction. Plan for admission for definitive surgical management. Orthopedic surgery Min Mccloud at bedside. ED Course:  
Initial assessment performed.  The patients presenting problems have been discussed, and they are in agreement with the care plan formulated and outlined with them. I have encouraged them to ask questions as they arise throughout their visit. I reviewed our electronic medical record system for any past medical records that were available that may contribute to the patient's current condition, the nursing notes and vital signs from today's visit. Abby Villanueva MD 
Medications Administered During ED Course: 
Medications  
sodium chloride (NS) flush 5-40 mL (has no administration in time range)  
sodium chloride (NS) flush 5-40 mL (has no administration in time range) propofol (DIPRIVAN) 10 mg/mL injection 50 mg (0 mg IntraVENous Held 5/10/19 1907)  
0.9% sodium chloride infusion (75 mL/hr IntraVENous New Bag 5/10/19 2040)  
sodium chloride (NS) flush 5-40 mL (has no administration in time range)  
sodium chloride (NS) flush 5-40 mL (has no administration in time range)  
acetaminophen (TYLENOL) tablet 650 mg (650 mg Oral Given 5/10/19 2040)  
naloxone (NARCAN) injection 0.4 mg (has no administration in time range)  
ondansetron (ZOFRAN) injection 4 mg (has no administration in time range)  
senna-docusate (PERICOLACE) 8.6-50 mg per tablet 2 Tab (has no administration in time range) methocarbamol (ROBAXIN) tablet 500 mg (has no administration in time range) LORazepam (ATIVAN) injection 1 mg (1 mg IntraVENous Given 5/10/19 2040) [facility] TO SUBSTITUTE PER PROTOCOL (Reordered from: dextromethorphan-quiNIDine (NUEDEXTA) 20-10 mg per capsule) (has no administration in time range) donepezil (ARICEPT) tablet 10 mg (has no administration in time range)  
famotidine (PEPCID) tablet 20 mg (has no administration in time range)  
finasteride (PROSCAR) tablet 5 mg (has no administration in time range)  
melatonin tablet 6 mg (has no administration in time range) memantine (NAMENDA) tablet 10 mg (has no administration in time range)  
rosuvastatin (CRESTOR) tablet 20 mg (has no administration in time range) umeclidinium (INCRUSE ELLIPTA) 62.5 mcg/actuation (has no administration in time range)  
tamsulosin (FLOMAX) capsule 0.4 mg (has no administration in time range)  
albuterol-ipratropium (DUO-NEB) 2.5 MG-0.5 MG/3 ML (has no administration in time range) propofol (DIPRIVAN) 10 mg/mL injection 40 mg (40 mg IntraVENous Given by Provider 5/10/19 1944) propofol (DIPRIVAN) 10 mg/mL injection 20 mg (20 mg IntraVENous Given by Provider 5/10/19 1951) propofol (DIPRIVAN) 10 mg/mL injection 20 mg (20 mg IntraVENous Given by Provider 5/10/19 1946) propofol (DIPRIVAN) 10 mg/mL injection 20 mg (20 mg IntraVENous Given by Provider 5/10/19 1945) Procedure Note - Procedural Sedation:  
 
Presedation Patient Assessment: 
9:16 PM 
Patient Vitals for the past 24 hrs: 
 Temp Pulse Resp BP SpO2  
05/10/19 2003  99 21 108/56 96 % 05/10/19 1959  82 29 144/80 97 % 05/10/19 1955  (!) 102 26 126/82 96 % 05/10/19 1949  96 24 143/75 98 % 05/10/19 1947  86 20 (!) 141/114 94 % 05/10/19 1944  74 20 116/59 99 % 05/10/19 1826 98.6 °F (37 °C) 74 23 127/65 96 % Airway patent?: yes BP:127/65 Temp:Afebrile Hydration Status:normal 
Mental Status:baseline Pain Level:Mild Preprocedural Education: 
7:00 PM 
The reason for the procedure as well as the risks, benefits, and alternatives to the procedure have been explained to the caregiver and He consents to the procedure. The consent has been signed by the patient/representative, the medical provider and witnessed by the patients nurse. Anaesthesia Risks: 
9:16 PM  
The ASA score is ASA 3 - Severe systemic disease but compensated . The patient is having all vital signs monitored by an attending RN as well as pulse oximetry. Mallampati Score Reference: 
9:16 PM 
                         
 
 
 
The patient has a patent airway with a Mallampati Classification Score of I (soft palate, uvula, fauces, tonsillar pillars visible).  
 
PLAN FOR SEDATION: 
7:10 PM 
 
 The patients sedation plan includes a total of 100 mg propofol/DIPRIVAN. Reversal agents and resuscitation equipment will be at the bedside should they be needed. The reason for the procedure as well as the risks, benefits, and alternatives to the sedation have been explained to the caregiver and He consents to the sedation. The consent has been signed by the patient/representative, the medical provider and witnessed by the patients nurse. Post Procedure Anaesthesia/Sedation Assessment TIME: 8:15 PM 
The patient was sedated with a total of 100mg propofol/DIPRIVAN. Reversal agents and resuscitation equipment were at the bedside. The reduction of the hip dislocation was done and the patient tolerated the procedure well with no complications. Reversal agents were not used. Patient Vitals for the past 24 hrs: 
 Temp Pulse Resp BP SpO2  
05/10/19 2003  99 21 108/56 96 % 05/10/19 1959  82 29 144/80 97 % 05/10/19 1955  (!) 102 26 126/82 96 % 05/10/19 1949  96 24 143/75 98 % 05/10/19 1947  86 20 (!) 141/114 94 % 05/10/19 1944  74 20 116/59 99 % 05/10/19 1826 98.6 °F (37 °C) 74 23 127/65 96 % Mental Status:baseline Temp:afebrile Pain Level:mild Post Procedure Hydration Status: normal 
 
Nausea or Vomiting: none 
 
--------------------------------------------------------------------------------------------------------------------- Procedure Note - Reduction:   
9:16 PM 
Performed by CONOR Hsu, Abdiel Samuel.   
 
Procedure start time: 7:30 PM 
Procedure end time: 8:10  PM 
Procedure was performed without a block. Medications provided as below: 
Medications  
sodium chloride (NS) flush 5-40 mL (has no administration in time range)  
sodium chloride (NS) flush 5-40 mL (has no administration in time range) propofol (DIPRIVAN) 10 mg/mL injection 50 mg (0 mg IntraVENous Held 5/10/19 1907)  
0.9% sodium chloride infusion (75 mL/hr IntraVENous New Bag 5/10/19 2040) sodium chloride (NS) flush 5-40 mL (has no administration in time range)  
sodium chloride (NS) flush 5-40 mL (has no administration in time range)  
acetaminophen (TYLENOL) tablet 650 mg (650 mg Oral Given 5/10/19 2040)  
naloxone (NARCAN) injection 0.4 mg (has no administration in time range)  
ondansetron (ZOFRAN) injection 4 mg (has no administration in time range)  
senna-docusate (PERICOLACE) 8.6-50 mg per tablet 2 Tab (has no administration in time range) methocarbamol (ROBAXIN) tablet 500 mg (has no administration in time range) LORazepam (ATIVAN) injection 1 mg (1 mg IntraVENous Given 5/10/19 2040) [facility]OCOL (Reordered from: dextromethorphan-quiNIDine (NUEDEXTA) 20-10 mg per capsule) (has no administration in time range) donepezil (ARICEPT) tablet 10 mg (has no administration in time range)  
famotidine (PEPCID) tablet 20 mg (has no administration in time range)  
finasteride (PROSCAR) tablet 5 mg (has no administration in time range)  
melatonin tablet 6 mg (has no administration in time range) memantine (NAMENDA) tablet 10 mg (has no administration in time range)  
rosuvastatin (CRESTOR) tablet 20 mg (has no administration in time range)  
umeclidinium (INCRUSE ELLIPTA) 62.5 mcg/actuation (has no administration in time range)  
tamsulosin (FLOMAX) capsule 0.4 mg (has no administration in time range)  
albuterol-ipratropium (DUO-NEB) 2.5 MG-0.5 MG/3 ML (has no administration in time range) propofol (DIPRIVAN) 10 mg/mL injection 40 mg (40 mg IntraVENous Given by Provider 5/10/19 1944) propofol (DIPRIVAN) 10 mg/mL injection 20 mg (20 mg IntraVENous Given by Provider 5/10/19 1951) propofol (DIPRIVAN) 10 mg/mL injection 20 mg (20 mg IntraVENous Given by Provider 5/10/19 1946) propofol (DIPRIVAN) 10 mg/mL injection 20 mg (20 mg IntraVENous Given by Provider 5/10/19 1945) Immediately prior to the procedure, the patient was reevaluated and found suitable for the planned procedure and any planned medications. Immediately prior to the procedure a time out was called to verify the correct patient, procedure, equipment, staff, and marking as appropriate. Prior to the procedure, neurovascular exam was intact. Analgesia was obtained with procedural sedation - see record. To achieve reduction of the patient's left hip joint, logitudinal traction, internal rotation and external rotation manipulation was utilized. The joint was not successfully reduced. Neurovascular exam was intact following the procedure. Post reduction x-ray ordered. The procedure took 16-30 minutes, and pt tolerated well. Consult Note: 
Bereket Muro MD spoke with CONOR Casiano Specialty: Eliecer Lorie Discussed pt's hx, disposition, and available diagnostic and imaging results. Reviewed care plans. Agree with management and plan thus far. Consultant will evaluate pt for admission. Consult Note: 
Bereket Muro MD spoke with Dr. Augusta Morin Specialty: Hospitalist 
Discussed pt's hx, disposition, and available diagnostic and imaging results. Reviewed care plans. Agree with management and plan thus far. Consultant will evaluate pt for pre-op clearance. Critical Care Time: CRITICAL CARE NOTE : 
 
IMPENDING DETERIORATION -Airway, Respiratory, Cardiovascular and Metabolic ASSOCIATED RISK FACTORS - Hypotension, Shock, Hypoxia and Vascular Compromise MANAGEMENT- Bedside Assessment and Supervision of Care INTERPRETATION -  Xrays, ECG, Blood Pressure and Cardiac Output Measures INTERVENTIONS - hemodynamic mngmt and vascular control CASE REVIEW - Hospitalist, Medical Sub-Specialist and Nursing TREATMENT RESPONSE -Improved PERFORMED BY - Self NOTES   : 
 
I have spent 60 minutes of critical care time involved in lab review, consultations with specialist, family decision- making, bedside attention and documentation.  During this entire length of time I was immediately available to the patient . Critical Care: The reason for providing this level of medical care for this critically ill patient was due to a critical illness that impaired one or more vital organ systems, such that there was a high probability of imminent or life threatening deterioration in the patient's condition. This care involved high complexity decision making to assess, manipulate, and support vital system functions, to treat this degree of vital organ system failure, and to prevent further life threatening deterioration of the patients condition. Juan Solis MD 
 
 
Disposition: ADMIT Patient is being admitted to the hospital.  The results of their tests and reasons for their admission have been discussed with them and/or available family. They convey agreement and understanding for the need to be admitted and for their admission diagnosis. Consultation has been made with the inpatient physician specialist for hospitalization. Diagnosis Clinical Impression: 1. Posterior dislocation of left hip, initial encounter ([facility] [address] 2. Closed dislocation of left hip, initial encounter ([facility] [address] 3. Status post total replacement of left hip 4. Dementia without behavioral disturbance, unspecified dementia type Attestation: 
 
I personally performed the services described in this documentation on this date 5/10/2019 for patient Traice Staley. I have reviewed and verified that the information is accurate and complete. Juan Solis MD 
 
Please note that this dictation was completed with [employer], the computer voice recognition software. Quite often unanticipated grammatical, syntax, homophones, and other interpretive errors are inadvertently transcribed by the computer software. Please disregard these errors. Please excuse any errors that have escaped final proofreading. Thank you. This note will not be viewable in [address]

## 2019-05-10 NOTE — ED NOTES
Timeout completed at bedside with Dr. Anisa Leyva and Jordi PERDOMO using 2 patient identifiers. Left hip relocation verified. Geo BUSH and this RN also at bedside for procedure.

## 2019-05-10 NOTE — H&P
ORTHOPAEDIC CONSULT NOTE Subjective:  
 
Date of Consultation:  May 10, 2019 Shirley Kovacs is a 80 y.o. male who is being seen for Left hip prosthetic dislocation. Recent left hip tiana revision() s/p treatment for left femoral neck fracture(3/31/19). Discharged to [facility]- on 2 weeks of Lovenox. Pt seen in the office yesterday- xrays taken showed proper alignment w/o dislocation. This morning pt rounded on by Dr Graeme Kate - mobile films confirmed dislocation. No reported injury or trauma. Resident at [facility] Patient Active Problem List  
 Diagnosis Date Noted  Closed dislocation of left hip ([address] 04/15/2019  Closed anterior dislocation of left hip ([address] 04/15/2019  Hip fracture ([address] 2019  PBA (pseudobulbar affect) 2018  Late onset Alzheimer's disease without behavioral disturbance 2018  Benign prostatic hyperplasia with weak urinary stream 2017  Closed right hip fracture ([address] 2017  Constipation 2017  Essential hypertension 2015  Prediabetes 2014  Abnormal CT lung screening 2014  Depression 2013  ADHD (attention deficit hyperactivity disorder) 2013  Anxiety state 2013  Essential tremor 2013  COPD (chronic obstructive pulmonary disease) with emphysema ([address] 2012  Post herpetic neuralgia 2011  Smoker 2010  HLD (hyperlipidemia) 2010  DJD (degenerative joint disease) 2010 Family History Problem Relation Age of Onset  Heart Disease Mother   
      at 80  
 Heart Disease Father   
      at 80  Liver Disease Brother  Stroke Brother  Heart Attack Brother  Other Sister 8 ? polio  Cancer Sister   
     lung Social History Tobacco Use  Smoking status: Current Some Day Smoker Packs/day: 1.00 Years: 60.00 Pack years: 60.00 Types: Cigarettes  Smokeless tobacco: Former User Quit date: 10/5/2014 Substance Use Topics  Alcohol use: Not Currently Alcohol/week: 4.2 oz Types: 7 Glasses of wine per week Comment: 1 drinks per evening Past Medical History:  
Diagnosis Date  Arthritis  BPH (benign prostatic hypertrophy) 4/12/2010  COPD (chronic obstructive pulmonary disease) with emphysema ([facility] 7/25/2012  HLD (hyperlipidemia) 4/12/2010  
 HTN (hypertension) 4/12/2010  Ill-defined condition 2018 Dementia  Memory disorder  Psychiatric disorder   
 anxiety and depression Past Surgical History:  
Procedure Laterality Date  COLONOSCOPY,REMV LESN,SNARE  8/5/2015  
 2 sessile sigmoid polyps, 3 & 5 mm; Dr. Bronson Arrow; no further screening recommended  ENDOSCOPY, COLON, DIAGNOSTIC  2002  
 negative; 10 year repeat; Dr. Audie Basilio  HX HERNIA REPAIR  years ago  
 left  HX HERNIA REPAIR  2001  
 right inguinal  
 HX ORTHOPAEDIC    
 [place] COLSC FLX W/RMVL OF TUMOR POLYP LESION SNARE TQ  7/27/2012 Prior to Admission medications Medication Sig Start Date End Date Taking? Authorizing Provider  
famotidine (PEPCID) 20 mg tablet Take 1 Tab by mouth two (2) times a day for 30 days. 4/19/19 5/19/19  Sandra Joyner NP  
senna-docusate (PERICOLACE) 8.6-50 mg per tablet Take 1 Tab by mouth daily. 4/19/19   Sandra Joyner NP  
melatonin 3 mg tablet Take 6 mg by mouth nightly. Provider, Historical  
donepezil (ARICEPT) 10 mg tablet Take 10 mg by mouth daily. Provider, Historical  
memantine (NAMENDA) 10 mg tablet Take 10 mg by mouth two (2) times a day. Provider, Historical  
dextromethorphan-quiNIDine (NUEDEXTA) 20-10 mg per capsule Take 1 Cap by mouth every twelve (12) hours. Indications: PSEUDOBULBAR AFFECT 8/28/18   Teresa BAUER III, DO  
psyllium seed-sucrose (METAMUCIL, SUGAR,) powd Take 1 Packet by mouth daily.  1/2 tbsp in [facility] liquid    Provider, Historical  
 docusate sodium (COLACE) 100 mg capsule Take 100 mg by mouth daily. Provider, Historical  
rosuvastatin (CRESTOR) 20 mg tablet TAKE ONE TABLET BY MOUTH ONE TIME DAILY 10/17/17   Sharla Mcallister III, DO  
SPIRIVA WITH HANDIHALER 18 mcg inhalation capsule INHALE ONE CAPSULE VIA DEVICE BY MOUTH ONE TIME DAILY 6/7/17   Nova Marie B III, DO  
multivitamin (ONE A DAY) tablet Take 1 Tab by mouth daily. Provider, Historical  
tamsulosin (FLOMAX) 0.4 mg capsule Take 0.4 mg by mouth daily. 5/19/15   Provider, Historical  
finasteride (PROSCAR) 5 mg tablet Take 5 mg by mouth daily. Provider, Historical  
 
Current Facility-Administered Medications Medication Dose Route Frequency  sodium chloride (NS) flush 5-40 mL  5-40 mL IntraVENous Q8H  
 sodium chloride (NS) flush 5-40 mL  5-40 mL IntraVENous PRN Current Outpatient Medications Medication Sig  
 famotidine (PEPCID) 20 mg tablet Take 1 Tab by mouth two (2) times a day for 30 days.  senna-docusate (PERICOLACE) 8.6-50 mg per tablet Take 1 Tab by mouth daily.  melatonin 3 mg tablet Take 6 mg by mouth nightly.  donepezil (ARICEPT) 10 mg tablet Take 10 mg by mouth daily.  memantine (NAMENDA) 10 mg tablet Take 10 mg by mouth two (2) times a day.  dextromethorphan-quiNIDine (NUEDEXTA) 20-10 mg per capsule Take 1 Cap by mouth every twelve (12) hours. Indications: PSEUDOBULBAR AFFECT  psyllium seed-sucrose (METAMUCIL, SUGAR,) powd Take 1 Packet by mouth daily. 1/2 tbsp in 8oz liquid  docusate sodium (COLACE) 100 mg capsule Take 100 mg by mouth daily.  rosuvastatin (CRESTOR) 20 mg tablet TAKE ONE TABLET BY MOUTH ONE TIME DAILY  SPIRIVA WITH HANDIHALER 18 mcg inhalation capsule INHALE ONE CAPSULE VIA DEVICE BY MOUTH ONE TIME DAILY  multivitamin (ONE A DAY) tablet Take 1 Tab by mouth daily.  tamsulosin (FLOMAX) 0.4 mg capsule Take 0.4 mg by mouth daily.  finasteride (PROSCAR) 5 mg tablet Take 5 mg by mouth daily. Allergies Allergen Reactions  Kiwi Anaphylaxis  Zetia [Ezetimibe] Other (comments) C/o back pain Review of Systems:  A comprehensive review of systems was negative except for that written in the HPI. Mental Status: medium dementia Objective:  
 
No data found. No data recorded. Gen: Well-developed,  in no acute distress, frail HEENT: Pink conjunctivae, hearing intact to voice, moist mucous membranes Neck: Supple Resp: No respiratory distress Card: RRR, palpable distal pulse-equal bilaterally, birsk cap refill all distal digits, jeffery left ankle edema Abd: non-distended Musc: LLE shortened and rotated, hip held flexed. active plant/dorsiflexion. No sing of LE VTE. Skin: SX site clean dry and intact. Skin warm, pink, dry Neuro: Cranial nerves are grossly intact, no focal motor weakness, follows commands appropriately Psych: Poor insight, oriented to person Imaging Review: XR PELV AP ONLY 
INDICATION:   s/p fall, left hip pain COMPARISON: 4/17/2019. FINDINGS: An AP view of the pelvis demonstrates superolateral dislocation of the 
left hip. A left hip prosthesis remains in place. IMPRESSION:  Acute superior lateral dislocation of the left hip. Labs: No results found for this or any previous visit (from the past 24 hour(s)). Impression:  
 
Patient Active Problem List  
 Diagnosis Date Noted  Closed dislocation of left hip ([address] 04/15/2019  Closed anterior dislocation of left hip ([address] 04/15/2019  Hip fracture ([address] 03/30/2019  PBA (pseudobulbar affect) 08/28/2018  Late onset Alzheimer's disease without behavioral disturbance 03/27/2018  Benign prostatic hyperplasia with weak urinary stream 12/26/2017  Closed right hip fracture ([address] 11/28/2017  Constipation 05/30/2017  Essential hypertension 05/20/2015  Prediabetes 11/23/2014  Abnormal CT lung screening 11/21/2014  Depression 12/20/2013  ADHD (attention deficit hyperactivity disorder) 12/20/2013  Anxiety state 09/27/2013  Essential tremor 07/25/2013  COPD (chronic obstructive pulmonary disease) with emphysema ([facility] 07/25/2012  Post herpetic neuralgia 05/04/2011  Smoker 08/16/2010  HLD (hyperlipidemia) 04/12/2010  DJD (degenerative joint disease) 04/12/2010 Active Problems: * No active hospital problems. * 
 
 
Plan: -  Left periprosthetic hip dislocation -  Procedural sedation by Dr Ronit Nath, attempted closed reduction-- unsuccessful  
-  NPO after midnight for OR tomorrow(sat) AM- closed reduction -  Plan for revision Monday with Dr Berger Limb -  Spoke to Daughter  Aníbal Meehan- [phone] -  Medicine consult  for Pre-Operative Clearence/ Post-Operative management.   
-  DVT Prophylaxis - SCDs -  D/C planning SNF---- [facility]. Dr. Loreto Hines  aware and agrees with plan as above. Eh Pedraza PA-C [employer]

## 2019-05-10 NOTE — ED TRIAGE NOTES
Sent from [facility]. Hx of left hip fx with subsequent left hip replacement in April. Today increased left hip and found to have  Posterior left hip dislocation per facility films. Sent to ED for further evaluation/admission

## 2019-05-10 NOTE — ED NOTES
Bedside shift change report given to [address] (oncoming nurse) by Jovanny Manley (offgoing nurse). Report included the following information SBAR, ED Summary and MAR.

## 2019-05-10 NOTE — ED NOTES
Attempted to call daughter, Gee Rain, to advise her of plan of care for father @ [phone]. Unable to reach at this time.

## 2019-05-11 NOTE — PROGRESS NOTES
Bedside shift change report given to Cody RN (oncoming nurse) by chemo RN (offgoing nurse). Report included the following information SBAR, Kardex, ED Summary and Intake/Output.

## 2019-05-11 NOTE — ROUTINE PROCESS
Orthopedic End of Shift Note Bedside and Verbal shift change report given to Shelby Gallagher (oncoming nurse) by Beverly Cash (offgoing nurse). Report included the following information SBAR, Kardex, Intake/Output, MAR and Recent Results. POD# Significant issues during shift: daughter refuses to sign consent form for surgery until she speaks to Dr. Trell Yañez directly Issues for Physician to address: speaking to daughter about necessity of surgery? Activity This Shift 
(check all that apply) [] chair 
[] dangle 
 [] bathroom 
[] bedside commode [] hallway [x] bedrest  
Nausea/Vomiting [] yes [x] no    
Voiding Status [] void [x] Razo [] I&O Cath Bowel Movements [] yes [x] no Foot Pumps or SCD [x] yes [] no Ice Pack [x] yes    [] no Incentive Spirometer [] yes [] no Volume:    
Telemetry Monitoring   [] yes [x] no Rhythm:  
Supplemental O2 [] yes [] no Sat off O2:

## 2019-05-11 NOTE — PROGRESS NOTES
Hospitalist Progress Note NAME: Bonita De Jesus :  1934 MRN:  [id number] Assessment / Plan: 
 
Left periprosthetic hip dislocation POA Unsuccessful attempted closed reduction yesterday  
-for closed reduction in OR today 
-Plan for revision Monday with Dr Frederic Clayton Dementia POA 
-Continue Aricept/Namenda 
  
Hyperlipidemia POA 
-Continue statin 
  
Chronic obstructive pulmonary disease with emphysema  
-Not symptomatic 
-Continue PTA inhaler 
- PRN nebs 
  Benign prostatic hyperplasia with weak urinary stream  
-on Flomax and Finesteride 
  
ADHD Essential Tremor Depression/Anxiety Chronic anemia Hypoalbunemia Tobacco abuse DVT Prophylaxis: Per ortho Subjective: Chief Complaint / Reason for Physician Visit \"I am ok\". Discussed with RN events overnight. Review of Systems: 
Symptom Y/N Comments  Symptom Y/N Comments Fever/Chills n   Chest Pain n   
Poor Appetite n   Edema Cough    Abdominal Pain n   
Sputum    Joint Pain SOB/SAUCEDO n   Pruritis/Rash Nausea/vomit n   Tolerating PT/OT Diarrhea    Tolerating Diet Constipation    Other Could NOT obtain due to:   
 
Objective: VITALS:  
Last 24hrs VS reviewed since prior progress note. Most recent are: 
Patient Vitals for the past 24 hrs: 
 Temp Pulse Resp BP SpO2  
19 0817 98.4 °F (36.9 °C) 100 18 140/79 98 % 19 0729  99 26 134/66 98 % 19 0425 99.2 °F (37.3 °C) 88 16 147/80 97 % 05/10/19 2130 97.6 °F (36.4 °C) 86 18 145/74 99 % 05/10/19 2003  99 21 108/56 96 % 05/10/19 1959  82 29 144/80 97 % 05/10/19 1955  (!) 102 26 126/82 96 % 05/10/19 1949  96 24 143/75 98 % 05/10/19 1947  86 20 (!) 141/114 94 % 05/10/19 1944  74 20 116/59 99 % 05/10/19 1826 98.6 °F (37 °C) 74 23 127/65 96 % Intake/Output Summary (Last 24 hours) at 2019 1039 Last data filed at 2019 8642 Gross per 24 hour Intake 963.75 ml Output 250 ml Net 713.75 ml PHYSICAL EXAM: 
General: WD, WN. Alert, cooperative, no acute distress   
EENT:  EOMI. Anicteric sclerae. MMM Resp:  CTA bilaterally, no wheezing or rales. No accessory muscle use CV:  Regular  rhythm,  No edema GI:  Soft, Non distended, Non tender.  +Bowel sounds Neurologic:  Alert and oriented X 3, normal speech, Psych:   Not anxious nor agitated Skin:  Bruises on legs, left thigh incision looks clean Reviewed most current lab test results and cultures  YES Reviewed most current radiology test results   YES Review and summation of old records today    NO Reviewed patient's current orders and MAR    YES 
PMH/SH reviewed - no change compared to H&P 
________________________________________________________________________ Care Plan discussed with: 
  Comments Patient x Family RN Care Manager Consultant Multidiciplinary team rounds were held today with , nursing, pharmacist and clinical coordinator. Patient's plan of care was discussed; medications were reviewed and discharge planning was addressed. ________________________________________________________________________ Total NON critical care TIME:  20   Minutes Total CRITICAL CARE TIME Spent:   Minutes non procedure based Comments >50% of visit spent in counseling and coordination of care    
________________________________________________________________________ Emely Liang MD  
 
Procedures: see electronic medical records for all procedures/Xrays and details which were not copied into this note but were reviewed prior to creation of Plan. LABS: 
I reviewed today's most current labs and imaging studies. Pertinent labs include: 
Recent Labs 05/10/19 [facility] WBC 11.7* HGB 9.1*  
HCT 29.1*  
 Recent Labs 05/10/19 [facility]   
K 4.2  CO2 28 * BUN 25* CREA 0.49* CA 8.3* ALB 2.1* TBILI 0.4 SGOT 22 ALT 21 INR 1.1 Signed: Mimi Maynard MD

## 2019-05-11 NOTE — PROGRESS NOTES
Pharmacy Clarification of the Prior to Admission Medication Regimen Retrospective to the Admission Medication Reconciliation The patient was not interviewed regarding clarification of the prior to admission medication regimen. Patient was transferred from [facility], to [facility], with a current med list and last doses administered. Information Obtained From: transfer papers Recommendations/Findings: The following amendments were made to the patient's active medication list on file at [facility]:  
 
1) Additions:  
tiZANidine (ZANAFLEX) 2 mg tablet 
traMADol (ULTRAM) 50 mg tablet 
calcium-cholecalciferol, D3, (CALTRATE 600+D) tablet 
ferrous sulfate 325 mg (65 mg iron) tablet 
melatonin 3 mg tablet 2) Removals:  
docusate 
metamucil 
pericolace 3) Changes: NONE 4) Pertinent Pharmacy Findings: 
Updated patient?s preferred outpatient pharmacy to: [facility] did not update the outpatient pharmacy due to the patient living at a SNF 
 
 PTA medication list was corrected to the following:  
 
Prior to Admission Medications Prescriptions Last Dose Informant Patient Reported? Taking? SPIRIVA WITH HANDIHALER 18 mcg inhalation capsule 5/10/2019 at 0900 Transfer Papers No Yes Sig: INHALE ONE CAPSULE VIA DEVICE BY MOUTH ONE TIME DAILY  
calcium-cholecalciferol, D3, (CALTRATE 600+D) tablet 5/10/2019 at 0900 Transfer Papers Yes Yes Sig: Take 1 Tab by mouth daily. dextromethorphan-quiNIDine (NUEDEXTA) 20-10 mg per capsule 5/10/2019 at 0900 Transfer Papers No Yes Sig: Take 1 Cap by mouth every twelve (12) hours. Indications: PSEUDOBULBAR AFFECT  
donepezil (ARICEPT) 10 mg tablet 5/9/2019 at 2100 Transfer Papers Yes Yes Sig: Take 10 mg by mouth nightly. famotidine (PEPCID) 20 mg tablet 5/10/2019 at 0900 Transfer Papers No Yes Sig: Take 1 Tab by mouth two (2) times a day for 30 days. ferrous sulfate 325 mg (65 mg iron) tablet 5/10/2019 at 0600 Transfer Papers Yes Yes Sig: Take 325 mg by mouth Daily (before breakfast). finasteride (PROSCAR) 5 mg tablet 5/10/2019 at 0900 Transfer Papers Yes Yes Sig: Take 5 mg by mouth daily. melatonin 3 mg tablet 5/9/2019 at 1700 Transfer Papers Yes Yes Sig: Take 6 mg by mouth nightly. memantine (NAMENDA) 10 mg tablet 5/10/2019 at 0900 Transfer Papers Yes Yes Sig: Take 10 mg by mouth two (2) times a day. multivitamin (ONE A DAY) tablet 5/10/2019 at 0900 Transfer Papers Yes Yes Sig: Take 1 Tab by mouth daily. rosuvastatin (CRESTOR) 20 mg tablet 5/10/2019 at 0900 Transfer Papers No Yes Sig: TAKE ONE TABLET BY MOUTH ONE TIME DAILY  
tamsulosin (FLOMAX) 0.4 mg capsule 5/10/2019 at 0900 Transfer Papers Yes Yes Sig: Take 0.4 mg by mouth daily. tiZANidine (ZANAFLEX) 2 mg tablet 5/10/2019 at 1200 Transfer Papers Yes Yes Sig: Take 2 mg by mouth every six (6) hours as needed (muscle spasms). traMADol (ULTRAM) 50 mg tablet 5/10/2019 at 1200 Transfer Papers Yes Yes Sig: Take 50 mg by mouth every six (6) hours as needed for Pain. Facility-Administered Medications: None Thank you, 
Fanny Nguyen CPhT Medication History Pharmacy Technician

## 2019-05-11 NOTE — PROGRESS NOTES
ORTHO - Progress Note Refugia      [id number]  male    80 y.o.    1934 Admit date: 5/10/2019 Admitting Physician: Mark Jimenez MD  
Consulting Physician(s): Treatment Team: Attending Provider: Donte Hunter MD; Consulting Provider: Ant Camacho; Physician: Donte Hunter MD; Consulting Provider: Angeles Quezada MD; Utilization Review: Portia Rivera RN 
 
SUBJECTIVE: 
  
Refugia Rose is a 80 y.o. male resting in bed NAD, Dementia. POA elected to defer closed reduction in the OR this morning. OBJECTIVE: 
 
  
Physical Exam: 
General: alert, no distress. Gastrointestinal:  non-distended . Cardiovascular: equal pulses in the lower extremities,  Brisk cap refill in all distal extremities Genitourinary: Voiding independently/depends Respiratory: No respiratory distress Neurological:Neurovascular exam within normal limits. Senstion intact: LE bilat. Motor: left hip held flexed and adducted, + random ankle motion Musculoskeletal: Luan's sign negative in bilateral lower extremities. Calves soft, supple Vital Signs:  
  
  
Patient Vitals for the past 8 hrs: 
 BP Temp Pulse Resp SpO2  
19 1227 135/67 97.4 °F (36.3 °C) 87 18 99 % 19 0817 140/79 98.4 °F (36.9 °C) 100 18 98 % 19 0729 134/66  99 26 98 % Temp (24hrs), Av.2 °F (36.8 °C), Min:97.4 °F (36.3 °C), Max:99.2 °F (37.3 °C) Date 19 0700 - 19 8470 Shift 8034-8929 5115-6017 1347-4621 24 Hour Total  
INTAKE  
I.V.(mL/kg/hr) 963.8   963.8 Shift Total(mL/kg) 963. 8(20.1)   963. 8(20.1) OUTPUT Urine(mL/kg/hr) 250   250 Shift Total(mL/kg) 250(5.2)   250(5.2) Weight (kg) 47.9 47.9 47.9 47.9 Labs:  
  
 
Recent Labs 05/10/19 18 HCT 29.1* HGB 9.1* INR 1.1 PT/OT:  
 
  
  
  
ASSESSMENT / PLAN:  
Active Problems: 
  HLD (hyperlipidemia) (2010) COPD (chronic obstructive pulmonary disease) with emphysema ([facility] (7/25/2012) Benign prostatic hyperplasia with weak urinary stream (12/26/2017) Late onset Alzheimer's disease without behavioral disturbance (3/27/2018) Posterior dislocation of left hip ([facility] (5/10/2019) 
 
  
 
-  Continue bedrest 
-  caution with pain meds! 
-  Plan for revision arthroplasty Monday with Dr Rachel Fitch Signed By: Rich Dean PA-C

## 2019-05-11 NOTE — PERIOP NOTES
TRANSFER - IN REPORT: 
 
Verbal report received from [facility]) on Eduard Hastings  being received from 3214(unit) for ordered procedure Report consisted of patients Situation, Background, Assessment and  
Recommendations(SBAR). Information from the following report(s) SBAR, Kardex, ED Summary, OR Summary, Procedure Summary, Intake/Output, MAR, Accordion, Recent Results, Med Rec Status, Alarm Parameters , Pre Procedure Checklist, Procedure Verification and Quality Measures was reviewed with the receiving nurse. Opportunity for questions and clarification was provided. Assessment completed upon patients arrival to unit and care assumed.

## 2019-05-11 NOTE — CONSULTS
Hospitalist Consultation NoteNAME:  Mary Beth Rojas :   1934 MRN:   [id number] ATTENDING: Sherri Salcedo MD 
PCP:  Slade Shaffer DO Date/Time:  5/10/2019 8:58 PM 
 
 
Recommendations/Plan:  
 
Pre-op cardiac risk assessment for Acute superior lateral dislocation of the left hip Pt evaluated using revised cardiac risk index and is felt to be moderate cardiovascular risk for intermediate risk surgery with a 1-2.4% risk for major complications based on these criteria. This risk has been discussed with the patient and pt wishes to proceed. Plan for surgery without further cardiac testing if this risk is acceptable per surgery and anesthesia. Note pt had recent surgery without any cardiac events Further risk reduction will involve medical management of other comorbid conditions in the perioperative period. Dementia Continue Aricept/Namenda HLD (hyperlipidemia) Continue statins COPD (chronic obstructive pulmonary disease) with emphysema Continue Px inhaler Add PRN nebs Benign prostatic hyperplasia with weak urinary stream  
Continue Flomax and Finesteride Code Status: DNR/DNI d/w Daughter Skye Luz who is POA DVT Prophylaxis: Per ortho Thanks for involving sound physician in patient's care. Dr Meka Tomlinson will resume care on consult tomorrow 7am  
  
 
Subjective:  
REQUESTING PHYSICIAN: Kara Fabry REASON FOR CONSULT:     
Lowanda Goldberg is a 80 y.o.  male who I was asked to see for PreOp Clearance. Pt has history of recurrent fall and underwent recent Hip Surgery. Currently in rehab and he was noted to have acute superior lateral dislocation of the left hip. Pt exhibit poor insight and unable to provide any meaningful information so history is limited. Past Medical History:  
Diagnosis Date  Arthritis  BPH (benign prostatic hypertrophy) 2010  COPD (chronic obstructive pulmonary disease) with emphysema (HCC) 2012  HLD (hyperlipidemia) 2010  
 HTN (hypertension) 2010  Ill-defined condition 2018 Dementia  Memory disorder  Psychiatric disorder   
 anxiety and depression Past Surgical History:  
Procedure Laterality Date  COLONOSCOPY,REMV LESRANDY,SNARE  2015  
 2 sessile sigmoid polyps, 3 & 5 mm; Dr. Luke Bartlett; no further screening recommended  ENDOSCOPY, COLON, DIAGNOSTIC    
 negative; 10 year repeat; Dr. Luke Bartlett  HX HERNIA REPAIR  years ago  
 left  HX HERNIA REPAIR    
 right inguinal  
 HX ORTHOPAEDIC    
 DC COLSC FLX W/RMVL OF TUMOR POLYP LESION SNARE TQ  2012 Social History Tobacco Use  Smoking status: Current Some Day Smoker Packs/day: 1.00 Years: 60.00 Pack years: 60.00 Types: Cigarettes  Smokeless tobacco: Former User Quit date: 10/5/2014 Substance Use Topics  Alcohol use: Not Currently Alcohol/week: 4.2 oz Types: 7 Glasses of wine per week Comment: 1 drinks per evening Family History Problem Relation Age of Onset  Heart Disease Mother   
      at 80  
 Heart Disease Father   
      at 80  Liver Disease Brother  Stroke Brother  Heart Attack Brother  Other Sister 8 ? polio  Cancer Sister   
     lung Allergies Allergen Reactions  Kiwi Anaphylaxis  Zetia [Ezetimibe] Other (comments) C/o back pain Prior to Admission medications Medication Sig Start Date End Date Taking? Authorizing Provider  
famotidine (PEPCID) 20 mg tablet Take 1 Tab by mouth two (2) times a day for 30 days. 19  Randy Hugo NP  
senna-docusate (PERICOLACE) 8.6-50 mg per tablet Take 1 Tab by mouth daily. 19   Randy Hugo NP  
melatonin 3 mg tablet Take 6 mg by mouth nightly. Provider, Historical  
donepezil (ARICEPT) 10 mg tablet Take 10 mg by mouth daily.     Provider, Historical  
 memantine (NAMENDA) 10 mg tablet Take 10 mg by mouth two (2) times a day. Provider, Historical  
dextromethorphan-quiNIDine (NUEDEXTA) 20-10 mg per capsule Take 1 Cap by mouth every twelve (12) hours. Indications: PSEUDOBULBAR AFFECT 18   Sheilda Gut B III, DO  
psyllium seed-sucrose (METAMUCIL, SUGAR,) powd Take 1 Packet by mouth daily. 1/2 tbsp in [address] [place] [address] liquid    Provider, Historical  
docusate sodium (COLACE) 100 mg capsule Take 100 mg by mouth daily. Provider, Historical  
rosuvastatin (CRESTOR) 20 mg tablet TAKE ONE TABLET BY MOUTH ONE TIME DAILY 10/17/17   Daisy Mcallister III, DO  
SPIRIVA WITH HANDIHALER 18 mcg inhalation capsule INHALE ONE CAPSULE VIA DEVICE BY MOUTH ONE TIME DAILY 17   [facility]ilda Gut B III, DO  
multivitamin (ONE A DAY) tablet Take 1 Tab by mouth daily. Provider, Historical  
tamsulosin (FLOMAX) 0.4 mg capsule Take 0.4 mg by mouth daily. 5/19/15   Provider, Historical  
finasteride (PROSCAR) 5 mg tablet Take 5 mg by mouth daily. Provider, Historical  
 
 
REVIEW OF SYSTEMS:    
Total of 12 systems reviewed as follows:  
I am not able to complete the review of systems because: The patient is intubated and sedated The patient has altered mental status due to his acute medical problems The patient has baseline aphasia from prior stroke(s)  
y The patient has baseline dementia and is not reliable historian Objective: VITALS:   
Visit Vitals /56 (BP 1 Location: Right arm, BP Patient Position: At rest) Pulse 99 Temp 98.6 °F (37 °C) Resp 21 Ht 5' 4\" (1.626 m) Wt 47.9 kg (105 lb 9.6 oz) SpO2 96% BMI 18.13 kg/m² Temp (24hrs), Av.6 °F (37 °C), Min:98.6 °F (37 °C), Max:98.6 °F (37 °C) PHYSICAL EXAM:  
General:    Alert, cooperative, no distress, appears stated age. HEENT: Atraumatic, anicteric sclerae, pink conjunctivae No oral ulcers, mucosa moist, throat clear Neck:  Supple, symmetrical,  thyroid: non tender Lungs:   Clear to auscultation bilaterally. No Wheezing or Rhonchi. No rales. Chest wall:  No tenderness  No Accessory muscle use. Heart:   Regular  rhythm,  No  murmur   No edema Abdomen:   Soft, non-tender. Not distended. Bowel sounds normal 
Extremities: No cyanosis. No clubbing Skin:     Not pale. Not Jaundiced  No rashes Psych:  Poor insight. Not depressed. Not anxious or agitated. Neurologic: EOMs intact. No facial asymmetry. No aphasia or slurred speech. Symmetrical strength, Alert.  
 
_______________________________________________________________________ Care Plan discussed with: 
  Comments Patient y Family RN y   
Care Manager Consultant:  paulie ED physician  
____________________________________________________________________ TOTAL TIME:  60 mins Comments  
 y Reviewed previous records  
>50% of visit spent in counseling and coordination of care y Discussion with family and questions answered Critical Care Provided     Minutes non procedure based 
________________________________________________________________________ Signed: Erin Weeks MD 
 
 
Procedures: see electronic medical records for all procedures/Xrays and details which were not copied into this note but were reviewed prior to creation of Plan. LAB DATA REVIEWED:   
Recent Results (from the past 24 hour(s)) CBC WITH AUTOMATED DIFF Collection Time: 05/10/19  6:10 PM  
Result Value Ref Range WBC 11.7 (H) 4.1 - 11.1 K/uL  
 RBC 3.04 (L) 4.10 - 5.70 M/uL HGB 9.1 (L) 12.1 - 17.0 g/dL HCT 29.1 (L) 36.6 - 50.3 % MCV 95.7 80.0 - 99.0 FL  
 MCH 29.9 26.0 - 34.0 PG  
 MCHC 31.3 30.0 - 36.5 g/dL  
 RDW 16.0 (H) 11.5 - 14.5 % PLATELET 951 163 - 375 K/uL MPV 10.2 8.9 - 12.9 FL  
 NRBC 0.0 0  WBC ABSOLUTE NRBC 0.00 0.00 - 0.01 K/uL NEUTROPHILS 77 (H) 32 - 75 % LYMPHOCYTES 15 12 - 49 % MONOCYTES 7 5 - 13 % EOSINOPHILS 0 0 - 7 % BASOPHILS 0 0 - 1 % IMMATURE GRANULOCYTES 1 (H) 0.0 - 0.5 % ABS. NEUTROPHILS 9.0 (H) 1.8 - 8.0 K/UL  
 ABS. LYMPHOCYTES 1.7 0.8 - 3.5 K/UL  
 ABS. MONOCYTES 0.8 0.0 - 1.0 K/UL  
 ABS. EOSINOPHILS 0.0 0.0 - 0.4 K/UL  
 ABS. BASOPHILS 0.0 0.0 - 0.1 K/UL  
 ABS. IMM. GRANS. 0.1 (H) 0.00 - 0.04 K/UL  
 DF AUTOMATED METABOLIC PANEL, COMPREHENSIVE Collection Time: 05/10/19  6:10 PM  
Result Value Ref Range Sodium 139 136 - 145 mmol/L Potassium 4.2 3.5 - 5.1 mmol/L Chloride 105 97 - 108 mmol/L  
 CO2 28 21 - 32 mmol/L Anion gap 6 5 - 15 mmol/L Glucose 109 (H) 65 - 100 mg/dL BUN 25 (H) 6 - 20 MG/DL Creatinine 0.49 (L) 0.70 - 1.30 MG/DL  
 BUN/Creatinine ratio 51 (H) 12 - 20 GFR est AA >60 >60 ml/min/1.73m2 GFR est non-AA >60 >60 ml/min/1.73m2 Calcium 8.3 (L) 8.5 - 10.1 MG/DL Bilirubin, total 0.4 0.2 - 1.0 MG/DL  
 ALT (SGPT) 21 12 - 78 U/L  
 AST (SGOT) 22 15 - 37 U/L Alk. phosphatase 91 45 - 117 U/L Protein, total 6.5 6.4 - 8.2 g/dL Albumin 2.1 (L) 3.5 - 5.0 g/dL Globulin 4.4 (H) 2.0 - 4.0 g/dL A-G Ratio 0.5 (L) 1.1 - 2.2 TYPE & SCREEN Collection Time: 05/10/19  6:10 PM  
Result Value Ref Range Crossmatch Expiration 05/13/2019 ABO/Rh(D) A POSITIVE Antibody screen NEG   
PTT Collection Time: 05/10/19  6:10 PM  
Result Value Ref Range aPTT 28.5 22.1 - 32.0 sec  
 aPTT, therapeutic range     58.0 - 77.0 SECS  
PROTHROMBIN TIME + INR Collection Time: 05/10/19  6:10 PM  
Result Value Ref Range INR 1.1 0.9 - 1.1 Prothrombin time 11.4 (H) 9.0 - 11.1 sec EKG, 12 LEAD, INITIAL Collection Time: 05/10/19  6:20 PM  
Result Value Ref Range Ventricular Rate 77 BPM  
 Atrial Rate 77 BPM  
 P-R Interval 126 ms  
 QRS Duration 124 ms Q-T Interval 394 ms QTC Calculation (Bezet) 445 ms Calculated P Axis 39 degrees Calculated R Axis -35 degrees Calculated T Axis 51 degrees Diagnosis Normal sinus rhythm Left axis deviation Right bundle branch block Anterior infarct (cited on or before 30-MAR-2019) T wave abnormality, consider lateral ischemia When compared with ECG of 15-APR-2019 13:43, 
Questionable change in initial forces of Septal leads 
  
 
 
_____________________________ Hospitalist: Vincent Yost MD

## 2019-05-11 NOTE — PROGRESS NOTES
Ortho: 
I spoke with the pt's daughter(Milka Harrington) a second time and she is concerned with putting her dad through two surgeries back to back. She would like to observe him, ck his pain level tomorrow morning prior to consenting for the closed reduction.  
 
Daisy Fernandes PA-C

## 2019-05-11 NOTE — ED NOTES
TRANSFER - OUT REPORT: 
 
Verbal report given to [facility] RN(name) on Mini Foote  being transferred to [facility] 3214(unit) for routine progression of care Report consisted of patients Situation, Background, Assessment and  
Recommendations(SBAR). Information from the following report(s) SBAR, ED Summary, Procedure Summary and MAR was reviewed with the receiving nurse. Lines:    
 
Opportunity for questions and clarification was provided.

## 2019-05-11 NOTE — PERIOP NOTES
6542 Patient was transported down to PACU/Pre-op. Connected to monitors. Oriented to unit and routine. Able to follow commands, but only oriented to self.   
 
2362 Called and spoke with Daughter, Dorie Soulier, to update on plan of care and to obtain consent for procedure this a.m. Per daughter Darin Lesa father is not having a procedure completed today. He is NOT to go into the OR. \"  Discussed that MD would be notified and patient would be transported back to room 3214. 
 
0750 Patient transported back to room 3214 and Primary RN updated. Daughter Dorie Soulier at the bedside. Allowed time for questions and answers.

## 2019-05-12 NOTE — PROGRESS NOTES
Bedside and Verbal shift change report given to Linn DE LA PAZ (oncoming nurse) by Yennifer Mercado (offgoing nurse). Report included the following information SBAR, Kardex, ED Summary, MAR, Accordion and Recent Results.  UA w/ reflex cutlture pending. '

## 2019-05-12 NOTE — PROGRESS NOTES
Orthopedic End of Shift Note Bedside and Verbal shift change report given to Shelby Gallagher (oncoming nurse) by Kj Macias (offgoing nurse). Report included the following information SBAR, Kardex, Intake/Output, MAR and Recent Results. POD# Significant issues during shift: none Issues for Physician to address: daughter Taya Martinez, [employer] [facility]) stated she will NOT sign consent until she personally speaks with Dr. Guevara Molina. Surgery scheduled for 1230p 5/13 Activity This Shift 
(check all that apply) [] chair 
[] dangle 
 [] bathroom 
[] bedside commode [] hallway [x] bedrest  
Nausea/Vomiting [] yes [x] no    
Voiding Status [] void [x] Razo [] I&O Cath Bowel Movements [x] yes [] no Foot Pumps or SCD [x] yes [] no Ice Pack [x] yes    [] no Incentive Spirometer [] yes [] no Volume:     
Telemetry Monitoring   [] yes [x] no Rhythm:  
Supplemental O2 [] yes [x] no Sat off O2:   %

## 2019-05-12 NOTE — PROGRESS NOTES
Reason for Readmission:    Hip pain RRAT Score and Risk Level:  24 Level of Readmission:    Level 1 Care Conference scheduled:   No- IDR rounds Resources/supports as identified by patient/family:   Daughter and family Top Challenges facing patient (as identified by patient/family and CM): Finances/Medication cost?     Not and issue- Has medicare and supplements. Transportation  Daughter assist member with transporting. [employer] Support system or lack thereof? Good Family Support Living arrangements? Living in a single story home with 2 steps at entrance Self-care/ADLs/Cognition? Alert and oriented  Independent most of the time. Current Advanced Directive/Advance Care Plan:  [facility] code Plan for utilizing home health:   Yes once completed rehab. Would like to go back to [facility] rehab. Likelihood of additional readmission:   Low Transition of Care Plan:    Based on readmission, the patient's previous Plan of Care 
 has been evaluated and/or modified. The current Transition of Care Plan is:       SNF Care Management Interventions PCP Verified by CM: Yes 
Palliative Care Criteria Met (RRAT>21 & CHF Dx)?: Yes 
Palliative Consult Recommended?: Yes(CHF- protocole.) Mode of Transport at Discharge: BLS Transition of Care Consult (CM Consult): SNF(pt recenly had admitted to [facility] after surgery.) Partner SNF: Yes Discharge Durable Medical Equipment: No(Owns RW.) Current Support Network: Lives with Caregiver(Living with daughter in a single story home has 2 stories.) Confirm Follow Up Transport: Family Plan discussed with Pt/Family/Caregiver: Yes Discharge Location Discharge Placement: Skilled nursing facility Spoke to pt's daughter and she explained she has concerns about member's last surgery and she doesn't want previous ortho surgen to  touch his father . Daughter said pt is waiting for Dr. Omer Wyatt to complete surgery after that she will discuss d/c plan. Including SNF placement. Teto Dickinson MSW 
ED Case Manager Ext -[phone]

## 2019-05-13 PROBLEM — Z96.642 STATUS POST TOTAL REPLACEMENT OF LEFT HIP: Status: ACTIVE | Noted: 2019-01-01

## 2019-05-13 NOTE — BRIEF OP NOTE
BRIEF OPERATIVE NOTE Date of Procedure: 5/13/2019 Preoperative Diagnosis: left hip dislocation Postoperative Diagnosis: left hip dislocation Procedure(s): LEFT HIP ARTHROPLASTY ANTERIOR APPROACH REVISION, PLACEMENT OF ANTIBIOTIC SPACER Surgeon(s) and Role: 
   Bianka Brown MD - Primary Surgical Assistant: Juanita Melvin Surgical Staff: 
Circ-1: Gokul Mosley Circ-Relief: Tank Castillo RN 
Circ-Intern: Faith Smith RN Physician Assistant: CONOR Yung Scrub Tech-1: Sammy Severe Surg Asst-1: Sully Alonso Event Time In Time Out Incision Start [phone] Incision Close Anesthesia: Other Estimated Blood Loss: 300 Specimens:  
ID Type Source Tests Collected by Time Destination 1 : lleft hip Body Fluid Hip, left CULTURE, ANAEROBIC, GRAM STAIN, CULTURE, BODY FLUID Charlee Orellana MD 5/13/2019 1206 Microbiology 2 : Left hip Body Fluid Hip, left CULTURE, ANAEROBIC, CULTURE, BODY FLUID, Meena Ayala MD 5/13/2019 1207 Microbiology Findings: irreducible hemiarthroplasty with greater trochanteric avulsion, significant fluid collection appearing purulent Complications: as above requiring placement of abx spacer Implants:  
Implant Name Type Inv. Item Serial No.  Lot No. LRB No. Used Action CEMENT BNE W/GENTAMICIN [id number] --  - SNA  CEMENT BNE W/GENTAMICIN [id number] --  NA [employer] [id number] Left 3 Implanted

## 2019-05-13 NOTE — PERIOP NOTES
Handoff Report from Operating Room to PACU Report received from [address] and Joaquin Garvin RN regarding Fabby Seen. Surgeon(s): 
Jennifer Meza MD  And Procedure(s) (LRB): LEFT HIP ARTHROPLASTY ANTERIOR APPROACH REVISION, PLACEMENT OF ANTIBIOTIC SPACER (Left)  confirmed  
with allergies and dressings discussed. Anesthesia type, drugs, patient history, complications, estimated blood loss, vital signs, intake and output, and last pain medication, lines and temperature were reviewed. 1425: TRANSFER - OUT REPORT: 
 
Verbal report given to Montse RN (name) on Fabby Seen  being transferred to Ortho (unit) for routine post - op Report consisted of patients Situation, Background, Assessment and  
Recommendations(SBAR). Information from the following report(s) SBAR, Kardex, Intake/Output, MAR and Recent Results was reviewed with the receiving nurse. Lines:  
Peripheral IV 05/13/19 Right Arm (Active) Site Assessment Clean, dry, & intact 5/13/2019  2:15 PM  
Phlebitis Assessment 0 5/13/2019  2:15 PM  
Infiltration Assessment 0 5/13/2019  2:15 PM  
Dressing Status Clean, dry, & intact 5/13/2019  2:15 PM  
Dressing Type Transparent;Tape 5/13/2019  2:15 PM  
Hub Color/Line Status Pink; Infusing 5/13/2019  2:15 PM  
  
 
Opportunity for questions and clarification was provided. Patient transported with: 
 O2 @ 2 liters Tech

## 2019-05-13 NOTE — PROGRESS NOTES
Problem: Falls - Risk of 
Goal: *Absence of Falls Description Document Durenda Bhaskar Fall Risk and appropriate interventions in the flowsheet. Outcome: Progressing Towards Goal 
  
Problem: Patient Education: Go to Patient Education Activity Goal: Patient/Family Education Outcome: Progressing Towards Goal 
  
Problem: Pressure Injury - Risk of 
Goal: *Prevention of pressure injury Description Document Duy Scale and appropriate interventions in the flowsheet. Outcome: Progressing Towards Goal 
  
Problem: Patient Education: Go to Patient Education Activity Goal: Patient/Family Education Outcome: Progressing Towards Goal

## 2019-05-13 NOTE — PROGRESS NOTES
Hospitalist Progress Note NAME: Abelardo  :  1934 MRN:  [id number] Assessment / Plan: 
 
Left periprosthetic hip dislocation POA Unsuccessful attempted closed reduction 
-plan for OR today Dementia POA 
-Continue Aricept/Namenda 
  
Hyperlipidemia POA 
-Continue statin 
  
Chronic obstructive pulmonary disease with emphysema  
-Not symptomatic 
-Continue PTA inhaler 
- PRN nebs 
  Benign prostatic hyperplasia with weak urinary stream  
-on Flomax and Finesteride 
  
ADHD Essential Tremor Depression/Anxiety Chronic anemia Hypoalbunemia Tobacco abuse Son at bedside. Updated him plan of care. All questions answered. DVT Prophylaxis: Per ortho Subjective: Chief Complaint / Reason for Physician Visit \"I am doing fine\". Discussed with RN events overnight. Review of Systems: 
Symptom Y/N Comments  Symptom Y/N Comments Fever/Chills n   Chest Pain n   
Poor Appetite n   Edema Cough    Abdominal Pain n   
Sputum    Joint Pain SOB/SAUCEDO n   Pruritis/Rash Nausea/vomit n   Tolerating PT/OT Diarrhea    Tolerating Diet Constipation    Other Could NOT obtain due to:   
 
Objective: VITALS:  
Last 24hrs VS reviewed since prior progress note. Most recent are: 
Patient Vitals for the past 24 hrs: 
 Temp Pulse Resp BP SpO2  
19 1443 98 °F (36.7 °C) 84 20 147/75 100 % 19 1415 97.7 °F (36.5 °C) 88 23 136/81 96 % 19 1400  92 27 143/81 100 % 19 1345  93 27 144/80 99 % 19 1335  93 25 148/80 100 % 19 1330  97 28 147/79 99 % 19 1325  93 25 144/70 100 % 19 1320 98.1 °F (36.7 °C) 97 26 142/77 99 % 19 1029 98.3 °F (36.8 °C) 75 18 119/67 98 % 19 0757 98.7 °F (37.1 °C) 75 18 133/63 100 % 19 0440 98.3 °F (36.8 °C) 76 18 133/63 100 % 19 202 98.1 °F (36.7 °C) 80 20 116/51 99 % 19 1642 98.1 °F (36.7 °C) 90 18 146/75 97 % Intake/Output Summary (Last 24 hours) at 5/13/2019 1450 Last data filed at 5/13/2019 1320 Gross per 24 hour Intake 3683.75 ml Output 1100 ml Net 2583.75 ml PHYSICAL EXAM: 
General: Alert, cooperative, no acute distress   
EENT:  EOMI. Anicteric sclerae. MMM Resp:  CTA bilaterally, no wheezing or rales. No accessory muscle use CV:  Regular  rhythm,  No edema GI:  Soft, Non distended, Non tender.  +Bowel sounds Neurologic:  Awake and alert, normal speech Psych:   Not anxious nor agitated Skin:  Bruises on legs, left thigh incision looks clean Reviewed most current lab test results and cultures  YES Reviewed most current radiology test results   YES Review and summation of old records today    NO Reviewed patient's current orders and MAR    YES 
PMH/SH reviewed - no change compared to H&P 
________________________________________________________________________ Care Plan discussed with: 
  Comments Patient x Family RN Care Manager Consultant Multidiciplinary team rounds were held today with , nursing, pharmacist and clinical coordinator. Patient's plan of care was discussed; medications were reviewed and discharge planning was addressed. ________________________________________________________________________ Total NON critical care TIME:  20   Minutes Total CRITICAL CARE TIME Spent:   Minutes non procedure based Comments >50% of visit spent in counseling and coordination of care    
________________________________________________________________________ Juhi Olmos MD  
 
Procedures: see electronic medical records for all procedures/Xrays and details which were not copied into this note but were reviewed prior to creation of Plan. LABS: 
I reviewed today's most current labs and imaging studies. Pertinent labs include: 
Recent Labs 05/13/19 
0410 05/12/19 
1648 05/10/19 [place] WBC 8.6 10.0 11.7*  
 HGB 8.8* 8.8* 9.1*  
HCT 29.4* 28.7* 29.1*  
 298 308 Recent Labs 05/13/19 
0410 05/12/19 
1648 05/10/19 [facility]  141 139  
K 3.5 3.8 4.2  108 105 CO2 27 27 28 GLU 90 97 109* BUN 13 18 25* CREA 0.42* 0.46* 0.49* CA 7.8* 8.1* 8.3* ALB 1.7* 1.9* 2.1* TBILI 0.4 0.4 0.4 SGOT 27 32 22 ALT 18 19 21 INR  --   --  1.1 Signed: Marcial Waldron MD

## 2019-05-13 NOTE — PROGRESS NOTES
Pharmacy Automatic Renal Dosing Protocol - Antimicrobials Indication for Antimicrobials: bone and joint infection Current Regimen of Each Antimicrobial: 
Vancomycin 750mg IV q12h (Start Date ; Day # 1) Previous Antimicrobial Therapy: 
 
 
Goal Level: VANCOMYCIN TROUGH GOAL RANGE Vancomycin Trough: 15 - 20 mcg/mL Date Dose & Interval Measured (mcg/mL) Extrapolated (mcg/mL) Date & time of next level:  
 
Significant Cultures:  
:  gram stain (pending) :  anaerobic (pending) :  ucx (pending) Radiology / Imaging results: (X-ray, CT scan or MRI):  
 
Paralysis, amputations, malnutrition: none Labs: 
Recent Labs 19 
0410 19 
1648 05/10/19 [facility] CREA 0.42* 0.46* 0.49* BUN 13 18 25* WBC 8.6 10.0 11.7* Temp (24hrs), Av.3 °F (36.8 °C), Min:98.1 °F (36.7 °C), Max:98.7 °F (37.1 °C) Creatinine Clearance (mL/min) or Dialysis: 53 Impression/Plan:  
Patient will be given vancomycin 1g IV x 1 (20mg/kg load) followed by vancomycin 750mg IV q12h to give an estimated pk/tr 3919.1 Antimicrobial stop date TBD Pharmacy will follow daily and adjust medications as appropriate for renal function and/or serum levels. Thank you, Filiberto Hair, PHARMD 
 
Recommended duration of therapy 
http://[facility]/[place]/[facility]/Pharmacy/Clinical%20Companion/Duration%20of%20ABX%20therapy. docx Renal Dosing 
http://[facility]/[place]/[facility]/Pharmacy/Clinical%20Companion/Renal%[email or web address]. pdf

## 2019-05-13 NOTE — PROGRESS NOTES
Initial Nutrition Assessment: 
 
INTERVENTIONS/RECOMMENDATIONS:  
· Resume diet when medically feasible · Ensure TID (strawberry) · Encourage PO intake · Please document % meals consumed in flowsheet ASSESSMENT:  
Chart reviewed, medically noted for LEFT HIP hemiarthroplasty dislocation with planned revision today. Pt off floor for procedure. Pt known to RD from recent admission in April. At that time he reported that her was eating fairly well. Weight history shows 12 lbs weight loss in the past month. Weight source not documented. Will added ensure once diet advances. Past Medical History:  
Diagnosis Date  Arthritis  BPH (benign prostatic hypertrophy) 4/12/2010  COPD (chronic obstructive pulmonary disease) with emphysema ([facility] [address] 7/25/2012  HLD (hyperlipidemia) 4/12/2010  
 HTN (hypertension) 4/12/2010  Ill-defined condition 2018 Dementia  Memory disorder  Psychiatric disorder   
 anxiety and depression Diet Order: NPO 
% Eaten:  No data found. Pertinent Medications: [x]Reviewed: NS, pepcid, lactated ringers, pericolace Pertinent Labs: [x]Reviewed:  
Food Allergies: []NKFA  [x]Other  (kiwi) Last BM: 5/13 Edema:  n/a      []RUE   []LUE   []RLE   []LLE Pressure Injury:  n/a    [] Stage I   [] Stage II   [] Stage III   [] Stage IV Wt Readings from Last 30 Encounters:  
05/10/19 47.9 kg (105 lb 9.6 oz) 04/17/19 53.3 kg (117 lb 8.1 oz) 04/02/19 55.9 kg (123 lb 3.8 oz) 02/26/19 49.9 kg (110 lb)  
02/25/19 51.7 kg (114 lb) 08/28/18 51.9 kg (114 lb 6.4 oz) 07/14/18 50.7 kg (111 lb 12.4 oz) 07/10/18 51.3 kg (113 lb)  
06/26/18 51.3 kg (113 lb)  
03/27/18 51.7 kg (114 lb) 01/16/18 48.1 kg (106 lb) 01/03/18 48.1 kg (106 lb) 01/02/18 48.1 kg (106 lb) 12/27/17 48.1 kg (106 lb) 12/26/17 48.1 kg (106 lb) 12/16/17 51.7 kg (114 lb) 12/02/17 51.7 kg (114 lb)  
11/28/17 49 kg (108 lb) 05/30/17 53.5 kg (118 lb) 05/16/17 54.4 kg (120 lb) 03/02/17 55.3 kg (122 lb) 12/29/16 53.6 kg (118 lb 3.2 oz)  
11/28/16 54.4 kg (120 lb)  
11/22/16 54.3 kg (119 lb 9.6 oz) 05/23/16 61.8 kg (136 lb 3.2 oz)  
11/23/15 56.2 kg (124 lb) 08/05/15 53.8 kg (118 lb 9 oz) 05/20/15 56.2 kg (124 lb)  
11/19/14 55.8 kg (123 lb)  
08/27/14 55.3 kg (122 lb) Anthropometrics:  
Height: 5' 4\" (162.6 cm) Weight: 47.9 kg (105 lb 9.6 oz) IBW (%IBW):   ( ) UBW (%UBW):   (  %) Last Weight Metrics: 
Weight Loss Metrics 5/10/2019 4/17/2019 4/2/2019 2/26/2019 2/25/2019 8/28/2018 7/14/2018 Today's Wt 105 lb 9.6 oz 117 lb 8.1 oz 123 lb 3.8 oz 110 lb 114 lb 114 lb 6.4 oz 111 lb 12.4 oz BMI 18.13 kg/m2 20.17 kg/m2 21.83 kg/m2 19.49 kg/m2 20.19 kg/m2 20.27 kg/m2 19.8 kg/m2 BMI: Body mass index is 18.13 kg/m². This BMI is indicative of: 
 [x]Underweight    []Normal    []Overweight    [] Obesity   [] Extreme Obesity (BMI>40) Estimated Nutrition Needs (Based on):  
7639 Kcals/day(BMR: 1075 x 1.3+250) , 65 g(1.3 g/kg) Protein Carbohydrate: At Least 130 g/day  Fluids: 1650 mL/day (1ml/kcal) or per primary team 
 
NUTRITION DIAGNOSES:  
Problem:  Increased nutrient needs Etiology: related to LEFT HIP hemiarthroplasty revision Signs/Symptoms: as evidenced by planed LEFT HIP hemiarthroplasty revision today NUTRITION INTERVENTIONS: 
Meals/Snacks: General/healthful diet   Supplements: Commercial supplement GOAL:  
consume >50% of meals and ONS in 3-5 days LEARNING NEEDS (Diet, Food/Nutrient-Drug Interaction):  
 [x] None Identified 
 [] Identified and Education Provided/Documented 
 [] Identified and Pt declined/was not appropriate Cultureal, Nondenominational, OR Ethnic Dietary Needs:  
 [x] None Identified 
 [] Identified and Addressed 
 
 [x] Interdisciplinary Care Plan Reviewed/Documented  
 [x] Discharge Planning: General healthy diet with adequate kcal and protein to promote healing MONITORING /EVALUATION:  
  
 Food/Nutrient Intake Outcomes: Total energy intake Physical Signs/Symptoms Outcomes: Weight/weight change, Electrolyte and renal profile, GI 
 
NUTRITION RISK:  
 [x] High              [] Moderate           []  Low  []  Minimal/Uncompromised PT SEEN FOR:  
 []  MD Consult: []Calorie Count []Diabetic Diet Education []Diet Education []Electrolyte Management []General Nutrition Management and Supplements []Management of Tube Feeding []TPN Recommendations []  RN Referral:  []MST score >=2 
   []Enteral/Parenteral Nutrition PTA []Pregnant: Gestational DM or Multigestation 
   []Pressure Ulcer/Wound Care needs [x]  Low BMI 
[]  LOS Referral  
 
 
Andres Acharya RDN Pager [phone] Weekend Pager [phone]

## 2019-05-13 NOTE — PROGRESS NOTES
Bedside and Verbal shift change report given to Radha [facility] (oncoming nurse) by Cecilio Sarabia (offgoing nurse). Report included the following information SBAR, Kardex, ED Summary, Intake/Output, MAR, Accordion and Recent Results.

## 2019-05-13 NOTE — PROGRESS NOTES
Ortho/ NeuroSurgery NP Note POD# 0  s/p LEFT HIP ARTHROPLASTY ANTERIOR APPROACH REVISION, PLACEMENT OF ANTIBIOTIC SPACER Pt resting in bed. Drowsy, awakens to voice. Patient is confused, he was unaware that he had had surgery, oriented to self- baseline. VSS Afebrile. Patient has not had something to eat/drink. No nausea. Most Recent Labs:  
Lab Results Component Value Date/Time HGB 8.8 (L) 05/13/2019 04:10 AM  
 Hemoglobin A1c 5.9 (H) 05/17/2017 08:49 AM  
 
 
Body mass index is 18.13 kg/m². Reference: BMI greater than 30 is classified as obesity and greater than 40 is classified as morbid obesity. STOP BANG Score: 2 Voiding status: polo catheter in place Dressing c.d.i, with cryotherapy in place Calves soft and supple; No pain with passive stretch Sensation and motor intact SCDs for mechanical DVT proph Plan: 
1) PT BID starting today 2) Alyse-op Antibiotics Ancef 3) Aspirin 81 mg PO BID for DVT Prophylaxis 4) Pepcid for GI Prophylaxis 5) Discharge plans back to assisted living/SNF pending readiness. Readiness for discharge: 
   [x] Vital Signs stable  
 [x] Hgb stable- no signs or symptoms of hemodynamic instability [x] + Voiding- polo in place- plan to remove when up with PT 
 [x] Incision intact, drainage minimal  
 [] Tolerating PO intake   
 [] Cleared by PT (OT if applicable) [] Stair training completed (if applicable) [] Independent/Contact Guard ambulation with assistive device (household distance) [] Bed mobility [] Car transfers  
  [] ADLs [x] Adequate pain control on oral medication alone Ted Clifton NP

## 2019-05-13 NOTE — PROGRESS NOTES
Orthopedic End of Shift Note Bedside shift change report given to BRAIN Calloway (oncoming nurse) by Luisa Davis (offgoing nurse). Report included the following information SBAR, Kardex, Procedure Summary, Intake/Output and Recent Results. POD# 0 Significant issues during shift: surgery today Issues for Physician to address: Activity This Shift 
(check all that apply) [] chair 
[] dangle 
 [] bathroom 
[] bedside commode [] hallway [x] bedrest  
Nausea/Vomiting [] yes [x] no    
Voiding Status [] void [x] Razo [] I&O Cath Bowel Movements [] yes [x] no Foot Pumps or SCD [x] yes [] no Ice Pack [x] yes    [] no Incentive Spirometer [] yes [] no Volume:     
Telemetry Monitoring   [] yes [] no Rhythm:  
Supplemental O2 [] yes [] no Sat off O2:   %

## 2019-05-13 NOTE — PROGRESS NOTES
Pharmacy Automatic Renal Dosing Protocol - Antimicrobials 
  Indication for Antimicrobials: bone and joint infection  
  
Current Regimen of Each Antimicrobial: 
Vancomycin 750mg IV q12h (Start Date ; Day # 1) Zosyn 3.375g IV q12h 
  
Significant Cultures:  
:  gram stain (pending) :  anaerobic (pending) :  ucx (pending) 
  
Labs: 
     
Recent Labs 19 
0410 19 
1648 05/10/19 18 CREA 0.42* 0.46* 0.49* BUN 13 18 25* WBC 8.6 10.0 11.7*  
  
Temp (24hrs), Av.3 °F (36.8 °C), Min:98.1 °F (36.7 °C), Max:98.7 °F (37.1 °C) 
  
Creatinine Clearance (mL/min) or Dialysis: 53 
  
Impression/Plan: · Adjust Zosyn dose to 3.375g x 1 over 30 min followed by 3.375g IV q8h over 4 hrs · Antimicrobial stop date TBD  
  
Pharmacy will follow daily and adjust medications as appropriate for renal function and/or serum levels. 
  
Thank you, 
Jose F Montoya. Blue MOE.

## 2019-05-13 NOTE — PROGRESS NOTES
TRANSFER - IN REPORT: 
 
Verbal report received from BRAIN Herrmann(name) on Sergio Keita  being received from Elyssafregori) for routine post - op Report consisted of patients Situation, Background, Assessment and  
Recommendations(SBAR). Information from the following report(s) SBAR, Kardex, OR Summary, Intake/Output, MAR and Recent Results was reviewed with the receiving nurse. Opportunity for questions and clarification was provided. Assessment completed upon patients arrival to unit and care assumed.

## 2019-05-13 NOTE — ANESTHESIA PREPROCEDURE EVALUATION
Anesthetic History No history of anesthetic complications Review of Systems / Medical History Patient summary reviewed, nursing notes reviewed and pertinent labs reviewed Pulmonary COPD (emphysema): mild Smoker (30 pk years) Pertinent negatives: No shortness of breath Comments: Smoker - 1 ppd x 60 years Neuro/Psych Psychiatric history (depression/anxiety) and dementia Comments: Anxiety/depression Post-herpetic neuralgia Short term memory loss Essential tremor Cardiovascular Hypertension: well controlled Hyperlipidemia Pertinent negatives: No angina Exercise tolerance: <4 METS: Uses walker Comments: TTE (11/29/17): Mild TI, PAP=38mmHg, EF=65% Incomplete right bundle branch block GI/Hepatic/Renal 
Within defined limits Endo/Other Diabetes (prediabetes, no meds): well controlled, type 2 Arthritis and anemia Comments: Thrombocytopenia Other Findings Comments: Left Periprosthetic Hip Fracture DJD BPH Physical Exam 
 
Airway Mallampati: III 
TM Distance: 4 - 6 cm Neck ROM: normal range of motion Mouth opening: Diminished (comment) Cardiovascular Rhythm: regular Rate: normal 
 
 
 
 Dental 
 
Dentition: Upper dentition intact and Lower dentition intact Comments: Overbite Pulmonary Decreased breath sounds: bibasilar Abdominal 
GI exam deferred Other Findings Anesthetic Plan ASA: 3 Anesthesia type: general and total IV anesthesia Monitoring Plan: BIS Induction: Intravenous Anesthetic plan and risks discussed with: Patient Glidescope (Previous Glide scope intubation X2 here)

## 2019-05-13 NOTE — PERIOP NOTES
TRANSFER - IN REPORT: 
 
Verbal report received from Santhosh Frost on Yamilex Aas  being received from  for ordered procedure Report consisted of patients Situation, Background, Assessment and  
Recommendations(SBAR). Information from the following report(s) SBAR, ED Summary, Intake/Output and MAR was reviewed with the receiving nurse. Opportunity for questions and clarification was provided. Assessment completed upon patients arrival to unit and care assumed.

## 2019-05-13 NOTE — ANESTHESIA POSTPROCEDURE EVALUATION
Procedure(s): LEFT HIP ARTHROPLASTY ANTERIOR APPROACH REVISION, PLACEMENT OF ANTIBIOTIC SPACER. general, total IV anesthesia Anesthesia Post Evaluation Patient location during evaluation: PACU Note status: Adequate. Level of consciousness: responsive to verbal stimuli and sleepy but conscious Pain management: satisfactory to patient Airway patency: patent Anesthetic complications: no 
Cardiovascular status: acceptable Respiratory status: acceptable Hydration status: acceptable Comments: +Post-Anesthesia Evaluation and Assessment Patient: Audra Hunt MRN: [id number]  SSN: [id number] YOB: 1934  Age: 80 y.o. Sex: male Cardiovascular Function/Vital Signs /81 (BP 1 Location: Right arm, BP Patient Position: At rest)   Pulse 92   Temp 36.7 °C (98.1 °F)   Resp 27   Ht 5' 4\" (1.626 m)   Wt 47.9 kg (105 lb 9.6 oz)   SpO2 100%   BMI 18.13 kg/m² Patient is status post Procedure(s): LEFT HIP ARTHROPLASTY ANTERIOR APPROACH REVISION, PLACEMENT OF ANTIBIOTIC SPACER. Nausea/Vomiting: Controlled. Postoperative hydration reviewed and adequate. Pain: 
Pain Scale 1: Numeric (0 - 10) (05/13/19 1400) Pain Intensity 1: 0 (05/13/19 1400) Managed. Neurological Status:  
Neuro (WDL): Exceptions to WDL (05/13/19 1320) At baseline. Mental Status and Level of Consciousness: Arousable. Pulmonary Status:  
O2 Device: Nasal cannula (05/13/19 1400) Adequate oxygenation and airway patent. Complications related to anesthesia: None Post-anesthesia assessment completed. No concerns. Signed By: Carmel Eastman MD  
 5/13/2019 Post anesthesia nausea and vomiting:  controlled Vitals Value Taken Time /81 5/13/2019  2:00 PM  
Temp 36.7 °C (98.1 °F) 5/13/2019  1:20 PM  
Pulse 95 5/13/2019  2:13 PM  
Resp 28 5/13/2019  2:13 PM  
SpO2 98 % 5/13/2019  2:13 PM  
Vitals shown include unvalidated device data.

## 2019-05-13 NOTE — PROGRESS NOTES
ORTHO - Progress Note Bonita De Jesus     [id number]  male    80 y.o.    1934 Admit date: 5/10/2019 Admitting Physician: Sam Brasher MD  
Surgeon:  Natasha Rivera) and Role:   Slade Travis MD - Primary Consulting Physician(s): Treatment Team: Attending Provider: Yamilet Jaime MD; Consulting Provider: Isaac Floyd; Physician: Yamilet Jaime MD; Consulting Provider: Donaldo Lincoln MD; Utilization Review: Lula Patten RN; Care Manager: Igor Arteaga SUBJECTIVE: 
  
Bonita De Jesus is a 80 y.o. male s/p a LEFT HIP hemiarthroplasty dislocation resting in the bed. OBJECTIVE: 
 
  
Physical Exam: 
General: baseline dementia - alert, pulling off his protective IV covering, cooperative, no distress. Gastrointestinal:  non-distended . Cardiovascular: pal pulses in the lower extremities,  Brisk cap refill in all distal extremities Genitourinary: adult diaper Respiratory: No respiratory distress Neurological:Neurovascular exam within normal limits. Senstion intact: LE bilat. Motor: spontaneous ankle motion bilat LE Musculoskeletal:   Calves soft, supple, non-tender upon palpation or with passive stretch. Vital Signs:  
  
  
Patient Vitals for the past 8 hrs: 
 BP Temp Pulse Resp SpO2  
19 1642 146/75 98.1 °F (36.7 °C) 90 18 97 % 19 1254 150/65 97.9 °F (36.6 °C) 82 18 97 % Temp (24hrs), Av.9 °F (36.6 °C), Min:97.6 °F (36.4 °C), Max:98.2 °F (36.8 °C) Date 19 07 - 19 0073 Shift 4465-4965 1263-6265 2265-4190 24 Hour Total  
INTAKE  
I.V.(mL/kg/hr)  1763.8  1763.8 Shift Total(mL/kg)  1763. 8(36.8)  1763. 8(36.8) OUTPUT Urine(mL/kg/hr) 500(1.3)   500 Shift Total(mL/kg) 500(10.4)   500(10.4) Weight (kg) 47.9 47.9 47.9 47.9 Labs:  
  
 
Recent Labs 19 
1648 05/10/19 [facility] HCT 28.7* 29.1* HGB 8.8* 9.1* INR  --  1.1 PT/OT:  
 
  
  
  
ASSESSMENT / PLAN:  
 Active Problems: 
  HLD (hyperlipidemia) (4/12/2010) COPD (chronic obstructive pulmonary disease) with emphysema ([address] (7/25/2012) Benign prostatic hyperplasia with weak urinary stream (12/26/2017) Late onset Alzheimer's disease without behavioral disturbance (3/27/2018) Posterior dislocation of left hip ([address] (5/10/2019) 
 
  
-  Pt's Daughter elected to defer closed reduction in the OR w Dr Idania Marx (wanted to avoid additional anesthesia exposure) -  Plan for revision w Dr Lakshmi Harris Monday 
-  NPO after midnight -  Bedrest 
-  SCDs -  DC back to SNF Signed By: Rosi Langford PA-C

## 2019-05-14 NOTE — PROGRESS NOTES
Problem: Self Care Deficits Care Plan (Adult) Goal: *Acute Goals and Plan of Care (Insert Text) Description Occupational Therapy Goals Initiated 5/14/2019 Sabrina Valenzuela 1. Patient will perform toilet transfers at maximal assistance level using Walkers, Type: Rolling Walker  within 7 days. 2. Patient will perform all aspects of toileting at moderate assistance  level within 7 days. 3. Patient will demonstrate 3/3 hip precautions without cues within 7 days. 4. Patient will be able to sit on EOb for 5 minutes with CGA for setup with ADLs, with in 7days Outcome: Progressing Towards Goal 
 OCCUPATIONAL THERAPY EVALUATION Patient: Felipe Hernandez (75 y.o. male) Date: 5/14/2019 Primary Diagnosis: Posterior dislocation of left hip, initial encounter ([facility] [address] [Q55.793Z] Procedure(s) (LRB): LEFT HIP ARTHROPLASTY ANTERIOR APPROACH REVISION, PLACEMENT OF ANTIBIOTIC SPACER (Left) 1 Day Post-Op Precautions: fall ASSESSMENT : 
Based on the objective data described below, the patient presents with generalized weakness, decreased endurance, strength, functional mobility, and ADLs and cognition. Pt was living at [facility] and needed assist with ADLs and walking per family. Pt was cleared to be seen and is NWB on left LE. He likes to keep his left knee flexed and hip internally rotated and called NP into room to ask for suggestions on how to move pt with out causing more injury. Therapy( OT /PT)  worked with pt on rolling to right in bed with max assist of  2  And max of 2 for supine to sit and was able to sit on EOb with CGA. Pt had a posterior lean in sitting  and was able to sit upright with VC. Pt was able to stand with mod assist of 2 and 1 assist, to keep his left foot off the ground for NWB status. Pt was mod assist of 2 for transfer to chair and family present in room. Pt is max for ADLs at bed level.   Pt will benefit from rehab at discharge at SNf  Pt is max for ADLs and decreased cognition and will need to be reminded each time of his NWB status on LLE Patient will benefit from skilled intervention to address the above impairments. Patients rehabilitation potential is considered to be Fair Factors which may influence rehabilitation potential include:  
? None noted ? Mental ability/status ? Medical condition ? Home/family situation and support systems ? Safety awareness ? Pain tolerance/management ? Other: PLAN : 
Recommendations and Planned Interventions: 
?                  Self Care Training                  ? Therapeutic Activities ? Functional Mobility Training    ? Cognitive Retraining 
? Therapeutic Exercises           ? Endurance Activities ? Balance Training                   ? Neuromuscular Re-Education ? Visual/Perceptual Training     ? Home Safety Training 
? Patient Education                 ? Family Training/Education ? Other (comment): Frequency/Duration: Patient will be followed by occupational therapy 4 times a week to address goals. Discharge Recommendations: Chilo Dillard Further Equipment Recommendations for Discharge: tbd SUBJECTIVE:  
Patient stated My leg is comfortable in this position .  OBJECTIVE DATA SUMMARY:  
HISTORY:  
Past Medical History:  
Diagnosis Date  Arthritis  BPH (benign prostatic hypertrophy) 4/12/2010  COPD (chronic obstructive pulmonary disease) with emphysema ([facility] [address] 7/25/2012  HLD (hyperlipidemia) 4/12/2010  
 HTN (hypertension) 4/12/2010  Ill-defined condition 2018 Dementia  Memory disorder  Psychiatric disorder   
 anxiety and depression Past Surgical History: Procedure Laterality Date  COLONOSCOPY,REMV LESN,SNARE  8/5/2015  
 2 sessile sigmoid polyps, 3 & 5 mm; Dr. Bobby Coates; no further screening recommended  ENDOSCOPY, COLON, DIAGNOSTIC  2002  
 negative; 10 year repeat; Dr. Bobby Coates  HX HERNIA REPAIR  years ago  
 left  HX HERNIA REPAIR  2001  
 right inguinal  
 HX ORTHOPAEDIC    
 [place] COLSC FLX W/RMVL OF TUMOR POLYP LESION SNARE TQ  7/27/2012 Prior Level of Function/Environment/Context: pt was residing in [facility] and needed assist  
Occupations in which the patient is/was successful, what are the barriers preventing that success:  
Performance Patterns (routines, roles, habits, and rituals):  
Personal Interests and/or values:  
Expanded or extensive additional review of patient history:  
 
Home Situation Home Environment: Skilled nursing facility One/Two Story Residence: Other (Comment) Living Alone: No 
Support Systems: Skilled nursing facility Patient Expects to be Discharged to[de-identified] Other (comment) Current DME Used/Available at Home: Other (comment) Hand dominance: Right EXAMINATION OF PERFORMANCE DEFICITS: 
Cognitive/Behavioral Status: 
Neurologic State: Alert;Eyes open spontaneously Orientation Level: Oriented to person;Oriented to place Skin: in fair health Edema: none Hearing: Auditory Auditory Impairment: None Vision/Perceptual:   
    
    
  Appears intact Range of Motion: 
 
AROM: Generally decreased, functional(LLE non-functional) PROM: Generally decreased, functional(Lacking 45 degrees of L knee ext) Strength: 
 
Strength: Generally decreased, functional 
  
  
  
  
Coordination: 
Coordination: Generally decreased, functional 
    
  
Tone & Sensation: 
 
Tone: Abnormal(Clonus of L foot / ankle; fasciculations of ant tibialis) Balance: 
Sitting: Impaired Sitting - Static: Poor (constant support)(Poor initially, progressing to fair; posterior lean) Standing: Impaired Standing - Static: Poor Standing - Dynamic : Poor Functional Mobility and Transfers for ADLs: 
Bed Mobility: 
Rolling: Moderate assistance;Maximum assistance;Assist x2(With full support of LLE) Supine to Sit: Maximum assistance;Assist x2 Scooting: Maximum assistance; Moderate assistance;Assist x2 Transfers: 
Sit to Stand: Moderate assistance;Assist x2 Stand to Sit: Minimum assistance; Moderate assistance;Assist x2 Bed to Chair: Moderate assistance;Assist x2 ADL Assessment: 
  
 
 Pt is max to total assist for ADLs Functional Measure: 
Barthel Index: 
 
Bathin Bladder: 0 Bowels: 5 Groomin Dressin Feedin Mobility: 0 Stairs: 0 Toilet Use: 0 Transfer (Bed to Chair and Back): 0 Total: 10/100 Percentage of impairment  
0% 1-19% 20-39% 40-59% 60-79% 80-99% 100% Barthel Score 0-100 100 99-80 79-60 59-40 20-39 1-19 
 0 The Barthel ADL Index: Guidelines 1. The index should be used as a record of what a patient does, not as a record of what a patient could do. 2. The main aim is to establish degree of independence from any help, physical or verbal, however minor and for whatever reason. 3. The need for supervision renders the patient not independent. 4. A patient's performance should be established using the best available evidence. Asking the patient, friends/relatives and nurses are the usual sources, but direct observation and common sense are also important. However direct testing is not needed. 5. Usually the patient's performance over the preceding 24-48 hours is important, but occasionally longer periods will be relevant. 6. Middle categories imply that the patient supplies over 50 per cent of the effort. 7. Use of aids to be independent is allowed. Sonia StreetMyRoll., Barthel, D.W. ([id number]). Functional evaluation: the Barthel Index. [address] [facility] (14)2.  
Carlton Mejía galo FADUMO Goodwin, Lilo Isaacs., Nimisha Fitch., Chaitanya Oates. (1999). Measuring the change indisability after inpatient rehabilitation; comparison of the responsiveness of the Barthel Index and Functional Elbert Measure. Journal of Neurology, Neurosurgery, and Psychiatry, 66(4), 898-731. BUZZ English, ALFIE Bernabe, & Radha Carrera M.A. (2004.) Assessment of post-stroke quality of life in cost-effectiveness studies: The usefulness of the Barthel Index and the EuroQoL-5D. [facility], 13, 151-05 Occupational Therapy Evaluation Charge Determination History Examination Decision-Making MEDIUM Complexity : Expanded review of history including physical, cognitive and psychosocial  history  MEDIUM Complexity : 3-5 performance deficits relating to physical, cognitive , or psychosocial skils that result in activity limitations and / or participation restrictions MEDIUM Complexity : Patient may present with comorbidities that affect occupational performnce. Miniml to moderate modification of tasks or assistance (eg, physical or verbal ) with assesment(s) is necessary to enable patient to complete evaluation Based on the above components, the patient evaluation is determined to be of the following complexity level: MEDIUM Pain: 
Pain Scale 1: Numeric (0 - 10) Pain Intensity 1: 1 Pain Location 1: Hip Pain Orientation 1: Left Pain Description 1: Aching Pain Intervention(s) 1: Medication (see MAR) Activity Tolerance:  
fair Please refer to the flowsheet for vital signs taken during this treatment. After treatment:  
? Patient left in no apparent distress sitting up in chair ? Patient left in no apparent distress in bed 
? Call bell left within reach ? Nursing notified ? Caregiver present ? Bed alarm activated COMMUNICATION/EDUCATION:  
The patients plan of care was discussed with: Physical Therapist, Registered Nurse and family . ?     Home safety education was provided and the patient/caregiver indicated understanding. ? Patient/family have participated as able in goal setting and plan of care. ?    Patient/family agree to work toward stated goals and plan of care. ?    Patient understands intent and goals of therapy, but is neutral about his/her participation. ? Patient is unable to participate in goal setting and plan of care. This patients plan of care is appropriate for delegation to [facility]ORI. Thank you for this referral. 
Scott Donahue OT Time Calculation: 53 mins

## 2019-05-14 NOTE — PROGRESS NOTES
Ortho - late entry - seen yesterday PM 
 
Planned for revision yesterday but encountered small tract to joint with suspicious appearing fluid and possible infection Additionally, has nonunited greater trochanter fracture which would not allow us to place an antibiotic cement hemiarthroplasty - would dislocate with troch off. Antibiotic cement placed in acetabulum and femoral canal after removal of hemiarthroplasty At this point, he has the equivalent of a Girdlestone resection Discussed with his son that it may be his best option to leave the hip out and only reoperate if necessary Following cultures and drain output.

## 2019-05-14 NOTE — PROGRESS NOTES
Problem: Mobility Impaired (Adult and Pediatric) Goal: *Acute Goals and Plan of Care (Insert Text) Description Physical Therapy Goals Initiated 5/14/2019 1. Patient will move from supine to sit and sit to supine , scoot up and down and roll side to side in bed with minimal assistance/contact guard assist within 7 day(s). 2.  Patient will transfer from bed to chair and chair to bed with moderate assistance  using the least restrictive device within 7 day(s). 3.  Patient will perform sit to stand with moderate assistance  within 7 day(s). 4.  Patient will ambulate with moderate assistance  for 5 feet with the least restrictive device within 7 day(s). 5. Patient will perform 2 sets of 10 repetitions of active range of motion exercises for left lower extremity(s) with minimal assistance/contact guard assist within 7 day(s). Outcome: Not Met PHYSICAL THERAPY EVALUATION Patient: Laura Alonso (86 y.o. male) Date: 5/14/2019 Primary Diagnosis: Posterior [facility] of left hip[facility] [J72.711G] Procedure(s) (LRB): LEFT HIP ARTHROPLASTY ANTERIOR APPROACH REVISION, PLACEMENT OF ANTIBIOTIC SPACER (Left) 1 Day Post-Op Precautions: strict NWB LLE; falls ASSESSMENT : Upon arrival patient supine in bed with L LE fully internally rotated with knee flexed to approx 110 degrees of flexion with medical aspect of thigh, knee and ankle resting against surface of bed. NP notified and cleared to attempt repositioning. Based on the objective data described below, the patient presents with impaired functional mobility, balance and endurance. Patient also demonstrating impaired active and passive ROM of LLE lacking approximately 45 degrees of knee extension. Patient requiring mod to max A of 2 for overall mobility today. Gently transitioned to EOB and able to realign L hip in neutral position.  In standing patient able to maintain NWB status on L secondary to lack on knee extension. Patient demonstrating poor ability to advance RLE and pivoted to chair with max A of 2. Patient pleasantly confused but able to follow commands. Family expressing concern regarding patient's dementia and lack of insight into deficits. Per family report he has attempted mobility at SNF several times without assistance with subsequent falls noted. Patient is high fall risk and due to his lack of insight into his current deficits and NWB status he is at risk for further injury. Recommend return to SNF following discharge. Would likely benefit from increased supervision also. Keena Billings Patient will benefit from skilled intervention to address the above impairments. Patients rehabilitation potential is considered to be Fair Factors which may influence rehabilitation potential include:  
? None noted 
x? Mental ability/status 
x? Medical condition ? Home/family situation and support systems ? Safety awareness 
? Pain tolerance/management 
? Other: PLAN : 
Recommendations and Planned Interventions: 
x? Bed Mobility Training             ? Neuromuscular Re-Education 
x? Transfer Training                   ? Orthotic/Prosthetic Training 
x? Gait Training                         ? Modalities 
x? Therapeutic Exercises           ? Edema Management/Control 
x? Therapeutic Activities            x? Patient and Family Training/Education ? Other (comment): Frequency/Duration: Patient will be followed by physical therapy  daily to address goals. Discharge Recommendations: return to [facility] Further Equipment Recommendations for Discharge: TBD by rehab SUBJECTIVE:  
Patient stated My leg feels comfortable in this position. - pt referring to LLE fulling internally rotated with knee flexed to approx 110 degrees of flexion OBJECTIVE DATA SUMMARY:  
HISTORY:   
Past Medical History:  
Diagnosis Date  Arthritis  BPH (benign prostatic hypertrophy) 4/12/2010  COPD (chronic obstructive pulmonary disease) with emphysema ([facility] [address] 7/25/2012  HLD (hyperlipidemia) 4/12/2010  
 HTN (hypertension) 4/12/2010  Ill-defined condition 2018 Dementia  Memory disorder  Psychiatric disorder   
 anxiety and depression Past Surgical History:  
Procedure Laterality Date  COLONOSCOPY,REMV LESN,SNARE  8/5/2015  
 2 sessile sigmoid polyps, 3 & 5 mm; Dr. Jessica Mccarthy; no further screening recommended  ENDOSCOPY, COLON, DIAGNOSTIC  2002  
 negative; 10 year repeat; Dr. Jessica Mccarthy  HX HERNIA REPAIR  years ago  
 left  HX HERNIA REPAIR  2001  
 right inguinal  
 HX ORTHOPAEDIC    
 [place] COLSC FLX W/RMVL OF TUMOR POLYP LESION SNARE TQ  7/27/2012 Prior Level of Function/Home Situation: patient has been a SNF following fall with hip fx Home Situation Home Environment: Skilled nursing facility One/Two Story Residence: Other (Comment) Living Alone: No 
Support Systems: Skilled nursing facility Patient Expects to be Discharged to[de-identified] Other (comment) Current DME Used/Available at Home: Other (comment) EXAMINATION/PRESENTATION/DECISION MAKING:  
Critical Behavior: 
Neurologic State: Alert, Eyes open spontaneously Orientation Level: Oriented to person, Oriented to place Hearing: Auditory Auditory Impairment: None Range Of Motion: 
AROM: Generally decreased, functional(LLE non-functional) PROM: Generally decreased, functional(Lacking 45 degrees of L knee ext) Strength:   
Strength: Generally decreased, functional 
  
  
  
  
  
  
Tone & Sensation:  
Tone: Abnormal(Clonus of L foot / ankle; fasciculations of ant tibialis) Coordination: 
Coordination: Generally decreased, functional 
Vision:  
  
Functional Mobility: 
Bed Mobility: Rolling: Moderate assistance;Maximum assistance;Assist x2(With full support of LLE) Supine to Sit: Maximum assistance;Assist x2 Scooting: Maximum assistance; Moderate assistance;Assist x2 Transfers: 
Sit to Stand: Moderate assistance;Assist x2 Stand to Sit: Minimum assistance; Moderate assistance;Assist x2 Bed to Chair: Moderate assistance;Assist x2 Balance:  
Sitting: Impaired Sitting - Static: Poor (constant support)(Poor initially, progressing to fair; posterior lean) Standing: Impaired Standing - Static: Poor Standing - Dynamic : Poor Ambulation/Gait Training: 
Distance (ft): (Few steps bed to chair) Assistive Device: Gait belt;Walker, rolling Ambulation - Level of Assistance: Minimal assistance; Moderate assistance;Assist x2 Gait Abnormalities: Decreased step clearance Right Side Weight Bearing: Full Left Side Weight Bearing: Non-weight bearing Base of Support: Shift to right(Constant posterior lean) Speed/Marie: Delayed;Pace decreased (<100 feet/min); Slow Step Length: Right shortened(signficant; poor ability to advance RLE) Gait is unsteady with poor ability to advance RLE and constant posterior lean noted; good ability to maintain NWB status on L Functional Measure: 
Barthel Index: 
 
Bathin Bladder: 0 Bowels: 5 Groomin Dressin Feedin Mobility: 0 Stairs: 0 Toilet Use: 0 Transfer (Bed to Chair and Back): 0 Total: 10/100 Percentage of impairment  
0% 1-19% 20-39% 40-59% 60-79% 80-99% 100% Barthel Score 0-100 100 99-80 79-60 59-40 20-39 1-19 
 0 The Barthel ADL Index: Guidelines 1. The index should be used as a record of what a patient does, not as a record of what a patient could do. 2. The main aim is to establish degree of independence from any help, physical or verbal, however minor and for whatever reason. 3. The need for supervision renders the patient not independent. 4. A patient's performance should be established using the best available evidence. Asking the patient, friends/relatives and nurses are the usual sources, but direct observation and common sense are also important. However direct testing is not needed. 5. Usually the patient's performance over the preceding 24-48 hours is important, but occasionally longer periods will be relevant. 6. Middle categories imply that the patient supplies over 50 per cent of the effort. 7. Use of aids to be independent is allowed. Shanti Powell., Barthel, D.W. ([id number]). Functional evaluation: the Barthel Index. [address] [facility] (14)2. Jennifer Ban galo [place], J.J.M.F, Nura Meadows., Clemente Billingsley., [place], [address] [facility] (1999). Measuring the change indisability after inpatient rehabilitation; comparison of the responsiveness of the Barthel Index and Functional Coulter Measure. Journal of Neurology, Neurosurgery, and Psychiatry, 66(4), 007-039. Robby Arriaga, N.J.A, ALFIE Bernabe, & Anders Lozada MTieraA. (2004.) Assessment of post-stroke quality of life in cost-effectiveness studies: The usefulness of the Barthel Index and the EuroQoL-5D. [facility], 13, 735-39 Pain: 
Pain Scale 1: Numeric (0 - 10) Pain Intensity 1: 1 Pain Location 1: Hip Pain Orientation 1: Left Pain Description 1: Aching Pain Intervention(s) 1: Medication (see MAR) Activity Tolerance: VSS Please refer to the flowsheet for vital signs taken during this treatment. After treatment:  
x? Patient left in no apparent distress sitting up in chair ? Patient left in no apparent distress in bed 
x? Call bell left within reach 
x? Nursing notified 
x? Caregiver present 
x? Bed alarm in place but not box available- nursing notified COMMUNICATION/EDUCATION:  
The patients plan of care was discussed with: Occupational Therapist and Registered Nurse. x?         Fall prevention education was provided and the patient/caregiver indicated understanding. x? Patient/family have participated as able in goal setting and plan of care. x? Patient/family agree to work toward stated goals and plan of care. ?         Patient understands intent and goals of therapy, but is neutral about his/her participation. ? Patient is unable to participate in goal setting and plan of care. Thank you for this referral. 
Maia Lechuga, PT Time Calculation: 52 mins

## 2019-05-14 NOTE — PROGRESS NOTES
Hospitalist Progress Note NAME: Randall Rodriguez :  1934 MRN:  [id number] Assessment / Plan: 
 
Left periprosthetic hip dislocation POA 
-s/p s/p Left Hip arthroplasty with antibiotic spacer placement 
-intraop wound cx on  growing MRSA 
-currently pt on vanc and zosyn  
-consider ID consult 
-contact isolation Dementia POA 
-Continue Aricept/Namenda 
  
Hyperlipidemia POA 
-Continue statin 
  
Chronic obstructive pulmonary disease with emphysema  
-Not symptomatic 
-Continue PTA inhaler 
- PRN nebs 
  Benign prostatic hyperplasia with weak urinary stream  
-on Flomax and Finesteride 
  
ADHD Essential Tremor Depression/Anxiety Chronic anemia Hypoalbunemia Tobacco abuse Son at bedside. Updated him plan of care. All questions answered. DVT Prophylaxis: Per ortho Subjective: Chief Complaint / Reason for Physician Visit \"I am doing great, can dismiss me\". Discussed with RN events overnight. Review of Systems: 
Symptom Y/N Comments  Symptom Y/N Comments Fever/Chills n   Chest Pain n   
Poor Appetite n   Edema Cough    Abdominal Pain n   
Sputum    Joint Pain SOB/SAUCEDO n   Pruritis/Rash Nausea/vomit n   Tolerating PT/OT Diarrhea    Tolerating Diet Constipation    Other Could NOT obtain due to:   
 
Objective: VITALS:  
Last 24hrs VS reviewed since prior progress note. Most recent are: 
Patient Vitals for the past 24 hrs: 
 Temp Pulse Resp BP SpO2  
19 1130 97.5 °F (36.4 °C) 92 18 122/81 97 % 19 0907 98 °F (36.7 °C) 71 18 142/77 99 % 19 0453 98.1 °F (36.7 °C) 67 18 152/70 100 % 19 0030 98.4 °F (36.9 °C) 81 20 132/66 98 % 19 98.1 °F (36.7 °C) 78 20 115/56 96 % 19 1700 97.7 °F (36.5 °C) 80 16 145/71 100 % 19 1602 98 °F (36.7 °C) 77 19 131/59 100 % 19 1515 97.9 °F (36.6 °C) 80 18 140/68 100 % 19 1443 98 °F (36.7 °C) 84 20 147/75 100 % 05/13/19 1415 97.7 °F (36.5 °C) 88 23 136/81 96 % 05/13/19 1400  92 27 143/81 100 % 05/13/19 1345  93 27 144/80 99 % 05/13/19 1335  93 25 148/80 100 % 05/13/19 1330  97 28 147/79 99 % 05/13/19 1325  93 25 144/70 100 % 05/13/19 1320 98.1 °F (36.7 °C) 97 26 142/77 99 % Intake/Output Summary (Last 24 hours) at 5/14/2019 1239 Last data filed at 5/14/2019 1208 Gross per 24 hour Intake 1610 ml Output 675 ml Net 935 ml PHYSICAL EXAM: 
General: Alert, cooperative, no acute distress   
EENT:  EOMI. Anicteric sclerae. MMM Resp:  CTA bilaterally, no wheezing or rales. No accessory muscle use CV:  Regular  rhythm,  No edema GI:  Soft, Non distended, Non tender.  +Bowel sounds Neurologic:  Awake and alert, normal speech Psych:   Not anxious nor agitated Skin:  Bruises on legs, left thigh incision looks clean Reviewed most current lab test results and cultures  YES Reviewed most current radiology test results   YES Review and summation of old records today    NO Reviewed patient's current orders and MAR    YES 
PMH/ reviewed - no change compared to H&P 
________________________________________________________________________ Care Plan discussed with: 
  Comments Patient x Family RN x Care Manager Consultant Multidiciplinary team rounds were held today with , nursing, pharmacist and clinical coordinator. Patient's plan of care was discussed; medications were reviewed and discharge planning was addressed. ________________________________________________________________________ Total NON critical care TIME:  20  Minutes Total CRITICAL CARE TIME Spent:   Minutes non procedure based Comments >50% of visit spent in counseling and coordination of care    
________________________________________________________________________ Mimi Maynard MD  
 
 Procedures: see electronic medical records for all procedures/Xrays and details which were not copied into this note but were reviewed prior to creation of Plan. LABS: 
I reviewed today's most current labs and imaging studies. Pertinent labs include: 
Recent Labs 05/14/19 
1656 05/13/19 
0410 05/12/19 
1648 WBC 11.4* 8.6 10.0 HGB 11.3* 8.8* 8.8* HCT 36.2* 29.4* 28.7*  
 296 298 Recent Labs 05/14/19 
6584 05/13/19 
0410 05/12/19 
1648  140 141  
K 3.7 3.5 3.8  108 108 CO2 26 27 27 GLU 93 90 97 BUN 13 13 18 CREA 0.54* 0.42* 0.46* CA 8.1* 7.8* 8.1* ALB 1.9* 1.7* 1.9* TBILI 0.8 0.4 0.4 SGOT 25 27 32 ALT 16 18 19 Signed: Anna Powell MD

## 2019-05-14 NOTE — CONSULTS
Infectious Disease Consult Note Reason for Consult: Prosthetic Hip infection Date of Consultation: May 14, 2019 Date of Admission: 5/10/2019 Referring Provider : Chantale Bañuelos HPI: 
 
Mr Silke Nagy is a 80year old gentleman with hx of emphysema/COPD, HTN, admitted on 5/10/19 with dislocated L hip. Per ortho notes, she had a left tiana hip revision \" 4/16\" for a femoral neck fracture. She was taken to the OR 5/13/19 and per brief op note \"  irreducible hemiarthroplasty with greater trochanteric avulsion, significant fluid collection appearing purulent\". Antibiotic spacer placed Cx wt S aureus likely MRSA Unable to obtain history from patient Discussed with his son today. Per son, Mr Silke Nagy has dementia at baseline and sundowns. He had a fracture in March of L hip and per review of records had hip hemiarthroplasty on 3/31/18. Per son, he had pain in his hip a week later and had a revision 4/16/18. He had fallen per son afterwards and admitted again for dislocated hip. Operative findings with concerns for purulence and cx + for MRSA He was started on Vancomycin and Zosyn IV Past Medical History: 
Past Medical History:  
Diagnosis Date  Arthritis  BPH (benign prostatic hypertrophy) 4/12/2010  COPD (chronic obstructive pulmonary disease) with emphysema ([facility] [address] 7/25/2012  HLD (hyperlipidemia) 4/12/2010  
 HTN (hypertension) 4/12/2010  Ill-defined condition 2018 Dementia  Memory disorder  Psychiatric disorder   
 anxiety and depression Surgical History: 
Past Surgical History:  
Procedure Laterality Date  COLONOSCOPY,NURA FERNÁNDEZ,SNARE  8/5/2015  
 2 sessile sigmoid polyps, 3 & 5 mm; Dr. Beverly Rodriguez; no further screening recommended  ENDOSCOPY, COLON, DIAGNOSTIC  2002  
 negative; 10 year repeat; Dr. Beverly Rodriguez  HX HERNIA REPAIR  years ago  
 left  HX HERNIA REPAIR  2001  
 right inguinal  
 HX ORTHOPAEDIC    
  [place] COLSC FLX W/RMVL OF TUMOR POLYP LESION SNARE TQ  2012 Family History:  
Family History Problem Relation Age of Onset  Heart Disease Mother   
      at 80  
 Heart Disease Father   
      at 80  Liver Disease Brother  Stroke Brother  Heart Attack Brother  Other Sister 8 ? polio  Cancer Sister   
     lung Social History: · From rehab facility/NH 
· Unable to obtain from patient , has dementia Allergies: Allergies Allergen Reactions  Kiwi Anaphylaxis  Zetia [Ezetimibe] Other (comments) C/o back pain Review of Systems: 
Unable to obtain from patient, has dementia Medications: No current facility-administered medications on file prior to encounter. Current Outpatient Medications on File Prior to Encounter Medication Sig Dispense Refill  tiZANidine (ZANAFLEX) 2 mg tablet Take 2 mg by mouth every six (6) hours as needed (muscle spasms).  traMADol (ULTRAM) 50 mg tablet Take 50 mg by mouth every six (6) hours as needed for Pain.  calcium-cholecalciferol, D3, (CALTRATE 600+D) tablet Take 1 Tab by mouth daily.  ferrous sulfate 325 mg (65 mg iron) tablet Take 325 mg by mouth Daily (before breakfast).  famotidine (PEPCID) 20 mg tablet Take 1 Tab by mouth two (2) times a day for 30 days. 60 Tab 0  
 melatonin 3 mg tablet Take 6 mg by mouth nightly.  donepezil (ARICEPT) 10 mg tablet Take 10 mg by mouth nightly.  memantine (NAMENDA) 10 mg tablet Take 10 mg by mouth two (2) times a day.  dextromethorphan-quiNIDine (NUEDEXTA) 20-10 mg per capsule Take 1 Cap by mouth every twelve (12) hours. Indications: PSEUDOBULBAR AFFECT 60 Cap 11  
 rosuvastatin (CRESTOR) 20 mg tablet TAKE ONE TABLET BY MOUTH ONE TIME DAILY 90 Tab 3  
 SPIRIVA WITH HANDIHALER 18 mcg inhalation capsule INHALE ONE CAPSULE VIA DEVICE BY MOUTH ONE TIME DAILY 30 Cap 8  multivitamin (ONE A DAY) tablet Take 1 Tab by mouth daily.  tamsulosin (FLOMAX) 0.4 mg capsule Take 0.4 mg by mouth daily.  finasteride (PROSCAR) 5 mg tablet Take 5 mg by mouth daily. Current Facility-Administered Medications:  
  [START ON 5/15/2019] Please draw Vancomycin Trough before  2am dose, 1 Each, Other, ONCE, Alban Juan MD 
  sodium chloride (NS) flush 5-40 mL, 5-40 mL, IntraVENous, Q8H, CONOR Starks, 10 mL at 05/14/19 1355   sodium chloride (NS) flush 5-40 mL, 5-40 mL, IntraVENous, PRN, CONOR Starks 
  oxyCODONE IR (ROXICODONE) tablet 5 mg, 5 mg, Oral, Q3H PRN, CONOR Day 
  oxyCODONE IR (ROXICODONE) tablet 10 mg, 10 mg, Oral, Q3H PRN, CONOR Day 
  naloxone [facility]) injection 0.4 mg, 0.4 mg, IntraVENous, PRN, CONOR Day 
  [START ON 5/15/2019] ondansetron (ZOFRAN ODT) tablet 4 mg, 4 mg, Oral, Q6H PRN, CONOR Day 
  diphenhydrAMINE (BENADRYL) 12.5 mg/5 mL oral elixir 12.5 mg, 12.5 mg, Oral, Q6H PRN, CONOR Day 
  polyethylene glycol (MIRALAX) packet 17 g, 17 g, Oral, DAILY, CONOR Day, 17 g at 05/14/19 0911 
  [START ON 5/15/2019] bisacodyl (DULCOLAX) suppository 10 mg, 10 mg, Rectal, DAILY PRN, CONOR Day 
  vancomycin (VANCOCIN) 750 mg in 0.9% sodium chloride (MBP/ADV) 250 mL, 750 mg, IntraVENous, Q12H, CONOR Day, 750 mg at 05/14/19 1355   [COMPLETED] piperacillin-tazobactam (ZOSYN) 3.375 g in 0.9% sodium chloride (MBP/ADV) 100 mL, 3.375 g, IntraVENous, ONCE, Last Rate: 200 mL/hr at 05/13/19 1653, 3.375 g at 05/13/19 1653 **FOLLOWED BY** piperacillin-tazobactam (ZOSYN) 3.375 g in 0.9% sodium chloride (MBP/ADV) 100 mL, 3.375 g, IntraVENous, Q8H, CONOR Day, Last Rate: 25 mL/hr at 05/14/19 1202, 3.375 g at 05/14/19 1202 
  guaiFENesin-dextromethorphan (ROBITUSSIN DM) 100-10 mg/5 mL syrup 10 mL, 10 mL, Oral, Q6H, Kev Naranjo MD, 10 mL at 05/14/19 1156   aspirin chewable tablet 81 mg, 81 mg, Oral, BID, German Regalado, NP, 81 mg at 05/14/19 7500   acetaminophen (TYLENOL) tablet 650 mg, 650 mg, Oral, Q6H, German Regalado, NP, 650 mg at 05/14/19 1156 
  senna-docusate (PERICOLACE) 8.6-50 mg per tablet 2 Tab, 2 Tab, Oral, QHS, German Regalado, NP, 2 Tab at 05/13/19 2228   methocarbamol (ROBAXIN) tablet 500 mg, 500 mg, Oral, TID PRN, Jr Mask, PA, 500 mg at 05/12/19 2301   LORazepam (ATIVAN) injection 1 mg, 1 mg, IntraVENous, Q8H PRN, Jr Mask, PA, 1 mg at 05/10/19 2040 
  donepezil (ARICEPT) tablet 10 mg, 10 mg, Oral, DAILY, Jr Mask, PA, 10 mg at 05/14/19 7954   famotidine (PEPCID) tablet 20 mg, 20 mg, Oral, BID, Jr Mask, PA, 20 mg at 05/14/19 0911 
  finasteride (PROSCAR) tablet 5 mg, 5 mg, Oral, DAILY, Jr Mask, PA, 5 mg at 05/14/19 0911 
  melatonin tablet 6 mg, 6 mg, Oral, QHS, Jr Mask, PA, 6 mg at 05/13/19 2228   memantine (NAMENDA) tablet 10 mg, 10 mg, Oral, BID, Jr Mask, PA, 10 mg at 05/14/19 0911 
  rosuvastatin (CRESTOR) tablet 20 mg, 20 mg, Oral, QHS, Jr Mask, PA, 20 mg at 05/13/19 2230 
  umeclidinium (INCRUSE ELLIPTA) 62.5 mcg/actuation, 1 Puff, Inhalation, DAILY, Jr Mask, [place], 1 Puff at 05/14/19 7370   tamsulosin (FLOMAX) capsule 0.4 mg, 0.4 mg, Oral, DAILY, Jr Mask, PA, 0.4 mg at 05/14/19 0911 
  albuterol-ipratropium (DUO-NEB) 2.5 MG-0.5 MG/3 ML, 3 mL, Nebulization, Q4H PRN, Jr Mask, PA Physical Exam: 
 
Vitals:  
Patient Vitals for the past 24 hrs: 
 Temp Pulse Resp BP SpO2  
05/14/19 1130 97.5 °F (36.4 °C) 92 18 122/81 97 % 05/14/19 0907 98 °F (36.7 °C) 71 18 142/77 99 % 05/14/19 0453 98.1 °F (36.7 °C) 67 18 152/70 100 % 05/14/19 0030 98.4 °F (36.9 °C) 81 20 132/66 98 % 05/13/19 2009 98.1 °F (36.7 °C) 78 20 115/56 96 % 05/13/19 1700 97.7 °F (36.5 °C) 80 16 145/71 100 % 05/13/19 1602 98 °F (36.7 °C) 77 19 131/59 100 % 05/13/19 1515 97.9 °F (36.6 °C) 80 18 140/68 100 % ·  
· GEN: NAD, sleeping · HEENT: sleeping  Now, mouth open, no thrush · CV: S1, S2 heard regularly · Lungs: Clear to auscultation bilaterally anteriorly · Abdomen: soft, non distended, no facial grimace to palpation · Genitourinary: + polo · Extremities: no edema · Neuro: unable to assess · Skin: no rash on areas noted · Psych: unable to assess · MSK: + hip area wt drain,no erythema noted , limited exam patient sleeping at this time · Labs:  
Recent Results (from the past 24 hour(s)) CBC WITH AUTOMATED DIFF Collection Time: 05/14/19  3:41 AM  
Result Value Ref Range WBC 11.4 (H) 4.1 - 11.1 K/uL  
 RBC 3.93 (L) 4.10 - 5.70 M/uL  
 HGB 11.3 (L) 12.1 - 17.0 g/dL HCT 36.2 (L) 36.6 - 50.3 % MCV 92.1 80.0 - 99.0 FL  
 MCH 28.8 26.0 - 34.0 PG  
 MCHC 31.2 30.0 - 36.5 g/dL  
 RDW 17.7 (H) 11.5 - 14.5 % PLATELET 731 108 - 124 K/uL MPV 10.2 8.9 - 12.9 FL  
 NRBC 0.0 0  WBC ABSOLUTE NRBC 0.00 0.00 - 0.01 K/uL NEUTROPHILS 82 (H) 32 - 75 % LYMPHOCYTES 13 12 - 49 % MONOCYTES 5 5 - 13 % EOSINOPHILS 0 0 - 7 % BASOPHILS 0 0 - 1 % IMMATURE GRANULOCYTES 1 (H) 0.0 - 0.5 % ABS. NEUTROPHILS 9.3 (H) 1.8 - 8.0 K/UL  
 ABS. LYMPHOCYTES 1.4 0.8 - 3.5 K/UL  
 ABS. MONOCYTES 0.6 0.0 - 1.0 K/UL  
 ABS. EOSINOPHILS 0.0 0.0 - 0.4 K/UL  
 ABS. BASOPHILS 0.0 0.0 - 0.1 K/UL  
 ABS. IMM. GRANS. 0.1 (H) 0.00 - 0.04 K/UL  
 DF AUTOMATED METABOLIC PANEL, COMPREHENSIVE Collection Time: 05/14/19  3:41 AM  
Result Value Ref Range Sodium 138 136 - 145 mmol/L Potassium 3.7 3.5 - 5.1 mmol/L Chloride 106 97 - 108 mmol/L  
 CO2 26 21 - 32 mmol/L Anion gap 6 5 - 15 mmol/L Glucose 93 65 - 100 mg/dL BUN 13 6 - 20 MG/DL  Creatinine 0.54 (L) 0.70 - 1.30 MG/DL  
 BUN/Creatinine ratio 24 (H) 12 - 20    
 GFR est AA >60 >60 ml/min/1.73m2 GFR est non-AA >60 >60 ml/min/1.73m2 Calcium 8.1 (L) 8.5 - 10.1 MG/DL Bilirubin, total 0.8 0.2 - 1.0 MG/DL  
 ALT (SGPT) 16 12 - 78 U/L  
 AST (SGOT) 25 15 - 37 U/L Alk. phosphatase 93 45 - 117 U/L Protein, total 6.4 6.4 - 8.2 g/dL Albumin 1.9 (L) 3.5 - 5.0 g/dL Globulin 4.5 (H) 2.0 - 4.0 g/dL A-G Ratio 0.4 (L) 1.1 - 2.2 Microbiology Data: 
  
  
 Hip 19 Specimen Information: Hip  
    
Component Value Ref Range & Units Status Special Requests: NO SPECIAL REQUESTS    Preliminary GRAM STAIN NO WBC'S SEEN    Preliminary GRAM STAIN NO DEFINITE ORGANISM SEEN    Preliminary Culture result: Abnormal     Preliminary FEW PROBABLE STAPHYLOCOCCUS AUREUS   
 
 
 
    
Component Value Ref Range & Units Status Special Requests: NO SPECIAL REQUESTS    Preliminary GRAM STAIN NO WBC'S SEEN    Preliminary GRAM STAIN    Preliminary [address] [place] [address] Culture result: Abnormal     Preliminary MODERATE Preliminary report of Methicillin Resistant Staphylococcus aureus by antigen detection. Confirmation and Sensitivities to follow. Urine: 19 Component Value Ref Range & Units Status Special Requests: NO SPECIAL REQUESTS Reflexed from [id number]    Final  
[place] Count <1,000 CFU/ML    Final  
Culture result: NO GROWTH 1 DAY    Final  
 
 
 
 
  
 
Pathology Results: 
 
Imagin/3/18 CT Chest  
FINDINGS:  
The unenhanced images demonstrate aortic and coronary artery calcifications. LOWER NECK:  The visualized portions of the thyroid and structures of the lower 
neck are within normal limits. LUNGS: The lungs are hyperinflated consistent with emphysema. The lungs are 
clear of mass, nodule, airspace disease or edema. There is airspace disease or 
atelectasis in the left lower lobe. PLEURA: There is increased left pleural effusion. AORTA: The aorta is atherosclerotic and enhances normally with no evidence of 
aneurysm or dissection. There is aortic valve and coronary artery calcification. MEDIASTINUM: There is no mediastinal or hilar adenopathy or mass. BONES AND SOFT TISSUES: The bones and soft tissues of the chest wall are normal. 
UPPER ABDOMEN: The visualized portions of the upper abdominal organs are normal. 
  
IMPRESSION IMPRESSION:  
1. Emphysema. 2. Increased left pleural effusion with left lower lobe airspace disease or 
atelectasis. 3. Atherosclerotic aorta with aortic valve and coronary artery calcifications. 5/10/19 CXR 
  
FINDINGS: A portable AP radiograph of the chest was obtained at 1856 hours. The 
patient is on a cardiac monitor. There is a small left pleural effusion, 
unchanged. Biapical fibrosis is noted. No superimposed process is seen. . The 
cardiac and mediastinal contours and pulmonary vascularity are stable. The 
bones and soft tissues are grossly within normal limits.  
  
IMPRESSION IMPRESSION: Stable left pleural effusion and lung hyperinflation. No acute 
process seen. X ray 5/10/19 FINDINGS: An AP view of the pelvis demonstrates superolateral dislocation of the 
left hip. A left hip prosthesis remains in place. 
  
IMPRESSION IMPRESSION:  Acute superior lateral dislocation of the left hip. Assessment / Plan:  
 
Mr John Lanier is a 80year old gentleman with hx of emphysema/COPD, HTN, admitted on 5/10/19 with dislocated L hip. He had a fracture in March of L hip and per review of records had hip hemiarthroplasty on 3/31/18. Per son, he had pain in his hip a week later and had a revision 4/16/18. He had fallen per son afterwards and admitted again for dislocated hip. Operative findings with concerns for purulence and cx + for MRSA 1) Prosthetic joint hip infection Await cx On Vancomycin and Zosyn Plan to stop Zosyn if no other + cx except MRSA 6 weeks IV antibiotics if he tolerates Discussed with patient's son about risk for nephrotoxicity, CDI, other antibiotic related side effects Check weekly CBC wt diff, BMP, Vancomycin trough, ESR, CRP. Trough goal 15-20 Check blood cx If renal function worsens, change Vancomycin to Daptomycin IV Probiotics/yogurt encouraged 2) Dementia 3) COPD/Emphesema CXR wt effusion Monitor for pneumonia signs , symptoms and risk for aspiration Aspiration precautions 4) DVT Px Oletha Ganser, DO 
3:58 PM 
  
Thank for the opportunity to participate in the care of this patient. Please contact with questions or concerns.

## 2019-05-14 NOTE — PROGRESS NOTES
Orthopedic End of Shift Note Bedside and Verbal shift change report given to Claudio Acevedo (oncoming nurse) by Yuri Pratt (offgoing nurse). Report included the following information SBAR, Kardex, Procedure Summary, Intake/Output, MAR and Recent Results. POD# 1 Significant issues during shift: N/A Issues for Physician to address: n/a Activity This Shift 
(check all that apply) [] chair 
[] dangle 
 [] bathroom 
[] bedside commode [] hallway [x] bedrest  
Nausea/Vomiting [] yes [x] no    
Voiding Status [] void [x] Razo [] I&O Cath Bowel Movements [] yes [x] no Foot Pumps or SCD [x] yes [] no Ice Pack [x] yes    [] no Incentive Spirometer [] yes [x] no Volume:     
Telemetry Monitoring   [] yes [x] no Rhythm:  
Supplemental O2 [] yes [x] no Sat off O2:   97%

## 2019-05-14 NOTE — PROGRESS NOTES
Ortho / Neurosurgery NP Note POD# 1  s/p LEFT HIP ARTHROPLASTY ANTERIOR APPROACH REVISION, PLACEMENT OF cement Pt seen with family at bedside Pt resting in bed. No complaints. States his leg is in a \"position of comfort. \" 
 
VSS Afebrile. Voiding status: polo with 400 cc Labs Lab Results Component Value Date/Time HGB 11.3 (L) 05/14/2019 03:41 AM  
  
Lab Results Component Value Date/Time INR 1.1 05/10/2019 06:10 PM  
  
 
Body mass index is 18.13 kg/m². : A BMI > 30 is classified as obesity and > 40 is classified as morbid obesity. Dressing c.d.i HVAC with 200 cc output last night and 40 cc this morning Leg is flexed at hip and knee and internally rotated. Son reports that this was like this last nght up return from OR and Dr. Angela Nguyen saw it. I have reached out to Dr Angela Nguyen to discuss. SCDs for mechanical DVT proph while in bed PLAN: 
1) PT BID, NWB. Demented and will likely not be able to maintain WB status. Leg internally rotated and flexed. Dr. Angela Nguyen believes this to be his normal posture. 2) Aspirin 81 mg PO BID for DVT Prophylaxis 3) GI Prophylaxis - Pepcid 4) HVAC - d/c soon when drainage slows. 5) Readniess for discharge: 
   [x] Vital Signs stable  
 [x] Hgb stable  
 [x] + Voiding  In polo - d/c soon? [x] Wound intact, drainage minimal  
 [x] Tolerating PO intake   
 [] Cleared by PT (OT if applicable) [] Stair training completed (if applicable) [] Independent / Contact Guard Assist (household distance) [] Bed mobility [] Car transfers  
  [] ADLs [x] Adequate pain control on oral medication alone Plan SNF placement when medically stable Jana Jones NP 
DNP, ACNP-BC, ONP-C

## 2019-05-15 NOTE — PROGRESS NOTES
Infectious Disease Progress Note Mr Amilcar Culver sitting in chair today His son says that LLE is swollen today and abruptly swollen Patient denied pain Medications: 
 
 
 
Current Facility-Administered Medications:  
  diphenhydrAMINE (BENADRYL) 12.5 mg/5 mL syrup 12.5 mg, 12.5 mg, Oral, Q6H PRN, CONOR Lopez 
  vancomycin (VANCOCIN) 700 mg in 0.9% sodium chloride 250 mL IVPB, 700 mg, IntraVENous, Q8H, Melany Bell DO, Last Rate: 125 mL/hr at 05/15/19 1027, 700 mg at 05/15/19 1027 
  [START ON 5/16/2019] Vancomycin Trough- Please draw prior to 0200 dose on 5/16/19, 1 Each, Other, ONCE, Melany Bell DO 
  sodium chloride (NS) flush 5-40 mL, 5-40 mL, IntraVENous, Q8H, CONOR Campos, 10 mL at 05/15/19 0600 
  sodium chloride (NS) flush 5-40 mL, 5-40 mL, IntraVENous, PRN, CONOR Campos, 10 mL at 05/14/19 1758   oxyCODONE IR (ROXICODONE) tablet 5 mg, 5 mg, Oral, Q3H PRN, CONOR Lopez 
  oxyCODONE IR (ROXICODONE) tablet 10 mg, 10 mg, Oral, Q3H PRN, CONOR Lopez 
  naloxone [facility]) injection 0.4 mg, 0.4 mg, IntraVENous, PRN, CNOOR Lopez 
  ondansetron (ZOFRAN ODT) tablet 4 mg, 4 mg, Oral, Q6H PRN, CONOR Lopez 
  polyethylene glycol (MIRALAX) packet 17 g, 17 g, Oral, DAILY, CONOR Lopez, 17 g at 05/14/19 0911 
  bisacodyl (DULCOLAX) suppository 10 mg, 10 mg, Rectal, DAILY PRN, CONOR Lopez 
  [COMPLETED] piperacillin-tazobactam (ZOSYN) 3.375 g in 0.9% sodium chloride (MBP/ADV) 100 mL, 3.375 g, IntraVENous, ONCE, Last Rate: 200 mL/hr at 05/13/19 1653, 3.375 g at 05/13/19 1653 **FOLLOWED BY** piperacillin-tazobactam (ZOSYN) 3.375 g in 0.9% sodium chloride (MBP/ADV) 100 mL, 3.375 g, IntraVENous, Q8H, CONOR Campos, Last Rate: 25 mL/hr at 05/15/19 0500, 3.375 g at 05/15/19 0500 
  guaiFENesin-dextromethorphan (ROBITUSSIN DM) 100-10 mg/5 mL syrup 10 mL, 10 mL, Oral, Q6H, Angeles Quezada MD, 10 mL at 05/15/19 9432   aspirin chewable tablet 81 mg, 81 mg, Oral, BID, Yulisa Loots, NP, 81 mg at 05/15/19 6030   acetaminophen (TYLENOL) tablet 650 mg, 650 mg, Oral, Q6H, Yulisa Loots, NP, 650 mg at 05/15/19 0611 
  senna-docusate (PERICOLACE) 8.6-50 mg per tablet 2 Tab, 2 Tab, Oral, QHS, Yulisa Loots, NP, 2 Tab at 05/14/19 2156   methocarbamol (ROBAXIN) tablet 500 mg, 500 mg, Oral, TID PRN, Restrepo Hand, PA, 500 mg at 05/15/19 2288   LORazepam (ATIVAN) injection 1 mg, 1 mg, IntraVENous, Q8H PRN, Restrepo Hand, PA, 1 mg at 05/10/19 2040 
  donepezil (ARICEPT) tablet 10 mg, 10 mg, Oral, DAILY, Restrepo Hand, PA, 10 mg at 05/15/19 2508   famotidine (PEPCID) tablet 20 mg, 20 mg, Oral, BID, Restrepo Hand, PA, 20 mg at 05/15/19 0722 
  finasteride (PROSCAR) tablet 5 mg, 5 mg, Oral, DAILY, Restrepo Hand, PA, 5 mg at 05/15/19 0936 
  melatonin tablet 6 mg, 6 mg, Oral, QHS, Resrtepo Hand, PA, 6 mg at 05/14/19 2157   memantine (NAMENDA) tablet 10 mg, 10 mg, Oral, BID, Restrepo Hand, PA, 10 mg at 05/15/19 0530 
  rosuvastatin (CRESTOR) tablet 20 mg, 20 mg, Oral, QHS, Restrepo Hand, PA, 20 mg at 05/14/19 2200 
  umeclidinium (INCRUSE ELLIPTA) 62.5 mcg/actuation, 1 Puff, Inhalation, DAILY, Restrepo Hand, [place], 1 Puff at 05/14/19 2080   tamsulosin (FLOMAX) capsule 0.4 mg, 0.4 mg, Oral, DAILY, Restrepo Hand, PA, 0.4 mg at 05/15/19 0937 
  albuterol-ipratropium (DUO-NEB) 2.5 MG-0.5 MG/3 ML, 3 mL, Nebulization, Q4H PRN, CONOR Guerrero Physical Exam: 
 
Vitals:  
Patient Vitals for the past 24 hrs: 
 Temp Pulse Resp BP SpO2  
05/15/19 1145 97.7 °F (36.5 °C) 73 18 125/62 99 % 05/15/19 0934 98.3 °F (36.8 °C) 70 18 134/68 99 % 05/15/19 0500 97.8 °F (36.6 °C) 61 20 130/70 98 % 05/15/19 0000 97.8 °F (36.6 °C) 67 18 129/68 98 % 05/14/19 2036 97.7 °F (36.5 °C) 60 18 128/63 98 % 05/14/19 1726 97.8 °F (36.6 °C) 73 18 140/72 100 % · GEN: NAD, sleeping · HEENT: sleeping  Now, mouth open, no thrush · CV: S1, S2 heard regularly · Lungs: Clear to auscultation bilaterally anteriorly · Abdomen: soft, non distended, no facial grimace to palpation · Genitourinary: + polo · Extremities: no edema · Neuro: unable to assess · Skin: no rash on areas noted · Psych: unable to assess · MSK: + hip area wt drain,no erythema noted , + LLE swelling Labs:  
Recent Results (from the past 24 hour(s)) CULTURE, BLOOD, PAIRED Collection Time: 05/14/19  4:46 PM  
Result Value Ref Range Special Requests: NO SPECIAL REQUESTS Culture result: NO GROWTH AFTER 14 HOURS METABOLIC PANEL, BASIC Collection Time: 05/15/19  3:44 AM  
Result Value Ref Range Sodium 139 136 - 145 mmol/L Potassium 3.7 3.5 - 5.1 mmol/L Chloride 107 97 - 108 mmol/L  
 CO2 25 21 - 32 mmol/L Anion gap 7 5 - 15 mmol/L Glucose 130 (H) 65 - 100 mg/dL BUN 9 6 - 20 MG/DL Creatinine 0.41 (L) 0.70 - 1.30 MG/DL  
 BUN/Creatinine ratio 22 (H) 12 - 20 GFR est AA >60 >60 ml/min/1.73m2 GFR est non-AA >60 >60 ml/min/1.73m2 Calcium 7.9 (L) 8.5 - 10.1 MG/DL  
HEMOGLOBIN Collection Time: 05/15/19  3:44 AM  
Result Value Ref Range HGB 10.1 (L) 12.1 - 17.0 g/dL Alma Brenden Collection Time: 05/15/19  3:44 AM  
Result Value Ref Range Vancomycin,trough 7.3 5.0 - 10.0 ug/mL Reported dose date: [id number] Reported dose time: 1400 Reported dose: 750MG IV Q12H UNITS  
CBC W/O DIFF Collection Time: 05/15/19  9:44 AM  
Result Value Ref Range WBC 8.9 4.1 - 11.1 K/uL  
 RBC 3.59 (L) 4.10 - 5.70 M/uL  
 HGB 10.4 (L) 12.1 - 17.0 g/dL HCT 33.0 (L) 36.6 - 50.3 % MCV 91.9 80.0 - 99.0 FL  
 MCH 29.0 26.0 - 34.0 PG  
 MCHC 31.5 30.0 - 36.5 g/dL  
 RDW 16.8 (H) 11.5 - 14.5 % PLATELET 201 314 - 811 K/uL MPV 10.2 8.9 - 12.9 FL  
 NRBC 0.0 0  WBC ABSOLUTE NRBC 0.00 0.00 - 0.01 K/uL SED RATE (ESR) Collection Time: 05/15/19  9:44 AM  
Result Value Ref Range Sed rate, automated 63 (H) 0 - 20 mm/hr C REACTIVE PROTEIN, QT Collection Time: 05/15/19  9:44 AM  
Result Value Ref Range C-Reactive protein 12.90 (H) 0.00 - 0.60 mg/dL Microbiology Data: 
  
  
 Hip 19 Specimen Information: Hip  
    
Component Value Ref Range & Units Status Special Requests: NO SPECIAL REQUESTS    Preliminary GRAM STAIN NO WBC'S SEEN    Preliminary GRAM STAIN NO DEFINITE ORGANISM SEEN    Preliminary Culture result: Abnormal     Preliminary FEW PROBABLE STAPHYLOCOCCUS AUREUS   
 
 
 
    
Component Value Ref Range & Units Status Special Requests: NO SPECIAL REQUESTS    Preliminary GRAM STAIN NO WBC'S SEEN    Preliminary GRAM STAIN    Preliminary [address] [place] [address] Culture result: Abnormal     Preliminary MODERATE Preliminary report of Methicillin Resistant Staphylococcus aureus by antigen detection. Confirmation and Sensitivities to follow. Urine: 19 Component Value Ref Range & Units Status Special Requests: NO SPECIAL REQUESTS Reflexed from [id number]    Final  
[place]ldon Count <1,000 CFU/ML    Final  
Culture result: NO GROWTH 1 DAY    Final  
 
 
 
 
  
 
Pathology Results: 
 
Imagin/3/18 CT Chest  
FINDINGS:  
The unenhanced images demonstrate aortic and coronary artery calcifications. LOWER NECK:  The visualized portions of the thyroid and structures of the lower 
neck are within normal limits. LUNGS: The lungs are hyperinflated consistent with emphysema. The lungs are 
clear of mass, nodule, airspace disease or edema. There is airspace disease or 
atelectasis in the left lower lobe. PLEURA: There is increased left pleural effusion. AORTA: The aorta is atherosclerotic and enhances normally with no evidence of 
aneurysm or dissection.  There is aortic valve and coronary artery calcification. MEDIASTINUM: There is no mediastinal or hilar adenopathy or mass. BONES AND SOFT TISSUES: The bones and soft tissues of the chest wall are normal. 
UPPER ABDOMEN: The visualized portions of the upper abdominal organs are normal. 
  
IMPRESSION IMPRESSION:  
1. Emphysema. 2. Increased left pleural effusion with left lower lobe airspace disease or 
atelectasis. 3. Atherosclerotic aorta with aortic valve and coronary artery calcifications. 5/10/19 CXR 
  
FINDINGS: A portable AP radiograph of the chest was obtained at 1856 hours. The 
patient is on a cardiac monitor. There is a small left pleural effusion, 
unchanged. Biapical fibrosis is noted. No superimposed process is seen. . The 
cardiac and mediastinal contours and pulmonary vascularity are stable. The 
bones and soft tissues are grossly within normal limits.  
  
IMPRESSION IMPRESSION: Stable left pleural effusion and lung hyperinflation. No acute 
process seen. X ray 5/10/19 FINDINGS: An AP view of the pelvis demonstrates superolateral dislocation of the 
left hip. A left hip prosthesis remains in place. 
  
IMPRESSION IMPRESSION:  Acute superior lateral dislocation of the left hip. Assessment / Plan:  
 
Mr Mic Peralta is a 80year old gentleman with hx of emphysema/COPD, HTN, admitted on 5/10/19 with dislocated L hip. He had a fracture in March of L hip and per review of records had hip hemiarthroplasty on 3/31/18. Per son, he had pain in his hip a week later and had a revision 4/16/18. He had fallen per son afterwards and admitted again for dislocated hip. Operative findings with concerns for purulence and cx + for MRSA 1) Prosthetic joint hip infection Await final hip cultures On Vancomycin and Zosyn Plan to stop Zosyn if no other + cx except MRSA 
6 weeks IV antibiotics if he tolerates since surgery Await blood cx Discussed with patient's son about risk for nephrotoxicity, CDI, other antibiotic related side effects Check weekly CBC wt diff, BMP, Vancomycin trough, ESR, CRP. Trough goal 15-20 If renal function worsens, change Vancomycin to Daptomycin IV Probiotics/yogurt encouraged 2) LLE edema:  Notified primary team RN to inform ortho and US to assess for DVT planned Plans per ortho 3) Dementia 4) COPD/Emphesema CXR wt effusion Monitor for pneumonia signs , symptoms and risk for aspiration Aspiration precautions 5) DVT Px Melany Bell DO 
1:32 PM 
 
  
Thank for the opportunity to participate in the care of this patient. Please contact with questions or concerns.

## 2019-05-15 NOTE — PROGRESS NOTES
Problem: Mobility Impaired (Adult and Pediatric) Goal: *Acute Goals and Plan of Care (Insert Text) Description Physical Therapy Goals Initiated 5/14/2019 1. Patient will move from supine to sit and sit to supine , scoot up and down and roll side to side in bed with minimal assistance/contact guard assist within 7 day(s). 2.  Patient will transfer from bed to chair and chair to bed with moderate assistance  using the least restrictive device within 7 day(s). 3.  Patient will perform sit to stand with moderate assistance  within 7 day(s). 4.  Patient will ambulate with moderate assistance  for 5 feet with the least restrictive device within 7 day(s). 5. Patient will perform 2 sets of 10 repetitions of active range of motion exercises for left lower extremity(s) with minimal assistance/contact guard assist within 7 day(s). Outcome: Progressing Towards Goal 
 PHYSICAL THERAPY TREATMENT Patient: Everett Bernal (89 y.o. male) Date: 5/15/2019 Diagnosis: Posterior dislocation of left hip, [facility] [M15.304C] Status post total replacement of left hip Procedure(s) (LRB): LEFT HIP ARTHROPLASTY ANTERIOR APPROACH REVISION, PLACEMENT OF ANTIBIOTIC SPACER (Left) 2 Days Post-Op Precautions:  Strict NWB LLE; falls Chart, physical therapy assessment, plan of care and goals were reviewed. ASSESSMENT: 
Upon arrival, pt was supine in bed with leg fully internally rotated with knee flexed to 120 degrees. Unable to realign hip until sitting EOB. Noted increased swelling throughout L leg, but especially at calf - NP notified. Pt also demonstrating increasing lack of extension of LLE. Pt remains confused and slightly more agitated today, but remained cooperative throughout therapy session. Continue to require mod-max A x 2 for overall mobility, but demonstrating slight improvements in balance. Able to take a few steps to chair using RW for support.  Demonstrates good adherence to NWB status, secondary to tight hamstrings and inability to extend knee. Continue to recommend return to SNF upon discharge. Progression toward goals: 
?    Improving appropriately and progressing toward goals ? Improving slowly and progressing toward goals ? Not making progress toward goals and plan of care will be adjusted PLAN: 
Patient continues to benefit from skilled intervention to address the above impairments. Continue treatment per established plan of care. Discharge Recommendations:  Chilo Dillard Further Equipment Recommendations for Discharge:  TBD by rehab SUBJECTIVE:  
Patient stated ? Don't touch my leg; move my leg.? OBJECTIVE DATA SUMMARY:  
Critical Behavior: 
Neurologic State: Alert Orientation Level: Oriented to person, Oriented to place, Disoriented to time Cognition: Decreased attention/concentration, Follows commands, Impulsive Functional Mobility Training: 
Bed Mobility: 
  
Supine to Sit: Maximum assistance;Assist x2; Additional time Scooting: Contact guard assistance Transfers: 
Sit to Stand: Moderate assistance;Assist x2(Assist x 1 to maintain WB status) Stand to Sit: Minimum assistance;Assist x2 Bed to Chair: Moderate assistance;Assist x2 Balance: 
Sitting: Impaired Sitting - Static: Good (unsupported) Sitting - Dynamic: Fair (occasional) Standing: Impaired; With support(RW) 
Standing - Static: Poor Standing - Dynamic : Poor Ambulation/Gait Training: 
Distance (ft): (Few steps bed to chair) Assistive Device: Gait belt;Walker, rolling Ambulation - Level of Assistance: Moderate assistance;Assist x2(Required help to unweight foot for advance) Gait Abnormalities: Decreased step clearance Base of Support: Shift to right Speed/Marie: Delayed;Pace decreased (<100 feet/min); Slow Step Length: Right shortened Gait unsteady overall, but pt demonstrating good ability to maintain NWB status. Pain: 
 Pt reporting pain in medial aspect of left thigh - NP notified. After treatment:  
?    Patient left in no apparent distress sitting up in chair ? Patient left in no apparent distress in bed 
? Call bell left within reach ? Nursing notified ? Caregiver present ? Bed alarm activated, but nursing notified requires  and call bell alarm COMMUNICATION/COLLABORATION:  
The patient?s plan of care was discussed with: Occupational Therapist, Registered Nurse and NP. Marguerite Hardy, PT Time Calculation: 30 mins

## 2019-05-15 NOTE — PROGRESS NOTES
Hospitalist Progress Note NAME: Bonita De Jesus :  1934 MRN:  [id number] Assessment / Plan: 
Left leg swelling LE venous dopplers negative Suspect due to left leg ortho issues Nothing to add from hospitalist services at this time, I will sign off. Please let us know if further assistance needed from hospitalist service Left periprosthetic hip dislocation POA S/p s/p Left Hip arthroplasty with antibiotic spacer placement Intraop wound cx on  growing MRSA Currently pt on vanc and zosyn ID is on the case now Dementia POA Continue Aricept/Namenda 
  
Hyperlipidemia POA Continue statin 
  
Chronic obstructive pulmonary disease with emphysema Not symptomatic Continue PTA inhaler PRN nebs 
  Benign prostatic hyperplasia with weak urinary stream  
On Flomax and Finesteride 
  
ADHD Essential Tremor Depression/Anxiety Chronic anemia Hypoalbunemia Tobacco abuse Son at bedside. Updated him plan of care. All questions answered. DVT Prophylaxis: Per ortho Subjective: Pt seen and examined at bedside. NAD. Complaining of left leg swelling. Overnight events d/w RN Chief Complaint / Reason for Physician Visit: f/u PeriOp medical management Review of Systems: 
Symptom Y/N Comments  Symptom Y/N Comments Fever/Chills n   Chest Pain n   
Poor Appetite n   Edema Cough    Abdominal Pain n   
Sputum    Joint Pain SOB/SAUCEDO n   Pruritis/Rash Nausea/vomit n   Tolerating PT/OT Diarrhea    Tolerating Diet Constipation    Other Could NOT obtain due to:   
 
Objective: VITALS:  
Last 24hrs VS reviewed since prior progress note. Most recent are: 
Patient Vitals for the past 24 hrs: 
 Temp Pulse Resp BP SpO2  
05/15/19 1626 97.5 °F (36.4 °C) 62 16 130/61 99 % 05/15/19 1145 97.7 °F (36.5 °C) 73 18 125/62 99 % 05/15/19 0934 98.3 °F (36.8 °C) 70 18 134/68 99 % 05/15/19 0500 97.8 °F (36.6 °C) 61 20 130/70 98 % 05/15/19 0000 97.8 °F (36.6 °C) 67 18 129/68 98 % 05/14/19 2036 97.7 °F (36.5 °C) 60 18 128/63 98 % 05/14/19 1726 97.8 °F (36.6 °C) 73 18 140/72 100 % Intake/Output Summary (Last 24 hours) at 5/15/2019 1719 Last data filed at 5/15/2019 9185 Gross per 24 hour Intake 240 ml Output 2400 ml Net -2160 ml PHYSICAL EXAM: 
General: Alert, cooperative, no acute distress   
EENT:  EOMI. Anicteric sclerae. MMM Resp:  CTA bilaterally, no wheezing or rales. No accessory muscle use CV:  Regular  rhythm,  left leg swelling GI:  Soft, Non distended, Non tender.  +Bowel sounds Neurologic:  Awake and alert, normal speech Psych:   Not anxious nor agitated Skin:  Bruises on legs, left thigh incision looks clean Reviewed most current lab test results and cultures  YES Reviewed most current radiology test results   YES Review and summation of old records today    NO Reviewed patient's current orders and MAR    YES 
PMH/ reviewed - no change compared to H&P 
________________________________________________________________________ Care Plan discussed with: 
  Comments Patient x Family  x At bedside RN x Care Manager Consultant Multidiciplinary team rounds were held today with , nursing, pharmacist and clinical coordinator. Patient's plan of care was discussed; medications were reviewed and discharge planning was addressed. ________________________________________________________________________ Total NON critical care TIME:  25 Minutes Total CRITICAL CARE TIME Spent:   Minutes non procedure based Comments >50% of visit spent in counseling and coordination of care    
________________________________________________________________________ Sayra Nails MD  
 
Procedures: see electronic medical records for all procedures/Xrays and details which were not copied into this note but were reviewed prior to creation of Plan.    
 
LABS: 
 I reviewed today's most current labs and imaging studies. Pertinent labs include: 
Recent Labs 05/15/19 
9044 05/15/19 
0344 05/14/19 0341 05/13/19 
0410 WBC 8.9  --  11.4* 8.6 HGB 10.4* 10.1* 11.3* 8.8* HCT 33.0*  --  36.2* 29.4*  
  --  284 296 Recent Labs 05/15/19 
5969 05/14/19 0341 05/13/19 0410  138 140  
K 3.7 3.7 3.5  106 108 CO2 25 26 27 * 93 90 BUN 9 13 13 CREA 0.41* 0.54* 0.42* CA 7.9* 8.1* 7.8* ALB  --  1.9* 1.7* TBILI  --  0.8 0.4 SGOT  --  25 27 ALT  --  16 18 Signed: Sulma Jiménez MD

## 2019-05-15 NOTE — PROGRESS NOTES
Pharmacy Automatic Renal Dosing Protocol - Antimicrobials Indication for Antimicrobials: bone and joint infection; s/p L-HILDA w/ Abx spacer placement  Current Regimen of Each Antimicrobial: 
Vancomycin 750 mg IV every 12 hours (Start Date ; Day # 3) Zosyn 3.375 g IV every 8 hours (Start Date ; Day #3) Previous Antimicrobial Therapy: 
Cefazolin Goal Level: VANCOMYCIN TROUGH GOAL RANGE Vancomycin Trough: 15 - 20 mcg/mL Date Dose & Interval Measured (mcg/mL) Extrapolated (mcg/mL) 5/15 @ 03:44 750 mg every 12 hours 7.3 9 Date & time of next level:  @ 0200 Significant Cultures:  
 Urine: NGTD x 2 days- final 
: Hip Wound = MRSA by antigen detection (pending) : Hip Wound - Anaerobic = (pending)  Hip wound: few probable staph aureus- pending  Blood: NGTD x 14 hours- pending Radiology / Imaging results: (X-ray, CT scan or MRI):  
5/10: Hip XR: Acute superior lateral dislocation of the left hip. 5/10: CXR: Stable left pleural effusion and lung hyperinflation. No acute process seen. Paralysis, amputations, malnutrition: none Labs: 
Recent Labs 05/15/19 
4904 19 
8090 19 
0410 19 
1648 CREA 0.41* 0.54* 0.42* 0.46* BUN 9 13 13 18 WBC  --  11.4* 8.6 10.0 Temp (24hrs), Av.8 °F (36.6 °C), Min:97.5 °F (36.4 °C), Max:98 °F (36.7 °C) Creatinine Clearance (mL/min) or Dialysis: 53 mL/min (SCr rounded to 0.7) Impression/Plan:  
Vancomycin trough resulted as subtherapeutic at 9 mcg/mL. Will change to 700 mg IV every 8 hours for an estimated trough of 16.6 mcg/mL. Will order a trough to be drawn prior to 0200 dose tomorrow. Will continue current regimen for Zosyn as appropriate for indication and renal function Daily BMP Antimicrobial stop date TBD (6 weeks mentioned in ID note) Pharmacy will follow daily and adjust medications as appropriate for renal function and/or serum levels. Thank you, Montse Banks, PHARMD 
 
Recommended duration of therapy 
[email or web address]/[facility]/Pharmacy/Clinical%20Companion/Duration%20of%20ABX%20therapy. docx Renal Dosing 
[email or web address]/[facility]/Pharmacy/Clinical%20Companion/Renal%[email or web address]. pdf

## 2019-05-15 NOTE — PROGRESS NOTES
Ortho / Neurosurgery NP Note POD# 2  s/p LEFT HIP ARTHROPLASTY ANTERIOR APPROACH REVISION, PLACEMENT OF ANTIBIOTIC SPACER Pt resting in bed, with son at bedside. Discussed plan with son regarding antibiotics and plan for PICC line pending BC. VSS Afebrile. Voiding status: Polo in place Labs Lab Results Component Value Date/Time HGB 10.4 (L) 05/15/2019 09:44 AM  
  
Lab Results Component Value Date/Time INR 1.1 05/10/2019 06:10 PM  
  
 
Body mass index is 18.13 kg/m². : A BMI > 30 is classified as obesity and > 40 is classified as morbid obesity. Dressing c.d.i Cryotherapy in place over incision Drain in place to suction Left leg is flexed at hip and knee SCDs for mechanical DVT proph while in bed PLAN: 
1) PT BID 
2) Aspirin 81 mg PO BID for DVT Prophylaxis 3) GI Prophylaxis - Pepcid 4) Monitor wound cultures- prelim results growing MRSA- ID is following, appreciate their recommendations. Plan for PICC line tomorrow pending blood cultures. 5) Monitor HV drainage- 185 output yesterday- will remove when output slows 6) Readniess for discharge: 
   [x] Vital Signs stable  
 [x] Hgb stable  
 [] + Voiding- plan to remove polo catheter [x] Wound intact, drainage minimal  
 [x] Tolerating PO intake   
 [] Cleared by PT (OT if applicable) [] Stair training completed (if applicable) [] Independent / Contact Guard Assist (household distance) [] Bed mobility [] Car transfers  
  [] ADLs [x] Adequate pain control on oral medication alone Plan to discharge to SNF pending cultures and PICC line placement.   
 
Patricia Stoner, NP 
DNP, AGACNP-BC

## 2019-05-15 NOTE — PROGRESS NOTES
05/15/19 Patient currently admitted at [address] from [facility] SN[facility] for hip dislocation. NN to continue to follow.  AR

## 2019-05-15 NOTE — PROGRESS NOTES
Nutrition Assessment: 
 
INTERVENTIONS/RECOMMENDATIONS:  
Continue current diet Ensure BID ASSESSMENT:  
Chart reviewed. Pt reports good appetite and consuming the majority of his meals. He agreed to receive ensure. Diet Order: Regular 
% Eaten:  No data found. Pertinent Medications: [x]Reviewed: pepcid, Pertinent Labs: [x]Reviewed:  
Food Allergies: []NKFA  [x]Other (kiwi) Last BM:  5/14 Edema:  n/a    []RUE   []LUE   []RLE   []LLE Pressure Ulcer: n/a     [] Stage I   [] Stage II   [] Stage III   [] Stage IV Anthropometrics: Height: 5' 4\" (162.6 cm) Weight: 47.9 kg (105 lb 9.6 oz) IBW (%IBW):   ( ) UBW (%UBW):   (  %) BMI: Body mass index is 18.13 kg/m². This BMI is indicative of: 
[x]Underweight   []Normal   []Overweight   [] Obesity   [] Extreme Obesity (BMI>40) Last Weight Metrics: 
Weight Loss Metrics 5/10/2019 4/17/2019 4/2/2019 2/26/2019 2/25/2019 8/28/2018 7/14/2018 Today's Wt 105 lb 9.6 oz 117 lb 8.1 oz 123 lb 3.8 oz 110 lb 114 lb 114 lb 6.4 oz 111 lb 12.4 oz BMI 18.13 kg/m2 20.17 kg/m2 21.83 kg/m2 19.49 kg/m2 20.19 kg/m2 20.27 kg/m2 19.8 kg/m2 Estimated Nutrition Needs (Based on): 0699 Kcals/day(BMR: 1075 x 1.3+250) , 65 g(1.3 g/kg) Protein Carbohydrate: At Least 130 g/day  Fluids: 1650 mL/day or per primary team 
 
NUTRITION DIAGNOSES:  
Problem:  Increased nutrient needs Etiology: related to LEFT HIP hemiarthroplasty revision Signs/Symptoms: as evidenced by planed LEFT HIP hemiarthroplasty revision today Previous Nutrition Dx:  [] Resolved  [] Unresolved           [x] Progressing NUTRITION INTERVENTIONS: 
Meals/Snacks: General/healthful diet   Supplements: Commercial supplement GOAL:  
consume >50% of meals and ONS in 3-5 days NUTRITION MONITORING AND EVALUATION Food/Nutrient Intake Outcomes: Total energy intake Physical Signs/Symptoms Outcomes: Weight/weight change, Electrolyte and renal profile, GI 
 
Previous Goal Met: 
 [] Met              [x] Progressing Towards Goal              [] Not Progressing Towards Goal  
Previous Recommendations: 
 [] Implemented          [] Not Implemented          [x] Not Applicable LEARNING NEEDS (Diet, Food/Nutrient-Drug Interaction):  
 [x] None Identified 
 [] Identified and Education Provided/Documented 
 [] Identified and Pt declined/was not appropriate Cultural, Congregation, OR Ethnic Dietary Needs:  
 [x] None Identified 
 [] Identified and Addressed 
 
 [x] Interdisciplinary Care Plan Reviewed/Documented  
 [x] Discharge Planning: General healthy diet with adequate kcal and protein to promote healing  
 [] Participated in Interdisciplinary Rounds NUTRITION RISK:  
 [] High              [x] Moderate           []  Low  []  Minimal/Uncompromised Sherryle Poncho, RDN Pager [phone] Weekend Pager [phone]

## 2019-05-15 NOTE — PROGRESS NOTES
Bedside and Verbal shift change report given to Cody Ware (oncoming nurse) by Minnie Perez RN (offgoing nurse). Report included the following information SBAR, Kardex, ED Summary, OR Summary, Procedure Summary, Intake/Output, MAR, Accordion, Recent Results and Med Rec Status.

## 2019-05-15 NOTE — PROGRESS NOTES
Medicare pt has received, reviewed, and signed 2nd IM letter informing them of their right to appeal the discharge. Signed copy has been placed on pt bedside chart. Moris Feliciano [phone]

## 2019-05-15 NOTE — PROGRESS NOTES
Problem: Self Care Deficits Care Plan (Adult) Goal: *Acute Goals and Plan of Care (Insert Text) Description Occupational Therapy Goals Initiated 5/14/2019 Hannah Ritter 1. Patient will perform toilet transfers at maximal assistance level using Walkers, Type: Rolling Walker  within 7 days. 2. Patient will perform all aspects of toileting at moderate assistance  level within 7 days. 3. Patient will demonstrate 3/3 hip precautions without cues within 7 days. 4. Patient will be able to sit on EOb for 5 minutes with CGA for setup with ADLs, with in 7days Outcome: Progressing Towards Goal 
 
OCCUPATIONAL THERAPY TREATMENT Patient: Mira Zarco (21 y.o. male) Date: 5/15/2019 Diagnosis: Posterior dislocation of left hip, initial encounter ([facility] [address] [I81.293G] Status post total replacement of left hip Procedure(s) (LRB): LEFT HIP ARTHROPLASTY ANTERIOR APPROACH REVISION, PLACEMENT OF ANTIBIOTIC SPACER (Left) 2 Days Post-Op Precautions:   
Chart, occupational therapy assessment, plan of care, and goals were reviewed. ASSESSMENT: 
Cleared by RN to see pt for therapy session. Pt received supine in bed, agreeable to participate, alert and oriented x2. Decreased attention during session but easily redirected. Noted LLE internally rotated while at rest in bed, less than yesterday. Max A x2 to transfer supine>sit, pt alternating between wanting to move without assistance and requesting assistance. Improved sitting balance EOB today, able to sit with CGA-min A and perform grooming ADL. Stood to [employer] with mod A x2 with cueing to maintain NWB LLE. Min-mod A x2 to perform stand pivot to chair as well as 1 person to intermittently assist keeping LLE off floor. Pt in chair at end of session with lunch tray setup, alarm set and son present. At this time pt requires setup-max A UB ADLs, total A LB ADLs and assist x2 for mobility. Recommend SNF rehab at discharge to increase independence and safety. Progression toward goals: 
?       Improving appropriately and progressing toward goals ? Improving slowly and progressing toward goals ? Not making progress toward goals and plan of care will be adjusted PLAN: 
Patient continues to benefit from skilled intervention to address the above impairments. Continue treatment per established plan of care. Discharge Recommendations:  Chilo Dillard Further Equipment Recommendations for Discharge:  TBD SUBJECTIVE:  
Patient stated ? Where's my fork? .? OBJECTIVE DATA SUMMARY:  
Cognitive/Behavioral Status: 
Neurologic State: Alert Orientation Level: Oriented to person;Oriented to place; Disoriented to time Cognition: Decreased attention/concentration; Follows commands; Impulsive Perception: Appears intact Perseveration: No perseveration noted Functional Mobility and Transfers for ADLs: 
Bed Mobility: 
Supine to Sit: Maximum assistance;Assist x2; Additional time Transfers: 
Sit to Stand: Moderate assistance;Assist x2;Adaptive equipment(RW) 
  
Bed to Chair: Moderate assistance;Assist x2; Additional time; Adaptive equipment(RW; frequent cues for NWB) Balance: 
Sitting: Impaired Sitting - Static: Fair (occasional) Sitting - Dynamic: Fair (occasional) Standing: Impaired; With support(RW) 
Standing - Static: Poor Standing - Dynamic : Poor ADL Intervention: 
Feeding Feeding Assistance: Set-up Grooming Washing Face: Contact guard assistance(seated EOB) Upper Body Dressing Assistance Hospital Gown: Maximum assistance(in supine) Lower Body Dressing Assistance Socks: Total assistance (dependent) Pain: 
  
  
Reported no pain at rest, mildly increased with WB. Activity Tolerance:  
Good Please refer to the flowsheet for vital signs taken during this treatment. After treatment:  
? Patient left in no apparent distress sitting up in chair ? Patient left in no apparent distress in bed ? Call bell left within reach ? Nursing notified ? Caregiver present ? Chair alarm activated COMMUNICATION/COLLABORATION:  
The patient?s plan of care was discussed with: Physical Therapist and Registered Nurse Yumiko Garcia OT Time Calculation: 34 mins

## 2019-05-16 NOTE — PROGRESS NOTES
PICC Education: Explained reason and rationale for PICC placement along with providing education in order to make an informed consent including nature, risks, benefits, potential complications, care and maintenance of PICC line. The opportunity for questions or concerns was given. A 'Patient PICC Handbook was also provided. Patient's daughter Mg JACKSON) gave verbal telephone consent consent for PICC procedure to be done at the bedside. Patient's daughter verbalizes understanding at this time. Patient not completely oriented and history of dementia. Procedure: 
Time out completed. Pre procedure assessment done. Maximum sterile barrier precautions observed throughout procedure. Lidocaine 1% 3.0ml sc given prior to cannulation Cannulated brachial vein using ultrasound guidance and modified seldinger technique. Inserted single lumen 4fr PICC in right arm using, [place] Tip Location System and 3CG Patient has sinus rhythm. Tip Positioning System indicating tall P wave and no negative deflection before P wave which would indicate the PICC tip is properly placed in the distal SVC or the CAJ. PICC tip was confirmed by 2 PICC nurses and 3CG printout was placed on patients chart. Blood return verified and flushed with 20ml NS in each port. Sterile dressing applied with Biopatch, Stat loc, occlusive dressing per protocol. Curios caps applied to each port. Reason for access (long term antibiotics). Complications related to insertion (none). Patient tolerated procedure well with minimal blood loss. PICC procedure performed by: Anna Solorzano RN [facility] Vascular Access Nurse Assisted by: Roberta Craig [facility] Vascular Access Nurse Right arm circumference: 20 cm. Catheter internal length:  38cm Catheter external length: 0 cm PICC catheter occupies 14% of vein Brand of catheter BARD SOLO POWER PICC, Lot #[id number] REF# [id number] EXP-04/30/2020 Primary nurse Montse RN, notified PICC line may be used and to hang new infusion tubing prior to use. Figueroa Gleason RN, [facility] Vascular Access Team

## 2019-05-16 NOTE — PROGRESS NOTES
Ortho / Neurosurgery NP Note POD# 3  s/p LEFT HIP ARTHROPLASTY ANTERIOR APPROACH REVISION, PLACEMENT OF ANTIBIOTIC SPACER Pt resting in bed, no complaints. VSS Afebrile. Voiding status: Voiding Labs Lab Results Component Value Date/Time HGB 10.8 (L) 05/16/2019 02:27 AM  
  
Lab Results Component Value Date/Time INR 1.1 05/10/2019 06:10 PM  
  
 
Body mass index is 18.13 kg/m². : A BMI > 30 is classified as obesity and > 40 is classified as morbid obesity. Dressing c.d.i Cryotherapy in place over incision Drain in place to suction Left leg is flexed at hip and knee, swelling has improved since prior exam.  
 
SCDs for mechanical DVT proph while in bed PLAN: 
1) PT BID 
2) Aspirin 81 mg PO BID for DVT Prophylaxis 3) GI Prophylaxis - Pepcid 4) Monitor wound cultures- prelim results growing MRSA- ID is following, appreciate their recommendations. Blood cultures no growth at 48 hours- PICC line orderd for today. 5) Monitor HV drainage- 120 output yesterday- will remove when output slows 6) Readniess for discharge: 
   [x] Vital Signs stable  
 [x] Hgb stable  
 [x] + Voiding-  
 [x] Wound intact, drainage minimal  
 [x] Tolerating PO intake   
 [] Cleared by PT (OT if applicable) [] Stair training completed (if applicable) [] Independent / Contact Guard Assist (household distance) [] Bed mobility [] Car transfers  
  [] ADLs [x] Adequate pain control on oral medication alone Plan to discharge to SNF pending cultures and PICC line placement.   
 
Janet Whitley NP 
DNP, AGACNP-BC

## 2019-05-16 NOTE — PROGRESS NOTES
Infectious Disease Progress Note Mr Jessica Romero seen Denied pain, nausea, vomiting , diarrhea D/W his RN today Medications: 
 
 
 
Current Facility-Administered Medications:  
  diphenhydrAMINE (BENADRYL) 12.5 mg/5 mL syrup 12.5 mg, 12.5 mg, Oral, Q6H PRN, CONOR Davis 
  vancomycin (VANCOCIN) 700 mg in 0.9% sodium chloride 250 mL IVPB, 700 mg, IntraVENous, Q8H, Melany Bell DO, Last Rate: 125 mL/hr at 05/16/19 0300, 700 mg at 05/16/19 0300 
  sodium chloride (NS) flush 5-40 mL, 5-40 mL, IntraVENous, Q8H, CONOR Davis, 10 mL at 05/16/19 6911   sodium chloride (NS) flush 5-40 mL, 5-40 mL, IntraVENous, PRN, CONOR Davis, 10 mL at 05/14/19 1758   oxyCODONE IR (ROXICODONE) tablet 5 mg, 5 mg, Oral, Q3H PRN, CONOR Davis 
  oxyCODONE IR (ROXICODONE) tablet 10 mg, 10 mg, Oral, Q3H PRN, CONOR Davis 
  naloxone [facility]) injection 0.4 mg, 0.4 mg, IntraVENous, PRN, CONOR Davis 
  ondansetron (ZOFRAN ODT) tablet 4 mg, 4 mg, Oral, Q6H PRN, CONOR Morris 
  polyethylene glycol (MIRALAX) packet 17 g, 17 g, Oral, DAILY, CONOR Davis, 17 g at 05/16/19 5160 
  bisacodyl (DULCOLAX) suppository 10 mg, 10 mg, Rectal, DAILY PRN, CONOR Davis 
  guaiFENesin-dextromethorphan (ROBITUSSIN DM) 100-10 mg/5 mL syrup 10 mL, 10 mL, Oral, Q6H, Artie Tellez MD, 10 mL at 05/16/19 4612   aspirin chewable tablet 81 mg, 81 mg, Oral, BID, Dylon Sim NP, 81 mg at 05/16/19 9653   acetaminophen (TYLENOL) tablet 650 mg, 650 mg, Oral, Q6H, Dylon Roney, NP, 650 mg at 05/16/19 2213   senna-docusate (PERICOLACE) 8.6-50 mg per tablet 2 Tab, 2 Tab, Oral, QHS, Dylon Harsh, NP, 2 Tab at 05/15/19 2203   methocarbamol (ROBAXIN) tablet 500 mg, 500 mg, Oral, TID PRN, CONOR Davis, 500 mg at 05/15/19 9486   LORazepam (ATIVAN) injection 1 mg, 1 mg, IntraVENous, Q8H PRN, Khurram Olive, PA, 1 mg at 05/10/19 2040 
  donepezil (ARICEPT) tablet 10 mg, 10 mg, Oral, DAILY, Khurram Olive, PA, 10 mg at 05/16/19 0267   famotidine (PEPCID) tablet 20 mg, 20 mg, Oral, BID, Khurram Olive, PA, 20 mg at 05/16/19 2142 
  finasteride (PROSCAR) tablet 5 mg, 5 mg, Oral, DAILY, Khurram Olive, PA, 5 mg at 05/16/19 1064   melatonin tablet 6 mg, 6 mg, Oral, QHS, Khurram Olive, [place], 6 mg at 05/15/19 2203   memantine (NAMENDA) tablet 10 mg, 10 mg, Oral, BID, Khurram Olive, PA, 10 mg at 05/16/19 7742 
  rosuvastatin (CRESTOR) tablet 20 mg, 20 mg, Oral, QHS, Khurram Olive, PA, 20 mg at 05/15/19 2207   umeclidinium (INCRUSE ELLIPTA) 62.5 mcg/actuation, 1 Puff, Inhalation, DAILY, Khurram Olive, [place], 1 Puff at 05/15/19 1625   tamsulosin (FLOMAX) capsule 0.4 mg, 0.4 mg, Oral, DAILY, Khurram Olive, PA, 0.4 mg at 05/16/19 0820 
  albuterol-ipratropium (DUO-NEB) 2.5 MG-0.5 MG/3 ML, 3 mL, Nebulization, Q4H PRN, Khurram Olive, PA Physical Exam: 
 
Vitals:  
Patient Vitals for the past 24 hrs: 
 Temp Pulse Resp BP SpO2  
05/16/19 0751 97.7 °F (36.5 °C) 68 16 138/66 97 % 05/16/19 0310 97.6 °F (36.4 °C) 61 18 144/65 96 % 05/16/19 0030 98.1 °F (36.7 °C) 62 16 138/64 97 % 05/15/19 2003 97.5 °F (36.4 °C) 60 18 147/63 96 % 05/15/19 1626 97.5 °F (36.4 °C) 62 16 130/61 99 % · GEN: NAD 
· HEENT: no scleral icterus, no thrush · CV: S1, S2 heard regularly · Lungs: Clear to auscultation bilaterally anteriorly · Abdomen: soft, non distended, no facial grimace to palpation · Skin: no rash on areas noted · Psych: unable to assess · MSK: + hip area wt drain,no erythema noted , + LLE swelling Labs:  
Recent Results (from the past 24 hour(s)) METABOLIC PANEL, BASIC Collection Time: 05/16/19  2:27 AM  
Result Value Ref Range Sodium 142 136 - 145 mmol/L Potassium 3.8 3.5 - 5.1 mmol/L  Chloride 108 97 - 108 mmol/L  
 CO2 29 21 - 32 mmol/L Anion gap 5 5 - 15 mmol/L Glucose 88 65 - 100 mg/dL BUN 11 6 - 20 MG/DL Creatinine 0.55 (L) 0.70 - 1.30 MG/DL  
 BUN/Creatinine ratio 20 12 - 20 GFR est AA >60 >60 ml/min/1.73m2 GFR est non-AA >60 >60 ml/min/1.73m2 Calcium 7.9 (L) 8.5 - 10.1 MG/DL  
HEMOGLOBIN Collection Time: 05/16/19  2:27 AM  
Result Value Ref Range HGB 10.8 (L) 12.1 - 17.0 g/dL Hannah [place] Collection Time: 05/16/19  2:27 AM  
Result Value Ref Range Vancomycin,trough 15.7 (H) 5.0 - 10.0 ug/mL Reported dose date: NOT PROVIDED Reported dose time: NOT PROVIDED Reported dose: NOT PROVIDED UNITS Microbiology Data: 
 
 Blood 5/14/19 Component Value Ref Range & Units Status Special Requests: NO SPECIAL REQUESTS    Preliminary Culture result: NO GROWTH 2 DAYS    Preliminary 5/13/19 hip wound Special Requests: NO SPECIAL REQUESTS    Final  
GRAM STAIN NO WBC'S SEEN    Final  
GRAM STAIN NO DEFINITE ORGANISM SEEN    Final  
Culture result: Abnormal     Final  
FEW **METHICILLIN RESISTANT STAPHYLOCOCCUS AUREUS**   
Culture result: Abnormal     Final  
SCANT STAPHYLOCOCCUS EPIDERMIDIS (OXACILLIN RESISTANT) Culture result: SCANT DIPHTHEROIDSAbnormal     Final  
Culture result: RARE ALPHA STREPTOCOCCUSAbnormal     Final  
Susceptibility Staphylococcus aureus Methcillin Resistant Staphylococcus epidermidis DELL DELL Ampicillin ($) >8 ug/mL R >8 ug/mL R Ampicillin/sulbactam ($) 16/8 ug/mL R 16/8 ug/mL R Cefazolin ($) >16 ug/mL R >16 ug/mL R Ciprofloxacin ($) >2 ug/mL R >2 ug/mL R Clindamycin ($) >4 ug/mL R >4 ug/mL R Daptomycin ($$$$$) 1 ug/mL S <=0.5 ug/mL S Erythromycin ($$$$) >4 ug/mL R >4 ug/mL R Gentamicin ($) <=4 ug/mL S <=4 ug/mL S Levofloxacin ($)   >4 ug/mL R Linezolid ($$$$$) 2 ug/mL S 2 ug/mL S Oxacillin >2 ug/mL R >2 ug/mL R Penicillin G ($$)   >8 ug/mL R Rifampin ($$$$) <=1 ug/mL S1 <=1 ug/mL S1   
 Tetracycline <=4 ug/mL S <=4 ug/mL S Trimeth/Sulfa <=0.5/9.5 u... S >2/38 ug/mL R Vancomycin ($) 2 ug/mL S 2 ug/mL S Hip 19 Special Requests: NO SPECIAL REQUESTS    Final  
GRAM STAIN NO WBC'S SEEN    Final  
GRAM STAIN    Final  
OCCASIONAL GRAM POSITIVE COCCI Culture result: Abnormal     Final  
MODERATE **METHICILLIN RESISTANT STAPHYLOCOCCUS AUREUS** Susceptibility Staphylococcus aureus Methcillin Resistant DELL Ampicillin ($) >8 ug/mL R Ampicillin/sulbactam ($) <=8/4 ug/mL R Cefazolin ($) >16 ug/mL R Ciprofloxacin ($) >2 ug/mL R Clindamycin ($) >4 ug/mL R Daptomycin ($$$$$) 1 ug/mL S Erythromycin ($$$$) >4 ug/mL R Gentamicin ($) <=4 ug/mL S Linezolid ($$$$$) 2 ug/mL S Oxacillin >2 ug/mL R Rifampin ($$$$) <=1 ug/mL S1 Tetracycline <=4 ug/mL S Trimeth/Sulfa <=0.5/9.5 u... S Vancomycin ($) 2 ug/mL S Component Value Ref Range & Units Status Special Requests: NO SPECIAL REQUESTS    Final  
Culture result: NO ANAEROBES ISOLATED    Final  
Result History  
 
 
    
Component Value Ref Range & Units Status Special Requests: NO SPECIAL REQUESTS    Final  
Culture result: NO ANAEROBES ISOLATED    Final  
Result History Urine: 19 Component Value Ref Range & Units Status Special Requests: NO SPECIAL REQUESTS Reflexed from [id number]    Final  
[place] Count <1,000 CFU/ML    Final  
Culture result: NO GROWTH 1 DAY    Final  
 
 
 
 
  
 
Pathology Results: 
 
Imagin/3/18 CT Chest  
FINDINGS:  
The unenhanced images demonstrate aortic and coronary artery calcifications. LOWER NECK:  The visualized portions of the thyroid and structures of the lower 
neck are within normal limits. LUNGS: The lungs are hyperinflated consistent with emphysema. The lungs are 
clear of mass, nodule, airspace disease or edema. There is airspace disease or 
atelectasis in the left lower lobe. PLEURA: There is increased left pleural effusion. AORTA: The aorta is atherosclerotic and enhances normally with no evidence of 
aneurysm or dissection. There is aortic valve and coronary artery calcification. MEDIASTINUM: There is no mediastinal or hilar adenopathy or mass. BONES AND SOFT TISSUES: The bones and soft tissues of the chest wall are normal. 
UPPER ABDOMEN: The visualized portions of the upper abdominal organs are normal. 
  
IMPRESSION IMPRESSION:  
1. Emphysema. 2. Increased left pleural effusion with left lower lobe airspace disease or 
atelectasis. 3. Atherosclerotic aorta with aortic valve and coronary artery calcifications. 5/10/19 CXR 
  
FINDINGS: A portable AP radiograph of the chest was obtained at 1856 hours. The 
patient is on a cardiac monitor. There is a small left pleural effusion, 
unchanged. Biapical fibrosis is noted. No superimposed process is seen. . The 
cardiac and mediastinal contours and pulmonary vascularity are stable. The 
bones and soft tissues are grossly within normal limits.  
  
IMPRESSION IMPRESSION: Stable left pleural effusion and lung hyperinflation. No acute 
process seen. X ray 5/10/19 FINDINGS: An AP view of the pelvis demonstrates superolateral dislocation of the 
left hip. A left hip prosthesis remains in place. 
  
IMPRESSION IMPRESSION:  Acute superior lateral dislocation of the left hip. US DVT · No evidence of acute deep vein thrombosis in the left common femoral, superficial femoral, popliteal, posterior tibial, and peroneal veins. The veins were imaged in the transverse and longitudinal planes. The vessels showed normal color filling and compressibility. Doppler interrogation showed phasic and spontaneous flow. Lower Extremity Venous Findings Left Lower Venous Technically difficult exam due to: marked edema and inability to externally rotate leg.  
No evidence of deep vein thrombosis in the common femoral, profunda femoral, superficial femoral, popliteal, posterior tibial, and peroneal veins. The veins were imaged in the transverse and longitudinal planes. The vessels showed normal color filling and compressibility. Doppler interrogation showed phasic and spontaneous flow. The common femoral, greater saphenous, femoral, proximal femoral, mid femoral, distal femoral and popliteal vein(s) were imaged in the transverse and longitudinal planes. The vessels showed normal color filling and compressibility. Doppler interrogation of the veins showed phasic and spontaneous flow. The posterior tibial vein(s) were not well visualized. Edema throughout lower left leg Assessment / Plan:  
 
Mr Janneth Meeks is a 80year old gentleman with hx of emphysema/COPD, HTN, admitted on 5/10/19 with dislocated L hip. He had a fracture in March of L hip and per review of records had hip hemiarthroplasty on 3/31/18. Per son, he had pain in his hip a week later and had a revision 4/16/18. He had fallen per son afterwards and admitted again for dislocated hip. 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 Dosing per pharmacy based on renal function/goal trough If he worsens clinically worsens from hip standpoint, fever, chills/worsenign WBC  , then would change to Daptomcyin IV as Vanc DELL is higher. Also if renal function worsens, change to Daptomycin instead of Vancomycin IV  
6 weeks IV antibiotics if he tolerates since surgery Check weekly CBC wt diff, BMP, Vancomycin trough, ESR, CRP. Trough goal 15-20 Probiotics/yogurt encouraged F/U with me on 6/11/19 at 10.30 am  
PICC care and remove PICC at completion of antibiotic therapy 2) LLE edema:  US negative for DVT Plans per ortho 3) Dementia 4) COPD/Emphesema CXR wt effusion Monitor for pneumonia signs , symptoms and risk for aspiration Aspiration precautions 5) DVT Px Melany Bell, DO 
12:03 PM 
 
 
 I will sign off at this time. Thank for the opportunity to participate in the care of this patient. Please contact with questions or concerns.

## 2019-05-16 NOTE — PROGRESS NOTES
Initial Inpatient order verification - IP order written 5/10/2019 at 2014. Patient eligible for transfer to [facility] 5/13/2019. Raudel Breen, RN, BSN, ACM [address] [facility]   [phone]

## 2019-05-16 NOTE — OP NOTES
[address] 
OPERATIVE REPORT Name:  Dominique Coffman 
MR#:  [id number] :  1934 ACCOUNT #:  [[id number]] DATE OF SERVICE:  2019 PREOPERATIVE DIAGNOSIS:  Left hip dislocation. POSTOPERATIVE DIAGNOSES:  Left hip dislocation with subacute greater trochanter fracture and likely infection. PROCEDURE PERFORMED: 1. Removal of prosthesis left hip. 2.  Placement of antibiotic cement spacer. 3.  Debridement left hip wound with excision of previous incision and sinus tract. SURGEON:  Dick Pulliam MD 
 
ASSISTANT:  CONOR Gonzalez 
 
ANESTHESIA:  General. 
 
COMPLICATIONS:  None. SPECIMENS REMOVED:  Multiple cultures. IMPLANTS:  Antibiotic cement. ESTIMATED BLOOD LOSS:  300. DRAINS:  Medium Hemovac x2. CONDITION:  Stable to PACU. INDICATIONS:  An 75-year-old gentleman with an unfortunate history of hemiarthroplasty complicated by subsequent dislocation. This was revised to a larger stem and he was noted to have a trochanteric avulsion fracture at that time. This was repaired. He unfortunately suffered a second dislocation necessitating revision. The family understood no guarantees could be given about the outcome and wished to proceed. DESCRIPTION OF PROCEDURE:  After being identified in the preoperative holding area and having his operative site marked, the patient was brought back to the operating room and placed supine on the standard OR table. General anesthesia was induced without difficulty. The patient was moved into a decubitus position with the left side up being sure to pad all bony prominences. Appropriate time-out was performed. He was on scheduled antibiotics. He was noted to have a small  amount of drainage from his previous incision. This was cultured.   We then opened his incision and found a sinus tract, which appeared to track directly to the wound, bloody purulent fluid was encountered. We decided at that point, it would be unwise to place a new prosthesis additionally. Because of his trochanteric fracture, he could not use a cement hemiarthroplasty as this would also dislocate. The hip was already dislocated. At the start of procedure, we performed a posterolateral approach to the previous incision. The trochanter was noted to be off and cerclage wire had cut through the bone and was removed. The head was removed from the stem. Threaded tool was inserted into top of the stem and the stem was removed with a backslap hammer. We then debrided the wound thoroughly including the acetabulum and femoral canal.  We also excised the entire previous incision. Hemostasis was obtained with electrocautery, thoroughly irrigated with Betadine containing irrigation. We then mixed several bags of antibiotic cement with gentamicin and vancomycin. A dipesh of cement was placed into the acetabulum and a twisted wire with the cement around it was placed into the femoral canal.  Two limbs of the medium Hemovac drain were placed followed by routine closure. Sterile compressive dressings were applied. The patient was awakened and moved to the stretcher and taken to the recovery room in satisfactory condition. At the conclusion of the procedure, all counts were correct. There were no immediate complications. MD RICH [id number] 
D:  05/15/2019 15:32 
T:  05/16/2019 1:21 
JOB #:  [id number]

## 2019-05-16 NOTE — PROGRESS NOTES
Pt was cleared to be seen for OT tx and as OT arrived at room, PICC team was going into room. Will defer and continue to follow

## 2019-05-16 NOTE — PROGRESS NOTES
Plan of Care 1) Return to [facility] via [facility] transportation 2:03 PM- CM reviewed chart and previous CM notes, CM sent a referral to [facility] to review for pt to return at time of discharge (anticipate tomorrow). Waiting on response. 4:03 PM- Pt has been accepted to [facility], [address] spoke with Davis Baumgarten at [facility] confirming they could accommodate all pt needs including IV ABX. CM arranged [facility] transportation for 11:00 AM, CM can cancel if pt is no longer medically stable tomorrow. CM spoke with pt dtr who understands and has no questions or concerns. Pt dtr's goal is to get pt back to his assisted living. [facility] has been contacted to screen pt for LTC if this is needed, dtr aware. Dtr and son have no questions or concerns at this time. Transition of Care note to follow tomorrow morning 5/15/19. [facility] [phone]

## 2019-05-16 NOTE — DISCHARGE INSTRUCTIONS
Allie Miles  Surgery: Total Hip Replacement  Surgeon:   Sisi York MD  Surgery Date:  5/13/2019      Instructions for IV antibiotic therapy:   Continue Vancomycin IV till 6/24/19 . Dosing per pharmacy based on renal function/goal trough   If he worsens clinically worsens from hip standpoint, fever, chills/worsenign WBC  , then would change to Daptomcyin IV as Vanc DELL is higher. Also if renal function worsens, change to Daptomycin instead of Vancomycin IV      Check weekly CBC wt diff, BMP, Vancomycin trough, ESR, CRP. Trough goal 15-20    PICC care per protocol. Plan to remove PICC at completion of antibiotic therapy           To relieve pain:   Use ice/gel packs.  Put the ice pack directly over the wound, or anywhere you are hurting or swollen.  To control pain and swelling, keep ice on regularly, especially after physical activity.  The packs should stay cold for 3-4 hours. When it is not cold anymore, rotate with the packs in the freezer.  Elevate your leg. This will also keep swelling down.  Rest for at least 20 minutes between activity or exercises.  To keep track of your pain medications, write down what you take and when you take it.  The last dose of pain medication you got in the hospital was:       Medication    Dose    Date & Time           Choose your medications based on the pain scale below:     To keep your pain under control, take Tylenol every 6 hours for 14 days - even if you feel like you dont need it.  For mild to moderate pain (4-6 on pain scale), take one pain pill every 3-4 hours as needed.  For severe pain (7-10 on pain scale), take two pain pills every 3-4 hours as needed.  To prevent nausea, take your pain medications with food. Pain Scale              As your pain lessens:     Slowly start taking less pain medication.  You may do this by waiting longer between doses or by taking smaller doses.  Stop using the pain medications as soon as you no longer need it, usually in 2-3 weeks.  Aspirin   To prevent blood clots, you will need to take Aspirin 81 mg twice a day for 30 days.  To prevent stomach upset or bleeding:   Do not take non-steroidal anti-inflammatory medications (Ibuprofen, Advil, Motrin, Naproxen, etc.)    Take Pepcid 20 mg twice a day, or a similar home medication, while you are taking a blood thinner. OPSITE (Honeycomb dressing)     Keep your clear, waterproof dressing in place for 5-7 days after your leave the hospital.     If you are still having drainage, you will need to change your dressing in 5-7 days. You will be given one extra dressing to use at home.  If there is no more drainage from the wound, you may leave it open to the air. OPSITE DRESSING INSTRUCTIONS                  PRINEO DRESSING INSTRUCTIONS       Prineo is a type of skin glue and mesh that is keeping your wound together.  Leave this covering alone. Do not peel it off.  Do not apply oils or lotions over it.  You may shower with this dressing but do not soak it. Gently pat your wound dry when you get out of the shower.  DO NOTs:   Do not rub your surgical wound   Do not put lotion or oils on your wound.  Do not take a tub bath or go swimming until your doctor says it is ok.  You may shower with this dressing over your wound.  After showering pat the dressing dry.  If you have staples a home health nurse will remove them in about 10 days.  To increase and promote healing:     Stop Smoking (or at least cut back on       Smoking).  Eat a well-balanced diet (high in protein       and vitamin C).  If you have a poor appetite, drink Ensure, Glucerna, or [place] Instant Breakfast for the next 30 days.      If you are diabetic, you should control your blood         sugars to prevent infection and help your wound         to heal.                         To prevent constipation, stay active and drink plenty of fluid.  While using pain medications, you should also take stool softeners and laxatives, such as Pericolace       and Miralax.  If you are having too many bowel       Movements, then you may need       to stop taking the laxatives.  You should have a bowel movement 3-4 days        after surgery and then at least every other day while       on pain medication.  To improve your recovery, you must be active!  Use your walker and take short walks         (in your home). about every 2 hours during the day.  Try to increase how far you walk each day.  You can put as much weight on your leg as you can tolerate while walking.  Home health physical therapy will come to your       home the day after you leave the hospital.  The       therapist will visit about 4 times over the next couple of       weeks to teach you how to get out of bed, to safely walk       in your home, and to do your exercises.  NO DRIVING until your surgeon tells you it is ok.  You can return to work when cleared by a physician.  Please call your physician immediately if you have:     Constant bleeding from your wound.  Increasing redness or swelling around your wound (Some warmth, bruising and swelling is normal).  Change in wound drainage (increase in amount, color, or bad smell).  Change in mental status (unusual behavior   Temperature over 101.5 degrees Fahrenheit      Pain or redness in the calf (back of your lower leg - see picture)     Increased swelling of the thigh, ankle, calf, or foot.  Emergency: CALL 911 if you have:     Shortness of breath     Chest pain when you cough or taking a deep breath     Please call your surgeons office at [phone]  for a follow up appointment. _   You should call as soon as you get home or the next day after discharge. Ask to make an appointment in 2 weeks.  If you have questions or concerns during normal business hours, you may reach Dr. Rachel Fitch' Team at [phone].

## 2019-05-16 NOTE — PROGRESS NOTES
Pharmacy Automatic Renal Dosing Protocol - Antimicrobials Indication for Antimicrobials: bone and joint infection; s/p L-HILDA w/ Abx spacer placement  Current Regimen of Each Antimicrobial: 
Vancomycin 750 mg IV every 12 hours (Start Date ; Day # 4) Previous Antimicrobial Therapy: 
Cefazolin Zosyn 3.375 g IV every 8 hours (Start Date ; Day #3) Goal Level: VANCOMYCIN TROUGH GOAL RANGE Vancomycin Trough: 15 - 20 mcg/mL Date Dose & Interval Measured (mcg/mL) Extrapolated (mcg/mL) 5/15 @ 03:44 750 mg every 12 hours 7.3 9  
, 0230 750 mg iv q8h 15.7 15.7 Date & time of next level: pending Significant Cultures:  
 Urine: <1K, final 
: Hip Wound = few MRSA; scant Staph. Epid; rare strept. Final 
: Hip Wound; no Anaerobic. Final 
 Blood: NG. pending Radiology / Imaging results: (X-ray, CT scan or MRI):  
5/10: Hip XR: Acute superior lateral dislocation of the left hip. 5/10: CXR: Stable left pleural effusion and lung hyperinflation. No acute process seen. Paralysis, amputations, malnutrition: none Labs: 
Recent Labs 19 
7582 05/15/19 
8130 05/15/19 
0344 19 
6239 CREA 0.55*  --  0.41* 0.54* BUN 11  --  9 13 WBC  --  8.9  --  11.4* Temp (24hrs), Av.7 °F (36.5 °C), Min:97.5 °F (36.4 °C), Max:98.1 °F (36.7 °C) Creatinine Clearance (mL/min) or Dialysis: 53 mL/min (SCr rounded to 0.7) Impression/Plan:  
Vancomycin trough therapeutic: 15.7 mcg/mL. Continue same. Zosyn DC'ed Continue to monitor for renal function and adjust prn. Daily BMP Antimicrobial stop date TBD (6 weeks if tolerates / ID) Pharmacy will follow daily and adjust medications as appropriate for renal function and/or serum levels.  
 
Thank you, 
Mazin Cid, PHARMD

## 2019-05-17 NOTE — PROGRESS NOTES
Transition Note to SNF/Rehab 
 
SNF/Rehab Transition: 
Patient has been accepted to [facility] and meets criteria for admission. Patient will transported by 11:00 AM and expected to leave at 11:00 AM. Communication to SNF/Rehab: 
Bedside RN, Montse, has been notified to update the transition plan to the facility and call report (phone number [phone]). Discharge information has been updated on the AVS. Discharge instructions uploaded to [address] [place] [address]. Communication to Patient/Family: Met with patient and spoke with patient daughter and son via phone and they are agreeable to the transition plan. SNF/Rehab Transition: 
Patient to follow-up with Home Health: [facility] PCP/Specialist: Dr. Earline Ibrahim (needs an appt for 2 weeks after d/c from [id number] [address] [facility]), Dr. Ana Joyner (6/11/19). Ambulatory Care Management: Beau Mclaughlin RN NN ([phone]) Reviewed and confirmed with facility, Abby Mcgarry can manage the patient care needs for the following:  
 
Robert Emerson with (X) only those applicable: 
 
Medication: 
[x]  Medications will be available at the facility [x]  IV Antibiotics [x]  Controlled Substance - hard copy to be sent with patient [x]  Weekly Labs Equipment: 
[]  CPAP/BiPAP []  Wound Vacuum 
[]  Razo or Urinary Device [x]  PICC/Central Line 
[]  Nebulizer 
[]  Ventilator Treatment: 
[x]Isolation (for MRSA, VRE, etc.) []Surgical Drain Management []Tracheostomy Care [x]Dressing Changes []Dialysis with transportation []PEG Care []Oxygen []Daily Weights for Heart Failure Dietary: 
[]Any diet limitations []Tube Feedings []Total Parenteral Management (TPN) Financial Resources: 
[x]Medicaid Application [x]UAI Completed- CM will fax once completed []Level II Completed-   
Advanced Care Plan: 
[]Surrogate Decision Maker of Care 
[x]POA [x]Communicated Code Status Other: CM spoke with María Chaparro yesterday 5/16/19 confirming they can accomidate pt and their needs. CM contacted Amelia (Liason with [facility]) and spoke with her this morning confirming again that all pt needs will be met (IV ABX, isolation). Care Management Interventions PCP Verified by CM: Yes 
Palliative Care Criteria Met (RRAT>21 & CHF Dx)?: Yes 
Palliative Consult Recommended?: Yes(CHF- protocole.) Mode of Transport at Discharge: S Transition of Care Consult (CM Consult): SNF(pt recenly had admitted to [facility] after surgery.) Partner SNF: Yes Discharge Durable Medical Equipment: No(Owns RW.) Current Support Network: Lives with Caregiver(Living with daughter in a single story home has 2 stories.) Confirm Follow Up Transport: Family Plan discussed with Pt/Family/Caregiver: Yes Discharge Location Discharge Placement: Skilled nursing facility [facility], CM [facility] [phone]

## 2019-05-17 NOTE — PROGRESS NOTES
05/17/19 NN performed chart review. Patient currently admitted at [facility] from [facility] SNF for hip dislocation. Patient will be discharged today per chart review and returning to [facility]. NN to continue to follow.  AR

## 2019-05-17 NOTE — PROGRESS NOTES
Transitional Care Team: Initial HUG Note    Date of Assessment: 05/17/19  Time of Assessment:  9:37 AM    Enma Nogueira is a 80 y.o. male inpatient at [facility]. Assessment & Plan   Pt had femoral Fx in March 2019. Has required two readmissions to treat the surgical site, most recent admission on 5/10 was for posterior dislocation. Now ready for return to [facility] for continued rehab. Is ordered to receive Vancomycin intravenous vantibiotic therapy for 38 days, and will need appropriate trough levels drawn with goal range of 15-20, and also will need weekly CBC with diff, ESR, CRP and reported to Dr Donnie Dunham. Will need to have office visit with Dr Ruel Lane on 6/11 @ 10:30         Primary Diagnosis:fx hip with recent re hospitalization for anterior dislocation and now readmitted with posterior dislocation  Advance Directive:  Pt has advanced care directive with named health care proxy in his medical record. Current Code Status:  DDNR      Awareness of Medical Conditions: (Trajectory of illness and pts expectations). Pt with dementia, not very aware of his condition. Family is being kept up to date by caregiver team  Discharge Needs: (to include safety issues)SNF  Barriers Identified: *dementia  Patient is willing to go to SNF/Inpatient Rehab if recommended: yes    Medication Review:  Was performed. Can patient afford medications:  yes. Patient is Compliant with Medication regimen:yes    Who manages medications at home: facility staff  Best Patient Contact Number:   [phone] DTR     HUG (Healthy Understanding of Goals) program introduced to patient/family. The Transitional Care Team bridges the gaps in care and education surrounding discharge from the acute care facility. The objective is to empower the patient and family in taking a proactive role in preventing readmission within the first thirty days after discharge.  The team is also involved in the efforts to reduce readmission to the acute care setting after stabilization and discharge from the acute care environment either to skilled nursing facilities or community. Future Appointments   Date Time Provider Malka Cici   6/4/2019 10:00 AM Penny Pierre  [facility]   6/11/2019 10:30 AM Nadeen Rogers DO [address]   8/27/2019  1:45 PM Ary, [address]       Non-[facility] follow up appointment(s): none    Patient education focused on readmission zones as described as: The Red Zone: High risk for readmission, days 1-21  The Yellow Zone: Moderate  risk for readmission, days 22-29   The Green Zone: Lower risk for readmission, days                30 and after    [facility] will attempt to follow the patient from a distance while inpatient as well as be available for further transition/disposition needs. The [facility] team will continue to offer support during the 30- 90 day discharge from acute care setting. Will notify Ambulatory , ANTONIETA RN.     Past Medical History:   Diagnosis Date    Arthritis     BPH (benign prostatic hypertrophy) 4/12/2010    COPD (chronic obstructive pulmonary disease) with emphysema ([facility] [address] 7/25/2012    HLD (hyperlipidemia) 4/12/2010    HTN (hypertension) 4/12/2010    Ill-defined condition 2018    Dementia    Memory disorder     Psychiatric disorder     anxiety and depression

## 2019-05-17 NOTE — PROGRESS NOTES
Ortho / Neurosurgery NP Note POD# 4  s/p LEFT HIP ARTHROPLASTY ANTERIOR APPROACH REVISION, PLACEMENT OF ANTIBIOTIC SPACER Pt resting in bed, no complaints. VSS Afebrile. Voiding status: Voiding Labs Lab Results Component Value Date/Time HGB 10.8 (L) 05/16/2019 02:27 AM  
  
Lab Results Component Value Date/Time INR 1.1 05/10/2019 06:10 PM  
  
 
Body mass index is 18.13 kg/m². : A BMI > 30 is classified as obesity and > 40 is classified as morbid obesity. Dressing c.d.i Cryotherapy in place over incision Drain in place to suction Left leg is flexed at hip and knee, Swelling has continued to improve since prior exam 
SCDs for mechanical DVT proph while in bed PLAN: 
1) PT BID 
2) Aspirin 81 mg PO BID for DVT Prophylaxis 3) GI Prophylaxis - Pepcid 4) Monitor wound cultures- prelim results growing MRSA- ID is following, appreciate their recommendations. Blood cultures no growth at 48 hours- PICC line placed 05/16- will discharge on IV vancomycin per ID recommendations 5) HV removed this morning 6) Readniess for discharge: 
   [x] Vital Signs stable  
 [x] Hgb stable  
 [x] + Voiding-  
 [x] Wound intact, drainage minimal  
 [x] Tolerating PO intake   
 [] Cleared by PT (OT if applicable) [] Stair training completed (if applicable) [] Independent / Contact Guard Assist (household distance) [] Bed mobility [] Car transfers  
  [] ADLs [x] Adequate pain control on oral medication alone Plan to discharge to SNF today with IV antibiotic  therapy Catalino Rodriguez, JOE 
DNP, AGACNP-BC

## 2019-05-17 NOTE — PROGRESS NOTES
Report called to [facility]. All questions answered. Facility knows patient well. Family at bedside along with ambulance staff. Right arm PICC flushed prior to discharge. Discharge instructions, H and P, and prescriptions given to ambulance staff to give to rehab facility.  
 
Estevan Cardoza RN

## 2019-05-17 NOTE — DISCHARGE SUMMARY
Ortho Discharge Summary Patient ID: Abelardo Rose [id number] 
male 80 y.o. 
1934 Admit date: 5/10/2019 Discharge date: 5/17/2019 Admitting Physician: Mark Jimenez MD  
 
Consulting Physician(s):   Treatment Team: Attending Provider: Donte Hunter MD; Consulting Provider: Ant Camacho; Physician: Donte Hunter MD; Utilization Review: Portia Rivera RN; Care Manager: Rodriguez Silveira; Consulting Provider: Niki Frausto MD; Nurse Practitioner: Viann Schirmer, NP; Nurse Practitioner: Yulisa Diego NP; Consulting Provider: Mee Cerda NP; Consulting Provider: Mercy Negron DO; Care Manager: Xin Olivarez Date of Surgery:   5/13/2019 Preoperative Diagnosis:  left hip dislocation Postoperative Diagnosis:   left hip dislocation Procedure(s):   left  LEFT HIP ARTHROPLASTY ANTERIOR APPROACH REVISION, PLACEMENT OF ANTIBIOTIC SPACER Anesthesia Type: Other Surgeon: Niki Frausto MD  
 
                      
HPI:  Pt is a 80 y.o. male who has a history of left hip dislocation  with pain and limitations of activities of daily living who presents at this time for a left hip revision and placement of antibiotic cement  following the failure of conservative management. PMH:  
Past Medical History:  
Diagnosis Date  Arthritis  BPH (benign prostatic hypertrophy) 4/12/2010  COPD (chronic obstructive pulmonary disease) with emphysema ([facility] [address] 7/25/2012  HLD (hyperlipidemia) 4/12/2010  
 HTN (hypertension) 4/12/2010  Ill-defined condition 2018 Dementia  Memory disorder  Psychiatric disorder   
 anxiety and depression Body mass index is 18.13 kg/m². : A BMI > 30 is classified as obesity and > 40 is classified as morbid obesity. Medications upon admission :  
Prior to Admission Medications Prescriptions Last Dose Informant Patient Reported? Taking?   
SPIRIVA WITH HANDIHALER 18 mcg inhalation capsule 5/10/2019 at 0900 Transfer Papers No Yes Sig: INHALE ONE CAPSULE VIA DEVICE BY MOUTH ONE TIME DAILY  
calcium-cholecalciferol, D3, (CALTRATE 600+D) tablet 5/10/2019 at 0900 Transfer Papers Yes Yes Sig: Take 1 Tab by mouth daily. dextromethorphan-quiNIDine (NUEDEXTA) 20-10 mg per capsule 5/10/2019 at 0900 Transfer Papers No Yes Sig: Take 1 Cap by mouth every twelve (12) hours. Indications: PSEUDOBULBAR AFFECT  
donepezil (ARICEPT) 10 mg tablet 5/9/2019 at 2100 Transfer Papers Yes Yes Sig: Take 10 mg by mouth nightly. famotidine (PEPCID) 20 mg tablet 5/10/2019 at 0900 Transfer Papers No Yes Sig: Take 1 Tab by mouth two (2) times a day for 30 days. ferrous sulfate 325 mg (65 mg iron) tablet 5/10/2019 at 0600 Transfer Papers Yes Yes Sig: Take 325 mg by mouth Daily (before breakfast). finasteride (PROSCAR) 5 mg tablet 5/10/2019 at 0900 Transfer Papers Yes Yes Sig: Take 5 mg by mouth daily. melatonin 3 mg tablet 5/9/2019 at 1700 Transfer Papers Yes Yes Sig: Take 6 mg by mouth nightly. memantine (NAMENDA) 10 mg tablet 5/10/2019 at 0900 Transfer Papers Yes Yes Sig: Take 10 mg by mouth two (2) times a day. multivitamin (ONE A DAY) tablet 5/10/2019 at 0900 Transfer Papers Yes Yes Sig: Take 1 Tab by mouth daily. rosuvastatin (CRESTOR) 20 mg tablet 5/10/2019 at 0900 Transfer Papers No Yes Sig: TAKE ONE TABLET BY MOUTH ONE TIME DAILY  
tamsulosin (FLOMAX) 0.4 mg capsule 5/10/2019 at 0900 Transfer Papers Yes Yes Sig: Take 0.4 mg by mouth daily. tiZANidine (ZANAFLEX) 2 mg tablet 5/10/2019 at 1200 Transfer Papers Yes Yes Sig: Take 2 mg by mouth every six (6) hours as needed (muscle spasms). traMADol (ULTRAM) 50 mg tablet 5/10/2019 at 1200 Transfer Papers Yes Yes Sig: Take 50 mg by mouth every six (6) hours as needed for Pain. Facility-Administered Medications: None Allergies: Allergies Allergen Reactions  Kiwi Anaphylaxis  Zetia [Ezetimibe] Other (comments) C/o back pain Hospital Course: The patient underwent surgery. Complications:  None; patient tolerated the procedure well. Was taken to the PACU in stable condition and then transferred to the ortho floor. Wound cultures from surgery grew MRSA, ID consulted and patient started on IV vancomycin. PICC was placed on 05/16. Perioperative Antibiotics:  vancomycin Postoperative Pain Management:  Oxycodone DVT Prophylaxis: Aspirin 81 Postoperative transfusions:    Number of units banked PRBCs =   none Post Op complications: none Hemoglobin at discharge:   
Lab Results Component Value Date/Time HGB 10.8 (L) 05/16/2019 02:27 AM  
 INR 1.1 05/10/2019 06:10 PM  
 
 
Dressing was changed on POD # 2. Incision - clean, dry and intact. No significant erythema or swelling. Neurovascular exam found to be within normal limits. Wound appears to be healing without any evidence of infection. Pt had a HVAC drain that was removed on POD# 4. Physical Therapy started following surgery and participated in bed mobility, transfers and ambulation. Gait:  Gait Base of Support: Shift to right Speed/Marie: Delayed, Pace decreased (<100 feet/min), Slow Step Length: Right shortened Gait Abnormalities: Decreased step clearance Ambulation - Level of Assistance: Moderate assistance, Assist x2(Required help to unweight foot for advance) Distance (ft): (Few steps bed to chair) Assistive Device: Gait belt, Walker, rolling Discharged to: SNF. Condition on Discharge:   stable Discharge instructions: - Anticoagulate with Aspirin 81 BID 
- Take pain medications as prescribed - Resume pre hospital diet - Discharge activity: activity as tolerated - Ambulate with assistive device as needed. - Weight bearing status NWB 
- Wound Care Keep wound clean and dry. See discharge instruction sheet. - Vancomycin IV till 6/24/19 . Dosing per pharmacy based on renal function/goal trough - Check weekly CBC wt diff, BMP, Vancomycin trough, ESR, CRP. Trough goal 15-20 Probiotics/yogurt encouraged - Follow up with ID 
 
 
       
-DISCHARGE MEDICATION LIST Current Discharge Medication List  
  
START taking these medications Details  
acetaminophen (TYLENOL) 325 mg tablet Take 2 Tabs by mouth every six (6) hours for 14 days. Qty: 112 Tab, Refills: 0  
  
aspirin 81 mg chewable tablet Take 1 Tab by mouth two (2) times a day for 30 days. Qty: 60 Tab, Refills: 0  
  
oxyCODONE IR (ROXICODONE) 5 mg immediate release tablet Take 1 Tab by mouth every four (4) hours as needed for Pain for up to 14 days. Max Daily Amount: 30 mg. 
Qty: 60 Tab, Refills: 0 Associated Diagnoses: Status post total replacement of left hip  
  
polyethylene glycol (MIRALAX) 17 gram packet Take 1 Packet by mouth daily for 15 days. Qty: 15 Packet, Refills: 0  
  
senna-docusate (PERICOLACE) 8.6-50 mg per tablet Take 2 Tabs by mouth nightly for 15 days. Qty: 30 Tab, Refills: 0  
  
vancomycin 10 gram 700 mg IVPB 700 mg by IntraVENous route every eight (8) hours for 38 days. To Be dosed by Pharmacy pending vanc trough and renal function Trough goal 15-20 Qty: 1 Dose, Refills: 0 CONTINUE these medications which have NOT CHANGED Details  
tiZANidine (ZANAFLEX) 2 mg tablet Take 2 mg by mouth every six (6) hours as needed (muscle spasms). traMADol (ULTRAM) 50 mg tablet Take 50 mg by mouth every six (6) hours as needed for Pain. calcium-cholecalciferol, D3, (CALTRATE 600+D) tablet Take 1 Tab by mouth daily. ferrous sulfate 325 mg (65 mg iron) tablet Take 325 mg by mouth Daily (before breakfast). famotidine (PEPCID) 20 mg tablet Take 1 Tab by mouth two (2) times a day for 30 days. Qty: 60 Tab, Refills: 0  
  
melatonin 3 mg tablet Take 6 mg by mouth nightly. donepezil (ARICEPT) 10 mg tablet Take 10 mg by mouth nightly. memantine (NAMENDA) 10 mg tablet Take 10 mg by mouth two (2) times a day. dextromethorphan-quiNIDine (NUEDEXTA) 20-10 mg per capsule Take 1 Cap by mouth every twelve (12) hours. Indications: PSEUDOBULBAR AFFECT Qty: 60 Cap, Refills: 11  
  
rosuvastatin (CRESTOR) 20 mg tablet TAKE ONE TABLET BY MOUTH ONE TIME DAILY Qty: 90 Tab, Refills: 3 SPIRIVA WITH HANDIHALER 18 mcg inhalation capsule INHALE ONE CAPSULE VIA DEVICE BY MOUTH ONE TIME DAILY Qty: 30 Cap, Refills: 8  
  
multivitamin (ONE A DAY) tablet Take 1 Tab by mouth daily. tamsulosin (FLOMAX) 0.4 mg capsule Take 0.4 mg by mouth daily. Associated Diagnoses: BPH (benign prostatic hypertrophy)  
  
finasteride (PROSCAR) 5 mg tablet Take 5 mg by mouth daily. Associated Diagnoses: BPH (benign prostatic hypertrophy)  
  
  
 per medical continuation form 
 
 
-Follow up in office in 2 weeks Signed: 
Dianna Ramirez 
DNP, AGACNP-BC Orthopaedic Nurse Practitioner 5/17/2019 9:17 AM

## 2019-05-20 NOTE — PROGRESS NOTES
Transition of Care Coordination/Hospital to Post Acute Facility: 
  
Date/Time:  2019 10:20 AM 
 
Patient was admitted to [facility] on 5/10/19 for treatment of left hip replacement. Patient was discharged 19 to [facility]  for continuation of care. Inpatient RRAT score: 27 Top Challenges reviewed Recent left hip tiana revision() s/p treatment for left femoral neck fracture(3/31/19). XR in office on 5/10 revealed dislocation 19 and per brief op note \"  irreducible hemiarthroplasty with greater trochanteric avulsion, significant fluid collection appearing purulent\". Antibiotic spacer placed Operative findings with concerns for purulence and cx + for MRSA He was started on Vancomycin and Zosyn IV Patient returned to [facility] where he was readmitted from. Patient on vancy IV until  Weekly CBC w/ dif, BMP, vancy trough, ESR, CRP. Trough goal 15-20 F/u with Dr. Pb Garcia on  Method of communication with care team :chart routing Nurse Navigator(NN) spoke with Barak Au to provide introduction to self and explanation of the Nurse Navigator Role. Verified name and  as patient identifiers. Weekly CBC w/ dif, BMP, vancy trough, ESR, CRP. Trough goal 15-20 F/u with Dr. Pb Garcia on  Encouraged probiotic/yogurt per ID note PICC line can be removed at end of abx therapy Discussed and reviewed  anticipated length of stay, diet restrictions, discharge summary, follow up appointments, medication reconciliation, pending labs at time of discharge, recommendations for future lab/imaging, wound care orders ACP:  
Does the patient have a current ACP (including DDNR):  yes Does the post acute facility have a copy of the patients ACP:  yes Medication(s):  
New Medications at Discharge: 
acetaminophen (TYLENOL) 325 mg tablet Take 2 Tabs by mouth every six (6) hours for 14 days.  
Qty: 112 Tab, Refills: 0  
   
 aspirin 81 mg chewable tablet Take 1 Tab by mouth two (2) times a day for 30 days. Qty: 60 Tab, Refills: 0  
   
oxyCODONE IR (ROXICODONE) 5 mg immediate release tablet Take 1 Tab by mouth every four (4) hours as needed for Pain for up to 14 days. Max Daily Amount: 30 mg. 
Qty: 60 Tab, Refills: 0  
  Associated Diagnoses: Status post total replacement of left hip  
   
polyethylene glycol (MIRALAX) 17 gram packet Take 1 Packet by mouth daily for 15 days. Qty: 15 Packet, Refills: 0  
   
senna-docusate (PERICOLACE) 8.6-50 mg per tablet Take 2 Tabs by mouth nightly for 15 days. Qty: 30 Tab, Refills: 0  
   
vancomycin 10 gram 700 mg IVPB 700 mg by IntraVENous route every eight (8) hours for 38 days. To Be dosed by Pharmacy pending vanc trough and renal function Trough goal 15-20 Qty: 1 Dose, Refills: 0 Changed Medications at Discharge: n/a Discontinued Medications at Discharge: n/a 
  
PCP/Specialist follow up:  
Future Appointments Date Time Provider Malka Bolanos 6/4/2019 10:00 AM Karma Mcarthur  [facility]  
6/11/2019 10:30 AM Ernie Meek DO [address] [place]  
8/27/2019  1:45 PM Ortiz Monreal Opportunity to ask questions was provided. Contact information was provided for future reference or further questions. Will continue to monitor.

## 2019-05-24 NOTE — PROGRESS NOTES
Community Care Team documentation for patient in [facility] Initial Follow Up Patient was discharged to [facility], [facility]. Information included in this progress note has been provided to SNF. Hospital Admission and Diagnosis: MRM 5/10-5/17 Left Hip Dislocation, s/p Left Hip Arthroplasty anterior approach revision, placement of antibiotic spacer  RRAT Score: 27    Advance Care Planning:   DDNR, Advance Directive, POA on file PCP : Miesha Neri DO 
Nurse Navigator in PCP office: Maggie Rain Note routed to Nurse Navigator team. 
 
SNF Attending:  Krista Villeda MD 
 
Spoke with SNF team. Ensured patient arrived to SNF safely with admission packet in order. Provided needed hospital follow up appointments: Donald Jain MD in 2 weeks; Infectious Diseases Mi Medrano DO on 6/11/19 at 10.30 am. PT/Ot providing skilled therapy in SNF. Currently max assist with transfers and all ADLs. Remains NWB to LLE. Before this hospitalization patient was ambulating 100 ft. Prior to being hospitalized first time, patient lived at [address] [place] [address] and previously used [facility]. DIsposition/LOS TBD. Community Care Team will follow up weekly with [facility] until discharge. Medications were not reconciled and general patient assessment was not completed during this [facility] outreach. Monica Natarajan, MSN, RN, ACNS-BC, [facility] Nurse Navigator, [address] [place] [address] [phone]

## 2019-05-30 PROBLEM — T84.52XA LEFT HIP PROSTHETIC JOINT INFECTION (HCC): Status: ACTIVE | Noted: 2019-01-01

## 2019-05-30 NOTE — PROGRESS NOTES
Community Care Team Documentation for Patient in [facility]  Subsequent Follow up     Patient remains at [facility] ([facility]). See previous [facility] Team notes. PCP : Abraham Merino DO  Nurse Navigator in PCP office: Janell Lanes with SNF team.  Provided needed hospital follow up appointments: Marquis Jim MD in 2 weeks; Infectious Diseases Alfie Edwards DO on 6/11/19 at 10.30 am.  PT/OT continue. Currently mod assist with transfers. Ambulating 60 ft. Min assist with upper body ADLs. Mod assist with lower body ADLs. Progress is slow. Care plan meeting to be held on 6/4 but daughter is out of town. Prior to being hospitalized first time, patient lived at [address] [place] [address] and previously used [facility]. Disposition/LOS TBD. Medications were not reconciled and general patient assessment was not completed during this skilled nursing facility outreach.      Linda Mcgovern, MSN, RN, ACNS-BC, [facility]  Nurse Navigator, [place] [phone]

## 2019-06-06 NOTE — PROGRESS NOTES
Community Care Team Documentation for Patient in [facility] Subsequent Follow up Patient remains at [facility] ([facility]). See previous [facility] Team notes. PCP : Nestor Benitez DO 
Nurse Navigator in PCP office: Gilma Wolff Spoke with SNF team.  Provided needed hospital follow up appointments: Johnson Sparrow MD in 2 weeks; Infectious Diseases Niki Sawant DO on 6/11/19 at 10.30 am.  PT/OT continue. Currently max assist overall. Not ambulating. NWB status continues. OT last therapy treat day 6/11. PT continues. Anticipate to discharge 6/19. [facility] to assess if pt can return to [facility] [place]. Care plan meeting held with daughter. Daughter is aware of potential for pt being too complex for NII. Potential transition to LTC private pay. Medications were not reconciled and general patient assessment was not completed during this skilled nursing facility outreach.

## 2019-06-13 NOTE — PROGRESS NOTES
06/13/19 Spoke to SNF team. Patient not ambulatory at this time. Patient will transition to LTC at [facility] on 6/26. NN to f/u 1 week.  AR

## 2019-06-13 NOTE — PROGRESS NOTES
Community Care Team Documentation for Patient in [facility] Subsequent Follow up Patient remains at [facility] ([facility]). See previous [facility] Team notes. PCP : Nieves Tesfaye DO 
Nurse Navigator in PCP office: Tete Wong Spoke with SNF team.   
 
PT/OT update: Currently mod assist with transfers. Betining to stand but remains non-ambulatory. Mod assist with upper and lower body ADLs. Anticipated LOS/discharge date: 6/26 . Disposition: Plan for transition to LTC at [facility]. Medications were not reconciled and general patient assessment was not completed during this skilled nursing facility outreach. Darcus Leventhal May, MSN, RN, ACNS-BC, [facility] Nurse Navigator, [address] [place] [address] [phone]

## 2019-06-20 NOTE — PROGRESS NOTES
Community Care Team Documentation for Patient in [facility] Subsequent Follow up Patient remains at [facility] ([facility]). See previous [facility] Team notes. PCP : [place] Dorantes DO 
Nurse Navigator in PCP office: Adri Paiz Spoke with SNF team.   
 
PT/OT update: Doing better with therapy. Standing tolerance improved. Ordered a built up shoe for patient. Working on strengthening. Anticipated LOS/discharge date: 6/26 . Disposition: Plan for transition to LTC at [facility]. Medications were not reconciled and general patient assessment was not completed during this skilled nursing facility outreach. Stephanie Natarajan, MALI, RN, ACNS-BC, [facility] Nurse Navigator, [address] [place] [address] [phone]

## 2019-06-21 NOTE — PROGRESS NOTES
06/21/19 Spoke to SNF team. Patient will be transitioning to LTC at [facility] on 6/26. NN to f/u 1 week to confirm.  AR

## 2019-06-27 NOTE — Clinical Note
OT last therapy treat day 6/25. PT last therapy treat day 7/3. Plan to transition to LTC at [facility] 7/4.

## 2019-06-27 NOTE — PROGRESS NOTES
06/27/19 Spoke with SNF team. last therapy treatment day 7/3. Plan to transition to LTC at [facility] 7/4. NN to f/u 1 week.  AR

## 2019-06-27 NOTE — PROGRESS NOTES
Community Care Team Documentation for Patient in [facility] Subsequent Follow up Patient remains at [facility] ([facility]). See previous [facility] Team notes. PCP : Sharon Aguirre DO 
Nurse Navigator in PCP office: Shaquille Jeffrey Spoke with SNF team.OT last therapy treat day 6/25. PT last therapy treat day 7/3. Plan to transition to LTC at [facility] 7/4. Medications were not reconciled and general patient assessment was not completed during this skilled nursing facility outreach.

## 2019-07-04 NOTE — PROGRESS NOTES
Community Care Team Documentation for Patient in [facility] Subsequent Follow up Patient remains at [facility] ([facility]). See previous [facility] Team notes. PCP : Comfort Mace DO 
Nurse Navigator in PCP office: Clara Santo Spoke with SNF team.  PT/OT extended LOS. Currently min to mod assist with transfers. Last therapy treat day 7/10 plan to transition to LTC at [facility] 7/11. Medications were not reconciled and general patient assessment was not completed during this skilled nursing facility outreach.

## 2019-07-05 NOTE — PROGRESS NOTES
07/05/19 Spoke to SNF team. Currently min to mod assist with transfers, last therapy date extended. Last therapy treat day 7/10 plan to transition to LTC at [facility] 7/11. NN to f/u 1 week.  AR

## 2019-07-07 PROBLEM — R41.82 ALTERED MENTAL STATUS: Status: ACTIVE | Noted: 2019-01-01

## 2019-07-07 NOTE — PROGRESS NOTES
TRANSFER - IN REPORT: 
 
Verbal report received from [facility]) on Talia Morrison  being received from ED(unit) for routine progression of care Report consisted of patients Situation, Background, Assessment and  
Recommendations(SBAR). Information from the following report(s) SBAR, Kardex, ED Summary, Procedure Summary, Intake/Output and MAR was reviewed with the receiving nurse. Opportunity for questions and clarification was provided. Assessment completed upon patients arrival to unit and care assumed. Primary Nurse Carolina Hargrove and Albina Jacobs RN performed a dual skin assessment on this patient. Blanchable erythema on sacrum and buttocks. Heels intact. Scattered bruising on upper extremities bilaterally. Scab on RLE. Duy score is 18. RN not able to complete admission database; pt poor historian.

## 2019-07-07 NOTE — H&P
HISTORY AND PHYSICAL 
 
 
PCP: Dianelys Chavez DO History source: patient's daughter and medical records CC: AMS 
 
 
HPI: A 80year old male patient with PMH of Alzheimer's dementia, HTN, HLD, BPH, COPD and s/p Left hip replacement complicated with MRSA infection of prosthesis s/p recent revision with abx spacer in 5/2019 presented to ED for evaluation of altered mental status from [facility]. Patient is poor historian as he has AMS and dementia. Daughter at bedside provided the history. He was lethargic and altered mentation this AM and was brought in. He does have severe dementia but baseline he talks. She denied any recent hx fever, cough, chest pain, abd pain, urinary or bowel changes. His mentation has improved since coming to ED. He is awake and alert and following simple commands. Daughter states he is getting better after IVF. Hospitalist consulted for admission for AMS. Spoke with his nurse at [facility], no new medications or medication changes, has not taken Zanaflex this month and tramadol last date 7/3. Denied any other symptoms. Baseline wheelchair bound PMH/PSH: 
Past Medical History:  
Diagnosis Date  Arthritis  BPH (benign prostatic hypertrophy) 4/12/2010  COPD (chronic obstructive pulmonary disease) with emphysema ([facility] [address] 7/25/2012  HLD (hyperlipidemia) 4/12/2010  
 HTN (hypertension) 4/12/2010  Ill-defined condition 2018 Dementia  Memory disorder  Psychiatric disorder   
 anxiety and depression Past Surgical History:  
Procedure Laterality Date  COLONOSCOPY,NURA FERNÁNDEZ,SNARE  8/5/2015  
 2 sessile sigmoid polyps, 3 & 5 mm; Dr. Rui Valiente; no further screening recommended  ENDOSCOPY, COLON, DIAGNOSTIC  2002  
 negative; 10 year repeat; Dr. Rui Valiente  HX HERNIA REPAIR  years ago  
 left  HX HERNIA REPAIR  2001  
 right inguinal  
 HX ORTHOPAEDIC    
 MS COLSC FLX W/RMVL OF TUMOR POLYP LESION SNARE TQ  7/27/2012 Home meds: Prior to Admission medications Medication Sig Start Date End Date Taking? Authorizing Provider  
tiZANidine (ZANAFLEX) 2 mg tablet Take 2 mg by mouth every six (6) hours as needed (muscle spasms). Provider, Historical  
traMADol (ULTRAM) 50 mg tablet Take 50 mg by mouth every six (6) hours as needed for Pain. Provider, Historical  
calcium-cholecalciferol, D3, (CALTRATE 600+D) tablet Take 1 Tab by mouth daily. Provider, Historical  
ferrous sulfate 325 mg (65 mg iron) tablet Take 325 mg by mouth Daily (before breakfast). Provider, Historical  
melatonin 3 mg tablet Take 6 mg by mouth nightly. Provider, Historical  
donepezil (ARICEPT) 10 mg tablet Take 10 mg by mouth nightly. Provider, Historical  
memantine (NAMENDA) 10 mg tablet Take 10 mg by mouth two (2) times a day. Provider, Historical  
dextromethorphan-quiNIDine (NUEDEXTA) 20-10 mg per capsule Take 1 Cap by mouth every twelve (12) hours. Indications: PSEUDOBULBAR AFFECT 18   Victorine Claremont B III, DO  
rosuvastatin (CRESTOR) 20 mg tablet TAKE ONE TABLET BY MOUTH ONE TIME DAILY 10/17/17   Darrius Mcallister III, DO  
SPIRIVA WITH HANDIHALER 18 mcg inhalation capsule INHALE ONE CAPSULE VIA DEVICE BY MOUTH ONE TIME DAILY 17   Victorine Bernard B III, DO  
multivitamin (ONE A DAY) tablet Take 1 Tab by mouth daily. Provider, Historical  
tamsulosin (FLOMAX) 0.4 mg capsule Take 0.4 mg by mouth daily. 5/19/15   Provider, Historical  
finasteride (PROSCAR) 5 mg tablet Take 5 mg by mouth daily. Provider, Historical  
 
 
Allergies: Allergies Allergen Reactions  Kiwi Anaphylaxis  Zetia [Ezetimibe] Other (comments) C/o back pain FH: 
Family History Problem Relation Age of Onset  Heart Disease Mother   
      at 80  
 Heart Disease Father   
      at 80  Liver Disease Brother  Stroke Brother  Heart Attack Brother  Other Sister 8 ? polio  Cancer Sister   
     lung SH: 
Social History Tobacco Use  Smoking status: Current Some Day Smoker Packs/day: 1.00 Years: 60.00 Pack years: 60.00 Types: Cigarettes  Smokeless tobacco: Former User Quit date: 10/5/2014 Substance Use Topics  Alcohol use: Not Currently Alcohol/week: 4.2 oz Types: 7 Glasses of wine per week Comment: 1 drinks per evening ROS: Review of systems not obtained due to patient factors. PHYSICAL EXAM: 
Visit Vitals /74 Pulse 73 Temp 97.6 °F (36.4 °C) Resp 16 SpO2 99% Gen: NAD, elderly HEENT: anicteric sclerae, normal conjunctiva, oropharynx clear, MM moist 
Neck: supple, trachea midline, no adenopathy Heart: RRR, no MRG, no JVD, no peripheral edema Lungs: CTA b/l, non-labored respirations Abd: soft, NT, ND, BS+, no organomegaly Extr: warm Skin: dry, no rash Neuro: Dementia, CN II-XII grossly intact, normal speech, moves all extremities Labs/Imaging: 
Recent Results (from the past 24 hour(s)) CBC WITH AUTOMATED DIFF Collection Time: 07/07/19  7:36 AM  
Result Value Ref Range WBC 6.2 4.1 - 11.1 K/uL  
 RBC 3.47 (L) 4.10 - 5.70 M/uL  
 HGB 10.3 (L) 12.1 - 17.0 g/dL HCT 33.4 (L) 36.6 - 50.3 % MCV 96.3 80.0 - 99.0 FL  
 MCH 29.7 26.0 - 34.0 PG  
 MCHC 30.8 30.0 - 36.5 g/dL  
 RDW 16.9 (H) 11.5 - 14.5 % PLATELET 502 223 - 867 K/uL MPV 10.6 8.9 - 12.9 FL  
 NRBC 0.0 0  WBC ABSOLUTE NRBC 0.00 0.00 - 0.01 K/uL NEUTROPHILS 68 32 - 75 % LYMPHOCYTES 22 12 - 49 % MONOCYTES 7 5 - 13 % EOSINOPHILS 1 0 - 7 % BASOPHILS 1 0 - 1 % IMMATURE GRANULOCYTES 1 (H) 0.0 - 0.5 % ABS. NEUTROPHILS 4.2 1.8 - 8.0 K/UL  
 ABS. LYMPHOCYTES 1.4 0.8 - 3.5 K/UL  
 ABS. MONOCYTES 0.4 0.0 - 1.0 K/UL  
 ABS. EOSINOPHILS 0.1 0.0 - 0.4 K/UL  
 ABS. BASOPHILS 0.0 0.0 - 0.1 K/UL  
 ABS. IMM. GRANS. 0.0 0.00 - 0.04 K/UL  
 DF AUTOMATED METABOLIC PANEL, COMPREHENSIVE  Collection Time: 07/07/19  7:36 AM  
 Result Value Ref Range Sodium 138 136 - 145 mmol/L Potassium 4.2 3.5 - 5.1 mmol/L Chloride 103 97 - 108 mmol/L  
 CO2 28 21 - 32 mmol/L Anion gap 7 5 - 15 mmol/L Glucose 94 65 - 100 mg/dL BUN 25 (H) 6 - 20 MG/DL Creatinine 1.11 0.70 - 1.30 MG/DL  
 BUN/Creatinine ratio 23 (H) 12 - 20 GFR est AA >60 >60 ml/min/1.73m2 GFR est non-AA >60 >60 ml/min/1.73m2 Calcium 9.2 8.5 - 10.1 MG/DL Bilirubin, total 0.4 0.2 - 1.0 MG/DL  
 ALT (SGPT) 13 12 - 78 U/L  
 AST (SGOT) 15 15 - 37 U/L Alk. phosphatase 112 45 - 117 U/L Protein, total 7.9 6.4 - 8.2 g/dL Albumin 3.4 (L) 3.5 - 5.0 g/dL Globulin 4.5 (H) 2.0 - 4.0 g/dL A-G Ratio 0.8 (L) 1.1 - 2.2 MAGNESIUM Collection Time: 07/07/19  7:36 AM  
Result Value Ref Range Magnesium 2.0 1.6 - 2.4 mg/dL SAMPLES BEING HELD Collection Time: 07/07/19  7:36 AM  
Result Value Ref Range SAMPLES BEING HELD 1RD,1BLU   
 COMMENT Add-on orders for these samples will be processed based on acceptable specimen integrity and analyte stability, which may vary by analyte. URINALYSIS W/MICROSCOPIC Collection Time: 07/07/19 10:27 AM  
Result Value Ref Range Color YELLOW/STRAW Appearance CLEAR CLEAR Specific gravity 1.014 1.003 - 1.030    
 pH (UA) 6.0 5.0 - 8.0 Protein NEGATIVE  NEG mg/dL Glucose NEGATIVE  NEG mg/dL Ketone NEGATIVE  NEG mg/dL Bilirubin NEGATIVE  NEG Blood NEGATIVE  NEG Urobilinogen 0.2 0.2 - 1.0 EU/dL Nitrites NEGATIVE  NEG Leukocyte Esterase NEGATIVE  NEG    
 WBC 0-4 0 - 4 /hpf  
 RBC 0-5 0 - 5 /hpf Epithelial cells FEW FEW /lpf Bacteria NEGATIVE  NEG /hpf Hyaline cast 0-2 0 - 5 /lpf URINE CULTURE HOLD SAMPLE Collection Time: 07/07/19 10:27 AM  
Result Value Ref Range Urine culture hold URINE ON HOLD IN MICROBIOLOGY DEPT FOR 3 DAYS.  IF UNPRESERVED URINE IS SUBMITTED, IT CANNOT BE USED FOR ADDITIONAL TESTING AFTER 24 HRS, RECOLLECTION WILL BE REQUIRED. Recent Labs  
  07/07/19 
0760 WBC 6.2 HGB 10.3* HCT 33.4*  
 Recent Labs  
  07/07/19 
4059   
K 4.2  CO2 28 BUN 25* CREA 1.11  
GLU 94  
CA 9.2 MG 2.0 Recent Labs  
  07/07/19 
1183 SGOT 15 ALT 13  
 TBILI 0.4 TP 7.9 ALB 3.4*  
GLOB 4.5* No results for input(s): CPK, CKNDX, TROIQ in the last 72 hours. No lab exists for component: CPKMB No results for input(s): INR, PTP, APTT in the last 72 hours. No lab exists for component: INREXT No results for input(s): PH, PCO2, PO2 in the last 72 hours. All labs and imaging personally reviewed by me Assessment & Plan:  
Acute encephalopathy - improving - possible due to dehydration vs medication  
- underlying dementia  
- admit to obs, reote tele 
- no signs of infection , Normal wbc, ua clean - CXR: Stable small left pleural effusion and mild left basilar opacity ( chronic) . Clear right lung. 
- CT head: No acute intracranial hemorrhage. Progressive age-indeterminate microvascular ischemic disease in the periventricular white matter 
- supportive care - IVF 
 
CT head : Ventricular dilatation out of proportion with the degree of volume loss raises the possibility of normal pressure hydrocephalus. -pt does have ams, and urinary incontinence but less likely NPH 
- will consult NS 
 
DERECK  
- cr worsened to 1.11 from baseline 0.5 
- possible due to dehydration  
- IVF 
- monitor renal function Hx Left hip replacement complicated with MRSA infection of prosthesis s/p recent revision with abx spacer in 5/2019 
- no left hip pain 
- normal wbc 
- less likely infected 
-  Xray left hip ordered Alzheimer dementia 
- supportive care - c/w home meds Aricept, nuedexta BPH - proscar, flomax HLD - statin DVT ppx:  Lovenox Code status: DND- confirmed by daughter Disposition: TBD. Possible dc back to [facility] in 1-2 days Signed By: Olena Iglesias MD   
 July 7, 2019

## 2019-07-07 NOTE — ED NOTES
Bedside and Verbal shift change report given to Laura Mauro RN (oncoming nurse) by Sima Cassidy RN (offgoing nurse). Report included the following information SBAR, ED Summary, MAR and Recent Results.

## 2019-07-07 NOTE — ROUTINE PROCESS
TRANSFER - OUT REPORT: 
 
Verbal report given to Orion Hill RN(name) on Randall Person  being transferred to 540(unit) for routine progression of care Report consisted of patients Situation, Background, Assessment and  
Recommendations(SBAR). Information from the following report(s) SBAR, ED Summary, Procedure Summary, Intake/Output, MAR and Recent Results was reviewed with the receiving nurse. Lines: PICC Single Lumen 05/16/19 Right;Brachial (Active) Peripheral IV 07/07/19 Left Antecubital (Active) Site Assessment Clean, dry, & intact 7/7/2019  7:35 AM  
Phlebitis Assessment 0 7/7/2019  7:35 AM  
Infiltration Assessment 0 7/7/2019  7:35 AM  
Dressing Status Clean, dry, & intact 7/7/2019  7:35 AM  
Dressing Type Transparent 7/7/2019  7:35 AM  
  
 
Opportunity for questions and clarification was provided. Patient transported with: 
Transporter

## 2019-07-07 NOTE — PROGRESS NOTES
Admission Medication Reconciliation: 
Information obtained from:  patient's dicharge paper, RN from [facility] by phone Comments 1) Spoke with RN over the phone, as the list they provided was NOT the updated medication list. The medication listed below were confirmed by RN who was using the [facility] to update 2)  Medication changes (since last review): Added - Famotidine  
-Tylenol (scheduled dose in morning) Adjusted 
- none Removed 
- none 3) Patient did NOT take any of his medication this morning 5) Patient completed IV Vancomycin  treatment for his hip infection on 6/24 6) Pain controled by Tylenol. Patient seldemoly asks for tramadol. Allergies:  Kiwi and Zetia [ezetimibe] Significant PMH/Disease States:  
Past Medical History:  
Diagnosis Date Arthritis BPH (benign prostatic hypertrophy) 4/12/2010 COPD (chronic obstructive pulmonary disease) with emphysema ([facility] [address] 7/25/2012 HLD (hyperlipidemia) 4/12/2010 HTN (hypertension) 4/12/2010 Ill-defined condition 2018 Dementia Memory disorder Psychiatric disorder   
 anxiety and depression Chief Complaint for this Admission: Chief Complaint Patient presents with Altered mental status Prior to Admission Medications:  
Prior to Admission Medications Prescriptions Last Dose Informant Patient Reported? Taking?  
acetaminophen (TYLENOL) 325 mg tablet 7/6/2019 at Unknown time  Yes Yes Sig: Take 650 mg by mouth daily. acetaminophen (TYLENOL) 325 mg tablet   Yes Yes Sig: Take 325 mg by mouth every six (6) hours as needed for Pain. calcium-cholecalciferol, D3, (CALTRATE 600+D) tablet 7/6/2019 at Unknown time Transfer Papers Yes Yes Sig: Take 1 Tab by mouth daily. dextromethorphan-quiNIDine (NUEDEXTA) 20-10 mg per capsule  Transfer Papers No Yes Sig: Take 1 Cap by mouth every twelve (12) hours.  Indications: PSEUDOBULBAR AFFECT  
 donepezil (ARICEPT) 10 mg tablet 7/6/2019 at Unknown time Transfer Papers Yes Yes Sig: Take 10 mg by mouth nightly. famotidine (PEPCID) 20 mg tablet 7/6/2019 at Unknown time  Yes Yes Sig: Take 20 mg by mouth two (2) times a day. ferrous sulfate 325 mg (65 mg iron) tablet 7/6/2019 at Unknown time Transfer Papers Yes Yes Sig: Take 325 mg by mouth Daily (before breakfast). finasteride (PROSCAR) 5 mg tablet 7/6/2019 at Unknown time Transfer Papers Yes Yes Sig: Take 5 mg by mouth daily. melatonin 3 mg tablet 7/6/2019 at Unknown time Transfer Papers Yes Yes Sig: Take 6 mg by mouth nightly. memantine (NAMENDA) 10 mg tablet 7/6/2019 at Unknown time Transfer Papers Yes Yes Sig: Take 10 mg by mouth two (2) times a day. multivitamin (ONE A DAY) tablet 7/6/2019 at Unknown time Transfer Papers Yes Yes Sig: Take 1 Tab by mouth daily. polyethylene glycol (MIRALAX) 17 gram packet 7/6/2019 at Unknown time  Yes Yes Sig: Take 17 g by mouth daily. rosuvastatin (CRESTOR) 20 mg tablet 7/6/2019 at Unknown time Transfer Papers No Yes Sig: TAKE ONE TABLET BY MOUTH ONE TIME DAILY  
senna-docusate (SENNA WITH DOCUSATE SODIUM) 8.6-50 mg per tablet 7/6/2019 at Unknown time  Yes Yes Sig: Take 2 Tabs by mouth nightly. tamsulosin (FLOMAX) 0.4 mg capsule 7/6/2019 at Unknown time Transfer Papers Yes Yes Sig: Take 0.4 mg by mouth daily. tiZANidine (ZANAFLEX) 2 mg tablet  Transfer Papers Yes Yes Sig: Take 2 mg by mouth every six (6) hours as needed (muscle spasms). tiotropium (SPIRIVA WITH HANDIHALER) 18 mcg inhalation capsule 7/6/2019 at Unknown time  Yes Yes Sig: Take 1 Cap by inhalation daily. traMADol (ULTRAM) 50 mg tablet  Transfer Papers Yes Yes Sig: Take 50 mg by mouth every six (6) hours as needed for Pain. Facility-Administered Medications: None

## 2019-07-07 NOTE — ED TRIAGE NOTES
Triage: Patient arrives from [facility] rehab for c/o AMS as reported by staff. Reports patient is normally \"laughing, talking but has alzheimers\". Today patient is arousable to noxious stimuli- no verbal response. Patient arrives with knee immobilizer to left knee secondary to hip replacement in May per staff.

## 2019-07-07 NOTE — ED PROVIDER NOTES
80 y.o. male with past medical history significant for hypertension, HLD, BPH, COPD, arthritis, Alzheimer's disease, anxiety, depression and dementia who presents from [facility] and [address] [place] [address] via EMS with altered mental status. Per Juancarlos Valencia, pt has been seen 5+ times at various hospitals in the [address] [facility] area for various medical concerns. EMS states pt at baseline is laughing and talking but this morning had altered mental status with fixed pupils with difficulty arousing. EMS states pt recently had a left hip replacement. There are no other acute medical concerns at this time. Full history, physical exam, and ROS unable to be obtained due to: Altered mental status. Social hx: Current some day tobacco smoker (1 pack/day for 60 years), No current EtOH use PCP: Mirta Peralta DO Note written by Randolph Winkler. David Barrientos, as dictated by Ace Pack MD 7:20 AM 
 
 
 
 
The history is provided by the EMS personnel and medical records. No  was used. Past Medical History:  
Diagnosis Date  Arthritis  BPH (benign prostatic hypertrophy) 2010  COPD (chronic obstructive pulmonary disease) with emphysema ([facility] [address] 2012  HLD (hyperlipidemia) 2010  
 HTN (hypertension) 2010  Ill-defined condition 2018 Dementia  Memory disorder  Psychiatric disorder   
 anxiety and depression Past Surgical History:  
Procedure Laterality Date  COLONOSCOPY,NURA FERNÁNDEZ,SNARE  2015  
 2 sessile sigmoid polyps, 3 & 5 mm; Dr. Mildred Perez; no further screening recommended  ENDOSCOPY, COLON, DIAGNOSTIC    
 negative; 10 year repeat; Dr. Mildred Perez  HX HERNIA REPAIR  years ago  
 left  HX HERNIA REPAIR    
 right inguinal  
 HX ORTHOPAEDIC    
 MS COLSC FLX W/RMVL OF TUMOR POLYP LESION SNARE TQ  2012 Family History:  
Problem Relation Age of Onset  Heart Disease Mother   
      at 80  
  Heart Disease Father   
      at 80  Liver Disease Brother  Stroke Brother  Heart Attack Brother  Other Sister 8 ? polio  Cancer Sister   
     lung Social History Socioeconomic History  Marital status:  Spouse name: Not on file  Number of children: Not on file  Years of education: Not on file  Highest education level: Not on file Occupational History  Not on file Social Needs  Financial resource strain: Not on file  Food insecurity:  
  Worry: Not on file Inability: Not on file  Transportation needs:  
  Medical: Not on file Non-medical: Not on file Tobacco Use  Smoking status: Current Some Day Smoker Packs/day: 1.00 Years: 60.00 Pack years: 60.00 Types: Cigarettes  Smokeless tobacco: Former User Quit date: 10/5/2014 Substance and Sexual Activity  Alcohol use: Not Currently Alcohol/week: 4.2 oz Types: 7 Glasses of wine per week Comment: 1 drinks per evening  Drug use: No  
 Sexual activity: Never Lifestyle  Physical activity:  
  Days per week: Not on file Minutes per session: Not on file  Stress: Not on file Relationships  Social connections:  
  Talks on phone: Not on file Gets together: Not on file Attends Judaism service: Not on file Active member of club or organization: Not on file Attends meetings of clubs or organizations: Not on file Relationship status: Not on file  Intimate partner violence:  
  Fear of current or ex partner: Not on file Emotionally abused: Not on file Physically abused: Not on file Forced sexual activity: Not on file Other Topics Concern  Not on file Social History Narrative  Not on file ALLERGIES: Kiwi and Zetia [ezetimibe] Review of Systems Unable to perform ROS: Mental status change Vitals:  
 19 4179 Pulse: (!) 57 Resp: 18 Temp: 97.9 °F (36.6 °C) SpO2: 98% Physical Exam  
Constitutional: He appears well-developed and well-nourished. No distress. HENT:  
Head: Normocephalic and atraumatic. Mouth/Throat: Mucous membranes are dry. Pt has dry mucous membranes. Eyes: Conjunctivae and EOM are normal.  
Neck: Normal range of motion. Neck supple. Cardiovascular: Normal rate, regular rhythm and normal heart sounds. Pulmonary/Chest: Effort normal and breath sounds normal. No respiratory distress. Abdominal: Soft. Bowel sounds are normal. He exhibits no distension. There is no tenderness. Pt has no super pubic mass. Musculoskeletal: Normal range of motion. Pt has mild bilateral knee contractures. Pt's left foot and hip are internally rotated with his toes pointing medially. Neurological:  
Pt is sleeping with his mouth open and unresponsive to voice commands. Pt is sleeping through questions. Pt does not allow arms to be dropped on his face. Skin: Skin is warm and dry. Psychiatric: He has a normal mood and affect. His behavior is normal. Judgment and thought content normal.  
Nursing note and vitals reviewed. Note written by Gianna Calabrese. Merary Osorio, as dictated by Kala Anders MD 7:21 AM 
 
MDM Procedures 9:20 AM 
Pt is dehydrated and has increased creatine since two months ago. I am going to get a urine specimen from pt via straight cath. Pt is now more alert but daughter at bedside states pt is not making sense and is not yet back at baseline. Hospitalist Katy for Admission 10:57 AM 
 
ED Room Number: WR71/56 Patient Name and age:  Alisha Mcdaniel 80 y.o.  male Working Diagnosis: 1. Dehydration 2. Azotemia 3. Somnolence 4. Altered thought processes Readmission: no 
Isolation Requirements:  no 
Recommended Level of Care:  med/surg Code Status:  Unknown Other:  No clear infection found ;no antibiotics started Note: Patient is on several psychoactive medications including oxycodone tramadol melatonin and Zanaflex ; most of these are as needed and I am  not sure how many recent doses he has had ; he was quite somnolent upon arrival with relatively small pupils. She seemed to wake up with the first liter of normal saline. The daughter says he is not back to baseline however and his speech is not as logical and purposeful as his baseline. He does not have a urinary tract infection. His decreased mental status may be from a combination of medications and dehydration. There may be an occult infection but I don't think antibiotics are indicated at present Soniya Austin MD 
11:09 AM

## 2019-07-08 PROBLEM — E86.0 DEHYDRATION: Status: ACTIVE | Noted: 2019-01-01

## 2019-07-08 NOTE — PROGRESS NOTES
CM unable to obtain observation letter due to patient AMS, and onset Alzheimers. Plan is for patient to return back to [facility] once neurosurgery clears. Full assessment to follow.  
 
Stef Donahue [facility]

## 2019-07-08 NOTE — DISCHARGE SUMMARY
Discharge Summary Patient:  Chad Molina MRN: [id number] YOB: 1934 Age: 80 y.o. Date of admission:  7/7/2019 Date of discharge:  7/8/2019 Primary care provider: Dr. Ashlyn Cardoso DO Admitting provider:  Patti Chao MD 
 
Discharging provider:  Milton Moraels 91.: [phone]. If unavailable, call [phone] and ask the  to page the triage hospitalist. 
 
Consultations · IP CONSULT TO NEUROSURGERY Procedures · * No surgery found * Discharge destination: [facility]. The patient is stable for discharge. Admission diagnosis · Altered mental status [R41.82] · Altered mental status [R41.82] · Dehydration [E86.0] Current Discharge Medication List  
  
CONTINUE these medications which have NOT CHANGED Details  
famotidine (PEPCID) 20 mg tablet Take 20 mg by mouth two (2) times a day. !! acetaminophen (TYLENOL) 325 mg tablet Take 650 mg by mouth daily. !! acetaminophen (TYLENOL) 325 mg tablet Take 325 mg by mouth every six (6) hours as needed for Pain.  
  
polyethylene glycol (MIRALAX) 17 gram packet Take 17 g by mouth daily. senna-docusate (SENNA WITH DOCUSATE SODIUM) 8.6-50 mg per tablet Take 2 Tabs by mouth nightly. tiotropium (SPIRIVA WITH HANDIHALER) 18 mcg inhalation capsule Take 1 Cap by inhalation daily. traMADol (ULTRAM) 50 mg tablet Take 50 mg by mouth every six (6) hours as needed for Pain. calcium-cholecalciferol, D3, (CALTRATE 600+D) tablet Take 1 Tab by mouth daily. ferrous sulfate 325 mg (65 mg iron) tablet Take 325 mg by mouth Daily (before breakfast). melatonin 3 mg tablet Take 6 mg by mouth nightly. donepezil (ARICEPT) 10 mg tablet Take 10 mg by mouth nightly. memantine (NAMENDA) 10 mg tablet Take 10 mg by mouth two (2) times a day. dextromethorphan-quiNIDine (NUEDEXTA) 20-10 mg per capsule Take 1 Cap by mouth every twelve (12) hours. Indications: PSEUDOBULBAR AFFECT Qty: 60 Cap, Refills: 11  
  
rosuvastatin (CRESTOR) 20 mg tablet TAKE ONE TABLET BY MOUTH ONE TIME DAILY Qty: 90 Tab, Refills: 3  
  
multivitamin (ONE A DAY) tablet Take 1 Tab by mouth daily. tamsulosin (FLOMAX) 0.4 mg capsule Take 0.4 mg by mouth daily. Associated Diagnoses: BPH (benign prostatic hypertrophy)  
  
finasteride (PROSCAR) 5 mg tablet Take 5 mg by mouth daily. Associated Diagnoses: BPH (benign prostatic hypertrophy) ! ! - Potential duplicate medications found. Please discuss with provider. STOP taking these medications tiZANidine (ZANAFLEX) 2 mg tablet Comments:  
Reason for Stopping: Follow-up Information Follow up With Specialties Details Why Contact Info Vivek Jefferson, DO Internal Medicine In 1 week  [address] [facility] [address] [facility]. 
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 Patient is poor historian as he has AMS and dementia. Daughter at bedside provided the history. He was lethargic and altered mentation this AM and was brought in. He does have severe dementia but baseline he talks. She denied any recent hx fever, cough, chest pain, abd pain, urinary or bowel changes. His mentation has improved since coming to ED. He is awake and alert and following simple commands. Daughter states he is getting better after IVF. Hospitalist consulted for admission for AMS.   
  
Spoke with his nurse at [facility], no new medications or medication changes, has not taken Zanaflex this month and tramadol last date 7/3. Denied any other symptoms. Baseline wheelchair bound  
  
Acute encephalopathy - improved - possible due to dehydration 
- underlying dementia  
- admit to obs, reote tele 
- no signs of infection , Normal wbc, ua clean - CXR: Stable small left pleural effusion and mild left basilar opacity ( chronic) . Clear right lung. 
- CT head: No acute intracranial hemorrhage. Progressive age-indeterminate microvascular ischemic disease in the periventricular white matter 
- supportive care - IVF 
- he looks back his baseline mentation  
  
CT head : Ventricular dilatation out of proportion with the degree of volume loss raises the possibility of normal pressure hydrocephalus. -pt does have ams, and urinary incontinence but less likely NPH 
- NS: \"Clinically I think he has advanced dementia. I do not think a shunt will help him. \" 
  
DERECK - resolved  
- cr worsened to 1.11 from baseline 0.5 
- possible due to dehydration  
- IVF 
- monitor renal function 
  
Hx Left hip replacement complicated with MRSA infection of prosthesis s/p recent revision with abx spacer in 5/2019 
- no left hip pain 
- normal wbc 
- less likely infected 
  
Alzheimer dementia 
- supportive care - c/w home meds Aricept, nuedexta 
  
BPH - proscar, flomax HLD - statin Recommendations: PCP f/u in 1 week Advise adequate fluid intake Physical examination at discharge Visit Vitals /76 (BP 1 Location: Right arm, BP Patient Position: At rest) Pulse 73 Temp 97.8 °F (36.6 °C) Resp 16 SpO2 95% Gen: NAD, elderly HEENT: anicteric sclerae, normal conjunctiva, oropharynx clear, MM moist 
Neck: supple, trachea midline, no adenopathy Heart: RRR, no MRG, no JVD, no peripheral edema Lungs: CTA b/l, non-labored respirations Abd: soft, NT, ND, BS+, no organomegaly Extr: warm Skin: dry, no rash Neuro: Dementia, CN II-XII grossly intact, normal speech, moves all extremities 
  
 
 
 
Pertinent imaging studies: CT head : Ventricular dilatation out of proportion with the degree of volume loss raises the possibility of normal pressure hydrocephalus Recent Labs  
  07/08/19 
0332 07/07/19 
8012 WBC 6.0 6.2 HGB 8.6* 10.3* HCT 28.1* 33.4*  
 218 Recent Labs  
  07/08/19 
0332 07/07/19 
8665  138  
K 3.5 4.2  103 CO2 25 28 BUN 18 25* CREA 0.81 1.11  
GLU 82 94 CA 8.2* 9.2 MG  --  2.0 Recent Labs  
  07/07/19 
6658 SGOT 15  
  
TP 7.9 ALB 3.4*  
GLOB 4.5* No results for input(s): INR, PTP, APTT in the last 72 hours. No lab exists for component: INREXT No results for input(s): FE, TIBC, PSAT, FERR in the last 72 hours. No results for input(s): PH, PCO2, PO2 in the last 72 hours. No results for input(s): CPK, CKMB in the last 72 hours. No lab exists for component: TROPONINI No components found for: [place] Chronic Diagnoses:   
Problem List as of 7/8/2019 Date Reviewed: 5/13/2019 Codes Class Noted - Resolved Dehydration ICD-10-CM: E86.0 ICD-9-CM: 276.51  7/8/2019 - Present Altered mental status ICD-10-CM: R41.82 
ICD-9-CM: 780.97  7/7/2019 - Present Left hip prosthetic joint infection ([facility] ICD-10-CM: W23.69MS ICD-9-CM: 996.66  5/30/2019 - Present Status post total replacement of left hip ICD-10-CM: S08.416 ICD-9-CM: V43.64  5/13/2019 - Present Posterior dislocation of left hip ([facility] ICD-10-CM: X68.942S ICD-9-CM: 835.01  5/10/2019 - Present Closed dislocation of left hip ([facility] ICD-10-CM: Q13.892X ICD-9-CM: 835.00  4/15/2019 - Present Closed anterior dislocation of left hip ([facility] ICD-10-CM: T66.446N ICD-9-CM: 835.03  4/15/2019 - Present Hip fracture ([facility] ICD-10-CM: P74.282O ICD-9-CM: 820.8  3/30/2019 - Present PBA (pseudobulbar affect) (Chronic) ICD-10-CM: D05.4 ICD-9-CM: 310.81  8/28/2018 - Present Late onset Alzheimer's disease without behavioral disturbance (Chronic) ICD-10-CM: G30.1, F02.80 ICD-9-CM: 331.0, 294.10  3/27/2018 - Present Benign prostatic hyperplasia with weak urinary stream ICD-10-CM: N40.1, R39.12 
ICD-9-CM: 600.01, 788.62  12/26/2017 - Present Closed right hip fracture ([address] ICD-10-CM: S72.001A ICD-9-CM: 820.8  11/28/2017 - Present Constipation ICD-10-CM: K59.00 ICD-9-CM: 564.00  5/30/2017 - Present Essential hypertension (Chronic) ICD-10-CM: I10 
ICD-9-CM: 401.9  5/20/2015 - Present Prediabetes ICD-10-CM: R73.03 
ICD-9-CM: 790.29  11/23/2014 - Present Abnormal CT lung screening ICD-10-CM: R91.8 ICD-9-CM: 793.2  11/21/2014 - Present Overview Signed 12/1/2015  4:56 AM by Demetra Eden MD  
  Repeat CT 11/30/15 showed 2 stable tiny lung nodules, pleural scarring, and emphysema. No routine further imaging indicated though might consider repeat screening in one year. 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 
 ICD-9-CM: 310.2  12/8/2013 - 4/16/2014 RESOLVED: Syncope and collapse ICD-10-CM: R55 
ICD-9-CM: 780.2  12/7/2013 - 4/16/2014 RESOLVED: Short-term memory loss ICD-10-CM: R41.3 ICD-9-CM: 780.93  9/26/2013 - 3/27/2018 RESOLVED: Prolonged grief reaction ICD-10-CM: F43.29 ICD-9-CM: 309.1  11/22/2012 - 11/19/2014 RESOLVED: Grief reaction ICD-10-CM: F43.21 ICD-9-CM: 309.0  11/14/2011 - 11/22/2012 RESOLVED: Unexplained weight loss ICD-10-CM: R63.4 ICD-9-CM: 783.21  8/16/2010 - 11/14/2011 RESOLVED: BPH (benign prostatic hypertrophy) ICD-10-CM: N40.0 ICD-9-CM: 600.00  4/12/2010 - 12/26/2017 Time spent on discharge related activities today greater than 30 minutes. Signed:  Urvashi Regan MD 
               Hospitalist, Internal Medicine Cc: Shree Conroy, DO

## 2019-07-08 NOTE — PROGRESS NOTES
Care Management Interventions PCP Verified by CM: Yes 
Palliative Care Criteria Met (RRAT>21 & CHF Dx)?: No 
Mode of Transport at Discharge: Other (see comment) Sharont Signup: No 
Discharge Durable Medical Equipment: No 
Health Maintenance Reviewed: Yes Physical Therapy Consult: No 
Occupational Therapy Consult: No 
Speech Therapy Consult: No 
Current Support Network: Other Confirm Follow Up Transport: Family Plan discussed with Pt/Family/Caregiver: Yes [employer] Information Provided?: No 
Discharge Location Discharge Placement: Skilled nursing facility Reason for Admission:   AMS 
            
RRAT Score:     24 Resources/supports as identified by patient/family:   His daughterSandra [phone] Top Challenges facing patient (as identified by patient/family and CM): Finances/Medication cost?      Patient is listed as having Medicare and AARP for insurance. Transportation? Patient needing [facility] transport at discharge. Support system or lack thereof? See above. Living arrangements? Patient is a resident at [address] [place] [address]. Self-care/ADLs/Cognition? Patient needs assistance with ADLs and IADLs with rolling walker. Current Advanced Directive/Advance Care Plan: On file. Plan for utilizing home health:    Patient will be going back to [facility] SNF. Transition of Care Plan:               
 
Reviewed chart for transitions of care, and discussed in rounds. CM noted patient admitted for AMS due to dehydration. CM contacted his daughter, Sandra Davis to let her know patient will discharge back to [facility] today via [facility] transport at 3:30 p.m. Patient is a resident of [facility] [place] and will return back when stable from [facility]. Folder placed on chart and report can be called to [phone].  
 
Sybil Mayers [facility]

## 2019-07-08 NOTE — DISCHARGE INSTRUCTIONS
Patient Education     DISCHARGE SUMMARY from Nurse    PATIENT INSTRUCTIONS:    After general anesthesia or intravenous sedation, for 24 hours or while taking prescription Narcotics:  · Limit your activities  · Do not drive and operate hazardous machinery  · Do not make important personal or business decisions  · Do  not drink alcoholic beverages  · If you have not urinated within 8 hours after discharge, please contact your surgeon on call. Report the following to your surgeon:  · Excessive pain, swelling, redness or odor of or around the surgical area  · Temperature over 100.5  · Nausea and vomiting lasting longer than 4 hours or if unable to take medications  · Any signs of decreased circulation or nerve impairment to extremity: change in color, persistent  numbness, tingling, coldness or increase pain  · Any questions    What to do at Home:    *  Please give a list of your current medications to your Primary Care Provider. *  Please update this list whenever your medications are discontinued, doses are      changed, or new medications (including over-the-counter products) are added. *  Please carry medication information at all times in case of emergency situations. These are general instructions for a healthy lifestyle:    No smoking/ No tobacco products/ Avoid exposure to second hand smoke  Surgeon General's Warning:  Quitting smoking now greatly reduces serious risk to your health. Obesity, smoking, and sedentary lifestyle greatly increases your risk for illness    A healthy diet, regular physical exercise & weight monitoring are important for maintaining a healthy lifestyle    You may be retaining fluid if you have a history of heart failure or if you experience any of the following symptoms:  Weight gain of 3 pounds or more overnight or 5 pounds in a week, increased swelling in our hands or feet or shortness of breath while lying flat in bed.   Please call your doctor as soon as you notice any of these symptoms; do not wait until your next office visit. The discharge information has been reviewed with the patient. The patient verbalized understanding. Discharge medications reviewed with the patient and appropriate educational materials and side effects teaching were provided. MyChart Activation    Thank you for requesting access to [employer]. Please follow the instructions below to securely access and download your online medical record. [employer] allows you to send messages to your doctor, view your test results, renew your prescriptions, schedule appointments, and more. How Do I Sign Up? 1. In your internet browser, go to [email or web address]  2. Click on the First Time User? Click Here link in the Sign In box. You will be redirect to the New Member Sign Up page. 3. Enter your [employer] Access Code exactly as it appears below. You will not need to use this code after youve completed the sign-up process. If you do not sign up before the expiration date, you must request a new code. [employer] Access Code: [email or web address]  Expires: 2019 11:12 AM (This is the date your [employer] access code will )    4. Enter the last four digits of your Social Security Number (xxxx) and Date of Birth (mm/dd/yyyy) as indicated and click Submit. You will be taken to the next sign-up page. 5. Create a [employer] ID. This will be your [employer] login ID and cannot be changed, so think of one that is secure and easy to remember. 6. Create a [employer] password. You can change your password at any time. 7. Enter your Password Reset Question and Answer. This can be used at a later time if you forget your password. 8. Enter your e-mail address. You will receive e-mail notification when new information is available in [address] 9. Click Sign Up. You can now view and download portions of your medical record.   10. Click the Download Summary menu link to download a portable copy of your medical information. Additional Information    If you have questions, please visit the Frequently Asked Questions section of the [employer] website at [email or web address] Remember, MyChart is NOT to be used for urgent needs. For medical emergencies, dial 911.  ___________________________________________________________________________________________________________________________________     Dehydration: Care Instructions  Your Care Instructions  Dehydration happens when your body loses too much fluid. This might happen when you do not drink enough water or you lose large amounts of fluids from your body because of diarrhea, vomiting, or sweating. Severe dehydration can be life-threatening. Water and minerals called electrolytes help put your body fluids back in balance. Learn the early signs of fluid loss, and drink more fluids to prevent dehydration. Follow-up care is a key part of your treatment and safety. Be sure to make and go to all appointments, and call your doctor if you are having problems. It's also a good idea to know your test results and keep a list of the medicines you take. How can you care for yourself at home? · To prevent dehydration, drink plenty of fluids, enough so that your urine is light yellow or clear like water. Choose water and other caffeine-free clear liquids until you feel better. If you have kidney, heart, or liver disease and have to limit fluids, talk with your doctor before you increase the amount of fluids you drink. · If you do not feel like eating or drinking, try taking small sips of water, sports drinks, or other rehydration drinks. · Get plenty of rest.  To prevent dehydration  · Add more fluids to your diet and daily routine, unless your doctor has told you not to. · During hot weather, drink more fluids. Drink even more fluids if you exercise a lot. Stay away from drinks with alcohol or caffeine. · Watch for the symptoms of dehydration.  These include:  ? A dry, sticky mouth. ? Dark yellow urine, and not much of it. ? Dry and sunken eyes. ? Feeling very tired. · Learn what problems can lead to dehydration. These include:  ? Diarrhea, fever, and vomiting. ? Any illness with a fever, such as pneumonia or the flu. ? Activities that cause heavy sweating, such as endurance races and heavy outdoor work in hot or humid weather. ? Alcohol or drug abuse or withdrawal.  ? Certain medicines, such as cold and allergy pills (antihistamines), diet pills (diuretics), and laxatives. ? Certain diseases, such as diabetes, cancer, and heart or kidney disease. When should you call for help? Call 911 anytime you think you may need emergency care. For example, call if:    · You passed out (lost consciousness).    Call your doctor now or seek immediate medical care if:    · You are confused and cannot think clearly.     · You are dizzy or lightheaded, or you feel like you may faint.     · You have signs of needing more fluids. You have sunken eyes and a dry mouth, and you pass only a little dark urine.     · You cannot keep fluids down.    Watch closely for changes in your health, and be sure to contact your doctor if:    · You are not making tears.     · Your skin is very dry and sags slowly back into place after you pinch it.     · Your mouth and eyes are very dry. Where can you learn more? Go to http://samina-cyn.info/. Enter I432 in the search box to learn more about \"Dehydration: Care Instructions. \"  Current as of: September 23, 2018  Content Version: 11.9  © 4627-8125 Jaco Solarsi. Care instructions adapted under license by [employer] (which disclaims liability or warranty for this information). If you have questions about a medical condition or this instruction, always ask your healthcare professional. [address] any warranty or liability for your use of this information.        Please bring this form with you to show your primary care provider at your follow-up appointment. Primary care provider:  Dr. Wandy Walls DO    Discharging provider:  Blayne Corral MD    You have been admitted to the hospital with the following diagnoses:  · Altered mental status [R41.82]  · Altered mental status [R41.82]  · Dehydration [E86.0]    FOLLOW-UP CARE RECOMMENDATIONS:    APPOINTMENTS:  · Follow-up with primary care provider, Dr. Wandy Walls DO  -  Please call [phone] shortly after discharge to set up an appointment to be seen in  1 week     FOLLOW-UP TESTS recommended: none    PENDING TEST RESULTS:  At the time of your discharge the following test results are still pending: none  Please make sure you review these results with your outpatient follow-up provider(s). SYMPTOMS to watch for: chest pain, shortness of breath, fever, chills, nausea, vomiting, diarrhea, change in mentation, falling, weakness, bleeding. DIET/what to eat:  Regular Diet    ACTIVITY:  Activity as tolerated      What to do if new or unexpected symptoms occur? If you experience any of the above symptoms (or should other concerns or questions arise after discharge) please call your primary care physician. Return to the emergency room if you cannot get hold of your doctor. · It is very important that you keep your follow-up appointment(s). · Please bring discharge papers, medication list (and/or medication bottles) to your follow-up appointments for review by your outpatient provider(s). · Please check the list of medications and be sure it includes every medication (even non-prescription medications) that your provider wants you to take. · It is important that you take the medication exactly as they are prescribed. · Keep your medication in the bottles provided by the pharmacist and keep a list of the medication names, dosages, and times to be taken in your wallet.    · Do not take other medications without consulting your doctor. · If you have any questions about your medications or other instructions, please talk to your nurse or care provider before you leave the hospital.    I understand that if any problems occur once I am at home I am to contact my physician. These instructions were explained to me and I had the opportunity to ask questions.

## 2019-07-08 NOTE — CONSULTS
Called for enlarged ventricles. There is quite a bit of atrophy as well. Clinically I think he has advanced dementia. I do not think a shunt will help him.

## 2019-07-08 NOTE — PROGRESS NOTES
Cm noted patient will return to [facility] under SNF level of care, three night stay is not required. The patient came in from [facility].  
 
[facility]

## 2019-07-08 NOTE — PHYSICIAN ADVISORY
Short Stay Review Pt Name:  Kirill Lewis MR#  [id number] CSN#   [id number] Room and Hospital  540/02  @ [address] hospital  
Hospitalization date  7/7/2019  6:58 AM 
No discharge date for patient encounter. Current Attending Physician  Talia Velasquez MD  
 
A discharge order has been placed for this episode of hospital care for Mr. Kirill Lewis; since this hospital stay is less than two midnights, I reviewed Mr. Rae Harrington's chart. Mr. Rae Harrington's healthcare insurance/benefit include: 
Payor: [place] MEDICARE / Plan: [place] MEDICARE PART A & B / Product Type: Medicare /  
 
Utilization Review related case summary:  
Age  80 y.o.  
BMI  There is no height or weight on file to calculate BMI. PMHx includes  Copied from [facility] \" Alzheimer's dementia, HTN, HLD, BPH, COPD and s/p Left hip replacement complicated with MRSA infection of prosthesis s/p recent revision with abx spacer in 5/201\" Hospital course  The pt was diagnosed with encephalopathy attributed to dehydration, vs. Medications, DERECK etc.  
 
(d/c summary pending thus I am unable to comment on hospital course and plan of care) Risk of deterioration at the time this patient  was hospitalized     Moderate On the basis of chart review, this patient's hospitalization status     
is appropriate for INPATIENT Piotr Reynolds MD MPH FACP Cell : [phone] Physician Advisor Jeff [address] [facility]  
Utilization Review, Care Management CSN:  [id number] BRITTNEY:   [id number] Admitted on :  7/7/2019 Discharge order

## 2019-07-09 NOTE — PROGRESS NOTES
Transition of Care Coordination/Hospital to Post Acute Facility: 
  
Date/Time:  2019 10:16 AM 
 
Patient was admitted to [facility] on 19 for treatment of AMS. Patient was discharged 19 to [facility] for continuation of care. Inpatient RRAT score: 24 Top Challenges reviewed Admitted from [facility] for [address] - possibly due to dehydration Patient has dementia. WBC WNL, UA WNL. Patient returned to [facility] for 1-2 more weeks of skilled therapy and then transition to LTC at [facility] as previously scheduled. Method of communication with care team :chart routing Nurse Navigator(NN) spoke with Gabe Cary to provide introduction to self and explanation of the Nurse Navigator Role. Verified name and  as patient identifiers. Discussed and reviewed  anticipated length of stay, diet restrictions, discharge summary, follow up appointments, medication reconciliation, pending labs at time of discharge, recommendations for future lab/imaging The current plan is for patient to have 1-2 weeks of skilled therapy at [facility] then transition to LTC at [facility] as previously planned. Date of transition to LTC TBD. ACP:  
Does the patient have a current ACP (including DDNR):  yes Does the post acute facility have a copy of the patients ACP:  yes Medication(s):  
New Medications at Discharge: n/a Changed Medications at Discharge: n/a Discontinued Medications at Discharge:  
 tiZANidine (ZANAFLEX) 2 mg tablet Comments:  
Reason for Stopping:   
     
 
 
 
  
PCP/Specialist follow up:  
Future Appointments Date Time Provider Malka Bolanos 2019  1:45 PM Ortiz Mcallister Opportunity to ask questions was provided. Contact information was provided for future reference or further questions. Will continue to monitor.

## 2019-07-09 NOTE — CONSULTS
[address] Name:  Hugo Padilla 
MR#:  [id number] :  1934 ACCOUNT #:  [de-identified] DATE OF SERVICE:  2019 REASON FOR CONSULTATION:  Enlarged ventricles. HISTORY OF PRESENT ILLNESS:  The patient is an 80-year-old gentleman. He is not able to give me any history because of his dementia. He was admitted with altered mental status. He was apparently lethargic and had altered mental status, which was fairly abrupt. He does have severe dementia, baseline admitted as talk. The daughter states he was getting better after IV fluid administration. Because of this, he was admitted to the hospital.  Head CT was done, which showed enlarged ventricles, and I was called to rule out NPH. He apparently is back to his baseline. He will only answer me some simple questions. PAST MEDICAL HISTORY: 
1. Arthritis. 2.  BPH. 3.  COPD. 4.  Hyperlipidemia. 5.  Hypertension. 6.  Dementia. HOME MEDICATIONS: 
1.  Zanaflex. 2.  Ultram. 
3.  Aricept. 4.  Namenda. 5.  Crestor. 6.  Flomax. 7.  Proscar. ALLERGIES:  Vicenta Puller. FAMILY HISTORY:  Heart disease, stroke, and heart attack. SOCIAL HISTORY:  Current smoker, drinks 4 ounces a week. REVIEW OF SYSTEMS:  Unable to obtain secondary to dementia. PHYSICAL EXAMINATION: 
GENERAL:  He is an elderly gentleman sitting in no acute distress. VITAL SIGNS:  His temperature is 97.8, pulse is 73, blood pressure is 153/76, saturation is 95% on room air. HEENT:  His head is normocephalic and atraumatic. Pupils are equal, regular, and reactive to light. Extraocular movements are intact. EXTREMITIES:  Shoulder shrug is normal.  Strength is 5/5 in his upper and lower extremities. Sensation is grossly intact to light touch. Deep tendon reflexes are intact. NEUROLOGIC:  He is awake, alert. He is oriented to self and hospital, but not name, not date or year. His recent and remote memory appear. impacted. Fund of knowledge appears impacted, and cerebellar exam is difficult to test. 
 
LABORATORY DATA:  CT scan shows ventricular dilatation. There is quite a bit of atrophy as well, although they believe it is out of proportion, I think it is probably appropriate for dementia diagnosis. IMPRESSION:  Alzheimer's dementia. RECOMMENDATIONS:  I think this represents advanced dementia. I do not think that a shunt will help him. Apparently, they are awaiting back for my clearance to go back to  . Constantine Hernandez MD 
 
 
[id number] 
D:  07/08/2019 13:58 
T:  07/08/2019 14:47 JOB #:  [id number]

## 2019-07-11 NOTE — PROGRESS NOTES
Community Care Team documentation for patient in [facility] Initial Follow Up Patient was discharged to [facility], [facility]. Information included in this progress note has been provided to SNF. Hospital Admission and Diagnosis: [facility] 7/7-7/8 Acute encephalopathy RRAT Score: 24 Advance Care Planning: Advance Directive and DDNR on file PCP : Torrey Jo,  
Nurse Navigator in PCP office: Honorio Ga Note routed to Nurse Navigator team. 
 
Spoke with SNF team. Ensured patient arrived to SNF safely with admission packet in order. PT/OT working on transfers and ambulation. Plan for transition to LTC at [facility] on 7/17. Community Care Team will follow up weekly with [facility] until discharge. Medications were not reconciled and general patient assessment was not completed during this [facility] outreach. Roxane Natarajan, MSN, RN, ACNS-BC, [facility] Nurse Navigator, [address] [place] [address] [phone]

## 2019-07-15 PROBLEM — A41.9 SEPSIS (HCC): Status: ACTIVE | Noted: 2019-01-01

## 2019-07-15 PROBLEM — J15.9 HEALTHCARE ASSOCIATED BACTERIAL PNEUMONIA: Status: ACTIVE | Noted: 2019-01-01

## 2019-07-15 PROBLEM — N17.9 AKI (ACUTE KIDNEY INJURY) (HCC): Status: ACTIVE | Noted: 2019-01-01

## 2019-07-15 PROBLEM — E87.1 HYPONATREMIA: Status: ACTIVE | Noted: 2019-01-01

## 2019-07-15 PROBLEM — G93.41 ACUTE METABOLIC ENCEPHALOPATHY: Status: ACTIVE | Noted: 2019-01-01

## 2019-07-15 NOTE — ED PROVIDER NOTES
80 y.o. male with past medical history significant for htn, hld, ph, copd, arthritis, anxiety, depression, dementia, chronic dislocation of L-hip, who presents from with chief complaint of ems. Pt was LKW yesterday at 12:30 PM before becoming symptomatic for left sided weakness and an observable left facial droop. Other accompanying sx included lethargy and \"small pupils. \" Staff chose not to call EMS until today. No prior stroke hx. DNR. There are no other acute medical concerns at this time. PCP: Maricarmen Tesfaye DO Full history, physical exam, and ROS unable to be obtained due to:  dementia. Note written by Savannah Rubio, as dictated by Ian Jaeger MD 2:50 PM 
 
The history is provided by the EMS personnel and a relative. History limited by: dementia. No  was used. Past Medical History:  
Diagnosis Date  Arthritis  BPH (benign prostatic hypertrophy) 2010  COPD (chronic obstructive pulmonary disease) with emphysema ([facility] [address] 2012  HLD (hyperlipidemia) 2010  
 HTN (hypertension) 2010  Ill-defined condition 2018 Dementia  Memory disorder  Psychiatric disorder   
 anxiety and depression Past Surgical History:  
Procedure Laterality Date  COLONOSCOPY,NURA FERNÁNDEZ,SNARE  2015  
 2 sessile sigmoid polyps, 3 & 5 mm; Dr. Yosef Razo; no further screening recommended  ENDOSCOPY, COLON, DIAGNOSTIC    
 negative; 10 year repeat; Dr. Yosef Razo  HX HERNIA REPAIR  years ago  
 left  HX HERNIA REPAIR    
 right inguinal  
 HX ORTHOPAEDIC    
 TX COLSC FLX W/RMVL OF TUMOR POLYP LESION SNARE TQ  2012 Family History:  
Problem Relation Age of Onset  Heart Disease Mother   
      at 80  
 Heart Disease Father   
      at 80  Liver Disease Brother  Stroke Brother  Heart Attack Brother  Other Sister 8 ? polio  Cancer Sister   
     lung Social History Socioeconomic History  Marital status:  Spouse name: Not on file  Number of children: Not on file  Years of education: Not on file  Highest education level: Not on file Occupational History  Not on file Social Needs  Financial resource strain: Not on file  Food insecurity:  
  Worry: Not on file Inability: Not on file  Transportation needs:  
  Medical: Not on file Non-medical: Not on file Tobacco Use  Smoking status: Current Some Day Smoker Packs/day: 1.00 Years: 60.00 Pack years: 60.00 Types: Cigarettes  Smokeless tobacco: Former User Quit date: 10/5/2014 Substance and Sexual Activity  Alcohol use: Not Currently Alcohol/week: 4.2 oz Types: 7 Glasses of wine per week Comment: 1 drinks per evening  Drug use: No  
 Sexual activity: Never Lifestyle  Physical activity:  
  Days per week: Not on file Minutes per session: Not on file  Stress: Not on file Relationships  Social connections:  
  Talks on phone: Not on file Gets together: Not on file Attends Congregation service: Not on file Active member of club or organization: Not on file Attends meetings of clubs or organizations: Not on file Relationship status: Not on file  Intimate partner violence:  
  Fear of current or ex partner: Not on file Emotionally abused: Not on file Physically abused: Not on file Forced sexual activity: Not on file Other Topics Concern  Not on file Social History Narrative  Not on file ALLERGIES: Kiwi and Zetia [ezetimibe] Review of Systems Unable to perform ROS: Dementia Constitutional: Positive for fatigue. Neurological: Positive for facial asymmetry and weakness. Vitals:  
 07/15/19 1515 07/15/19 1530 07/15/19 1531 07/15/19 1538 BP: 137/67 148/64 Pulse: 82 89 87 Resp: 28 27 27 Temp:      
SpO2: 92% 96% 92% 98% Physical Exam  
 Constitutional: He appears well-developed. HENT:  
Head: Normocephalic and atraumatic. Eyes: EOM are normal.  
Neck: No neck rigidity. Cardiovascular: Normal rate and intact distal pulses. Pulmonary/Chest: Effort normal. He has rhonchi (b/l). Abdominal: Soft. He exhibits no distension. There is no tenderness. Musculoskeletal: He exhibits deformity (left hip internally rotated). He exhibits no edema. Neurological: He is alert. No cranial nerve deficit. Severe paresis of LUE w/ spontaneous movement of left hand. RUE strong and follows commands. RLE strong. L-face: flattened nasolabial fold. Right gaze preference. Skin: Skin is warm. He is not diaphoretic. Psychiatric: He has a normal mood and affect. Nursing note and vitals reviewed. Note written by Savannah Huizar, as dictated by Clay Yanes MD 2:50 PM 
MDM Number of Diagnoses or Management Options Diagnosis management comments: Yenny Kline is a 80 y.o. male presenting with L hemiplegia noticed 24 hours ago. Concern for a R mca syndrome. Check CT, also given lung sounds will check cxr. Prominent leucocytosis, ua bland, no obvious ssti, concern for pulmonary source. 5:29 PM 
Change of shift. Care of patient signed over to Dr. Matias oBnds. Bedside handoff complete. Procedures Hospitalist Katy for Admission 6:35 PM 
 
ED Room Number: ER10/10 Patient Name and age:  Yenny Kline 80 y.o.  male Working Diagnosis: 1. Altered mental status, unspecified altered mental status type 2. Community acquired pneumonia, unspecified laterality Readmission: no 
Isolation Requirements:  no 
Recommended Level of Care:  med/surg Code Status:  Full

## 2019-07-15 NOTE — PROGRESS NOTES
Date of previous inpatient admission/ ED visit? 7/7-7/8/19 AMS  
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 What brought the patient back to ED? Patient presents from [facility] with complaints of L sided weakness to his arm. No LKW. Patients baseline mentation is A&O times 3, currently he is a&o times 2 Did patient decline recommended services during last admission/ ED visit (if yes, what)? NO Has patient seen a provider since their last inpatient admission/ED visit (if yes, when)? Yes, being followed for Skilled services at [facility] [place]. CM Interventions: 
From previous inpatient admission/ED visit:Discharged to [facility] for skilled nursing and rehab. From current inpatient admission/ED visit: 
Pt currently in ED being assessed for needs. Anticipated ANTONIETA to [facility] SNF. TBD, subject to change pending recommendations at this time. CM will continue to follow to assist ANTONIETA. Care Management Interventions PCP Verified by CM: No 
Palliative Care Criteria Met (RRAT>21 & CHF Dx)?: Yes 
Palliative Consult Recommended?: No 
Mode of Transport at Discharge: (TBD) Transition of Care Consult (CM Consult): (No CM consult at this time.) Discharge Durable Medical Equipment: No 
Physical Therapy Consult: No 
Occupational Therapy Consult: No 
Speech Therapy Consult: No 
Discharge Location Discharge Placement: Skilled nursing facility [facility] 
CARE MANAGEMENT 
3:11 PM

## 2019-07-15 NOTE — ED NOTES
Bedside and Verbal shift change report given to [address] [facility] (oncoming nurse) by [address] (offgoing nurse). Report included the following information SBAR, Kardex, ED Summary, [facility] and Recent Results.

## 2019-07-15 NOTE — ED NOTES
5:26 PM 
Change of shift. Care of patient taken over from Darian Melo MD. H&P reviewed, bedside handoff complete. Awaiting CXR. PROGRESS NOTE: 
6:28 PM 
CXR shows small left effusion with underlying consolidation. PROGRESS NOTE: 
6:35 PM  
Reviewed results with daughter. Daughter states that patient has had some left facial drooping. Was also here one week ago with dehydration. Will order IVF and admit with hospitalist.  
 
CONSULT NOTE: 
6:47 PM Cayden Ellis MD communicated with Dr. Mary Tee, Consult for Hospitalist via [facility] Text. Discussed available diagnostic tests and clinical findings. Dr. Mary Tee will evaluate the patient for admission to the hospital.  
 
6:48 PM 
Patient is being admitted to the hospital.  The results of their tests and reasons for their admission have been discussed with them and/or available family. They convey agreement and understanding for the need to be admitted and for their admission diagnosis.

## 2019-07-15 NOTE — ED TRIAGE NOTES
Patient presents from [facility] with complaints of L sided weakness to his arm. No LKW.  Patients baseline mentation is A&O times 3, currently he is a&o times 2

## 2019-07-15 NOTE — ACP (ADVANCE CARE PLANNING)
Advance Care Planning Note Name: Hoang Hartley YOB: 1934 MRN: [id number] Admission Date: 7/15/2019  2:42 PM 
 
Date of discussion: 7/15/2019 Active Diagnoses: 
 
Hospital Problems  Date Reviewed: 7/15/2019 Codes Class Noted POA Acute metabolic encephalopathy [facility]-36-NU: G93.41 
ICD-9-CM: 348.31  7/15/2019 Unknown Healthcare associated bacterial pneumonia ICD-10-CM: J15.9 ICD-9-CM: 482.9  7/15/2019 Yes Hyponatremia ICD-10-CM: E87.1 ICD-9-CM: 276.1  7/15/2019 Yes DERECK (acute kidney injury) ([facility] [address] ICD-10-CM: N17.9 ICD-9-CM: 584.9  7/15/2019 Yes Sepsis ([facility] [address] ICD-10-CM: A41.9 ICD-9-CM: 038.9, 995.91  7/15/2019 Yes These active diagnoses are of sufficient risk that focused discussion on advance care planning is indicated in order to allow the patient to thoughtfully consider personal goals of care, and if situations arise that prevent the ability to personally give input, to ensure appropriate representation of their personal desires for different levels and aggressiveness of care. Discussion:  
 
Persons present and participating in discussion: Hoang Hartley, patient's daughter Fabby Hawkins and Florence Lion MD. Discussion: Addressed decompensated medical problems, co-morbidities, Advanced Directives, Prognosis and confirmation of a Surrogate Decision maker. Time Spent:  
 
Total time spent face-to-face in education and discussion: 16 minutes.   
 
Florence Lion MD 
7/15/2019 
7:58 PM

## 2019-07-16 NOTE — PROGRESS NOTES
Day # 2 of Cefepime Indication:  HAP Current regimen:  2 gram Q24H Abx regimen: Vancomycin + Cefepime Recent Labs  
  19 
0305 07/15/19 
1505 WBC 11.1 18.9*  
CREA 1.06 1.38* BUN 40* 40* Est CrCl: ~ 35 mL/min Temp (24hrs), Av °F (36.7 °C), Min:97.7 °F (36.5 °C), Max:98.4 °F (36.9 °C) Cultures:  
7/15/2019 Blood cx sent, 
2019: MRSA (+) nares cx Plan: Change to 2 G Q12H per renal adjustment protocol Krysta Allison, CarolD

## 2019-07-16 NOTE — ROUTINE PROCESS
Bedside and Verbal shift change report given to Ary Jacob RN (oncoming nurse) by Devon Archer RN (offgoing nurse). Report included the following information SBAR, Kardex, Intake/Output, MAR, Recent Results and Cardiac Rhythm NSR - ST.

## 2019-07-16 NOTE — PROGRESS NOTES
NUTRITION COMPLETE ASSESSMENT Interventions/Plan:  
Provide diet per SLP/ MBS results. Add Chocolate flavored supplements once cleared for appropriate  texture. Assessment:  
Reason for Assessment:  
[x] Provider Consult Objective: 
Pt is a 80year old admitted for AMS, pneumonia. PMH: [facility] skilled nursing, HTN, dyslipidemia, COPD, arthritis, anxiety disorder, depression among others. Spoke to daughter, pt usually consumes regular texture, thin liquids. Does not drink \"little milkshakes\". Pt with 4.3 kg or 8.3% wt loss from UBW over the past 5 months reflecting severe wt change. Pt with multiple hip surgeries the past year and recently admitted for dehydration. Pt for MBS today to evaluate safety with swallow. Continue to follow and send appropriate textured foods/liquids. Patient meets criteria for Severe Protein Calorie Malnutrition as evidenced by:  
ASPEN Malnutrition Criteria Acute Illness, Chronic Illness, or Social/Enviornmental: Chronic illness Energy Intake: Less than/equal to 75% of est energy req for greater than/equal to 1 month Weight Loss: Greater than 7.5% x 3 mos Body Fat: Severe Muscle Mass: Severe ASPEN Malnutrition Score - Chronic Illness: 24 Chronic Illness - Malnutrition Diagnosis: Severe malnutrition. Estimated Nutrition Needs:  
Kcals/day: 1300 Kcals/day(6883-4837 kcal/day (1800 kcal/day for wt gain))) Protein: 57 g Fluid: (1 kcal/ml) Based On: [address] Weight Used: Actual wt Pt expected to meet estimated nutrient needs:  []   Yes     []  No [x] Unable to predict at this time Nutrition Diagnosis:  
1. Inadequate oral intake related to AMS, previous surgeries  as evidenced by wt loss of 4.3 kg over the past 5 months . Goals:   
  Consume 50% meals and 2 supplements daily x 5-7 days. Monitoring & Evaluation: - Total energy intake - Weight/weight change, Electrolyte and renal profile Previous Nutrition Goals Met:   N/A 
 Previous Recommendations:    N/A Education & Discharge Needs: 
 [x] None Identified 
 [] Identified and addressed  
 [] Participated in care plan, discharge planning, and/or interdisciplinary rounds Cultural, Advent and ethnic food preferences identified:   
 
Skin Integrity: [x]Intact  []Other Edema: [x]None []Other Food Allergies: []None [x]kiwi Anthropometrics:   
Weight Loss Metrics 7/16/2019 5/10/2019 4/17/2019 4/2/2019 2/26/2019 2/25/2019 8/28/2018 Today's Wt 104 lb 8 oz 105 lb 9.6 oz 117 lb 8.1 oz 123 lb 3.8 oz 110 lb 114 lb 114 lb 6.4 oz BMI 17.94 kg/m2 18.13 kg/m2 20.17 kg/m2 21.83 kg/m2 19.49 kg/m2 20.19 kg/m2 20.27 kg/m2 Weight Source: Bed 
 ,   
Body mass index is 17.94 kg/m². IBW : 54.4 kg (120 lb),   
 ,   
 
Labs:   
Lab Results Component Value Date/Time Sodium 136 07/16/2019 03:05 AM  
 Potassium 3.9 07/16/2019 03:05 AM  
 Chloride 107 07/16/2019 03:05 AM  
 CO2 24 07/16/2019 03:05 AM  
 Glucose 111 (H) 07/16/2019 03:05 AM  
 BUN 40 (H) 07/16/2019 03:05 AM  
 Creatinine 1.06 07/16/2019 03:05 AM  
 Calcium 8.9 07/16/2019 03:05 AM  
 Magnesium 2.0 07/07/2019 07:36 AM  
 Albumin 3.3 (L) 07/15/2019 03:05 PM  
 
Lab Results Component Value Date/Time  Hemoglobin A1c 5.9 (H) 05/17/2017 08:49 AM  
 
 
Sherice Olmos RD

## 2019-07-16 NOTE — H&P
[address]  HISTORY AND PHYSICAL Name:  Chante Irving 
MR#:  [id number] :  1934 ACCOUNT #:  [de-identified] ADMIT DATE:  07/15/2019 PRIMARY CARE PHYSICIAN:  Jerman De Santiago Iii, DO 
 
CHIEF COMPLAINT:  Altered mental status. HISTORY OF PRESENT ILLNESS:  Please note history was obtained from the [address] [place] [address], EMS, Emergency room records and the patient's daughter at the bedside. Patient with a history of Alzheimer's dementia and unable to give any pertinent history. An 80-year-old gentleman with a past medical history significant for primary hypertension, dyslipidemia, COPD, arthritis, anxiety disorder, depression, Alzheimer's dementia, chronically dislocated left hip, and MRSA infection left hip May 2019,  was brought to the emergency room by EMS from the [address] [place] [address]. Per the patient's daughter at the bedside, on most recent visit to the [facility] facility on 2019, the patient was noted with some left-sided weakness and left facial droop, which was brought to the attention of the medical staff at the facility. However, per patient's daughter, staff did not think anything sinister about findings and patient was not  referred to the emergency room. On the morning of 07/15/2019, the patient noted with worsening altered mentation and was thereby referred to the emergency room for further evaluation. Of note, the patient was recently admitted to the [address] for an approximately 1-2 day stay, at which time, he was treated for dehydration. There was no documentation of any falls, headaches, dizziness, visual deficits, chest pains, palpitations, shortness of breath, cough, fevers, chills, abdominal pain, bladder or bowel irregularities. PAST MEDICAL HISTORY: 1.  Primary hypertension. 2.  Dyslipidemia. 3.  COPD. 4.  Arthritis. 5.  Anxiety disorder. 6.  Depression. 7.  Alzheimer's dementia. 8. MRSA infection left hip. 
9.  Chronically dislocated left hip. PAST SURGICAL HISTORY: 
1. Inguinal hernia repair. 2.  Endoscopies. 3.  Colonoscopies. MEDICATIONS:  At the [facility] skilled facility are as follows: 1. Tylenol 650 mg p.o. every 6 hours as needed for pain. 2.  Calcium with vitamin D 1 tablet p.o. daily. 3.  Dextromethorphan 1 capsule p.o. every 12 hours. 4.  Aricept 10 mg p.o. nightly. 5.  Pepcid 20 mg p.o. twice a day. 6.  Ferrous sulfate 325 mg p.o. daily. 7.  Proscar 5 mg p.o. daily. 8.  Melatonin 6 mg p.o. nightly. 9.  Namenda 10 mg p.o. twice a day. 10.  Multivitamin 1 tablet p.o. daily. 11.  MiraLax 17 g p.o. daily. 12.  Rosuvastatin 20 mg p.o. daily. 13.  Senna docusate 2 tablets p.o. nightly. 14.  Flomax 0.4 mg p.o. daily. 15.  Spiriva 18 mcg inhaler daily. 16.  Tramadol 50 mg p.o. every 6 hours as needed for pain. ALLERGIES:  Bonna Mandes. SOCIAL HISTORY:  Significant for being a resident at the [address] [place] [address]. No tobacco use disorder. No alcohol use disorder. FAMILY HISTORY:  Reviewed and positive for coronary artery disease in both the maternal and paternal sides of the family. Sister with a history of lung cancer. REVIEW OF SYSTEMS:  Attempted to conduct a complete system review, however, very limited as the patient with advanced dementia and unable to give all pertinent answers. Per daughter, negative otherwise as outlined in the history of the presenting illness. PHYSICAL EXAMINATION: 
GENERAL:  On exam, the patient is chronically ill appearing, not in any acute distress. VITAL SIGNS:  Blood pressure of 129/63, heart rate of 85, respiratory rate 25, afebrile, saturating 93% on room air. HEAD:  Normocephalic. Pupils equal and reactive to light. ENT:  Ears, nose, and throat clear. Dry buccal mucosa. NECK:  No jugular venous distention. No carotid bruits. CARDIOVASCULAR:  S1, S2 present. RESPIRATORY:  Lungs with decreased air entry at both bases without any crepitations or rhonchi. GASTROINTESTINAL:  Bowel sounds present. The abdomen is soft, nontender. No rebound, no guarding. GENITOURINARY:  Nil of note. MUSCULOSKELETAL:  No asymmetry of the extremities. CNS:  The patient is awake and alert, however, not oriented to time, date, place, or person. LABORATORY/RADIOGRAPHIC FINDINGS:  A CT of the head without contrast that did not show any acute intracranial findings. A 12-lead EKG that was personally reviewed that showed normal sinus rhythm at 84 per minute. Metabolic panel with a sodium of 134, potassium 4.2, chloride 102, bicarbonate 26, BUN 40, creatinine 1.38, glucose 161, calcium 9.5, total bilirubin 0.5, albumin 3.3. CBC with a WBC of 18.9, 91% polys, hemoglobin 10.3, hematocrit 33.6, platelet 461. Urinalysis, dark yellow cloudy, negative nitrites, negative leukocytes, wbc's 0-4. ASSESSMENT AND PLAN: 
1. Central Nervous System:  The patient will be admitted to the inpatient level monitored floor for management of acute probable multifactorial metabolic encephalopathy including due to probable sepsis due to healthcare-associated pneumonia, dehydration, and electrolyte abnormality. CT of the head without contrast negative for any acute intracranial findings. Neuro checks. 2.  Respiratory:  Probable left-sided gram-negative healthcare-associated pneumonia without any overt respiratory failure. History of chronic obstructive pulmonary disease. Oxygen via nasal cannula with a goal saturation of at least 94%, nebulizers, mucolytics, antitussives, incentive spirometry. Followup imaging as needed. 3. Infectious Disease: Probable sepsis due to gram negative bacterial Healthcare Pneumonia. Afebrile, leukocytosis with left shift, blood cultures, sedrate, empiric Cefepime and Vancomycin. As needed ID consult.  
4.  Renal:  Acute kidney injury probably due to severe dehydration and some acute tubular necrosis. We will initiate intravenous fluids and trending of BUN and creatinine. As needed renal ultrasound and Nephrology consult. 5.  Electrolytes:  Probable multifactorial hyponatremia, present on admission. 6.  Hematology:  Probable anemia of chronicity with stable hemoglobin and hematocrit. 7.  Cardiovascular:  Controlled primary hypertension. 8.  Dyslipidemia. 9.  Musculoskeletal:  Osteoarthritis. Osteoporosis. History of chronically dislocated left hip. To be continued with left knee immobilizer. 10.  Psychiatry:  Anxiety disorder. Depression. No current behavioral disturbances. 11.  Dermatology:  Daily wound care to left hip. 12.  Prophylaxis for deep venous thrombosis. 13.  Directives:  Do Not Resuscitate/Do Not Intubate status with an extremely guarded prognosis. Discussed with the patient's daughter. In summary, an 80-year-old gentleman with multiple medical problems including probable early advanced Alzheimer's dementia, primary hypertension, dyslipidemia, chronic obstructive pulmonary disease, and chronically dislocated left hip with a recent discharge from hospital, is being readmitted from the [facility] skilled nursing facility for acute probable multifactorial metabolic encephalopathy including due to probable sepsis due to left-sided bacterial healthcare-associated pneumonia, acute kidney injury with severe dehydration and probable multifactorial hyponatremia. Patient is at increased risk of further decompensation and will benefit from inpatient management including with respiratory therapies, intravenous antibiotics, and intravenous hydration. The entire admission plans discussed in detail with the patient's daughter, Bensonkermit Gimenez, who was at the bedside and can be reached at cell [phone]. All questions and concerns were addressed.  
 
The patient's functional status prior to admission significant for the patient being able to transfer from bed to wheelchair. Total time for admission 40 minutes, of which at least 50% of the time was spent in plan of care and counseling of the patient. MD LETICIA [email or web address] 
D:  07/15/2019 19:56 
T:  07/15/2019 20:02 
JOB #:  [id number]

## 2019-07-16 NOTE — PROGRESS NOTES
Pharmacist Note - Vancomycin Dosing Consult provided for this 80 y.o. male for indication of HAP. Antibiotic regimen(s): Vancomycin + Cefepime Recent Labs  
  07/15/19 
1505 WBC 18.9*  
CREA 1.38* BUN 40* Frequency of BMP: daily x 3 beginning 2019 Height:  62 inches Weight: 47.9 kg Est CrCl:  ~ 26.7 ml/min Temp (24hrs), Av.7 °F (36.5 °C), Min:97.7 °F (36.5 °C), Max:97.7 °F (36.5 °C) Cultures: 
7/15/2019 Blood cx sent, 
2019: MRSA (+) nares cx Goal trough = 15 - 20 mcg/mL Therapy will be initiated with a loading dose of 1000 mg IV x 1 to be followed by a maintenance dose of 750 mg IV every 24 hours. Pharmacy to follow patient daily and order levels / make dose adjustments as appropriate.

## 2019-07-16 NOTE — PROGRESS NOTES
Problem: Falls - Risk of 
Goal: *Absence of Falls Description Document Maico Delarosa Fall Risk and appropriate interventions in the flowsheet. Outcome: Progressing Towards Goal 
  
Problem: Patient Education: Go to Patient Education Activity Goal: Patient/Family Education Outcome: Progressing Towards Goal 
  
Problem: Risk for Spread of Infection Goal: Prevent transmission of infectious organism to others Description Prevent the transmission of infectious organisms to other patients, staff members, and visitors. Outcome: Progressing Towards Goal 
  
Problem: Patient Education:  Go to Education Activity Goal: Patient/Family Education Outcome: Progressing Towards Goal 
  
Problem: Pressure Injury - Risk of 
Goal: *Prevention of pressure injury Description Document Duy Scale and appropriate interventions in the flowsheet. Outcome: Progressing Towards Goal 
  
Problem: Patient Education: Go to Patient Education Activity Goal: Patient/Family Education Outcome: Progressing Towards Goal

## 2019-07-16 NOTE — PROGRESS NOTES
Problem: Falls - Risk of 
Goal: *Absence of Falls Description Document Domonique Toth Fall Risk and appropriate interventions in the flowsheet. Outcome: Progressing Towards Goal 
  
Problem: Patient Education: Go to Patient Education Activity Goal: Patient/Family Education Outcome: Progressing Towards Goal 
  
Problem: Risk for Spread of Infection Goal: Prevent transmission of infectious organism to others Description Prevent the transmission of infectious organisms to other patients, staff members, and visitors. Outcome: Progressing Towards Goal 
  
Problem: Patient Education:  Go to Education Activity Goal: Patient/Family Education Outcome: Progressing Towards Goal 
  
Problem: Pressure Injury - Risk of 
Goal: *Prevention of pressure injury Description Document Duy Scale and appropriate interventions in the flowsheet. Outcome: Progressing Towards Goal 
  
Problem: Patient Education: Go to Patient Education Activity Goal: Patient/Family Education Outcome: Progressing Towards Goal 
  
Problem: General Medical Care Plan Goal: *Vital signs within specified parameters Outcome: Progressing Towards Goal 
Goal: *Labs within defined limits Outcome: Progressing Towards Goal 
Goal: *Absence of infection signs and symptoms Outcome: Progressing Towards Goal 
Goal: *Optimal pain control at patient's stated goal 
Outcome: Progressing Towards Goal 
Goal: *Skin integrity maintained Outcome: Progressing Towards Goal 
Goal: *Fluid volume balance Outcome: Progressing Towards Goal 
Goal: *Optimize nutritional status Outcome: Progressing Towards Goal 
Goal: *Anxiety reduced or absent Outcome: Progressing Towards Goal 
Goal: *Progressive mobility and function (eg: ADL's) Outcome: Progressing Towards Goal 
  
Problem: Patient Education: Go to Patient Education Activity Goal: Patient/Family Education Outcome: Progressing Towards Goal 
  
Problem: Patient Education: Go to Patient Education Activity Goal: Patient/Family Education Outcome: Progressing Towards Goal 
  
Problem: Pneumonia: Day 1 Goal: Activity/Safety Outcome: Progressing Towards Goal 
Goal: Consults, if ordered Outcome: Progressing Towards Goal 
Goal: Diagnostic Test/Procedures Outcome: Progressing Towards Goal 
Goal: Nutrition/Diet Outcome: Progressing Towards Goal 
Goal: Medications Outcome: Progressing Towards Goal 
Goal: Respiratory Outcome: Progressing Towards Goal 
Goal: Treatments/Interventions/Procedures Outcome: Progressing Towards Goal 
Goal: Psychosocial 
Outcome: Progressing Towards Goal 
Goal: *Oxygen saturation within defined limits Outcome: Progressing Towards Goal 
Goal: *Influenza vaccine administered (October-March) Outcome: Progressing Towards Goal 
Goal: *Pneumoccocal vaccine administered Outcome: Progressing Towards Goal 
Goal: *Hemodynamically stable Outcome: Progressing Towards Goal 
Goal: *Demonstrates progressive activity Outcome: Progressing Towards Goal 
Goal: *Tolerating diet Outcome: Progressing Towards Goal

## 2019-07-16 NOTE — ROUTINE PROCESS
TRANSFER - OUT REPORT: 
 
Verbal report given to Karina(name) on Yessi Grant  being transferred to (unit) for routine progression of care Report consisted of patients Situation, Background, Assessment and  
Recommendations(SBAR). Information from the following report(s) SBAR, ED Summary and MAR was reviewed with the receiving nurse. Lines:    
 
Opportunity for questions and clarification was provided. Patient transported with: 
 Registered Nurse

## 2019-07-16 NOTE — PROGRESS NOTES
Hospitalist Progress Note Bebe Gusman MD 
Answering service: [phone] OR 2019 from in house phone Cell:   
  
Date of Service:  2019 NAME:  Mattie Karimi :  1934 MRN:  [id number] Admission Summary: An 63-year-old gentleman with a past medical history significant for primary hypertension, dyslipidemia, COPD, arthritis, anxiety disorder, depression, Alzheimer's dementia, chronically dislocated left hip, and MRSA infection left hip May 2019,  was brought to the emergency room by EMS from the [address] [place] [address]. Interval history / Subjective:  
 Having ongoing weakness, and dysphagia. MBS ordered. Both patient and daughter agree would not be willing on PEG tube even if had high risk for aspiration. Assessment & Plan:  
 
Sepsis (RR, WBC) secondary to HCAP - possible aspiration PNA - Vanc and cefepime - F/U sputum and blood cultures 
- O2 as needed - Bolus as needed 
- Nebs, mucolytics Dysphagia - high risk for aspiration, however family discussed goals of care, and pt would not be willing to have PEG under any circumstances. - Speech consulted, MBS today to eval safest diet. Metabolic encephalopathy - in setting of infection, continue to monitor Sudden L sided weakness - per daughter ongoing for the last few days, but is new. Along with new possible dypshagia concern for acute stroke - MRI brain ordered 
- PT/OT 
- Neuro checks DERECK  Stage 1, pre-renal secondary to dehydration - improving. 
- Continue IVF today, and DC tomorrow - Monitor with daily BMP 
- I and O, avoid nephrotoxins. Hypovolemic hyponatremia (mild) - improving, continue to monitor with IVF HTN - Not on BP medications, continue to monitor BPH - home flomax, finasteride Dementia - home donepezil, namenda HLD - home statin Constipation - miralax, docusate. Acute Moderate Protein-Calorie Malnutrition - nutrition consulted. Code status: DNR 
DVT prophylaxis: SCDs Care Plan discussed with: Patient/Family Disposition: TBD Hospital Problems  Date Reviewed: 7/15/2019 Codes Class Noted POA Acute metabolic encephalopathy QMO-38-EA: G93.41 
ICD-9-CM: 348.31  7/15/2019 Unknown Healthcare associated bacterial pneumonia ICD-10-CM: J15.9 ICD-9-CM: 482.9  7/15/2019 Yes Hyponatremia ICD-10-CM: E87.1 ICD-9-CM: 276.1  7/15/2019 Yes DERECK (acute kidney injury) ([facility] [address] ICD-10-CM: N17.9 ICD-9-CM: 584.9  7/15/2019 Yes Sepsis ([facility] [address] ICD-10-CM: A41.9 ICD-9-CM: 038.9, 995.91  7/15/2019 Yes Review of Systems: A comprehensive review of systems was negative except for that written in the HPI. Vital Signs:  
 Last 24hrs VS reviewed since prior progress note. Most recent are: 
Visit Vitals /70 (BP 1 Location: Left arm, BP Patient Position: At rest) Pulse 82 Temp 98.3 °F (36.8 °C) Resp 18 Ht 5' 2\" (1.575 m) Wt 47.4 kg (104 lb 8 oz) SpO2 97% BMI 19.11 kg/m² Intake/Output Summary (Last 24 hours) at 7/16/2019 1630 Last data filed at 7/16/2019 1057 Gross per 24 hour Intake 1601.25 ml Output 1 ml Net 1600.25 ml Physical Examination:  
 
 
     
Constitutional:  No acute distress, cooperative, pleasant, chronically ill appearing, cachectic ENT:  Oral mucous moist, oropharynx benign. Neck supple, Resp:  CTA bilaterally. No wheezing/rhonchi/rales. No accessory muscle use CV:  Regular rhythm, normal rate, no murmurs, gallops, rubs GI:  Soft, non distended, non tender. normoactive bowel sounds, no hepatosplenomegaly Musculoskeletal:  No edema, warm, 2+ pulses throughout Neurologic:  Moves all extremities. RUE weakness. Skin:  Good turgor, no rashes or ulcers Data Review:  
 Imaging and laboratory data reviewed. Labs:  
 
Recent Labs  
  07/16/19 3321 07/15/19 
1505 WBC 11.1 18.9* HGB 9.1* 10.3* HCT 30.8* 33.6*  
 239 Recent Labs  
  07/16/19 
0305 07/15/19 
1505  134* K 3.9 4.2  102 CO2 24 26 BUN 40* 40* CREA 1.06 1.38* * 161* CA 8.9 9.5 Recent Labs  
  07/15/19 
1505 SGOT 16 ALT 14  
 TBILI 0.5 TP 8.3* ALB 3.3*  
GLOB 5.0* No results for input(s): INR, PTP, APTT in the last 72 hours. No lab exists for component: INREXT No results for input(s): FE, TIBC, PSAT, FERR in the last 72 hours. Lab Results Component Value Date/Time Folate 19.6 12/08/2013 03:00 AM  
  
No results for input(s): PH, PCO2, PO2 in the last 72 hours. No results for input(s): CPK, CKNDX, TROIQ in the last 72 hours. No lab exists for component: CPKMB Lab Results Component Value Date/Time Cholesterol, total 126 05/17/2017 08:49 AM  
 HDL Cholesterol 48 05/17/2017 08:49 AM  
 LDL, calculated 66 05/17/2017 08:49 AM  
 Triglyceride 61 05/17/2017 08:49 AM  
 CHOL/HDL Ratio 2.8 12/08/2013 02:50 AM  
 
Lab Results Component Value Date/Time Glucose (POC) 92 12/07/2013 03:48 PM  
 
Lab Results Component Value Date/Time Color DARK YELLOW 07/15/2019 03:05 PM  
 Appearance CLOUDY (A) 07/15/2019 03:05 PM  
 Specific gravity 1.024 07/15/2019 03:05 PM  
 pH (UA) 5.0 07/15/2019 03:05 PM  
 Protein TRACE (A) 07/15/2019 03:05 PM  
 Glucose NEGATIVE  07/15/2019 03:05 PM  
 Ketone NEGATIVE  07/15/2019 03:05 PM  
 Bilirubin NEGATIVE  07/07/2019 10:27 AM  
 Urobilinogen 0.2 07/15/2019 03:05 PM  
 Nitrites NEGATIVE  07/15/2019 03:05 PM  
 Leukocyte Esterase NEGATIVE  07/15/2019 03:05 PM  
 Epithelial cells FEW 07/15/2019 03:05 PM  
 Bacteria NEGATIVE  07/15/2019 03:05 PM  
 WBC 0-4 07/15/2019 03:05 PM  
 RBC 0-5 07/15/2019 03:05 PM  
 
 
 
Medications Reviewed:  
 
Current Facility-Administered Medications Medication Dose Route Frequency  balsam peru-castor oil (VENELEX) ointment   Topical BID  
  cefepime (MAXIPIME) 2 g in 0.9% sodium chloride (MBP/ADV) 100 mL  2 g IntraVENous Q12H  
 sodium chloride (NS) flush 5-40 mL  5-40 mL IntraVENous Q8H  
 sodium chloride (NS) flush 5-40 mL  5-40 mL IntraVENous PRN  
 acetaminophen (TYLENOL) tablet 650 mg  650 mg Oral Q4H PRN  
 albuterol (PROVENTIL VENTOLIN) nebulizer solution 2.5 mg  2.5 mg Nebulization Q4H PRN  
 ondansetron (ZOFRAN) injection 4 mg  4 mg IntraVENous Q6H PRN  
 albuterol-ipratropium (DUO-NEB) 2.5 MG-0.5 MG/3 ML  3 mL Nebulization Q6H RT  
 0.9% sodium chloride infusion  100 mL/hr IntraVENous CONTINUOUS  
 benzonatate (TESSALON) capsule 100 mg  100 mg Oral TID PRN  
 guaiFENesin ER (MUCINEX) tablet 600 mg  600 mg Oral Q12H  
 donepezil (ARICEPT) tablet 10 mg  10 mg Oral QHS  famotidine (PEPCID) tablet 20 mg  20 mg Oral DAILY  ferrous sulfate tablet 325 mg  325 mg Oral ACB  finasteride (PROSCAR) tablet 5 mg  5 mg Oral DAILY  melatonin tablet 6 mg  6 mg Oral QHS  memantine (NAMENDA) tablet 10 mg  10 mg Oral BID  polyethylene glycol (MIRALAX) packet 17 g  17 g Oral DAILY  rosuvastatin (CRESTOR) tablet 20 mg  20 mg Oral QHS  senna-docusate (PERICOLACE) 8.6-50 mg per tablet 2 Tab  2 Tab Oral QHS  tamsulosin (FLOMAX) capsule 0.4 mg  0.4 mg Oral DAILY  Vancomycin: Pharmacy to Dose   Other Rx Dosing/Monitoring  vancomycin (VANCOCIN) 750 mg in 0.9% sodium chloride (MBP/ADV) 250 mL  750 mg IntraVENous Q24H  therapeutic multivitamin (THERAGRAN) tablet 1 Tab  1 Tab Oral DAILY  calcium carbonate (OS-PLACIDO) tablet 500 mg [elemental]  500 mg Oral DAILY WITH BREAKFAST  
 
______________________________________________________________________ EXPECTED LENGTH OF STAY: 4d 19h ACTUAL LENGTH OF STAY:          1 
 
            
Viri Perez MD

## 2019-07-16 NOTE — PROGRESS NOTES
Problem: Dysphagia (Adult) Goal: *Acute Goals and Plan of Care (Insert Text) Description Speech Pathology Initiated 7/16/19 1. Patient will participate in [address] today; goals to follow. Outcome: Progressing Towards Goal 
 
SPEECH LANGUAGE PATHOLOGY BEDSIDE SWALLOW EVALUATION Patient: Luis Antonio Lofton (73 y.o. male) Date: 7/16/2019 Primary Diagnosis: Acute metabolic encephalopathy [U68.64] Precautions: aspiration ASSESSMENT : 
Based on the objective data described below, the patient presents with no oral and suspected moderate-severe pharyngeal dysphagia characterized by suspected delayed swallow initiation and reduced hyolaryngeal excursion. Overt coughing with both liquids and purees. Multiple swallows evident with each bite/sip along with intermittent wet vocal quality. Needs further objective assessment via MBS; scheduled for this PM. 
 
Per daughter, patient began with dysphagia Sunday as evident by coughing/choking at meals. Prior to this, the patient was eating a regular diet well. Patient will benefit from skilled intervention to address the above impairments. Patient?s rehabilitation potential is considered to be Fair Factors which may influence rehabilitation potential include: ? None noted ? Mental ability/status ? Medical condition ? Home/family situation and support systems ? Safety awareness ? Pain tolerance/management ? Other: PLAN : 
Recommendations and Planned Interventions: MBS today; no exact time but this PM 
Frequency/Duration: Patient will be followed by speech-language pathology TBD pending MBS Discharge Recommendations: Chilo Dillard SUBJECTIVE:  
Patient stated he feels like food is sticking in his throat some. Limited history provided by patient; daughter reached by phone. Per daughter and SLP at [facility], patient tolerating a regular diet prior to admission. Daughter reported dysphagia symptoms beginning Sunday when facial droop noted-- in fact, it was one of the symptoms that concerned for for CVA. NAD. OBJECTIVE:  
 
Past Medical History:  
Diagnosis Date Arthritis BPH (benign prostatic hypertrophy) 4/12/2010 COPD (chronic obstructive pulmonary disease) with emphysema ([facility] [address] 7/25/2012 HLD (hyperlipidemia) 4/12/2010 HTN (hypertension) 4/12/2010 Ill-defined condition 2018 Dementia Memory disorder Psychiatric disorder   
 anxiety and depression Past Surgical History:  
Procedure Laterality Date COLONOSCOPY,REMV LESN,SNARE  8/5/2015  
 2 sessile sigmoid polyps, 3 & 5 mm; Dr. Ela Morales; no further screening recommended ENDOSCOPY, COLON, DIAGNOSTIC  2002  
 negative; 10 year repeat; Dr. Ela Morales HX HERNIA REPAIR  years ago  
 left HX HERNIA REPAIR  2001  
 right inguinal  
 HX ORTHOPAEDIC    
 OR COLSC FLX W/RMVL OF TUMOR POLYP LESION SNARE TQ  7/27/2012 Diet prior to admission: Regular/thin Current Diet:  Reg/thin 
Cognitive and Communication Status: 
Neurologic State: Alert, Confused Orientation Level: Oriented to person Cognition: Decreased attention/concentration, Follows commands Perception: Appears intact Perseveration: No perseveration noted Safety/Judgement: Not assessed Oral Assessment: 
Oral Assessment Labial: Left droop Dentition: Natural 
Oral Hygiene: dry Lingual: Decreased rate Velum: No impairment Mandible: No impairment P.O. Trials: 
Patient Position: Upright in bed Vocal quality prior to P.O.: Wet Consistency Presented: Puree; Thin liquid How Presented: Spoon;Straw;Cup/sip;SLP-fed/presented;Self-fed/presented Bolus Acceptance: No impairment Bolus Formation/Control: No impairment Propulsion: No impairment Oral Residue: None Initiation of Swallow: Delayed (# of seconds) Laryngeal Elevation: Decreased;Weak Aspiration Signs/Symptoms: Clear throat; Change vocal quality;Delayed cough/throat clear;Facial redness;Strong cough Pharyngeal Phase Characteristics: Suspected pharyngeal residue;Double swallow;Multiple swallows; Other (comment)(suspected delayed initiation, reduced excursion) Effective Modifications: None Oral Phase Severity: No impairment Pharyngeal Phase Severity : Moderate-severe NOMS:  
The NOMS functional outcome measure was used to quantify this patient's level of swallowing impairment. Based on the NOMS, the patient was determined to be at level 1 for swallow function NOMS Swallowing Levels: 
Level 1 (CN): NPO Level 2 (CM): NPO but takes consistency in therapy Level 3 (CL): Takes less than 50% of nutrition p.o. and continues with nonoral feedings; and/or safe with mod cues; and/or max diet restriction Level 4 (CK): Safe swallow but needs mod cues; and/or mod diet restriction; and/or still requires some nonoral feeding/supplements Level 5 (CJ): Safe swallow with min diet restriction; and/or needs min cues Level 6 (CI): Independent with p.o.; rare cues; usually self cues; may need to avoid some foods or needs extra time Level 7 (CH): Independent for all p.o. STEVEN. (2003). National Outcomes Measurement System (NOMS): Adult Speech-Language Pathology User's Guide. Pain: 
Pain Scale 1: Adult Nonverbal Pain Scale Pain Intensity 1: 0 After treatment:  
?            Patient left in no apparent distress sitting up in chair ? Patient left in no apparent distress in bed ? Call bell left within reach ? Nursing notified ? Caregiver present ? Bed alarm activated COMMUNICATION/EDUCATION:  
The patient?s plan of care including recommendations, planned interventions, and recommended diet changes were discussed with: Registered Nurse. ? Posted safety precautions in patient's room. ? Patient/family have participated as able in goal setting and plan of care. ?            Patient/family agree to work toward stated goals and plan of care. ?            Patient understands intent and goals of therapy, but is neutral about his/her participation. ? Patient is unable to participate in goal setting and plan of care. Thank you for this referral. 
Brian Santacruz. Ingrid More MS, CCC-SLP, BCS-S Time Calculation: 15 mins

## 2019-07-16 NOTE — PROGRESS NOTES
ADULT PROTOCOL: JET AEROSOL ASSESSMENT    Patient  Hoang Hartley     80 y.o.   male     7/16/2019  7:51 PM    Breath Sounds Pre Procedure: Right Breath Sounds: Diminished/Clear                               Left Breath Sounds: Diminished/Clear    Breath Sounds Post Procedure:                                        Breathing pattern: Pre procedure Breathing Pattern: Regular          Post procedure      Heart Rate: Pre procedure Pulse: 70           Post procedure 70    Resp Rate: Pre procedure Respirations: 16           Post procedure 16    Peak Flow: Pre bronchodilator             Post bronchodilator         Suctioned: NO    Sputum: Pre procedure                   Post procedure      Oxygen: O2 Device: Room air   FiO2 (%) 21     Changed: NO    SpO2: Pre procedure SpO2: 97 %   without oxygen              Post procedure    without oxygen    Nebulizer Therapy: Current medications Aerosolized Medications: DuoNeb      Changed: YES    Changed to PRN per protocol    Problem List:   Patient Active Problem List   Diagnosis Code    HLD (hyperlipidemia) E78.5    DJD (degenerative joint disease) M19.90    Smoker F17.200    Post herpetic neuralgia B02.29    COPD (chronic obstructive pulmonary disease) with emphysema (HCC) J43.9    Essential tremor G25.0    Anxiety state F41.1    Depression F32.9    ADHD (attention deficit hyperactivity disorder) F90.9    Abnormal CT lung screening R91.8    Prediabetes R73.03    Essential hypertension I10    Constipation K59.00    Closed right hip fracture (HCC) S72.001A    Benign prostatic hyperplasia with weak urinary stream N40.1, R39.12    Late onset Alzheimer's disease without behavioral disturbance G30.1, F02.80    PBA (pseudobulbar affect) F48.2    Hip fracture (HCC) S72.009A    Closed dislocation of left hip (HCC) S73.005A    Closed anterior dislocation of left hip (HCC) P11.087X    Posterior dislocation of left hip (HCC) S73.015A    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

## 2019-07-16 NOTE — CERTIFICATE OF TERMINAL ILLNESS
WOCN Note:  
 
New consult placed by RN / MD for left hip evaluation. Chart shows: 
Admitted for MRSA nares and hip: metabolic encephalopathy, bacterial pneumonia, hyponatremia, DERECK, sepsis, DJD, positive smoker, COPD, tremor, anxiety, depression, ADHD, abnormal CT lung scan, pre DM, HTYN, Alzheimers and left hip fracture. WBC = 11.1 On = 7-16-19 Admitted from [facility]. Assessment: patient had large soft brown bowel movement. Cleaned and skin protected with Vanita Velasquez / RN. Patient is alert but ? Orientation at times. communicative, incontinent and not mobile. 2 assists in repositioning, turning and lifting up in bed. Patient wearing briefs for incontinence and no polo. Bed: Versa Care Bed Diet: regular. Patient reports pain with turning. Bilateral heels are pink, dry and reyna. Buttocks skin intact and without erythema. Sacrum and anal area: darker in appearance and blanching slowly. No opened skin areas. Left hip scar suture line: intact, wel approximated with no openings. Patient repositioned on right  side with pillow between the knees and  Black brace to left lower leg. Recommendations:   
- sacrum and anal area: twice daily vasolex ointment. Minimize layers of linen/pads under patient to optimize support surface. Turn/reposition approximately every 2 hours and offload heels. Manage incontinence / promote continence; Aloe Vesta to buttocks and sacrum daily and as needed with incontinence care. Specialty bed: on a Versa Care Bed. Discussed above plan with patient and RN Vanita Velasquez. Transition of Care: Plan to follow weekly and as needed while admitted to hospital.   
 
Juan TRACY RN Wound Care Department Office: [phone] Pager: [phone]

## 2019-07-16 NOTE — PROGRESS NOTES
Bedside and Verbal shift change report given to Dennise (oncoming nurse) by Jer Velásquez (offgoing nurse). Report included the following information SBAR, Kardex, Recent Results, Med Rec Status and Cardiac Rhythm NSR.  
 
0830: Bedside and Verbal shift change report given to Tommy Ibarra (oncoming nurse) by [employer] (offgoing nurse). Report included the following information SBAR, Kardex, ED Summary, Intake/Output, MAR, Recent Results, Med Rec Status and Cardiac Rhythm NSR.

## 2019-07-16 NOTE — PROGRESS NOTES
Problem: Dysphagia (Adult) Goal: *Acute Goals and Plan of Care (Insert Text) Description Speech Pathology Initiated 7/16/19 1. Patient will participate in [address] today; goals to follow. MET 7/16 2. Caregiver will verbalize understanding of careful feeding strategies to reduced aspiration risk as much as possible within 7 days 7/16/2019 1501 by Jacob Velez SLP Outcome: Progressing Towards Goal 
7/16/2019 0935 by BORA Flores Outcome: Progressing Towards Goal 
 
SPEECH PATHOLOGY MODIFIED BARIUM SWALLOW STUDY Patient: Leander Castro (89 y.o. male) Date: 7/16/2019 Primary Diagnosis: Acute metabolic encephalopathy [A53.99] Precautions: aspiration ASSESSMENT : 
Based on the objective data described below, the patient presents with mild-moderate oral and severe pharyngeal dysphagia. Oral dysphagia characterized by delayed bolus manipulation and posterior propulsion for all consistencies. Mild diffuse oral residue with thin barium. Pharyngeal dysphagia characterized by decreased pharyngeal sensation and diffuse oropharyngeal weakness in tongue base retraction, pharyngeal constriction and hyolaryngeal excursion. Significant pharyngeal residue observed with all consistencies given weakness and although reduced with a double swallow, it did not clear completely. Penetration observed with single cup sip of thin barium given delayed initiation and gross aspiration observed after the swallow given the presence of residual. Delayed, weak coughing with +wet vocal quality evident after the event without full clearance from the trachea. At this time, patient is at a high risk for aspiration with all PO consistencies. A feeding tube is not an option the family/patient wishes to pursue per discussion with Dr. Shilpi Santo earlier today. The \"safest\" diet, although carrying an elevated aspiration risk, would be purees and honey thick liquids. Swallow treatment: SLP returned to room to discuss MBS findings and recommendations and concerns for aspiration. Daughter verbalized understanding and asked appropriate questions. She is eager to have MRI to further assess if acute stroke did happen. Etiology of dysphagia unknown at this time but suspect of acute stroke. The daughter is consulting with a friend who works for an hospice agency on potentially pursuing hospice services. Patient will benefit from skilled intervention to address the above impairments. Patient?s rehabilitation potential is considered to be Guarded Factors which may influence rehabilitation potential include:  
? None noted ? Mental ability/status ? Medical condition ? Home/family situation and support systems ? Safety awareness ? Pain tolerance/management ? Other: PLAN : 
Recommendations and Planned Interventions: 
Purees/HONEY thick liquids with careful hand feeding and accepted aspiration risks Safe swallowing strategies (upright for all PO, small bites/sips, slow rate) Strict oral care Frequency/Duration: Patient will be followed by speech-language pathology 2 times a week to address goals. Discharge Recommendations: hospice/SNF/TBD SUBJECTIVE:  
Patient grateful for SLP help. OBJECTIVE:  
 
Past Medical History:  
Diagnosis Date Arthritis BPH (benign prostatic hypertrophy) 4/12/2010 COPD (chronic obstructive pulmonary disease) with emphysema ([facility] [address] 7/25/2012 HLD (hyperlipidemia) 4/12/2010 HTN (hypertension) 4/12/2010 Ill-defined condition 2018 Dementia Memory disorder Psychiatric disorder   
 anxiety and depression Past Surgical History:  
Procedure Laterality Date COLONOSCOPY,NURA FERNÁNDEZ,REINALDO  8/5/2015  
 2 sessile sigmoid polyps, 3 & 5 mm; Dr. Payton Guthrie; no further screening recommended ENDOSCOPY, COLON, DIAGNOSTIC  2002 negative; 10 year repeat; Dr. Salazar Whitmore HX HERNIA REPAIR  years ago  
 left HX HERNIA REPAIR  2001  
 right inguinal  
 HX ORTHOPAEDIC    
 [facility] COLSC FLX W/RMVL OF TUMOR POLYP LESION SNARE TQ  7/27/2012 Prior Level of Function/Home Situation:  
Home Situation Home Environment: Skilled nursing facility One/Two Story Residence: One story Living Alone: No 
Support Systems: Skilled nursing facility Patient Expects to be Discharged to[de-identified] Skilled nursing facility Current DME Used/Available at Home: Hospital bed Diet prior to admission: Regular/thin Current Diet:  NPO Radiologist: Dr. Solis Yoon Film Views: Lateral;Fluoro Patient Position: Upright in Ha[place] chair Trial 1: Trial 2:  
Consistency Presented: Thin liquid Consistency Presented: Puree How Presented: Spoon;Cup/sip;SLP-fed/presented;Self-fed/presented How Presented: Spoon;SLP-fed/presented Consistency Amount: (25mL) Consistency Amount: (2 teaspoons barium paste mixed with applesauce) Bolus Acceptance: No impairment Bolus Acceptance: No impairment Bolus Formation/Control: Impaired: Delayed Bolus Formation/Control: Impaired: Delayed Propulsion: Discoordination;Delayed (# of seconds) Propulsion: Discoordination;Delayed (# of seconds) Oral Residue: Lingual Oral Residue: None Initiation of Swallow: Triggered at pyriform sinus(es) Initiation of Swallow: Triggered at valleculae Timing: Pooling 1-5 sec;Pyriform sinus Timing: Pooling 1-5 sec;Vallecular Penetration: During swallow; To laryngeal vestibule;From initial swallow Penetration: None Aspiration/Timing: From residual;After; Other (comment)(gross) Aspiration/Timing: No evidence of aspiration Pharyngeal Clearance: Vallecular residue;Pyriform residue ;Greater than 50% Pharyngeal Clearance: Vallecular residue;Greater than 50% Attempted Modifications: Double swallow;Effortful swallow Attempted Modifications: Double swallow Effective Modifications: Double swallow(effective in reducing but not fully clearing residual) Effective Modifications: Double swallow(effective in reducing but not fully clearing) Cues for Modifications: Minimal-moderate Cues for Modifications: Minimal-Mod  
     
 
Trial 3:  
Consistency Presented: Honey thick liquid How Presented: SLP-fed/presented;Spoon Consistency Amount: (2 teaspoons) Bolus Acceptance: No impairment Bolus Formation/Control: Impaired: Delayed Propulsion: Delayed (# of seconds); Discoordination Oral Residue: Lingual  
Initiation of Swallow: Triggered at valleculae Timing: Pooling 1-5 sec;Vallecular Penetration: None Aspiration/Timing: No evidence of aspiration Pharyngeal Clearance: Vallecular residue;Pyriform residue ;Greater than 50% Attempted Modifications: Double swallow Effective Modifications: Double swallow(effective in reducing, not clearing) Cues for Modifications: Minimal-Mod  
   
 
Decreased Tongue Base Retraction?: Yes Laryngeal Elevation: Inadequate epiglottic inversion; Incomplete laryngeal closure; Reduced excursion with laryngeal vestibule gap; Other (comment)(decreased sensation leading to delayed initiation) Aspiration/Penetration Score: 7 (Aspiration-Contrast passes below the cords/, but is not ejected despite attempt)(7-thin barium 1-puree 1-honey thick liq) Pharyngeal Symmetry: Not assessed Pharyngeal-Esophageal Segment: Decreased relaxation of upper esophageal segment; Other (comment)(given reduced hyolaryngeal excursion) Pharyngeal Dysfunction: Decreased tongue base retraction;Decreased strength;Decreased pharyngeal wall constriction;Decreased elevation/closure; Other (comment)(decreased sensation) Oral Phase Severity: Mild-moderate Pharyngeal Phase Severity: Severe NOMS:  
The NOMS functional outcome measure was used to quantify this patient's level of swallowing impairment.   Based on the NOMS, the patient was determined to be at level 2 for swallow function NOMS Swallowing Levels: 
Level 1 (CN): NPO Level 2 (CM): NPO but takes consistency in therapy Level 3 (CL): Takes less than 50% of nutrition p.o. and continues with nonoral feedings; and/or safe with mod cues; and/or max diet restriction Level 4 (CK): Safe swallow but needs mod cues; and/or mod diet restriction; and/or still requires some nonoral feeding/supplements Level 5 (CJ): Safe swallow with min diet restriction; and/or needs min cues Level 6 (CI): Independent with p.o.; rare cues; usually self cues; may need to avoid some foods or needs extra time Level 7 (CH): Independent for all p.o. STEVEN. (2003). National Outcomes Measurement System (NOMS): Adult Speech-Language Pathology User's Guide. COMMUNICATION/EDUCATION:  
 
The patient?s plan of care including findings from MBS, recommendations, planned interventions, and recommended diet changes were discussed with: Registered Nurse, Physician and . ? Posted safety precautions in patient's room. ? Patient/family have participated as able in goal setting and plan of care. ?  Patient/family agree to work toward stated goals and plan of care. ?  Patient understands intent and goals of therapy, but is neutral about his/her participation. ? Patient is unable to participate in goal setting and plan of care. Thank you for this referral. 
Kofi Hamede. Alok Nieto MS, CCC-SLP, BCS-S Time Calculation: 25 mins

## 2019-07-17 PROBLEM — I63.9 ACUTE CVA (CEREBROVASCULAR ACCIDENT) (HCC): Status: ACTIVE | Noted: 2019-01-01

## 2019-07-17 NOTE — PROGRESS NOTES
SPEECH LANGUAGE PATHOLOGY DYSPHAGIA TREATMENT  Patient: Marc Nunez (16 y.o. male)  Date: 7/17/2019  Diagnosis: Acute metabolic encephalopathy [G08.74] <principal problem not specified>       Precautions:   Contact, Fall    ASSESSMENT:  Per RN, patient tolerating puree diet with honey thickened liquids free of significant s/s of aspiration. Reviewed CT results. Noted MRI results, \"1. Large acute right MCA distribution infarct 2. Volume loss and white matter change persists, unchanged in the interval  3. Enlargement of the left cavernous carotid concerning for aneurysm\"  Attempted pharyngeal swallow exercises, facilitated with honey thickened liquids. These were discontinued after 10 repetitions due to increasing s/s of aspiration with each trial. ?Increased difficulty due to fatigue. PLAN:  Recommendations and Planned Interventions:  Continue careful feeding with known aspiration risks  Patient continues to benefit from skilled intervention to address the above impairments. Continue treatment per established plan of care. Discharge Recommendations: To Be Determined     SUBJECTIVE:   Patient stated This is smoothe. OBJECTIVE:   Cognitive and Communication Status:  Neurologic State: Drowsy, Confused  Orientation Level: Oriented to person, Disoriented to place, Disoriented to situation, Disoriented to time  Cognition: Follows commands  Perception: Appears intact  Perseveration: No perseveration noted  Safety/Judgement: Not assessed  Dysphagia Treatment:                                                   Exercises:  Laryngeal Exercises:                    Effortful Swallow: Yes  Sets : 1  Reps : 10                                                                                                        Pain:  Pain Scale 1: Numeric (0 - 10)  Pain Intensity 1: 0     After treatment:   ?              Patient left in no apparent distress sitting up in chair  ?               Patient left in no apparent distress in bed  ? Call bell left within reach  ? Nursing notified  ? Caregiver present  ? Bed alarm activated    COMMUNICATION/EDUCATION:   Patient was educated regarding  MBS results, current diet    The patients plan of care including recommendations, planned interventions, and recommended diet changes were discussed with: Registered Nurse. ? Posted safety precautions in patient's room.     BORA Burns  Time Calculation: 20 mins

## 2019-07-17 NOTE — PROGRESS NOTES
Problem: Falls - Risk of  Goal: *Absence of Falls  Description  Document [facility] Fall Risk and appropriate interventions in the flowsheet. Outcome: Progressing Towards Goal  Note:   Fall Risk Interventions:       Mentation Interventions: Adequate sleep, hydration, pain control         Elimination Interventions: Call light in reach    History of Falls Interventions: Door open when patient unattended         Problem: Patient Education: Go to Patient Education Activity  Goal: Patient/Family Education  Outcome: Progressing Towards Goal     Problem: Risk for Spread of Infection  Goal: Prevent transmission of infectious organism to others  Description  Prevent the transmission of infectious organisms to other patients, staff members, and visitors.   Outcome: Progressing Towards Goal     Problem: General Medical Care Plan  Goal: *Vital signs within specified parameters  Outcome: Progressing Towards Goal  Goal: *Labs within defined limits  Outcome: Progressing Towards Goal  Goal: *Absence of infection signs and symptoms  Outcome: Progressing Towards Goal  Goal: *Optimal pain control at patient's stated goal  Outcome: Progressing Towards Goal  Goal: *Skin integrity maintained  Outcome: Progressing Towards Goal  Goal: *Fluid volume balance  Outcome: Progressing Towards Goal  Goal: *Optimize nutritional status  Outcome: Progressing Towards Goal  Goal: *Anxiety reduced or absent  Outcome: Progressing Towards Goal  Goal: *Progressive mobility and function (eg: ADL's)  Outcome: Progressing Towards Goal     Problem: Pneumonia: Day 1  Goal: Off Pathway (Use only if patient is Off Pathway)  Outcome: Progressing Towards Goal  Goal: Activity/Safety  Outcome: Progressing Towards Goal  Goal: Consults, if ordered  Outcome: Progressing Towards Goal  Goal: Diagnostic Test/Procedures  Outcome: Progressing Towards Goal  Goal: Nutrition/Diet  Outcome: Progressing Towards Goal  Goal: Medications  Outcome: Progressing Towards Goal  Goal: Respiratory  Outcome: Progressing Towards Goal  Goal: Treatments/Interventions/Procedures  Outcome: Progressing Towards Goal  Goal: Psychosocial  Outcome: Progressing Towards Goal  Goal: *Oxygen saturation within defined limits  Outcome: Progressing Towards Goal  Goal: *Influenza vaccine administered (October-March)  Outcome: Progressing Towards Goal  Goal: *Pneumoccocal vaccine administered  Outcome: Progressing Towards Goal  Goal: *Hemodynamically stable  Outcome: Progressing Towards Goal  Goal: *Demonstrates progressive activity  Outcome: Progressing Towards Goal  Goal: *Tolerating diet  Outcome: Progressing Towards Goal

## 2019-07-17 NOTE — PROGRESS NOTES
Bedside shift change report given to Ayad Campbell RN (oncoming nurse) by Pastor Tatiana RN (offgoing nurse). Report included the following information SBAR, Kardex, MAR and Recent Results.

## 2019-07-17 NOTE — PROGRESS NOTES
Bedside shift change report given to Thor Aggarwal RN (oncoming nurse) by Raj Flores RN (offgoing nurse). Report included the following information SBAR, Kardex, MAR and Recent Results.

## 2019-07-17 NOTE — PROGRESS NOTES
Orders received, chart reviewed. Patient presently off floor in MRI. Will f/u later as able.  Dorcas Perez, PT

## 2019-07-17 NOTE — PROGRESS NOTES
Bedside and Verbal shift change report given to Randolph Cowan (oncoming nurse) by Soraya Hawkins (offgoing nurse). Report included the following information SBAR, Kardex, Intake/Output, MAR, Cardiac Rhythm NSR and Quality Measures.

## 2019-07-17 NOTE — PROGRESS NOTES
Hospitalist Progress Note  Kassy Rebollar MD  Answering service: [phone] from in house phone  Cell:       Date of Service:  2019  NAME:  Vishal Teixeira  :  1934  MRN:  [id number]      Admission Summary: An 80-year-old gentleman with a past medical history significant for primary hypertension, dyslipidemia, COPD, arthritis, anxiety disorder, depression, Alzheimer's dementia, chronically dislocated left hip, and MRSA infection left hip May 2019,  was brought to the emergency room by EMS from the [address] [place] [address]. Interval history / Subjective:   MBS with maddie aspiration yesterday. Ongoing L sided weakness. Discussed potential hospice with family. Assessment & Plan:     Sepsis (RR, WBC) secondary to HCAP - possible aspiration PNA   -  DC Vanc and continue cefepime. Blood cultures negative x 2 days. Consider switching to oral antibiotics tomorrow. - F/U sputum and blood cultures  - O2 as needed  - Bolus as needed  - Nebs, mucolytics    Large acute R MCA stroke - at this point very far out from acute stroke (likely Sat)  - MRI brain with large R MCA stroke, corresponding to new L sided weakness.   - PT/OT/speech  - Continue neurochecks  - ASA, statin     Metabolic encephalopathy - improved. in setting of infection, continue to monitor    DERECK  Stage 1, pre-renal secondary to dehydration - resolved. - DC IVF  - Monitor with daily BMP  - I and O, avoid nephrotoxins. Hypovolemic hyponatremia (mild) -resolved with IVF, continue to monitor   HTN - Not on BP medications, continue to monitor   BPH - home flomax, finasteride  Dementia - home donepezil, namenda   HLD - home statin   Constipation - miralax, docusate. Acute Moderate Protein-Calorie Malnutrition - nutrition consulted.      Code status: DNR  DVT prophylaxis: SCDs    Care Plan discussed with: Patient/Family  Disposition: TBD, SNF likely either with hospice, or without. Will discuss with family. Hospital Problems  Date Reviewed: 7/15/2019          Codes Class Noted POA    Acute metabolic encephalopathy JKK-81-CB: G93.41  ICD-9-CM: 348.31  7/15/2019 Unknown        Healthcare associated bacterial pneumonia ICD-10-CM: J15.9  ICD-9-CM: 482.9  7/15/2019 Yes        Hyponatremia ICD-10-CM: E87.1  ICD-9-CM: 276.1  7/15/2019 Yes        DERECK (acute kidney injury) ([facility] [address] ICD-10-CM: N17.9  ICD-9-CM: 584.9  7/15/2019 Yes        Sepsis ([facility] [address] ICD-10-CM: A41.9  ICD-9-CM: 038.9, 995.91  7/15/2019 Yes                Review of Systems:   A comprehensive review of systems was negative except for that written in the HPI. Vital Signs:    Last 24hrs VS reviewed since prior progress note. Most recent are:  Visit Vitals  /61 (BP 1 Location: Left arm, BP Patient Position: At rest)   Pulse 64   Temp 97.6 °F (36.4 °C)   Resp 16   Ht 5' 2\" (1.575 m)   Wt 44.9 kg (98 lb 15.8 oz)   SpO2 97%   BMI 18.10 kg/m²         Intake/Output Summary (Last 24 hours) at 7/17/2019 1503  Last data filed at 7/17/2019 1249  Gross per 24 hour   Intake 12 ml   Output    Net 12 ml        Physical Examination:             Constitutional:  No acute distress, cooperative, pleasant, chronically ill appearing, cachectic    ENT:  Oral mucous moist, oropharynx benign. Neck supple,    Resp:  CTA bilaterally. No wheezing/rhonchi/rales. No accessory muscle use   CV:  Regular rhythm, normal rate, no murmurs, gallops, rubs    GI:  Soft, non distended, non tender. normoactive bowel sounds, no hepatosplenomegaly     Musculoskeletal:  No edema, warm, 2+ pulses throughout    Neurologic:  Moves all extremities. RUE weakness. Skin:  Good turgor, no rashes or ulcers       Data Review:    Imaging and laboratory data reviewed.        Labs:     Recent Labs     07/17/19  0515 07/16/19  0305   WBC 9.0 11.1   HGB 8.5* 9.1*   HCT 27.7* 30.8*    191     Recent Labs     07/17/19  0513 07/16/19  0305 07/15/19  1505    136 134*   K 3.5 3.9 4.2   * 107 102   CO2 22 24 26   BUN 26* 40* 40*   CREA 0.81 1.06 1.38*   GLU 92 111* 161*   CA 8.7 8.9 9.5   MG 2.0  --   --    PHOS 2.0*  --   --      Recent Labs     07/15/19  1505   SGOT 16   ALT 14      TBILI 0.5   TP 8.3*   ALB 3.3*   GLOB 5.0*     No results for input(s): INR, PTP, APTT in the last 72 hours. No lab exists for component: INREXT, INREXT   No results for input(s): FE, TIBC, PSAT, FERR in the last 72 hours. Lab Results   Component Value Date/Time    Folate 19.6 12/08/2013 03:00 AM      No results for input(s): PH, PCO2, PO2 in the last 72 hours. No results for input(s): CPK, CKNDX, TROIQ in the last 72 hours.     No lab exists for component: CPKMB  Lab Results   Component Value Date/Time    Cholesterol, total 126 05/17/2017 08:49 AM    HDL Cholesterol 48 05/17/2017 08:49 AM    LDL, calculated 66 05/17/2017 08:49 AM    Triglyceride 61 05/17/2017 08:49 AM    CHOL/HDL Ratio 2.8 12/08/2013 02:50 AM     Lab Results   Component Value Date/Time    Glucose (POC) 92 12/07/2013 03:48 PM     Lab Results   Component Value Date/Time    Color DARK YELLOW 07/15/2019 03:05 PM    Appearance CLOUDY (A) 07/15/2019 03:05 PM    Specific gravity 1.024 07/15/2019 03:05 PM    pH (UA) 5.0 07/15/2019 03:05 PM    Protein TRACE (A) 07/15/2019 03:05 PM    Glucose NEGATIVE  07/15/2019 03:05 PM    Ketone NEGATIVE  07/15/2019 03:05 PM    Bilirubin NEGATIVE  07/07/2019 10:27 AM    Urobilinogen 0.2 07/15/2019 03:05 PM    Nitrites NEGATIVE  07/15/2019 03:05 PM    Leukocyte Esterase NEGATIVE  07/15/2019 03:05 PM    Epithelial cells FEW 07/15/2019 03:05 PM    Bacteria NEGATIVE  07/15/2019 03:05 PM    WBC 0-4 07/15/2019 03:05 PM    RBC 0-5 07/15/2019 03:05 PM         Medications Reviewed:     Current Facility-Administered Medications   Medication Dose Route Frequency    [START ON 7/18/2019] [email or web address] Vancomycin trough   Other ONCE    balsam peru-castor oil (VENELEX) ointment   Topical BID    cefepime (MAXIPIME) 2 g in 0.9% sodium chloride (MBP/ADV) 100 mL  2 g IntraVENous Q12H    albuterol-ipratropium (DUO-NEB) 2.5 MG-0.5 MG/3 ML  3 mL Nebulization Q6H PRN    sodium chloride (NS) flush 5-40 mL  5-40 mL IntraVENous Q8H    sodium chloride (NS) flush 5-40 mL  5-40 mL IntraVENous PRN    acetaminophen (TYLENOL) tablet 650 mg  650 mg Oral Q4H PRN    albuterol (PROVENTIL VENTOLIN) nebulizer solution 2.5 mg  2.5 mg Nebulization Q4H PRN    ondansetron (ZOFRAN) injection 4 mg  4 mg IntraVENous Q6H PRN    0.9% sodium chloride infusion  100 mL/hr IntraVENous CONTINUOUS    benzonatate (TESSALON) capsule 100 mg  100 mg Oral TID PRN    guaiFENesin ER (MUCINEX) tablet 600 mg  600 mg Oral Q12H    donepezil (ARICEPT) tablet 10 mg  10 mg Oral QHS    famotidine (PEPCID) tablet 20 mg  20 mg Oral DAILY    ferrous sulfate tablet 325 mg  325 mg Oral ACB    finasteride (PROSCAR) tablet 5 mg  5 mg Oral DAILY    melatonin tablet 6 mg  6 mg Oral QHS    memantine (NAMENDA) tablet 10 mg  10 mg Oral BID    polyethylene glycol (MIRALAX) packet 17 g  17 g Oral DAILY    rosuvastatin (CRESTOR) tablet 20 mg  20 mg Oral QHS    senna-docusate (PERICOLACE) 8.6-50 mg per tablet 2 Tab  2 Tab Oral QHS    tamsulosin (FLOMAX) capsule 0.4 mg  0.4 mg Oral DAILY    Vancomycin: Pharmacy to Dose   Other Rx Dosing/Monitoring    vancomycin (VANCOCIN) 750 mg in 0.9% sodium chloride (MBP/ADV) 250 mL  750 mg IntraVENous Q24H    therapeutic multivitamin (THERAGRAN) tablet 1 Tab  1 Tab Oral DAILY    calcium carbonate (OS-PLACIDO) tablet 500 mg [elemental]  500 mg Oral DAILY WITH BREAKFAST     ______________________________________________________________________  EXPECTED LENGTH OF STAY: 4d 19h  ACTUAL LENGTH OF STAY:          2                 Corbin Wellington MD

## 2019-07-17 NOTE — ACP (ADVANCE CARE PLANNING)
Advance Care Planning Note    Name: Yenny Kline  YOB: 1934  MRN: [id number]  Admission Date: 7/15/2019  2:42 PM    Date of discussion: 7/17/2019    Active Diagnoses:    Hospital Problems  Date Reviewed: 7/15/2019          Codes Class Noted POA    Acute CVA (cerebrovascular accident) [facility]) ICD-10-CM: I63.9  ICD-9-CM: 434.91  7/17/2019 Unknown        Acute metabolic encephalopathy CEF-95-FO: G93.41  ICD-9-CM: 348.31  7/15/2019 Unknown        Healthcare associated bacterial pneumonia ICD-10-CM: J15.9  ICD-9-CM: 482.9  7/15/2019 Yes        Hyponatremia ICD-10-CM: E87.1  ICD-9-CM: 276.1  7/15/2019 Yes        DERECK (acute kidney injury) [facility]) ICD-10-CM: N17.9  ICD-9-CM: 584.9  7/15/2019 Yes        Sepsis ([address] ICD-10-CM: A41.9  ICD-9-CM: 038.9, 995.91  7/15/2019 Yes               These active diagnoses are of sufficient risk that focused discussion on advance care planning is indicated in order to allow the patient to thoughtfully consider personal goals of care, and if situations arise that prevent the ability to personally give input, to ensure appropriate representation of their personal desires for different levels and aggressiveness of care. Discussion:     Persons present and participating in discussion: Fabrizio Oconnor MD, Pt's daughter and son     Discussion:   Reviewed MRI results and large MCA stroke. Patient currently with significant dysphagia and aspiration. Under no circumstances did he want a PEG or feeding tube. Therefore discussed risks of eating for comfort, with knowledge that possible for him to aspirate again, or have acute respiratory failure as a result. Family in agreement. Patient already with DNR on file. Discussed options moving forward. There is an option to shift towards comfort measures, and hospice, in which case we would focus completley on comfort.  Would avoid readmission to hospital in that situation. Can also return to [facility] (if they would like), and start PT/OT, continue comfort feeds, with the knowledge that he will likely decline in the future. Confirmed again pt is DNR/DNI, and mPOMEGHAN is his daughter. Time Spent:     Total time spent face-to-face in education and discussion: 16 minutes.      Lionel Cisse MD  7/17/2019  5:25 PM

## 2019-07-17 NOTE — PROGRESS NOTES
Problem: Self Care Deficits Care Plan (Adult)  Goal: *Acute Goals and Plan of Care (Insert Text)  Description  Occupational Therapy Goals  Initiated 7/17/2019  1. Patient will perform grooming with minimal assistance/contact guard assist within 7 day(s). 2.  Patient will perform upper body dressing with minimal assistance/contact guard assist within 7 day(s). 3.  Patient will follow simple, one step directions 2/3 trials for improved ADL completion within 7 days. 4.  Patient will participate in Fugl-Quinn assessment of LUE within 7 days. Outcome: Not Met  OCCUPATIONAL THERAPY EVALUATION  Patient: Elaine Carlton (36 y.o. male)  Date: 7/17/2019  Primary Diagnosis: Acute metabolic encephalopathy [R40.50]        Precautions:   Contact, Fall    ASSESSMENT :  Pt was received in bed and agreeable to OT. Son and young grandson present for session. Pt presents with impaired cognition, LLE immobilizer, impaired sitting balance, drowsiness, generalized weakness with LUE weakness greater than RUE. Based on the objective data described below, the patient presents with overall ADL performance levels total to maximum A level. Son able to provide insight regarding pts extensive medical course since March 2019 including 3 surgeries at L hip and multiple falls (including falls at [facility]). Recent baseline functioning includes w/c level mobility only. Son states pt was in process of transitioning to LTC at [facility] (prior to initial fall and fracture 3/2019 pt resided in independent senior neighborhood). Noted family and physicians determining medical goals of care (considering hospice). Patient will benefit from skilled intervention to address the above impairments pending medical plan of care (should family decide to purse hospice OT will discontinue).  OT alerted RN Sam Denson and son recommend pillow supporting LUE/ forearm to assist with glenohumeral joint approximation as pt at high risk for L shoulder subluxation. Patients rehabilitation potential is considered to be Guarded  Factors which may influence rehabilitation potential include:   ? None noted  ? Mental ability/status  ? Medical condition  ? Home/family situation and support systems  ? Safety awareness  ? Pain tolerance/management  ? Other:      PLAN :  Recommendations and Planned Interventions:  ?               Self Care Training                  ? Therapeutic Activities  ? Functional Mobility Training    ? Cognitive Retraining  ? Therapeutic Exercises           ? Endurance Activities  ? Balance Training                   ? Neuromuscular Re-Education  ? Visual/Perceptual Training     ? Home Safety Training  ? Patient Education                 ? Family Training/Education  ? Other (comment):    Frequency/Duration: Patient will be followed by occupational therapy 3 times a week to address goals. Discharge Recommendations: [facility] D[facility] (? With hospice pending family decisions)  Further Equipment Recommendations for Discharge: TBD      SUBJECTIVE:   Patient stated Look at those sweeties.  (Pt statement when looking at his son/ grandson)    OBJECTIVE DATA SUMMARY:   HISTORY:   Past Medical History:   Diagnosis Date    Arthritis     BPH (benign prostatic hypertrophy) 4/12/2010    COPD (chronic obstructive pulmonary disease) with emphysema ([facility] [address] 7/25/2012    HLD (hyperlipidemia) 4/12/2010    HTN (hypertension) 4/12/2010    Ill-defined condition 2018    Dementia    Memory disorder     Psychiatric disorder     anxiety and depression     Past Surgical History:   Procedure Laterality Date    COLONOSCOPY,NURA FERNÁNDEZ,SNARE  8/5/2015    2 sessile sigmoid polyps, 3 & 5 mm; Dr. Nagy Ra; no further screening recommended    ENDOSCOPY, COLON, DIAGNOSTIC  2002 negative; 10 year repeat; Dr. Alarcon Shells  years ago    left    HX HERNIA REPAIR  2001    right inguinal    HX ORTHOPAEDIC      [place] COLSC FLX W/RMVL OF TUMOR POLYP LESION SNARE TQ  7/27/2012            Prior Level of Function/Environment/Context: Please see assessment section. Of note, daughter Liz Diaz" lives locally and son resides in  [address] [place] [address] S  Expanded or extensive additional review of patient history:     Home Situation  Home Environment: Skilled nursing facility  One/Two Story Residence: One story  Living Alone: No  Support Systems: Skilled nursing facility, Family member(s)  Patient Expects to be Discharged to[de-identified] Skilled nursing facility  Current DME Used/Available at Home: Hospital bed, Wheelchair    Hand dominance: Right    EXAMINATION OF PERFORMANCE DEFICITS:  Cognitive/Behavioral Status:  Neurologic State: Drowsy; Confused  Orientation Level: Oriented to person;Disoriented to place; Disoriented to situation;Disoriented to time  Cognition: Follows commands  Perception: Appears intact  Perseveration: No perseveration noted  Safety/Judgement: Not assessed    Skin: Appears intact BUE    Edema: None noted BUE    Hearing:   Auditory  Auditory Impairment: None    Vision/Perceptual:    Tracking: Unable to test secondary due to decreased visual attention              Visual Fields: Unable to test secondary due to decreased visual attention            Corrective Lenses: Glasses(*Not at bedside)    Range of Motion:    AROM: Generally decreased, functional                         Strength:    Strength: Generally decreased, functional                Coordination:  Coordination: Generally decreased, functional  Fine Motor Skills-Upper: Left Impaired;Right Impaired    Gross Motor Skills-Upper: Left Impaired;Right Impaired    Tone & Sensation:    Tone: Normal  Sensation: (Inconsistent responses due to cognition)                      Balance:  Sitting: Impaired  Sitting - Static: Poor (constant support)(Supported sitting bedside level)    Functional Mobility and Transfers for ADLs:  Bed Mobility:       Transfers:       ADL Assessment:  Feeding: Maximum assistance(SLP noted; MD speaking with family about PO at session end)    Oral Facial Hygiene/Grooming: Maximum assistance    Bathing: Total assistance    Upper Body Dressing: Total assistance    Lower Body Dressing: Total assistance    Toileting: Total assistance                ADL Intervention and task modifications:                                     Cognitive Retraining  Safety/Judgement: Not assessed  Neuro: Max manual cue for scapular mobilization. Pt tolerated without c/o. Facilitation limited by positioning in bed, pt cognition (intermittent self direct movement) and family/ MD arrived to discuss plan of care. Functional Measure:  Barthel Index:    Bathin  Bladder: 0  Bowels: 0  Groomin  Dressin  Feedin  Mobility: 0  Stairs: 0  Toilet Use: 0  Transfer (Bed to Chair and Back): 0  Total: 0/100        Percentage of impairment   0%   1-19%   20-39%   40-59%   60-79%   80-99%   100%   Barthel Score 0-100 100 99-80 79-60 59-40 20-39 1-19   0     The Barthel ADL Index: Guidelines  1. The index should be used as a record of what a patient does, not as a record of what a patient could do. 2. The main aim is to establish degree of independence from any help, physical or verbal, however minor and for whatever reason. 3. The need for supervision renders the patient not independent. 4. A patient's performance should be established using the best available evidence. Asking the patient, friends/relatives and nurses are the usual sources, but direct observation and common sense are also important. However direct testing is not needed. 5. Usually the patient's performance over the preceding 24-48 hours is important, but occasionally longer periods will be relevant. 6. Middle categories imply that the patient supplies over 50 per cent of the effort.   7. Use of aids to be independent is allowed. Nuris Mesa., Barthel, D.W. (1389). Functional evaluation: the Barthel Index. [address] (14)2. Karena Barthel galo FADUMO Goodwin Ova Larsson.Terell., Sarah, [address] (1999). Measuring the change indisability after inpatient rehabilitation; comparison of the responsiveness of the Barthel Index and Functional [place] Measure. Journal of Neurology, Neurosurgery, and Psychiatry, 66(4), 504-944. BUZZ Angeles, ALFIE Bernabe, & Parul Shultz M.A. (2004.) Assessment of post-stroke quality of life in cost-effectiveness studies: The usefulness of the Barthel Index and the EuroQoL-5D. Quality of Life Research, 15, 304-54          Occupational Therapy Evaluation Charge Determination   History Examination Decision-Making   HIGH Complexity : Extensive review of history including physical, cognitive and psychosocial history  HIGH Complexity : 5 or more performance deficits relating to physical, cognitive , or psychosocial skils that result in activity limitations and / or participation restrictions HIGH Complexity : Patient presents with comorbidities that affect occupational performance. Signifigant modification of tasks or assistance (eg, physical or verbal) with assessment (s) is necessary to enable patient to complete evaluation       Based on the above components, the patient evaluation is determined to be of the following complexity level: HIGH   Pain:  Pain Scale 1: Numeric (0 - 10)  Pain Intensity 1: 0              Activity Tolerance:   Limited by generalized weakness/ fatigue. After treatment:   ? Patient left in no apparent distress sitting up in chair  ? Patient left in no apparent distress in bed  ? Call bell left within reach  ? Nursing notified  ? Caregiver present  ? Bed alarm activated    COMMUNICATION/EDUCATION:   The patients plan of care was discussed with: Physical Therapist and Registered Nurse.  ?  Home safety education was provided and the patient/caregiver indicated understanding. ? Patient/family have participated as able in goal setting and plan of care. ? Patient/family agree to work toward stated goals and plan of care. ? Patient understands intent and goals of therapy, but is neutral about his/her participation. ? Patient is unable to participate in goal setting and plan of care. This patients plan of care is appropriate for delegation to hospitals.     Thank you for this referral.  Tariq Portillo  Time Calculation: 18 mins

## 2019-07-17 NOTE — PROGRESS NOTES
Bedside and Verbal shift change report given to Raj Flores RN (oncoming nurse) by Garcia Foster RN (offgoing nurse). Report included the following information SBAR, Kardex, Intake/Output, MAR and Recent Results.

## 2019-07-18 NOTE — PROGRESS NOTES
AA&O to person only. Speech garbled. Tiffanie meds  crushed and given with applesauce or honey thick liq. HOB elevated. No evidence of aspiration. Sats % on room air. RR 16. SAUCEDO. Breath sounds dim lower lobes. Coarse aide. [id number]. //80. Afebrile. Contact precaution maintained. Complete bath CH. Venelex cream to excoriated coccyx/sacrum. Repositioned Q 2H.

## 2019-07-18 NOTE — PROGRESS NOTES
TRANSFER - IN REPORT:    Verbal report received from Calvin RN (name) on Robet Primes  being received from 3N (unit) for routine progression of care      Report consisted of patients Situation, Background, Assessment and   Recommendations(SBAR). Information from the following report(s) SBAR, Kardex, Intake/Output, MAR and Recent Results was reviewed with the receiving nurse. Opportunity for questions and clarification was provided. Assessment completed upon patients arrival to unit and care assumed.

## 2019-07-18 NOTE — PROGRESS NOTES
TRANSFER - OUT REPORT:    Verbal report given to Muriel(name) on Cheo Lim  being transferred to (unit) for routine progression of care       Report consisted of patients Situation, Background, Assessment and   Recommendations(SBAR). Information from the following report(s) SBAR, MAR, Recent Results and Med Rec Status was reviewed with the receiving nurse. Lines:   Peripheral IV 07/15/19 Right Antecubital (Active)   Site Assessment Clean, dry, & intact 7/18/2019  7:35 AM   Phlebitis Assessment 0 7/18/2019  7:35 AM   Infiltration Assessment 0 7/18/2019  7:35 AM   Dressing Status Clean, dry, & intact 7/18/2019  7:35 AM   Dressing Type Tape;Transparent 7/18/2019  7:35 AM   Hub Color/Line Status Pink;Capped 7/18/2019  7:35 AM   Action Taken Open ports on tubing capped 7/18/2019  3:32 AM   Alcohol Cap Used Yes 7/18/2019  7:35 AM       Peripheral IV 07/18/19 Left Antecubital (Active)   Site Assessment Clean, dry, & intact 7/18/2019  7:35 AM   Phlebitis Assessment 0 7/18/2019  7:35 AM   Infiltration Assessment 0 7/18/2019  7:35 AM   Dressing Status Clean, dry, & intact 7/18/2019  7:35 AM   Dressing Type Tape;Transparent 7/18/2019  7:35 AM   Hub Color/Line Status Pink;Capped 7/18/2019  7:35 AM   Action Taken Open ports on tubing capped 7/18/2019  4:00 AM   Alcohol Cap Used Yes 7/18/2019  7:35 AM        Opportunity for questions and clarification was provided.       Patient transported with:   [facility]

## 2019-07-18 NOTE — PROGRESS NOTES
Chart reviewed, patient comfort care with plan to discharge to [facility] Hospice, will sign off.    Yanick Clark, PT

## 2019-07-18 NOTE — PROGRESS NOTES
Problem: Falls - Risk of  Goal: *Absence of Falls  Description  Document Rachaelnarciso Haynes Fall Risk and appropriate interventions in the flowsheet.   Outcome: Progressing Towards Goal  Note:   Fall Risk Interventions:  Mobility Interventions: Bed/chair exit alarm, Patient to call before getting OOB    Mentation Interventions: Bed/chair exit alarm, Door open when patient unattended, More frequent rounding, Reorient patient         Elimination Interventions: Bed/chair exit alarm, Call light in reach, Patient to call for help with toileting needs, Toileting schedule/hourly rounds    History of Falls Interventions: Bed/chair exit alarm, Door open when patient unattended

## 2019-07-18 NOTE — PROGRESS NOTES
Problem: Dysphagia (Adult)  Goal: *Acute Goals and Plan of Care (Insert Text)  Description  Speech Pathology  Initiated 7/16/19  1. Patient will participate in [employer] today; goals to follow. MET 7/16  2. Caregiver will verbalize understanding of careful feeding strategies to reduced aspiration risk as much as possible within 7 days    Outcome: Not Met   SPEECH LANGUAGE PATHOLOGY DYSPHAGIA TREATMENT  Patient: Peg Cardoza (7 y.o. male)  Date: 7/18/2019  Diagnosis: Acute metabolic encephalopathy [I58.08] <principal problem not specified>       Precautions: aspiration Contact, Fall    ASSESSMENT:  Patient with s/s aspiration with all PO this morning. He was awake, alert and interested in PO. MBS showed severe dysphagia. Apparently family has opted to continue to feed for comfort with known aspiration risks from large R MCA. Suction now available at bedside and will most likely need to be used with all PO. PLAN:  Recommendations and Planned Interventions:  FEed PO only upon patient request for comfort. HIGH ASPIRATION RISK  Suction as needed. Patient continues to benefit from skilled intervention to address the above impairments. Continue treatment per established plan of care. Discharge Recommendations:  hospice      SUBJECTIVE:   Patient stated You are so kind. OBJECTIVE:   Cognitive and Communication Status:  Neurologic State: Alert  Orientation Level: Oriented to person  Cognition: Follows commands  Perception: Appears intact  Perseveration: No perseveration noted  Safety/Judgement: Not assessed  Dysphagia Treatment:  Oral Assessment:     P.O. Trials:  Patient Position: upright in bed. leaned R unless respositioned repetitively  Vocal quality prior to P.O.: Wet  Consistency Presented: Honey thick liquid;Puree  How Presented: SLP-fed/presented;Spoon     ORAL PHASE:   Patient alert and interested in PO. Fed by SLP  Mild-moderate R oral leakage at times, clary when coughing.  Good awareness of residue. PHARYNGEAL PHASE :  1) moderate to severe weakness  2)  immediate wet voice with any PO  3) delay in swallow; also easily distracted and talks before swallowing sometimes. More coughing on HTL than purees  4) had to obtain sx equipment and sx R oral cavity and pharynx. A  moderate amount of clear secretions removed. 5) repetitive, severe wet coughing that did not appear strong enough to clear pharynx. 6) PO was stopped after 10 bites due to severity of dysphagia. Spoke with RN and CNA         O2 sats 91-93 in this sessiion                                     Exercises:  Laryngeal Exercises:                                                                                                                                   Pain:  Pain Scale 1: Numeric (0 - 10)  Pain Intensity 1: 0     After treatment:   ?              Patient left in no apparent distress sitting up in chair  ? Patient left in no apparent distress in bed  ? Call bell left within reach  ? Nursing notified  ? Caregiver present  ? Bed alarm activated    COMMUNICATION/EDUCATION:   Patient was educated regarding His deficit(s) of severe dysphagia as this relates to His diagnosis of metabolic encephalopathy. He demonstrated Guarded understanding as evidenced by discussion. .    The patients plan of care including recommendations, planned interventions, and recommended diet changes were discussed with: Registered Nurse. ? Posted safety precautions in patient's room.     BORA Bass  Time Calculation: 20 mins

## 2019-07-18 NOTE — PROGRESS NOTES
CM has reached out to daughter Sukumar Loges this afternoon and they are still making decision about hospice agency they want to use. They did [facility]. She did confirm plan is to return to [facility]. She plans to call back this CM with choice later this afternoon.       AGUSTIN Mansfield

## 2019-07-18 NOTE — PROGRESS NOTES
Hospitalist Progress Note  Shad Vaca MD  Answering service: [phone] from in house phone  Cell:       Date of Service:  2019  NAME:  Vidal John  :  1934  MRN:  [id number]      Admission Summary: An 70-year-old gentleman with a past medical history significant for primary hypertension, dyslipidemia, COPD, arthritis, anxiety disorder, depression, Alzheimer's dementia, chronically dislocated left hip, and MRSA infection left hip May 2019,  was brought to the emergency room by EMS from the [address] [place] [address]. Interval history / Subjective:   A bit more sleepy today, but still alert when awake. Family at bedside. In agreement regarding comfort measures. Will DC labs, and IVF. Plan to discharge back to [facility] with hospice. Family deciding on hospice agency. Assessment & Plan:     Sepsis (RR, WBC) secondary to HCAP - possible aspiration PNA , improving.  -  S/p Vanc, will switch cefepime to oral antibiotics today. Plan to transition to hospice on discharge. - F/U sputum and blood cultures  - O2 as needed  - Bolus as needed  - Nebs, mucolytics    Large acute R MCA stroke - at this point far out from acute stroke (likely Sat), onoging, stable. - MRI brain with large R MCA stroke, corresponding to new L sided weakness.   - PT/OT/speech  - Continue neurochecks  - ASA, statin     Metabolic encephalopathy - improved. in setting of infection, continue to monitor    DERECK  Stage 1, pre-renal secondary to dehydration - resolved. - DC IVF  - Avoid additional labs as patient is now moving towards comfort measures. - I and O, avoid nephrotoxins.      Hypovolemic hyponatremia (mild) -resolved with IVF, continue to monitor   HTN - Not on BP medications, continue to monitor   BPH - home flomax, finasteride  Dementia - home donepezil, namenda   HLD - home statin   Constipation - miralax, docusate. Acute Moderate Protein-Calorie Malnutrition - nutrition consulted. Code status: DNR  DVT prophylaxis: SCDs    Care Plan discussed with: Patient/Family  Disposition: TBD, Back to [facility] ([facility] under hospice. Family to decide on agency today. Hospital Problems  Date Reviewed: 7/15/2019          Codes Class Noted POA    Acute CVA (cerebrovascular accident) [facility]) ICD-10-CM: I63.9  ICD-9-CM: 434.91  7/17/2019 Unknown        Acute metabolic encephalopathy RFE-48-GE: G93.41  ICD-9-CM: 348.31  7/15/2019 Unknown        Healthcare associated bacterial pneumonia ICD-10-CM: J15.9  ICD-9-CM: 482.9  7/15/2019 Yes        Hyponatremia ICD-10-CM: E87.1  ICD-9-CM: 276.1  7/15/2019 Yes        DERECK (acute kidney injury) ([facility] [address] ICD-10-CM: N17.9  ICD-9-CM: 584.9  7/15/2019 Yes        Sepsis ([facility] [address] ICD-10-CM: A41.9  ICD-9-CM: 038.9, 995.91  7/15/2019 Yes                Review of Systems:   A comprehensive review of systems was negative except for that written in the HPI. Vital Signs:    Last 24hrs VS reviewed since prior progress note. Most recent are:  Visit Vitals  /83 (BP 1 Location: Left arm, BP Patient Position: At rest)   Pulse 69   Temp 97.8 °F (36.6 °C)   Resp 16   Ht 5' 2\" (1.575 m)   Wt 44.6 kg (98 lb 5.2 oz)   SpO2 96%   BMI 17.98 kg/m²         Intake/Output Summary (Last 24 hours) at 7/18/2019 1425  Last data filed at 7/18/2019 1200  Gross per 24 hour   Intake 854 ml   Output 200 ml   Net 654 ml        Physical Examination:             Constitutional:  No acute distress, cooperative, pleasant, chronically ill appearing, cachectic    ENT:  Oral mucous moist, oropharynx benign. Neck supple, + L facial droop    Resp:  CTA bilaterally. No wheezing/rhonchi/rales. No accessory muscle use   CV:  Regular rhythm, normal rate, no murmurs, gallops, rubs    GI:  Soft, non distended, non tender.  normoactive bowel sounds, no hepatosplenomegaly     Musculoskeletal:  No edema, warm, 2+ pulses throughout Neurologic:  Moves all extremities. LUE weakness. Skin:  Good turgor, no rashes or ulcers       Data Review:    Imaging and laboratory data reviewed. Labs:     Recent Labs     07/18/19  0413 07/17/19  0515   WBC 7.1 9.0   HGB 8.6* 8.5*   HCT 27.9* 27.7*    203     Recent Labs     07/18/19  0413 07/17/19  0515 07/16/19  0305    140 136   K 3.4* 3.5 3.9   * 111* 107   CO2 23 22 24   BUN 16 26* 40*   CREA 0.80 0.81 1.06    92 111*   CA 8.6 8.7 8.9   MG 1.8 2.0  --    PHOS  --  2.0*  --      Recent Labs     07/15/19  1505   SGOT 16   ALT 14      TBILI 0.5   TP 8.3*   ALB 3.3*   GLOB 5.0*     No results for input(s): INR, PTP, APTT in the last 72 hours. No lab exists for component: INREXT, INREXT   No results for input(s): FE, TIBC, PSAT, FERR in the last 72 hours. Lab Results   Component Value Date/Time    Folate 19.6 12/08/2013 03:00 AM      No results for input(s): PH, PCO2, PO2 in the last 72 hours. No results for input(s): CPK, CKNDX, TROIQ in the last 72 hours.     No lab exists for component: CPKMB  Lab Results   Component Value Date/Time    Cholesterol, total 126 05/17/2017 08:49 AM    HDL Cholesterol 48 05/17/2017 08:49 AM    LDL, calculated 66 05/17/2017 08:49 AM    Triglyceride 61 05/17/2017 08:49 AM    CHOL/HDL Ratio 2.8 12/08/2013 02:50 AM     Lab Results   Component Value Date/Time    Glucose (POC) 92 12/07/2013 03:48 PM     Lab Results   Component Value Date/Time    Color DARK YELLOW 07/15/2019 03:05 PM    Appearance CLOUDY (A) 07/15/2019 03:05 PM    Specific gravity 1.024 07/15/2019 03:05 PM    pH (UA) 5.0 07/15/2019 03:05 PM    Protein TRACE (A) 07/15/2019 03:05 PM    Glucose NEGATIVE  07/15/2019 03:05 PM    Ketone NEGATIVE  07/15/2019 03:05 PM    Bilirubin NEGATIVE  07/07/2019 10:27 AM    Urobilinogen 0.2 07/15/2019 03:05 PM    Nitrites NEGATIVE  07/15/2019 03:05 PM    Leukocyte Esterase NEGATIVE  07/15/2019 03:05 PM    Epithelial cells FEW 07/15/2019 03:05 PM    Bacteria NEGATIVE  07/15/2019 03:05 PM    WBC 0-4 07/15/2019 03:05 PM    RBC 0-5 07/15/2019 03:05 PM         Medications Reviewed:     Current Facility-Administered Medications   Medication Dose Route Frequency    aspirin chewable tablet 81 mg  81 mg Oral DAILY    balsam peru-castor oil (VENELEX) ointment   Topical BID    cefepime (MAXIPIME) 2 g in 0.9% sodium chloride (MBP/ADV) 100 mL  2 g IntraVENous Q12H    albuterol-ipratropium (DUO-NEB) 2.5 MG-0.5 MG/3 ML  3 mL Nebulization Q6H PRN    sodium chloride (NS) flush 5-40 mL  5-40 mL IntraVENous Q8H    sodium chloride (NS) flush 5-40 mL  5-40 mL IntraVENous PRN    acetaminophen (TYLENOL) tablet 650 mg  650 mg Oral Q4H PRN    albuterol (PROVENTIL VENTOLIN) nebulizer solution 2.5 mg  2.5 mg Nebulization Q4H PRN    ondansetron (ZOFRAN) injection 4 mg  4 mg IntraVENous Q6H PRN    benzonatate (TESSALON) capsule 100 mg  100 mg Oral TID PRN    guaiFENesin ER (MUCINEX) tablet 600 mg  600 mg Oral Q12H    donepezil (ARICEPT) tablet 10 mg  10 mg Oral QHS    famotidine (PEPCID) tablet 20 mg  20 mg Oral DAILY    ferrous sulfate tablet 325 mg  325 mg Oral ACB    finasteride (PROSCAR) tablet 5 mg  5 mg Oral DAILY    melatonin tablet 6 mg  6 mg Oral QHS    memantine (NAMENDA) tablet 10 mg  10 mg Oral BID    polyethylene glycol (MIRALAX) packet 17 g  17 g Oral DAILY    rosuvastatin (CRESTOR) tablet 20 mg  20 mg Oral QHS    senna-docusate (PERICOLACE) 8.6-50 mg per tablet 2 Tab  2 Tab Oral QHS    tamsulosin (FLOMAX) capsule 0.4 mg  0.4 mg Oral DAILY    therapeutic multivitamin (THERAGRAN) tablet 1 Tab  1 Tab Oral DAILY    calcium carbonate (OS-PLACIDO) tablet 500 mg [elemental]  500 mg Oral DAILY WITH BREAKFAST     ______________________________________________________________________  EXPECTED LENGTH OF STAY: 4d 19h  ACTUAL LENGTH OF STAY:          3                 Joe Brock MD

## 2019-07-18 NOTE — PROGRESS NOTES
ANTONIETA:  1. Hospice consult pending, MD advised she is waiting MRI results. CM will communicate with family once confirmed plans with MD.  2.  Amelia from [facility] rounded and advised that pt is in LTC private pay at facility. They use [facility] if family decides for pt to return their under hospice care. Referral sent to [facility] via CC link. 3.  Will follow with family and MD.  4.  Clarified plan is to have informational session with hospice. [address] [place] [address], [phone] and she is going to [facility] to  list from SNF for choices and confer with her daughter regarding choice of agency. 5.  Daughter MANUEL will call this CM back today in order for CM to send referral to hospice agency of choice.      AGUSTIN Dickey

## 2019-07-19 PROBLEM — R13.10 DYSPHAGIA: Status: ACTIVE | Noted: 2019-01-01

## 2019-07-19 NOTE — DISCHARGE SUMMARY
Discharge Summary       PATIENT ID: Vernell Nick  MRN: [id number]   YOB: 1934    DATE OF ADMISSION: 7/15/2019  2:42 PM    DATE OF DISCHARGE: 07/19/2019   PRIMARY CARE PROVIDER: Digna Chirinos DO     DISCHARGING PROVIDER: Amanda Dickey MD    To contact this individual call [phone] and ask the  to page. If unavailable ask to be transferred the Adult Hospitalist Department. CONSULTATIONS: None    PROCEDURES/SURGERIES: * No surgery found *    ADMITTING DIAGNOSES & HOSPITAL COURSE:   Sepsis - secondary to aspiration PNA  Large Acute R MCA stroke   Dysphagia - secondary to CVA  Metabolic encephalopathy - improved. DERECK stage 1 - resolved      An 27-year-old gentleman with a past medical history significant for primary hypertension, dyslipidemia, COPD, arthritis, anxiety disorder, depression, Alzheimer's dementia, chronically dislocated left hip, and MRSA infection left hip May 2019,  was brought to the emergency room by EMS from the [address] [place] [address] for AMS. Per his daughter, on Sunday she noted acute L arm and leg weakness with facial droop. She alerted nursing at [facility], but after being examined decided he was doing ok. The next day he was alerted, and was brought in to the ED. At [facility], on admission he had sepsis, and CXR showed a L sided consolidation and effusion. He was started on cefepime and vanc for HCAP. VS improved, and Vanc was DC'd after blood cultures were negative x 48 hours. MRI was ordered to evaluate for potential stroke as CT head was neg on admission. Speech was consulted, and patient had severe dysphagia. MRI showed a large R MCA stroke consistent with symptoms of L sided weakness and dysphagia. Discussed options with family, and pt has an AMD which states he is DNR, and would not want a feeding tube under any circumstances. Opted for comfort feeding, and comfort care. Family decided on a hospice agency.  Plan to go back to [facility] with hospice.        DISCHARGE DIAGNOSES / PLAN:      Sepsis (RR, WBC) secondary to HCAP - possible aspiration PNA , improving.  -  S/p Vanc, cefepime to augmentin 7/18, continue for 4 additional days, or per hospice recommendation.  - F/U sputum and blood cultures --> NGTD  - O2 as needed for comfort. - Nebs, mucolytics     Large acute R MCA stroke -  onoging, stable. - MRI brain with large R MCA stroke, corresponding to new L sided weakness.   - ASA, statin   - Comfort measures, no PEG per family/patient wishes.      Metabolic encephalopathy - improved. in setting of infection, CVA continue to monitor     DERECK  Stage 1, pre-renal secondary to dehydration - resolved. S/p hydration.  - Avoid additional labs as patient is now moving towards comfort measures. - I and O, avoid nephrotoxins.      Hypovolemic hyponatremia (mild) -resolved with IVF, continue to monitor   HTN - Not on BP medications, continue to monitor   BPH - home flomax, finasteride  Dementia - home donepezil, namenda   HLD - home statin   Constipation - miralax, docusate. Acute Moderate Protein-Calorie Malnutrition - nutrition consulted.         ADDITIONAL CARE RECOMMENDATIONS:   1. Please take all medications as prescribed. Note changes as below. - Hospice will take over medications once you are discharged from the hospital.   2. Any concerns should be directed towards hospice. Goal is to keep him from returning to the hospital, and continue to focus on keeping him comfortable, and increase his quality of life.        PENDING TEST RESULTS:   At the time of discharge the following test results are still pending: None    FOLLOW UP APPOINTMENTS:    Follow-up Information     Follow up With Specialties Details Why Contact Info    Luci Haider DO Internal Medicine   [address]  [facility] [address]  [facility]  [phone]               DIET: Comfort feeding    ACTIVITY: Activity as tolerated    WOUND CARE: None    EQUIPMENT needed: None      DISCHARGE MEDICATIONS:  Current Discharge Medication List      START taking these medications    Details   amoxicillin-clavulanate (AUGMENTIN) 600-42.9 mg/5 mL suspension Take 5 mL by mouth every twelve (12) hours for 4 days. Qty: 40 mL, Refills: 0      aspirin 81 mg chewable tablet Take 1 Tab by mouth daily for 30 days. Qty: 30 Tab, Refills: 0      guaiFENesin ER (MUCINEX) 600 mg ER tablet Take 1 Tab by mouth every twelve (12) hours for 30 days. Qty: 60 Tab, Refills: 0         CONTINUE these medications which have CHANGED    Details   traMADol (ULTRAM) 50 mg tablet Take 1 Tab by mouth every six (6) hours as needed for Pain for up to 10 days. Max Daily Amount: 200 mg. Qty: 10 Tab, Refills: 0    Associated Diagnoses: Acute CVA (cerebrovascular accident) ([facility]         CONTINUE these medications which have NOT CHANGED    Details   famotidine (PEPCID) 20 mg tablet Take 20 mg by mouth two (2) times a day. acetaminophen (TYLENOL) 325 mg tablet Take 650 mg by mouth daily. polyethylene glycol (MIRALAX) 17 gram packet Take 17 g by mouth daily. senna-docusate (SENNA WITH DOCUSATE SODIUM) 8.6-50 mg per tablet Take 2 Tabs by mouth nightly. tiotropium (SPIRIVA WITH HANDIHALER) 18 mcg inhalation capsule Take 1 Cap by inhalation daily. melatonin 3 mg tablet Take 6 mg by mouth nightly. donepezil (ARICEPT) 10 mg tablet Take 10 mg by mouth nightly. memantine (NAMENDA) 10 mg tablet Take 10 mg by mouth two (2) times a day. rosuvastatin (CRESTOR) 20 mg tablet TAKE ONE TABLET BY MOUTH ONE TIME DAILY  Qty: 90 Tab, Refills: 3      tamsulosin (FLOMAX) 0.4 mg capsule Take 0.4 mg by mouth daily. Associated Diagnoses: BPH (benign prostatic hypertrophy)      finasteride (PROSCAR) 5 mg tablet Take 5 mg by mouth daily.     Associated Diagnoses: BPH (benign prostatic hypertrophy)         STOP taking these medications calcium-cholecalciferol, D3, (CALTRATE 600+D) tablet Comments:   Reason for Stopping:         ferrous sulfate 325 mg (65 mg iron) tablet Comments:   Reason for Stopping:         dextromethorphan-quiNIDine (NUEDEXTA) 20-10 mg per capsule Comments:   Reason for Stopping:         multivitamin (ONE A DAY) tablet Comments:   Reason for Stopping:                 NOTIFY YOUR PHYSICIAN FOR ANY OF THE FOLLOWING:   Fever over 101 degrees for 24 hours. Chest pain, shortness of breath, fever, chills, nausea, vomiting, diarrhea, change in mentation, falling, weakness, bleeding. Severe pain or pain not relieved by medications. Or, any other signs or symptoms that you may have questions about. DISPOSITION:   Home With:   OT  PT  HH  RN       Long term SNF/Inpatient Rehab    Independent/assisted living   X Hospice    Other:       PATIENT CONDITION AT DISCHARGE:     Functional status   X Poor     Deconditioned     Independent      Cognition     Lucid     Forgetful    X Dementia      Catheters/lines (plus indication)    Razo     PICC     PEG    X None      Code status     Full code    X DNR      PHYSICAL EXAMINATION AT DISCHARGE:  Visit Vitals  /76 (BP 1 Location: Right arm, BP Patient Position: At rest)   Pulse 68   Temp 98 °F (36.7 °C)   Resp 16   Ht 5' 2\" (1.575 m)   Wt 44.4 kg (97 lb 14.2 oz)   SpO2 96%   BMI 17.90 kg/m²     Gen: NAD, awake in bed  HEENT: NC/AT, sclera anicteric, PERRL, EOMI, L sided facial droop  CV: RRR no m/r/g  Resp: CTA b/l no increased work of breathing  Abd: NT/ND, normal bowel sounds  Ext: 2+ pulses, no edema, L sided weakness.    Skin: No rashes        CHRONIC MEDICAL DIAGNOSES:  Problem List as of 7/19/2019 Date Reviewed: 7/15/2019          Codes Class Noted - Resolved    Acute CVA (cerebrovascular accident) [facility]) ICD-10-CM: I63.9  ICD-9-CM: 434.91  7/17/2019 - Present        Acute metabolic encephalopathy VNL-64-BB: G93.41  ICD-9-CM: 348.31  7/15/2019 - Present        Healthcare associated bacterial pneumonia ICD-10-CM: J15.9  ICD-9-CM: 482.9  7/15/2019 - Present        Hyponatremia ICD-10-CM: E87.1  ICD-9-CM: 276.1  7/15/2019 - Present        DERECK (acute kidney injury) ([facility] ICD-10-CM: N17.9  ICD-9-CM: 584.9  7/15/2019 - Present        Sepsis ([facility] ICD-10-CM: A41.9  ICD-9-CM: 038.9, 995.91  7/15/2019 - Present        Dehydration ICD-10-CM: E86.0  ICD-9-CM: 276.51  7/8/2019 - Present        Altered mental status ICD-10-CM: R41.82  ICD-9-CM: 780.97  7/7/2019 - Present        Left hip prosthetic joint infection ([facility] ICD-10-CM: K50.32MU  ICD-9-CM: 996.66  5/30/2019 - Present        Status post total replacement of left hip ICD-10-CM: M81.262  ICD-9-CM: V43.64  5/13/2019 - Present        Posterior dislocation of left hip ([facility] ICD-10-CM: G06.632Y  ICD-9-CM: 835.01  5/10/2019 - Present        Closed dislocation of left hip ([facility] ICD-10-CM: S73.005A  ICD-9-CM: 835.00  4/15/2019 - Present        Closed anterior dislocation of left hip ([facility] ICD-10-CM: N90.284R  ICD-9-CM: 835.03  4/15/2019 - Present        Hip fracture ([facility] ICD-10-CM: S72.009A  ICD-9-CM: 820.8  3/30/2019 - Present        PBA (pseudobulbar affect) (Chronic) ICD-10-CM: D35.0  ICD-9-CM: 310.81  8/28/2018 - Present        Late onset Alzheimer's disease without behavioral disturbance (Chronic) ICD-10-CM: G30.1, F02.80  ICD-9-CM: 331.0, 294.10  3/27/2018 - Present        Benign prostatic hyperplasia with weak urinary stream ICD-10-CM: N40.1, R39.12  ICD-9-CM: 600.01, 788.62  12/26/2017 - Present        Closed right hip fracture ([facility] ICD-10-CM: S72.001A  ICD-9-CM: 820.8  11/28/2017 - Present        Constipation ICD-10-CM: K59.00  ICD-9-CM: 564.00  5/30/2017 - Present        Essential hypertension (Chronic) ICD-10-CM: I10  ICD-9-CM: 401.9  5/20/2015 - Present        Prediabetes ICD-10-CM: R73.03  ICD-9-CM: 790.29  11/23/2014 - Present        Abnormal CT lung screening ICD-10-CM: R91.8  ICD-9-CM: 793.2  11/21/2014 - Present    Overview Signed 12/1/2015  4:56 AM by Lukasz Jefferson MD     Repeat CT 11/30/15 showed 2 stable tiny lung nodules, pleural scarring, and emphysema. No routine further imaging indicated though might consider repeat screening in one year.              Depression ICD-10-CM: F32.9  ICD-9-CM: 867  12/20/2013 - Present        ADHD (attention deficit hyperactivity disorder) ICD-10-CM: F90.9  ICD-9-CM: 314.01  12/20/2013 - Present        Anxiety state ICD-10-CM: F41.1  ICD-9-CM: 300.00  9/27/2013 - Present        Essential tremor ICD-10-CM: G25.0  ICD-9-CM: 333.1  7/25/2013 - Present        COPD (chronic obstructive pulmonary disease) with emphysema (HCC) (Chronic) ICD-10-CM: J43.9  ICD-9-CM: 492.8  7/25/2012 - Present        Post herpetic neuralgia ICD-10-CM: B02.29  ICD-9-CM: 053.19  5/4/2011 - Present        Smoker ICD-10-CM: F17.200  ICD-9-CM: 305.1  8/16/2010 - Present        HLD (hyperlipidemia) (Chronic) ICD-10-CM: E78.5  ICD-9-CM: 272.4  4/12/2010 - Present        DJD (degenerative joint disease) ICD-10-CM: M19.90  ICD-9-CM: 715.90  4/12/2010 - Present        RESOLVED: Hip fracture ([facility] [address] ICD-10-CM: S72.009A  ICD-9-CM: 820.8  11/28/2017 - 12/26/2017        RESOLVED: Post concussion syndrome ICD-10-CM: F07.81  ICD-9-CM: 310.2  12/8/2013 - 4/16/2014        RESOLVED: Syncope and collapse ICD-10-CM: R55  ICD-9-CM: 780.2  12/7/2013 - 4/16/2014        RESOLVED: Short-term memory loss ICD-10-CM: R41.3  ICD-9-CM: 780.93  9/26/2013 - 3/27/2018        RESOLVED: Prolonged grief reaction ICD-10-CM: F43.29  ICD-9-CM: 309.1  11/22/2012 - 11/19/2014        RESOLVED: Grief reaction ICD-10-CM: F43.21  ICD-9-CM: 309.0  11/14/2011 - 11/22/2012        RESOLVED: Unexplained weight loss ICD-10-CM: R63.4  ICD-9-CM: 783.21  8/16/2010 - 11/14/2011        RESOLVED: BPH (benign prostatic hypertrophy) ICD-10-CM: N40.0  ICD-9-CM: 600.00  4/12/2010 - 12/26/2017              Greater than 30 minutes were spent with the patient on counseling and coordination of care    Signed:   Grace Schrader MD  7/19/2019  11:46 AM

## 2019-07-19 NOTE — PROGRESS NOTES
TRANSFER - OUT REPORT:    Verbal report given to Daria (name) on Layne Carter  being transferred to [facility] (unit) for routine progression of care       Report consisted of patients Situation, Background, Assessment and   Recommendations(SBAR). Information from the following report(s) SBAR, Kardex, Intake/Output, MAR and Recent Results was reviewed with the receiving nurse. Lines:   Peripheral IV 07/15/19 Right Antecubital (Active)   Site Assessment Clean, dry, & intact 7/19/2019  9:25 AM   Phlebitis Assessment 0 7/19/2019  9:25 AM   Infiltration Assessment 0 7/19/2019  9:25 AM   Dressing Status Clean, dry, & intact 7/19/2019  9:25 AM   Dressing Type Transparent 7/19/2019  9:25 AM   Hub Color/Line Status Pink;Capped 7/19/2019  9:25 AM   Action Taken Open ports on tubing capped 7/19/2019  9:25 AM   Alcohol Cap Used Yes 7/19/2019  9:25 AM       Peripheral IV 07/18/19 Left Antecubital (Active)   Site Assessment Clean, dry, & intact 7/19/2019  9:25 AM   Phlebitis Assessment 0 7/19/2019  9:25 AM   Infiltration Assessment 0 7/19/2019  9:25 AM   Dressing Status Clean, dry, & intact 7/19/2019  9:25 AM   Dressing Type Transparent 7/19/2019  9:25 AM   Hub Color/Line Status Pink;Capped 7/19/2019  9:25 AM   Action Taken Open ports on tubing capped 7/19/2019  9:25 AM   Alcohol Cap Used Yes 7/19/2019  9:25 AM        Opportunity for questions and clarification was provided.       Patient transported with:   O2 @ 0 liters

## 2019-07-19 NOTE — PROGRESS NOTES
Problem: Falls - Risk of  Goal: *Absence of Falls  Description  Document Stefan Gonzalez Fall Risk and appropriate interventions in the flowsheet. Outcome: Progressing Towards Goal  Note:   Fall Risk Interventions:  Mobility Interventions: Communicate number of staff needed for ambulation/transfer, Patient to call before getting OOB    Mentation Interventions: Door open when patient unattended, More frequent rounding    Medication Interventions: Bed/chair exit alarm    Elimination Interventions: Call light in reach, Patient to call for help with toileting needs    History of Falls Interventions: Door open when patient unattended         Problem: Risk for Spread of Infection  Goal: Prevent transmission of infectious organism to others  Description  Prevent the transmission of infectious organisms to other patients, staff members, and visitors.   Outcome: Progressing Towards Goal

## 2019-07-19 NOTE — DISCHARGE INSTRUCTIONS
Discharge Instructions       PATIENT ID: Marc Nunez  MRN: [id number]   YOB: 1934    DATE OF ADMISSION: 7/15/2019  2:42 PM    DATE OF DISCHARGE: 7/19/2019    PRIMARY CARE PROVIDER: Lesley Valenzuela DO     ATTENDING PHYSICIAN: Lashae Armstrong*  DISCHARGING PROVIDER: Annika Pedro MD    To contact this individual call [phone] and ask the  to page. If unavailable ask to be transferred the Adult Hospitalist Department. DISCHARGE DIAGNOSES   Sepsis - secondary to aspiration PNA  Large Acute R MCA stroke   Dysphagia - secondary to CVA  Metabolic encephalopathy - improved. DERECK stage 1 - resolved      CONSULTATIONS: None    PROCEDURES/SURGERIES: * No surgery found *    PENDING TEST RESULTS:   At the time of discharge the following test results are still pending: None    FOLLOW UP APPOINTMENTS:   Follow-up Information     Follow up With Specialties Details Why Contact Info    Hospice               ADDITIONAL CARE RECOMMENDATIONS:   1. Please take all medications as prescribed. Note changes as below. - Hospice will take over medications once you are discharged from the hospital.   2. Any concerns should be directed towards hospice. Goal is to keep him from returning to the hospital, and continue to focus on keeping him comfortable, and increase his quality of life. DIET: Comfort feeding    ACTIVITY: Activity as tolerated    WOUND CARE: None    EQUIPMENT needed: None      DISCHARGE MEDICATIONS:   See Medication Reconciliation Form    · It is important that you take the medication exactly as they are prescribed. · Keep your medication in the bottles provided by the pharmacist and keep a list of the medication names, dosages, and times to be taken in your wallet. · Do not take other medications without consulting your doctor. NOTIFY YOUR PHYSICIAN FOR ANY OF THE FOLLOWING:   Fever over 101 degrees for 24 hours.    Chest pain, shortness of breath, fever, chills, nausea, vomiting, diarrhea, change in mentation, falling, weakness, bleeding. Severe pain or pain not relieved by medications. Or, any other signs or symptoms that you may have questions about. DISPOSITION:    Home With:   OT  PT  HH  RN      X SNF/Inpatient Rehab/LTAC    Independent/assisted living   X Hospice    Other:     CDMP Checked: Yes X     PROBLEM LIST Updated:   Yes X       Signed:   Sanjana Funez MD  7/19/2019  12:50 PM

## 2019-07-19 NOTE — PROGRESS NOTES
NUTRITION     Chart reviewed. Pt now Comfort Measures Only with plans for Hospice after discharge noted. Will sign off at this time since no aggressive nutrition intervention indicated.      Ángela Valladares RD

## 2019-07-19 NOTE — PROGRESS NOTES
Bedside and Verbal shift change report given to [address] [place] [address], [address], RN (oncoming nurse) by Giana Cardenas RN (offgoing nurse). Report included the following information SBAR, Kardex, Intake/Output, MAR and Recent Results.

## 2019-07-19 NOTE — PROGRESS NOTES
ANTONIETA:  1. Discharge today to return to [facility] with hospice. 2. Daughter wanted to explore [facility] hospice. [facility] after speaking with both agencies. Planned hospice admission at facility around 5pm today. 3.  Reached out to yaron goss to confirm awareness of pt's return today. 4.  [facility] scheduled for 3:30pm request KOBE.     AGUSTIN Valdez

## 2019-07-26 NOTE — TELEPHONE ENCOUNTER
----- Message from Amanda Fleming sent at 7/26/2019  1:02 PM EDT -----  Regarding: Dr. Jan Arora first and last name: Kendra Hernandez , form [facility]       Reason for call: Transition       Callback required yes/no and why: N/a      Best contact number(s):       Details to clarify the request: Kendra Hernandez from [facility] would like to advise the Doctor that pt has passed at 5:20 this morning.        Fabiano Wilson    Copy[employer]paste [employer]

## 2020-12-02 NOTE — PROGRESS NOTES
06/07/19 NN spoke to [facility] at [facility]. Patient will see Dr. Claude Manson on 6/11. Patient remains [facility]. OT last treatment 6/11. Pt will continue. Anticipated d/c 6/19. Family and SNF team to determine if patient is able to return to [facility] at [facility] or potential transition to LTC. NN to f/u 1 week.  AR 

06:45

## 2022-11-21 NOTE — MR AVS SNAPSHOT
Pt came in for flu vaccine. Pt handled injection well. Visit Information Date & Time Provider Department Dept. Phone Encounter #  
 5/30/2017  2:00 PM Tyler Tate, [address] [phone] [id number] Follow-up Instructions Return if symptoms worsen or fail to improve. Your Appointments 9/5/2017 11:30 AM  
Follow Up with Reese King NP [facility] [address]) Appt Note: Follow up $0CP tdb 3/2/17  
 [address] [place] [address], 
[address] [place] [address], [address] [phone]  
[address] [facility], [address] [facility], [address] [address] Upcoming Health Maintenance Date Due  
 GLAUCOMA SCREENING Q2Y 12/30/2016 INFLUENZA AGE 9 TO ADULT 8/1/2017 MEDICARE YEARLY EXAM 5/17/2018 DTaP/Tdap/Td series (2 - Td) 3/14/2022 Allergies as of 5/30/2017  Review Complete On: 5/30/2017 By: Iveth Sandoval III, DO Severity Noted Reaction Type Reactions Zetia [Ezetimibe]  04/12/2010    Other (comments) C/o back pain Current Immunizations  Reviewed on 11/23/2015 Name Date Influenza High Dose Vaccine PF 10/15/2015 Influenza Vaccine 10/15/2014, 10/7/2013 Influenza Vaccine Whole 11/1/2010 Pneumococcal Conjugate (PCV-13) 11/23/2015 Pneumococcal Vaccine (Unspecified Type) 11/14/2011, 10/1/1998 TDAP Vaccine 3/14/2012 Zoster 9/24/2011 Not reviewed this visit You Were Diagnosed With   
  
 Codes Comments Constipation, unspecified constipation type    -  Primary ICD-10-CM: K59.00 ICD-9-CM: 564.00 Vitals BP Pulse Temp Resp Height(growth percentile) Weight(growth percentile)  
 117/68 (BP 1 Location: Left arm, BP Patient Position: Sitting) 74 98 °F (36.7 °C) (Oral) 14 5' 4\" (1.626 m) 118 lb (53.5 kg) SpO2 BMI Smoking Status 96% 20.25 kg/m2 Current Some Day Smoker Vitals History BMI and BSA Data  Body Mass Index Body Surface Area  
 20.25 kg/m 2 1.55 m 2  
  
  
 Preferred Pharmacy Pharmacy Name Phone [facility] [address] [place] [address] [phone] Your Updated Medication List  
  
   
This list is accurate as of: 5/30/17  2:45 PM.  Always use your most recent med list.  
  
  
  
  
 aspirin 81 mg tablet Take 81 mg by mouth daily. atomoxetine 80 mg capsule Commonly known as:  STRATTERA Take 1 Cap by mouth daily. finasteride 5 mg tablet Commonly known as:  PROSCAR Take 5 mg by mouth daily. multivitamin tablet Commonly known as:  ONE A DAY Take 1 Tab by mouth daily. rosuvastatin 20 mg tablet Commonly known as:  CRESTOR  
TAKE ONE TABLET BY MOUTH ONE TIME DAILY SPIRIVA WITH HANDIHALER 18 mcg inhalation capsule Generic drug:  tiotropium INHALE ONE CAPSULE BY MOUTH ONE TIME DAILY  
  
 tamsulosin 0.4 mg capsule Commonly known as:  FLOMAX Take 0.4 mg by mouth daily. Follow-up Instructions Return if symptoms worsen or fail to improve. Patient Instructions Constipation: Care Instructions Your Care Instructions Constipation means that you have a hard time passing stools (bowel movements). People pass stools from 3 times a day to once every 3 days. What is normal for you may be different. Constipation may occur with pain in the rectum and cramping. The pain may get worse when you try to pass stools. Sometimes there are small amounts of bright red blood on toilet paper or the surface of stools. This is because of enlarged veins near the rectum (hemorrhoids). A few changes in your diet and lifestyle may help you avoid ongoing constipation. Your doctor may also prescribe medicine to help loosen your stool. Some medicines can cause constipation. These include pain medicines and antidepressants. Tell your doctor about all the medicines you take. Your doctor may want to make a medicine change to ease your symptoms. Follow-up care is a key part of your treatment and safety. Be sure to make and go to all appointments, and call your doctor if you are having problems. It's also a good idea to know your test results and keep a list of the medicines you take. How can you care for yourself at home? · Drink plenty of fluids, enough so that your urine is light yellow or clear like water. If you have kidney, heart, or liver disease and have to limit fluids, talk with your doctor before you increase the amount of fluids you drink. · Include high-fiber foods in your diet each day. These include fruits, vegetables, beans, and whole grains. · Get at least 30 minutes of exercise on most days of the week. Walking is a good choice. You also may want to do other activities, such as running, swimming, cycling, or playing tennis or team sports. · Take a fiber supplement, such as Citrucel or Metamucil, every day. Read and follow all instructions on the label. · Schedule time each day for a bowel movement. A daily routine may help. Take your time having your bowel movement. · Support your feet with a small step stool when you sit on the toilet. This helps flex your hips and places your pelvis in a squatting position. · Your doctor may recommend an over-the-counter laxative to relieve your constipation. Examples are Milk of Magnesia and MiraLax. Read and follow all instructions on the label. Do not use laxatives on a long-term basis. When should you call for help? Call your doctor now or seek immediate medical care if: 
· You have new or worse belly pain. · You have new or worse nausea or vomiting. · You have blood in your stools. Watch closely for changes in your health, and be sure to contact your doctor if: 
· Your constipation is getting worse. · You do not get better as expected. Where can you learn more? Go to http://samina-cyn.info/.  
Enter 21 [phone] in the search box to learn more about \"Constipation: Care Instructions. \" Current as of: May 27, 2016 Content Version: 11.2 © 7089-4862 [employer]. Care instructions adapted under license by [employer] (which disclaims liability or warranty for this information). If you have questions about a medical condition or this instruction, always ask your healthcare professional. [address] any warranty or liability for your use of this information. Try colace or senna for stool softener. Would also add miralax daily. Introducing [facility]! [facility] introduces [employer]) patient portal. Now you can access parts of your medical record, email your doctor's office, and request medication refills online. 1. In your internet browser, go to [email or web address] 2. Click on the First Time User? Click Here link in the Sign In box. You will see the New Member Sign Up page. 3. Enter your [employer] Access Code exactly as it appears below. You will not need to use this code after youve completed the sign-up process. If you do not sign up before the expiration date, you must request a new code. · [employer] Access Code: [email or web address] Expires: 5/31/2017 12:53 PM 
 
4. Enter the last four digits of your Social Security Number (xxxx) and Date of Birth (mm/dd/yyyy) as indicated and click Submit. You will be taken to the next sign-up page. 5. Create a [employer]) ID. This will be your [employer] login ID and cannot be changed, so think of one that is secure and easy to remember. 6. Create a [employer]) password. You can change your password at any time. 7. Enter your Password Reset Question and Answer. This can be used at a later time if you forget your password. 8. Enter your e-mail address. You will receive e-mail notification when new information is available in [address] 9. Click Sign Up. You can now view and download portions of your medical record. 10. Click the Download Summary menu link to download a portable copy of your medical information. If you have questions, please visit the Frequently Asked Questions section of the [employer] website. Remember, [employer] is NOT to be used for urgent needs. For medical emergencies, dial 911. Now available from your iPhone and Android! Please provide this summary of care documentation to your next provider. Your primary care clinician is listed as Marietta Garcia If you have any questions after today's visit, please call [phone].

## 2023-11-23 NOTE — CDMP QUERY
Patient admitted with Sepsis, noted to have 8.3% wt loss from UBW over the past 5 months reflecting severe wt change. BMI 17.94. If possible, please document in progress notes and d/c summary if you are evaluating and/or treating any of the following: 
 
=> Acute Moderate Protein-Calorie Malnutrition  
=> Acute Severe Protein-Calorie Malnutrition 
=> Other Explanation of clinical findings 
=> Clinically Undetermined (no explanation for clinical findings) The medical record reflects the following: 
   Risk Factors: dementia, copd depression Clinical Indicators: dietitian PN 7/16 \"Patient meets criteria for Severe Protein Calorie Malnutrition as evidenced by:  
ASPEN Malnutrition Criteria Acute Illness, Chronic Illness, or Social/Enviornmental: Chronic illness Energy Intake: Less than/equal to 75% of est energy req for greater than/equal to 1 month Weight Loss: Greater than 7.5% x 3 mos Body Fat: Severe Muscle Mass: Severe ASPEN Malnutrition Score - Chronic Illness: 24 Chronic Illness - Malnutrition Diagnosis: Severe malnutrition. Treatment: dietitian consult, MBS today , monitoring total energy intake, weight change Thank You Percy Morris, [address] [facility] 
   [phone] oral

## 2024-12-16 NOTE — PROGRESS NOTES
No. Occupational Therapy Orders received and medical record reviewed. Pt is having bedside testing at this time. Will defer. 1200: Will defer OT as pt is about to receive PRBCs. Will continue to follow to initiate OT Evaluation.

## (undated) DEVICE — HANDLE LT SNAP ON ULT DURABLE LENS FOR TRUMPF ALC DISPOSABLE

## (undated) DEVICE — SUTURE ETHBND EXCEL SZ 2 L30IN NONABSORBABLE GRN L40MM V-37 MX69G

## (undated) DEVICE — 6619 2 PTNT ISO SYS INCISE AREA&LT;(&GT;&&LT;)&GT;P: Brand: STERI-DRAPE™ IOBAN™ 2

## (undated) DEVICE — SYR 50ML LR LCK 1ML GRAD NSAF --

## (undated) DEVICE — SOLUTION IV 1000ML 0.9% SOD CHL

## (undated) DEVICE — DRAPE SHT 3 QTR PROXIMA 53X77 --

## (undated) DEVICE — 4-PORT MANIFOLD: Brand: NEPTUNE 2

## (undated) DEVICE — ADHESIVE SKIN CLOSURE 4X22 CM PREMIERPRO EXOFINFUSION DISP

## (undated) DEVICE — SUTURE VCRL SZ 1 L36IN ABSRB UD L36MM CT-1 1/2 CIR J947H

## (undated) DEVICE — (D)PREP SKN CHLRAPRP APPL 26ML -- CONVERT TO ITEM 371833

## (undated) DEVICE — GLOVE SURG SZ 8.5 L11.85IN FNGR THK7.5MIL IVRY LTX FREE

## (undated) DEVICE — SLIM BODY SKIN STAPLER: Brand: APPOSE ULC

## (undated) DEVICE — SUTURE VCRL SZ 0 L27IN ABSRB UD L36MM CT-1 1/2 CIR J260H

## (undated) DEVICE — 3M™ IOBAN™ 2 ANTIMICROBIAL INCISE DRAPE 6651EZ: Brand: IOBAN™ 2

## (undated) DEVICE — GOWN,SIRUS,NONRNF,SETINSLV,XL,20/CS: Brand: MEDLINE

## (undated) DEVICE — REM POLYHESIVE ADULT PATIENT RETURN ELECTRODE: Brand: VALLEYLAB

## (undated) DEVICE — SPONGE LAP 18X18IN STRL -- 5/PK

## (undated) DEVICE — SURGICAL PROCEDURE PACK BASIN MAJ SET CUST NO CAUT

## (undated) DEVICE — KENDALL SCD EXPRESS SLEEVES, KNEE LENGTH, MEDIUM: Brand: KENDALL SCD

## (undated) DEVICE — Device

## (undated) DEVICE — DRAPE,REIN 53X77,STERILE: Brand: MEDLINE

## (undated) DEVICE — WATERPROOF, BACTERIA PROOF DRESSING WITH ABSORBENT SEE THROUGH PAD: Brand: OPSITE POST-OP VISIBLE 30X10CM CTN 20

## (undated) DEVICE — GOWN,SIRUS,NONRNF,SETINSLV,2XL,18/CS: Brand: MEDLINE

## (undated) DEVICE — SUTURE VCRL SZ 0 L27IN ABSRB VLT L36MM CT-1 1/2 CIR J340H

## (undated) DEVICE — BIT DRL L330MM DIA4.2MM CALIB 100MM 3 FLUT QUIK CPL

## (undated) DEVICE — COVER,TABLE,60X90,STERILE: Brand: MEDLINE

## (undated) DEVICE — FLOSEAL MATRIX IS INDICATED IN SURGICAL PROCEDURES (OTHER THAN IN OPHTHALMIC) AS AN ADJUNCT TO HEMOSTASIS WHEN CONTROL OF BLEEDING BY LIGATURE OR CONVENTIONALPROCEDURES IS INEFFECTIVE OR IMPRACTICAL.: Brand: FLOSEAL HEMOSTATIC MATRIX

## (undated) DEVICE — BANDAGE COMPR SELF ADH 5 YDX6 IN TAN STRL PREMIERPRO LF

## (undated) DEVICE — STERILE LATEX POWDER-FREE SURGICAL GLOVESWITH NITRILE COATING: Brand: PROTEXIS

## (undated) DEVICE — ROCKER SWITCH PENCIL BLADE ELECTRODE, HOLSTER: Brand: EDGE

## (undated) DEVICE — SOLUTION IRRIG 1000ML H2O STRL BLT

## (undated) DEVICE — STERILE POLYISOPRENE POWDER-FREE SURGICAL GLOVES: Brand: PROTEXIS

## (undated) DEVICE — TOWEL SURG W17XL27IN STD BLU COT NONFENESTRATED PREWASHED

## (undated) DEVICE — ABDOMINAL PAD: Brand: DERMACEA

## (undated) DEVICE — DRAPE XR C ARM 41X74IN LF --

## (undated) DEVICE — INFECTION CONTROL KIT SYS

## (undated) DEVICE — SUTURE VCRL SZ 2-0 L36IN ABSRB UD L36MM CT-1 1/2 CIR J945H

## (undated) DEVICE — SUTURE VCRL SZ 2-0 L36IN ABSRB VLT L36MM CT-1 1/2 CIR J345H

## (undated) DEVICE — SMOKE EVACUATION PENCIL: Brand: VALLEYLAB

## (undated) DEVICE — DEVON™ KNEE AND BODY STRAP 60" X 3" (1.5 M X 7.6 CM): Brand: DEVON

## (undated) DEVICE — Z DISCONTINUED USE 2717541 SUTURE STRATAFIX SZ 3-0 L30CM NONABSORBABLE UD L26MM FS 3/8

## (undated) DEVICE — SUTURE STRATAFIX SYMMETRIC SZ 1 L18IN ABSRB VLT CT1 L36CM SXPP1A404

## (undated) DEVICE — HANDPIECE SET WITH COAXIAL HIGH FLOW TIP AND SUCTION TUBE: Brand: INTERPULSE

## (undated) DEVICE — GAUZE SPONGES,12 PLY: Brand: CURITY

## (undated) DEVICE — CEMENT MIXING SYSTEM WITH FEMORAL BREAKWAY NOZZLE: Brand: REVOLUTION

## (undated) DEVICE — WATERPROOF, BACTERIA PROOF DRESSING WITH ABSORBENT SEE THROUGH PAD: Brand: OPSITE POST-OP VISIBLE 20X10CM CTN 20

## (undated) DEVICE — TRAP FLUID BUFFALO FLTR

## (undated) DEVICE — 3000CC GUARDIAN II: Brand: GUARDIAN

## (undated) DEVICE — NDL SPNE QNCKE 18GX3.5IN LF --

## (undated) DEVICE — ELECTRODE BLDE L4IN NONINSULATED EDGE

## (undated) DEVICE — 2108 SERIES SAGITTAL BLADE (24.8 X 0.88 X 73.8MM)

## (undated) DEVICE — SOLUTION IRRIG 3000ML 0.9% SOD CHL FLX CONT 0797208] ICU MEDICAL INC]

## (undated) DEVICE — PREP KIT PEEL PTCH POVIDONE IOD

## (undated) DEVICE — SUTURE ABSORBABLE MONOFILAMENT 2-0 WND CLOSURE GRN V-LOC 180 VLOCL0315

## (undated) DEVICE — STRAP POS KNEE BODY VELC

## (undated) DEVICE — (D)SOL MEDC ALC ISO 70% 16OZ -- CONVERT TO ITEM 364515

## (undated) DEVICE — STERILE POLYISOPRENE POWDER-FREE SURGICAL GLOVES WITH EMOLLIENT COATING: Brand: PROTEXIS

## (undated) DEVICE — IMMOBILIZER KNEE PREMIER PRO TRI PNL 22INCH FOAM TIETEX PAT

## (undated) DEVICE — ROD RMR L950MM DIA2.5MM W/ EXTN BALL TIP

## (undated) DEVICE — 3.2MM GUIDE WIRE 400MM